# Patient Record
Sex: MALE | Race: WHITE | NOT HISPANIC OR LATINO | Employment: OTHER | URBAN - METROPOLITAN AREA
[De-identification: names, ages, dates, MRNs, and addresses within clinical notes are randomized per-mention and may not be internally consistent; named-entity substitution may affect disease eponyms.]

---

## 2017-03-02 ENCOUNTER — HOSPITAL ENCOUNTER (OUTPATIENT)
Dept: ULTRASOUND IMAGING | Facility: HOSPITAL | Age: 67
Discharge: HOME | End: 2017-03-02
Attending: UROLOGY
Payer: COMMERCIAL

## 2017-03-02 DIAGNOSIS — Q61.00 CONGENITAL RENAL CYST: ICD-10-CM

## 2017-03-02 PROCEDURE — 76770 US EXAM ABDO BACK WALL COMP: CPT

## 2017-03-22 ENCOUNTER — TRANSCRIBE ORDERS (OUTPATIENT)
Dept: ADMINISTRATIVE | Facility: HOSPITAL | Age: 67
End: 2017-03-22

## 2017-03-22 DIAGNOSIS — N28.1 ACQUIRED CYST OF KIDNEY: Primary | ICD-10-CM

## 2017-05-22 ENCOUNTER — HOSPITAL ENCOUNTER (OUTPATIENT)
Dept: RADIOLOGY | Facility: HOSPITAL | Age: 67
Discharge: HOME | End: 2017-05-22
Payer: COMMERCIAL

## 2017-05-22 ENCOUNTER — TRANSCRIBE ORDERS (OUTPATIENT)
Dept: RADIOLOGY | Facility: HOSPITAL | Age: 67
End: 2017-05-22

## 2017-05-22 ENCOUNTER — ALLSCRIPTS OFFICE VISIT (OUTPATIENT)
Dept: OTHER | Facility: OTHER | Age: 67
End: 2017-05-22

## 2017-05-22 ENCOUNTER — TRANSCRIBE ORDERS (OUTPATIENT)
Dept: ADMINISTRATIVE | Facility: HOSPITAL | Age: 67
End: 2017-05-22

## 2017-05-22 DIAGNOSIS — Z87.891 FORMER SMOKER: ICD-10-CM

## 2017-05-22 DIAGNOSIS — M79.672 PAIN OF LEFT FOOT: ICD-10-CM

## 2017-05-22 DIAGNOSIS — Z13.6 ENCOUNTER FOR SCREENING FOR VASCULAR DISEASE: Primary | ICD-10-CM

## 2017-05-22 PROCEDURE — 73630 X-RAY EXAM OF FOOT: CPT

## 2017-05-23 ENCOUNTER — TRANSCRIBE ORDERS (OUTPATIENT)
Dept: LAB | Facility: HOSPITAL | Age: 67
End: 2017-05-23

## 2017-05-23 ENCOUNTER — APPOINTMENT (OUTPATIENT)
Dept: LAB | Facility: HOSPITAL | Age: 67
End: 2017-05-23
Payer: COMMERCIAL

## 2017-05-23 DIAGNOSIS — I10 ESSENTIAL (PRIMARY) HYPERTENSION: ICD-10-CM

## 2017-05-23 DIAGNOSIS — E66.01 MORBID (SEVERE) OBESITY DUE TO EXCESS CALORIES (HCC): ICD-10-CM

## 2017-05-23 LAB
ALBUMIN SERPL BCP-MCNC: 4 G/DL (ref 3.5–5)
ALP SERPL-CCNC: 78 U/L (ref 46–116)
ALT SERPL W P-5'-P-CCNC: 26 U/L (ref 12–78)
ANION GAP SERPL CALCULATED.3IONS-SCNC: 6 MMOL/L (ref 4–13)
AST SERPL W P-5'-P-CCNC: 15 U/L (ref 5–45)
BASOPHILS # BLD AUTO: 0.01 THOUSANDS/ΜL (ref 0–0.1)
BASOPHILS NFR BLD AUTO: 0 % (ref 0–1)
BILIRUB SERPL-MCNC: 0.73 MG/DL (ref 0.2–1)
BILIRUB UR QL STRIP: NEGATIVE
BUN SERPL-MCNC: 15 MG/DL (ref 5–25)
CALCIUM SERPL-MCNC: 8.5 MG/DL (ref 8.3–10.1)
CHLORIDE SERPL-SCNC: 107 MMOL/L (ref 100–108)
CHOLEST SERPL-MCNC: 135 MG/DL (ref 50–200)
CLARITY UR: CLEAR
CO2 SERPL-SCNC: 27 MMOL/L (ref 21–32)
COLOR UR: YELLOW
CREAT SERPL-MCNC: 1.07 MG/DL (ref 0.6–1.3)
EOSINOPHIL # BLD AUTO: 0.22 THOUSAND/ΜL (ref 0–0.61)
EOSINOPHIL NFR BLD AUTO: 3 % (ref 0–6)
ERYTHROCYTE [DISTWIDTH] IN BLOOD BY AUTOMATED COUNT: 13.4 % (ref 11.6–15.1)
GFR SERPL CREATININE-BSD FRML MDRD: >60 ML/MIN/1.73SQ M
GLUCOSE P FAST SERPL-MCNC: 95 MG/DL (ref 65–99)
GLUCOSE UR STRIP-MCNC: NEGATIVE MG/DL
HCT VFR BLD AUTO: 46 % (ref 36.5–49.3)
HDLC SERPL-MCNC: 53 MG/DL (ref 40–60)
HGB BLD-MCNC: 15.6 G/DL (ref 12–17)
HGB UR QL STRIP.AUTO: NEGATIVE
KETONES UR STRIP-MCNC: NEGATIVE MG/DL
LDLC SERPL CALC-MCNC: 66 MG/DL (ref 0–100)
LEUKOCYTE ESTERASE UR QL STRIP: NEGATIVE
LYMPHOCYTES # BLD AUTO: 0.96 THOUSANDS/ΜL (ref 0.6–4.47)
LYMPHOCYTES NFR BLD AUTO: 15 % (ref 14–44)
MCH RBC QN AUTO: 29.9 PG (ref 26.8–34.3)
MCHC RBC AUTO-ENTMCNC: 33.9 G/DL (ref 31.4–37.4)
MCV RBC AUTO: 88 FL (ref 82–98)
MONOCYTES # BLD AUTO: 0.83 THOUSAND/ΜL (ref 0.17–1.22)
MONOCYTES NFR BLD AUTO: 13 % (ref 4–12)
NEUTROPHILS # BLD AUTO: 4.55 THOUSANDS/ΜL (ref 1.85–7.62)
NEUTS SEG NFR BLD AUTO: 69 % (ref 43–75)
NITRITE UR QL STRIP: NEGATIVE
NRBC BLD AUTO-RTO: 0 /100 WBCS
PH UR STRIP.AUTO: 6 [PH] (ref 4.5–8)
PLATELET # BLD AUTO: 233 THOUSANDS/UL (ref 149–390)
PMV BLD AUTO: 10.1 FL (ref 8.9–12.7)
POTASSIUM SERPL-SCNC: 4.3 MMOL/L (ref 3.5–5.3)
PROT SERPL-MCNC: 7.4 G/DL (ref 6.4–8.2)
PROT UR STRIP-MCNC: NEGATIVE MG/DL
RBC # BLD AUTO: 5.22 MILLION/UL (ref 3.88–5.62)
SODIUM SERPL-SCNC: 140 MMOL/L (ref 136–145)
SP GR UR STRIP.AUTO: 1.02 (ref 1–1.03)
TRIGL SERPL-MCNC: 80 MG/DL
TSH SERPL DL<=0.05 MIU/L-ACNC: 1.78 UIU/ML (ref 0.36–3.74)
UROBILINOGEN UR QL STRIP.AUTO: 0.2 E.U./DL
WBC # BLD AUTO: 6.59 THOUSAND/UL (ref 4.31–10.16)

## 2017-05-23 PROCEDURE — 85025 COMPLETE CBC W/AUTO DIFF WBC: CPT

## 2017-05-23 PROCEDURE — 80053 COMPREHEN METABOLIC PANEL: CPT

## 2017-05-23 PROCEDURE — 80061 LIPID PANEL: CPT

## 2017-05-23 PROCEDURE — 84443 ASSAY THYROID STIM HORMONE: CPT

## 2017-05-23 PROCEDURE — 36415 COLL VENOUS BLD VENIPUNCTURE: CPT

## 2017-05-23 PROCEDURE — 81003 URINALYSIS AUTO W/O SCOPE: CPT

## 2017-05-24 ENCOUNTER — GENERIC CONVERSION - ENCOUNTER (OUTPATIENT)
Dept: OTHER | Facility: OTHER | Age: 67
End: 2017-05-24

## 2017-05-25 ENCOUNTER — HOSPITAL ENCOUNTER (OUTPATIENT)
Dept: RADIOLOGY | Facility: HOSPITAL | Age: 67
Discharge: HOME | End: 2017-05-25
Payer: COMMERCIAL

## 2017-05-25 DIAGNOSIS — Z13.6 ENCOUNTER FOR SCREENING FOR VASCULAR DISEASE: ICD-10-CM

## 2017-05-25 PROCEDURE — 76706 US ABDL AORTA SCREEN AAA: CPT

## 2017-05-29 ENCOUNTER — GENERIC CONVERSION - ENCOUNTER (OUTPATIENT)
Dept: OTHER | Facility: OTHER | Age: 67
End: 2017-05-29

## 2017-06-21 ENCOUNTER — ALLSCRIPTS OFFICE VISIT (OUTPATIENT)
Dept: OTHER | Facility: OTHER | Age: 67
End: 2017-06-21

## 2017-06-22 ENCOUNTER — ALLSCRIPTS OFFICE VISIT (OUTPATIENT)
Dept: OTHER | Facility: OTHER | Age: 67
End: 2017-06-22

## 2017-06-29 ENCOUNTER — GENERIC CONVERSION - ENCOUNTER (OUTPATIENT)
Dept: OTHER | Facility: OTHER | Age: 67
End: 2017-06-29

## 2017-07-13 ENCOUNTER — GENERIC CONVERSION - ENCOUNTER (OUTPATIENT)
Dept: OTHER | Facility: OTHER | Age: 67
End: 2017-07-13

## 2017-07-19 ENCOUNTER — ALLSCRIPTS OFFICE VISIT (OUTPATIENT)
Dept: OTHER | Facility: OTHER | Age: 67
End: 2017-07-19

## 2017-07-20 ENCOUNTER — GENERIC CONVERSION - ENCOUNTER (OUTPATIENT)
Dept: OTHER | Facility: OTHER | Age: 67
End: 2017-07-20

## 2017-07-27 ENCOUNTER — GENERIC CONVERSION - ENCOUNTER (OUTPATIENT)
Dept: OTHER | Facility: OTHER | Age: 67
End: 2017-07-27

## 2017-08-03 ENCOUNTER — GENERIC CONVERSION - ENCOUNTER (OUTPATIENT)
Dept: OTHER | Facility: OTHER | Age: 67
End: 2017-08-03

## 2017-08-10 ENCOUNTER — GENERIC CONVERSION - ENCOUNTER (OUTPATIENT)
Dept: OTHER | Facility: OTHER | Age: 67
End: 2017-08-10

## 2017-08-17 ENCOUNTER — ALLSCRIPTS OFFICE VISIT (OUTPATIENT)
Dept: OTHER | Facility: OTHER | Age: 67
End: 2017-08-17

## 2017-08-31 ENCOUNTER — GENERIC CONVERSION - ENCOUNTER (OUTPATIENT)
Dept: OTHER | Facility: OTHER | Age: 67
End: 2017-08-31

## 2017-09-07 ENCOUNTER — GENERIC CONVERSION - ENCOUNTER (OUTPATIENT)
Dept: OTHER | Facility: OTHER | Age: 67
End: 2017-09-07

## 2017-09-14 ENCOUNTER — GENERIC CONVERSION - ENCOUNTER (OUTPATIENT)
Dept: OTHER | Facility: OTHER | Age: 67
End: 2017-09-14

## 2017-09-21 ENCOUNTER — GENERIC CONVERSION - ENCOUNTER (OUTPATIENT)
Dept: OTHER | Facility: OTHER | Age: 67
End: 2017-09-21

## 2017-09-28 ENCOUNTER — ALLSCRIPTS OFFICE VISIT (OUTPATIENT)
Dept: OTHER | Facility: OTHER | Age: 67
End: 2017-09-28

## 2018-01-09 NOTE — PROGRESS NOTES
History of Present Illness  HPI: Precious Martinez is here for 2 month f/u  Current wt 262 lbs, loss of 19 2 lbs x 7 wks  Not tracking but no changes to intake  Likely consuming ~1800 calories/day  He is going on vacation next week and reports he will be overindulging and "gorging" at the buffet x 2  Encouraged him to be mindful of his portions and choices but he said this will not be happening  Discussed other meals surrounding these higher calorie meals and he is prepared to take meal replacements for breakfast and lunch as well as do a lot of walking  Bariatric MNT [facility]:   Weight Assessment: IBW: 166 lbs, ABW: 190 lbs, Weight Change: 19 2 lb loss x 7 wks, Highest Weight: 284 lbs, Lowest Weight: 185 lbs, Goal Weight: 253 lbs  Weight loss attempts: Other: flat belly 50 lbs  Excess calories come from large portions at mealtimes and high calorie high fat food choices  Dietary Recall:   Breakfast: replacement  Snack: orange, [place]  Lunch: 2 oranges, replacement  Snack: bar  Dinner: 2 turkey burgers (homemade), s/w thin, veggies, 1 slice cheese  Snack: 2 fudge pop or nectarine or vitamin C   Beverages: water, powderade  Estimated fluid intake: >80 oz  Alcohol consumption: n/a  Food allergies/intolerances: n/a  Cooking: wife  Shopping: both  Exercise Frequency:  He exercises treadmill/gym when not working  His obesity/being overweight is related to excess energy intake and as evidenced by BMI=37 6  Nutrition Prescription: Estimated calories for weight loss: eCalc BMR 1975, wt loss w/o exercise 1764-8153, w/exercise 4739-7878, Estimated daily protein needs:  gm (1 2-1 5 gm/kg IBW) and Estimated daily fluid needs: 88 oz (35 cc/kg IBW)  Nutrition Intervention: Counseling provided with emphasis on meal planning, portion sizes, hydration, protein intake and exercise  Education materials provided: [employer] manual    Comprehension: good  Expected Compliance: good  Goals: follow meal plan prescribed, plan meals and snacks daily, Choose lower-calorie, lower-fat meal options at home and when dining out, do not skip meals or snacks and 6664-8659 calories using 1-2 replacements  Monitor/Evaluation: weekly weight, food journal, fluid intake and exercise level  Active Problems    1  Chronic obstructive pulmonary disease (496) (J44 9)   2  Decreased vision in both eyes (369 20) (H54 2)   3  Eczema (692 9) (L30 9)   4  Encounter for screening for vascular disease (V81 2) (Z13 6)   5  Foot pain, left (729 5) (M79 672)   6  Heel pain, chronic, left (729 5,338 29) (M79 672,G89 29)   7  Hypertension (401 9) (I10)   8  Mild depression (311) (F32 0)   9  Morbid or severe obesity due to excess calories (278 01) (E66 01)   10  Need for pneumococcal vaccination (V03 82) (Z23)   11  Obstructive sleep apnea (327 23) (G47 33)   12  Renal cyst, left (753 10) (N28 1)   13  Screening for colon cancer (V76 51) (Z12 11)   14  Screening for prostate cancer (V76 44) (Z12 5)   15  Welcome to Medicare preventive visit (V70 0) (Z00 00)   16  Well adult exam (V70 0) (Z00 00)    Past Medical History    1  History of Arthralgia of right elbow (719 42) (M25 521)   2  History of Borderline hypertension (796 2) (R03 0)   3  History of Epilepsy And Recurrent Seizures (345 90)   4  History of Fracture Of Facial Bones (802 8)   5  History of asthma (V12 69) (Z87 09)   6  History of obesity (V13 89) (Z86 39)   7  History of shortness of breath (V13 89) (Z87 898)   8  History of sleep apnea (V13 89) (Z86 69)    Surgical History    1  History of Appendectomy   2  History of Dental Surgery   3  History of Elevation Of Depressed Skull Fracture   4  History of Tonsillectomy    Family History  Mother    1  Family history of dementia (V17 2) (Z81 8)  Father    2  Family history of   Family History    3   Family history of Cancer    Social History    · Former smoker (D43 62) (X69 420)   · Never Drank Alcohol   · No illicit drug use   · Non drinker / no alcohol use   · Occasional caffeine consumption    Current Meds   1  Advair Diskus 250-50 MCG/DOSE Inhalation Aerosol Powder Breath Activated; USE 1   PUFF TWICE DAILY; Therapy: 91ODO4727 to (Last Rx:23May2016)  Requested for: 62QIC5652 Ordered   2  Ibuprofen 800 MG Oral Tablet; TAKE 1 TABLET TWICE DAILY AS NEEDED; Therapy: 98GEQ6247 to (Evaluate:14Jan2016)  Requested for: 20CTI5010; Last   Rx:04Jan2016 Ordered   3  Lisinopril-Hydrochlorothiazide 10-12 5 MG Oral Tablet; TAKE 1 TABLET EVERY DAY; Therapy: 46QUT4876 to (Evaluate:18Nov2017)  Requested for: 97VJR8450; Last   Rx:22May2017 Ordered   4  Ventolin  (90 Base) MCG/ACT Inhalation Aerosol Solution; USE 2 PUFFS EVERY   4-6 HOURS AS NEEDED; Therapy: 85NOD2545 to (Evaluate:21Mar2017); Last Rx:21Nov2016 Ordered    Allergies    1  Penicillin V Potassium SOLR    Vitals  Signs   Recorded: 17Aug2017 07:39AM   Height: 5 ft 10 in  Weight: 262 lb   BMI Calculated: 37 59  BSA Calculated: 2 34    Future Appointments    Date/Time Provider Specialty Site   03/27/2018 08:30 AM Joaquin Dudley MD Urology [address]   10/05/2017 03:30 PM IVIS Hill  Bariatric Medicine [facility]     Signatures   Electronically signed by :  Daren Shelton, ; Aug 17 2017 12:00PM EST                       (Author)    Electronically signed by : IVIS Wu ; Aug 17 2017  4:41PM EST                       (Co-author)

## 2018-01-10 NOTE — RESULT NOTES
Verified Results  US ABDOMINAL AORTA SCREENING AAA [id number] 08:01AM Mortimer [place]     Test Name Result Flag Reference   US ABDOMINAL AORTA SCREENING AAA (Report)     ABDOMINAL AORTIC ULTRASOUND     INDICATION: Evaluate for aortic aneurysm  COMPARISON: CT abdomen and pelvis 1/15/2016     FINDINGS:      Ultrasound of the abdominal aorta was performed in longitudinal and transverse planes with a curvilinear transducer  Evaluation is mildly limited secondary to shadowing  Proximal aorta: 3 4 x 3 6 cm   Mid aorta:  2 x 2 1 cm   Distal aorta:  1 8 x 1 9 cm   Right common iliac origin: 1 x 1 2 cm   Left common iliac origin: 1 x 1 2 cm     No periaortic collections or adenopathy detected  IMPRESSION:   Limited evaluation demonstrating mild enlargement of the proximal abdominal aorta measuring 3 4 x 3 6 cm         Workstation performed: [id number]     Signed by:   Carlota Pavon MD   5/29/17

## 2018-01-10 NOTE — CONSULTS
Chief Complaint  Chief Complaint Free Text Note Form: Patient is here today for MWM consult  Patient has been diagnosed with sleep apnea but does not use C-PAP machine  History of Present Illness  Free Text HPI: Obesity-  Severity: Severe  Onset: 1990s  Modifiers:Flat belly diet lost 50lbs in 6lbs  Associated Symptoms: increased SOB, increased sweating, increased BP  Past Medical History    1  History of Arthralgia of right elbow (719 42) (M25 521)   2  History of Borderline hypertension (796 2) (R03 0)   3  History of Epilepsy And Recurrent Seizures (345 90)   4  History of Fracture Of Facial Bones (802 8)   5  History of obesity (V13 89) (Z86 39)   6  History of shortness of breath (V13 89) (N10 932)  Active Problems And Past Medical History Reviewed: The active problems and past medical history were reviewed and updated today  Assessment    1  Morbid or severe obesity due to excess calories (278 01) (E66 01)   2  Obstructive sleep apnea (327 23) (G47 33)   3  Hypertension (401 9) (I10)   4  Chronic obstructive pulmonary disease (496) (J44 9)    Discussion/Summary  Discussion Summary:   71-year-old male with hypertension, COPD, KIZZY and obesity here to pursue medical weight management to improve weight and health  Obesity class 3:  -discussed options of conservative vs CAM program +/- meal replacement vs VLCD and bariatric surgery(not interested in this)  -initial focus of 5-10% weight loss over 3-6 mos for improved health  -screening labs-reviewed from 5/2017 and within acceptable limits    KIZZY:  -Encouraged continued use of C Pap-does not use, discussed the risks of untreated sleep apnea    Hypertension: Elevated today, patient did not take medication  -Continue with lisinopril/HCTZ    Asthma vs COPD: Stable  -Continue with current regimen    Patient is interested in intensive lifestyle intervention program and will start this with his wife   We'll set up visit with dietitian, 2 months BP check, 3 month follow-up with me  Patient's Capacity to Self-Care: Patient is able to Self-Care  Understands and agrees with treatment plan: The treatment plan was reviewed with the patient/guardian  The patient/guardian understands and agrees with the treatment plan   Self Referrals:   Self Referrals: No      Review of Systems  Focused-Male:   Constitutional: no fever  ENT: no sore throat  Cardiovascular: no chest pain and no palpitations  Respiratory: shortness of breath during exertion  Gastrointestinal: no abdominal pain, no nausea, no constipation and no diarrhea  Genitourinary: urinary hesitancy  Musculoskeletal: no arthralgias  Integumentary: no rashes  Neurological: no headache  Other Symptoms: Psych:denies depression/anxiety  Active Problems    1  Chronic obstructive pulmonary disease (496) (J44 9)   2  Decreased vision in both eyes (369 20) (H54 2)   3  Eczema (692 9) (L30 9)   4  Encounter for screening for vascular disease (V81 2) (Z13 6)   5  Foot pain, left (729 5) (M79 672)   6  Heel pain, chronic, left (729 5,338 29) (M79 672,G89 29)   7  Hypertension (401 9) (I10)   8  Mild depression (311) (F32 0)   9  Morbid or severe obesity due to excess calories (278 01) (E66 01)   10  Need for pneumococcal vaccination (V03 82) (Z23)   11  Obstructive sleep apnea (327 23) (G47 33)   12  Renal cyst, left (753 10) (N28 1)   13  Screening for colon cancer (V76 51) (Z12 11)   14  Screening for prostate cancer (V76 44) (Z12 5)   15  Welcome to Medicare preventive visit (V70 0) (Z00 00)   16  Well adult exam (V70 0) (Z00 00)    Surgical History    1  History of Appendectomy   2  History of Dental Surgery  Surgical History Reviewed: The surgical history was reviewed and updated today  Family History  Mother    1  Family history of dementia (V17 2) (Z81 8)  Family History    2  Family history of Cancer  Family History Reviewed: The family history was reviewed and updated today  Social History    · Former smoker (D66 66) (W23 639)   · Never Drank Alcohol   · Occasional caffeine consumption  Social History Reviewed: The social history was reviewed and updated today  Current Meds   1  Advair Diskus 250-50 MCG/DOSE Inhalation Aerosol Powder Breath Activated; USE 1   PUFF TWICE DAILY; Therapy: 22ZLP5566 to (Last Rx:23May2016)  Requested for: 52TTS5974 Ordered   2  Ibuprofen 800 MG Oral Tablet; TAKE 1 TABLET TWICE DAILY AS NEEDED; Therapy: 77CTY3307 to (Evaluate:14Jan2016)  Requested for: 06IQL5599; Last   Rx:04Jan2016 Ordered   3  Lisinopril-Hydrochlorothiazide 10-12 5 MG Oral Tablet; TAKE 1 TABLET EVERY DAY; Therapy: 01JVC7006 to (Evaluate:18Nov2017)  Requested for: 36MFO3095; Last   Rx:22May2017 Ordered   4  Ventolin  (90 Base) MCG/ACT Inhalation Aerosol Solution; USE 2 PUFFS EVERY   4-6 HOURS AS NEEDED; Therapy: 46BGZ3226 to (Evaluate:21Mar2017); Last Rx:21Nov2016 Ordered  Medication List Reviewed: The medication list was reviewed and updated today  Allergies    1  Penicillin V Potassium SOLR    Vitals  Vital Signs    Recorded: 21Jun2017 04:03PM   Temperature 97 5 F   Heart Rate 78   Systolic 471, LUE, Sitting   Diastolic 88, LUE, Sitting   Height 5 ft 10 in   Weight 284 lb 5 oz   BMI Calculated 40 79   BSA Calculated 2 42   Waist Circumference 54 in  Neck Circumference 18 in  Physical Exam    Constitutional   General appearance: Abnormal   well developed and morbidly obese  Eyes No conjunctival pallor  Ears, Nose, Mouth, and Throat Moist oral mucosa  Pulmonary   Respiratory effort: No increased work of breathing or signs of respiratory distress  Auscultation of lungs: Clear to auscultation, equal breath sounds bilaterally, no wheezes, no rales, no rhonci  Cardiovascular   Auscultation of heart: Normal rate and rhythm, normal S1 and S2, without murmurs  Abdomen   Abdomen: Abnormal   The abdomen was obese  The abdomen was soft and nontender  Musculoskeletal   Gait and station: Normal     Psychiatric   Orientation to person, place and time: Normal     Mood and affect: Normal          Future Appointments    Date/Time Provider Specialty Site   07/05/2017 09:00 AM IVIS Mariscal   03/27/2018 08:30 AM Danae Hines MD Urology [address]     Signatures   Electronically signed by : IVIS Barbour ; Jun 21 2017  5:03PM EST                       (Author)

## 2018-01-10 NOTE — RESULT NOTES
Message   Colon polyp biopsy showed hyperplastic polyp  Colonoscopy recommended in ten years  Please enter reminder  Verified Results  (1) TISSUE EXAM 04Apr2016 02:23PM Verona Pulido   polyp     Test Name Result Flag Reference   LAB AP CASE REPORT (Report)     Surgical Pathology Report             Case: [id number]                   Authorizing Provider: Skyler Hankins MD      Collected:      04/04/2016 1423        Ordering Location:   [place] [address]   Received:      04/05/2016 [facility]                               Pathologist:      Jaime Rivera MD                              Specimen:  Large Intestine, Sigmoid Colon, Cold bx, polyp   LAB AP FINAL DIAGNOSIS      A  Sigmoid colon, polyp, cold biopsy:    - Hyperplastic polyp     - No dysplasia or carcinoma identified  LAB AP SURGICAL ADDITIONAL INFORMATION (Report)     These tests were developed and their performance characteristics   determined by [address] [place] [address] or [employer]  They may not be cleared or approved by the [place]  Food and   Drug Administration  The FDA has determined that such clearance or   approval is not necessary  These tests are used for clinical purposes  They should not be regarded as investigational or for research  This   laboratory has been approved by [facility], designated as a high-complexity   laboratory and is qualified to perform these tests  - Interpretation performed at [facility]   LAB AP GROSS DESCRIPTION (Report)     A  The specimen is received in formalin, labeled with the patient's name   and hospital number, and is designated colon polyp biopsy  The specimen   consists of one tan-pink soft tissue fragment measuring 0 3 centimeters in   greatest dimension  Entirely submitted  One cassette      Note: The estimated total formalin fixation time based upon information   provided by the submitting clinician and the standard processing schedule   is 23 75 hours      [facility]   LAB AP CLINICAL INFORMATION polyp

## 2018-01-11 NOTE — PROGRESS NOTES
History of Present Illness  HPI: Angela Jones is here for initial RD session for [employer]  Current wt: 281 2 lbs  No goal weight in mind but wants to breathe better so that he can be intimate with wife  Has been diets before and is open to suggestions  Currently working at his [employer]  Son also on the program and wife joining as well which will be good for support  He currently consumes excess calories from carbs  Enjoys exercising on the treadmill  Does not drive and needs to plan everything around his wife's schedule  Bariatric MNT [facility]:   Weight Assessment: IBW: 166 lbs, ABW: 194 8 lbs, Highest Weight: 284 lbs, Lowest Weight: 185 lbs, Goal Weight: 253-267  Weight loss attempts: Other: FlatBelly 50 lbs  Excess calories come from large portions at mealtimes and high calorie high fat food choices  Dietary Recall:   Breakfast: 8 a m  usually skip or granola bar or cereal honey nut cheerios, skim  Snack: 10:00 iced tea crystal    Lunch: 11-1: (subway) steak s/w, crystal light iced tea  Snack: skip  Dinner: 2-6: hoagie OR tuna salad, chicken w/pierogies  Snack: healthy choice fudge pop 100, 5  cereal w/skim milk   Beverages: crystal light,   Estimated fluid intake: 80 oz  Alcohol consumption: n/a  Food allergies/intolerances: n/a  Cooking: wife  Shopping: both  He dines out 1-2 times per week  Exercise Frequency:  He exercises daily when not working, gym/treadmill at home  His obesity/being overweight is related to excess energy intake and as evidenced by BMI=40 3  Nutrition Prescription: Estimated calories for weight loss: [place] BMR 2042, eCalc BMR 2076, wt loss w/o exercise 4229-3168, w/exercise 9600-8441, Estimated daily protein needs:  gm (1 2-1 5 gm/kg IBW) and Estimated daily fluid needs: 88 oz (35 cc/kg IBW)  Breakfast: replacement + skim milk 12 oz  Snack: bar  Lunch: replacement + fruit  Snack: skip or food snack     Dinner: 500-620, 42: 6 oz lean pro, 200 marti carb, 2 fat  Snack: healthy choice fudge pop  3/4 cup cereal + 1/2 cup skim milk  255, 11   Nutrition Intervention: Counseling provided with emphasis on meal planning, portion sizes, healthy snack choices, hydration, fiber intake, protein intake, exercise and food journaling  Education materials provided: [employer] manual    Comprehension: good  Expected Compliance: good  Goals: follow meal plan prescribed, reduce portion sizes at mealtimes, plan meals and snacks daily, Choose lower-calorie, lower-fat meal options at home and when dining out, food journal, do not skip meals or snacks and 2656-8046 calories  Monitor/Evaluation: weekly weight, food journal, fluid intake and exercise level  Active Problems    1  Chronic obstructive pulmonary disease (496) (J44 9)   2  Decreased vision in both eyes (369 20) (H54 2)   3  Eczema (692 9) (L30 9)   4  Encounter for screening for vascular disease (V81 2) (Z13 6)   5  Foot pain, left (729 5) (M79 672)   6  Heel pain, chronic, left (729 5,338 29) (M79 672,G89 29)   7  Hypertension (401 9) (I10)   8  Mild depression (311) (F32 0)   9  Morbid or severe obesity due to excess calories (278 01) (E66 01)   10  Need for pneumococcal vaccination (V03 82) (Z23)   11  Obstructive sleep apnea (327 23) (G47 33)   12  Renal cyst, left (753 10) (N28 1)   13  Screening for colon cancer (V76 51) (Z12 11)   14  Screening for prostate cancer (V76 44) (Z12 5)   15  Welcome to Medicare preventive visit (V70 0) (Z00 00)   16  Well adult exam (V70 0) (Z00 00)    Past Medical History    1  History of Arthralgia of right elbow (719 42) (M25 521)   2  History of Borderline hypertension (796 2) (R03 0)   3  History of Epilepsy And Recurrent Seizures (345 90)   4  History of Fracture Of Facial Bones (802 8)   5  History of asthma (V12 69) (Z87 09)   6  History of obesity (V13 89) (Z86 39)   7  History of shortness of breath (V13 89) (Z87 898)   8   History of sleep apnea (V13 89) (Z86 69)    Surgical History    1  History of Appendectomy   2  History of Dental Surgery   3  History of Elevation Of Depressed Skull Fracture   4  History of Tonsillectomy    Family History  Mother    1  Family history of dementia (V17 2) (Z81 8)  Father    2  Family history of   Family History    3  Family history of Cancer    Social History    · Former smoker (Q39 62) (T94 312)   · Never Drank Alcohol   · No illicit drug use   · Non drinker / no alcohol use   · Occasional caffeine consumption    Current Meds   1  Advair Diskus 250-50 MCG/DOSE Inhalation Aerosol Powder Breath Activated; USE 1   PUFF TWICE DAILY; Therapy: 57AXX9113 to (Last Rx:2016)  Requested for: 67XAI7533 Ordered   2  Ibuprofen 800 MG Oral Tablet; TAKE 1 TABLET TWICE DAILY AS NEEDED; Therapy: 96DST4010 to (Evaluate:2016)  Requested for: 76KNV5413; Last   Rx:2016 Ordered   3  Lisinopril-Hydrochlorothiazide 10-12 5 MG Oral Tablet; TAKE 1 TABLET EVERY DAY; Therapy: 99PPY7702 to (Evaluate:2017)  Requested for: 88PXF3165; Last   Rx:2017 Ordered   4  Ventolin  (90 Base) MCG/ACT Inhalation Aerosol Solution; USE 2 PUFFS EVERY   4-6 HOURS AS NEEDED; Therapy: 96XJU7393 to (Evaluate:2017); Last Rx:2016 Ordered    Allergies    1   Penicillin V Potassium SOLR    Vitals  Signs   Recorded: 34DTS2538 04:56PM   Height: 5 ft 10 in  Weight: 281 lb 3 2 oz  BMI Calculated: 40 35  BSA Calculated: 2 41    Results/Data  Tanita Flowsheet 71LLX2157 05:57PM Kvng Jazmin     Test Name Result Flag Reference   Height 70     Weight 281 2     Body Fat % 40 0     Fat Mass 112 4     FFM ( Fat Free Mass) 168 8     Muscle Mass 160 6     TBW ( Total Body Water) 121 4     TBW % 43 2     Bone Mass 8 2     BMR ( Basal Metabolic Rate) 7415     Metabolic Age 81     Visceral Fat Rating 26     BMI 40 3         Future Appointments    Date/Time Provider Specialty Site   2017 09:00 AM IVIS Terry  Leandra   03/27/2018 08:30 AM Bridget Delacruz MD Urology [facility] [address]   07/19/2017 08:00 AM Marston Schlatter  [facility]   10/05/2017 03:30 PM IVIS Castle  Bariatric Medicine [facility]     Signatures   Electronically signed by :  Blayne Matthew, ; Jun 22 2017  5:57PM EST                       (Author)    Electronically signed by : IVIS Salazar ; Jun 23 2017  7:38AM EST                       (Co-author)

## 2018-01-12 VITALS — BODY MASS INDEX: 37.82 KG/M2 | HEIGHT: 70 IN | WEIGHT: 264.2 LBS

## 2018-01-12 VITALS — HEIGHT: 70 IN | WEIGHT: 270.2 LBS | BODY MASS INDEX: 38.68 KG/M2

## 2018-01-12 VITALS — HEIGHT: 70 IN | BODY MASS INDEX: 37.51 KG/M2 | WEIGHT: 262 LBS

## 2018-01-12 NOTE — RESULT NOTES
Message   Call patient  X-ray L foot showed no heel spur or bone lesions  Recommend to schedule visit with podiatrist Dr Jung Cruz as discussed  Verified Results  * XR FOOT 3+ VIEW LEFT 22Nov2016 06:57AM Bozena Carter    Order Number: [id number]     Test Name Result Flag Reference   XR FOOT 3+ VW LEFT (Report)     LEFT FOOT     INDICATION: Left heel pain     COMPARISON: Left ankle 8/2/2006     VIEWS: 3; 3 images     FINDINGS:     There is no acute fracture or dislocation  No calcaneal spur is seen  No significant joint space narrowing  No lytic or blastic lesions are seen  Soft tissues are unremarkable  IMPRESSION:     No acute osseous abnormality         Workstation performed: [id number]     Signed by:   Olive Lord MD   11/23/16

## 2018-01-12 NOTE — RESULT NOTES
Verified Results  * XR FOOT 3+ VIEW LEFT [id number] 09:34AM Trixie Munoz Order Number: [id number]     Test Name Result Flag Reference   XR FOOT 3+ VW LEFT (Report)     LEFT FOOT     INDICATION: Left foot pain  COMPARISON: Left foot x-ray dated November 22, 2016  VIEWS: 3     IMAGES: 3     FINDINGS:     There is no acute fracture or dislocation  No degenerative changes  No lytic or blastic lesions are seen  Soft tissues are unremarkable  IMPRESSION:     No acute osseous abnormality         Workstation performed: [id number]     Signed by:   Barrett Betancourt MD   5/24/17

## 2018-01-13 VITALS — HEIGHT: 70 IN | WEIGHT: 277.5 LBS | BODY MASS INDEX: 39.73 KG/M2

## 2018-01-13 VITALS — HEIGHT: 70 IN | WEIGHT: 281.2 LBS | BODY MASS INDEX: 40.26 KG/M2

## 2018-01-13 VITALS
DIASTOLIC BLOOD PRESSURE: 100 MMHG | BODY MASS INDEX: 40.03 KG/M2 | HEART RATE: 72 BPM | TEMPERATURE: 96.5 F | RESPIRATION RATE: 20 BRPM | WEIGHT: 279.6 LBS | SYSTOLIC BLOOD PRESSURE: 172 MMHG | HEIGHT: 70 IN

## 2018-01-13 VITALS — WEIGHT: 267.8 LBS | HEIGHT: 70 IN | BODY MASS INDEX: 38.34 KG/M2

## 2018-01-13 VITALS — HEIGHT: 70 IN | BODY MASS INDEX: 36.79 KG/M2 | WEIGHT: 257 LBS

## 2018-01-13 NOTE — RESULT NOTES
Message   Call patient  Blood work showed normal cholesterol profile, kidney function test,  liver enzymes,  fasting blood sugar level  Verified Results  (1) LIPID PANEL, FASTING 41VBS9232 09:16AM Melba Hamilton    Order Number: [id number]  TW Order Number: [id number] Order Number: [id number]     Test Name Result Flag Reference   CHOLESTEROL 145 mg/dL     HDL,DIRECT 46 mg/dL  40-60   Specimen collection should occur prior to Metamizole administration due to the potential for falsely depressed results  LDL CHOLESTEROL CALCULATED 84 mg/dL  0-100   Triglyceride:         Normal              <150 mg/dl       Borderline High    150-199 mg/dl       High               200-499 mg/dl       Very High          >499 mg/dl  Cholesterol:         Desirable        <200 mg/dl      Borderline High  200-239 mg/dl      High             >239 mg/dl  HDL Cholesterol:        High    >59 mg/dL      Low     <41 mg/dL  LDL CALCULATED:    This screening LDL is a calculated result  It does not have the accuracy of the Direct Measured LDL in the monitoring of patients with hyperlipidemia and/or statin therapy  Direct Measure LDL (IAT167) must be ordered separately in these patients  TRIGLYCERIDES 76 mg/dL  <=150   Specimen collection should occur prior to N-Acetylcysteine or Metamizole administration due to the potential for falsely depressed results

## 2018-01-14 VITALS
WEIGHT: 284.31 LBS | HEIGHT: 70 IN | BODY MASS INDEX: 40.7 KG/M2 | SYSTOLIC BLOOD PRESSURE: 158 MMHG | DIASTOLIC BLOOD PRESSURE: 88 MMHG | TEMPERATURE: 97.5 F | HEART RATE: 78 BPM

## 2018-01-14 VITALS — HEIGHT: 70 IN | WEIGHT: 273.4 LBS | BODY MASS INDEX: 39.14 KG/M2

## 2018-01-14 VITALS — HEIGHT: 70 IN | BODY MASS INDEX: 37.02 KG/M2 | WEIGHT: 258.6 LBS

## 2018-01-14 VITALS — HEIGHT: 70 IN | BODY MASS INDEX: 37.16 KG/M2 | WEIGHT: 259.6 LBS

## 2018-01-15 VITALS — WEIGHT: 260.2 LBS | BODY MASS INDEX: 37.25 KG/M2 | HEIGHT: 70 IN

## 2018-01-15 VITALS — WEIGHT: 266 LBS | BODY MASS INDEX: 38.08 KG/M2 | HEIGHT: 70 IN

## 2018-01-17 NOTE — PROGRESS NOTES
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  Bariatric MNT [facility]:   Weight Assessment: IBW: 166 lbs, ABW: 191 8 lbs, Weight Change: 12 lb loss x 3 1/2 wk, Highest Weight: 284 lbs, Lowest Weight: 185 lbs, Goal Weight: 253-267 lbs  Weight loss attempts: Other: flat belly 50 lbs  Excess calories come from large portions at mealtimes and high calorie high fat food choices  Dietary Recall:   Breakfast: replacement (200)  Snack: [employer] protein 190, 10  Lunch: shake + 2 oranges  Snack: [employer] protein  Dinner: 2 turkey burgers, veggies, cheese, s/w thin's  Snack: fudge pop, bar   Beverages: water  Estimated fluid intake: >80 oz  Alcohol consumption: n/a  Food allergies/intolerances: n/a  Cooking: wife  Shopping: both  He dines out 1-2 times per week  Exercise Frequency:  He exercises treadmill/gym when not working, some daily  His obesity/being overweight is related to excess energy intake and as evidenced by BMI=38 6  Nutrition Prescription: Estimated calories for weight loss: eCalc BMR 2054, wt loss w/o exercise 1670, w/exercise 2170, Estimated daily protein needs:  gm (1 2-1 5 gm/kg IBW) and Estimated daily fluid needs: 88 oz (35 cc/kg IBW)  Nutrition Intervention: Counseling provided with emphasis on portion sizes, healthy snack choices, hydration, protein intake and exercise  Education materials provided: [employer] manual    Comprehension: good  Expected Compliance: good     Goals: follow meal plan prescribed, plan meals and snacks daily, do not skip meals or snacks and 8494-8740 calories using 2 replacements   Monitor/Evaluation: weekly weight, food journal, fluid intake and exercise level  Active Problems    1  Chronic obstructive pulmonary disease (496) (J44 9)   2  Decreased vision in both eyes (369 20) (H54 2)   3  Eczema (692 9) (L30 9)   4  Encounter for screening for vascular disease (V81 2) (Z13 6)   5  Foot pain, left (729 5) (M79 672)   6  Heel pain, chronic, left (729 5,338 29) (M79 672,G89 29)   7  Hypertension (401 9) (I10)   8  Mild depression (311) (F32 0)   9  Morbid or severe obesity due to excess calories (278 01) (E66 01)   10  Need for pneumococcal vaccination (V03 82) (Z23)   11  Obstructive sleep apnea (327 23) (G47 33)   12  Renal cyst, left (753 10) (N28 1)   13  Screening for colon cancer (V76 51) (Z12 11)   14  Screening for prostate cancer (V76 44) (Z12 5)   15  Welcome to Medicare preventive visit (V70 0) (Z00 00)   16  Well adult exam (V70 0) (Z00 00)    Past Medical History    1  History of Arthralgia of right elbow (719 42) (M25 521)   2  History of Borderline hypertension (796 2) (R03 0)   3  History of Epilepsy And Recurrent Seizures (345 90)   4  History of Fracture Of Facial Bones (802 8)   5  History of asthma (V12 69) (Z87 09)   6  History of obesity (V13 89) (Z86 39)   7  History of shortness of breath (V13 89) (Z87 898)   8  History of sleep apnea (V13 89) (Z86 69)    Surgical History    1  History of Appendectomy   2  History of Dental Surgery   3  History of Elevation Of Depressed Skull Fracture   4  History of Tonsillectomy    Family History  Mother    1  Family history of dementia (V17 2) (Z81 8)  Father    2  Family history of   Family History    3  Family history of Cancer    Social History    · Former smoker (L62 17) (E23 543)   · Never Drank Alcohol   · No illicit drug use   · Non drinker / no alcohol use   · Occasional caffeine consumption    Current Meds   1   Advair Diskus 250-50 MCG/DOSE Inhalation Aerosol Powder Breath Activated; USE 1   PUFF TWICE DAILY; Therapy: 66FPO1264 to (Last Rx:23May2016)  Requested for: 06SKJ3898 Ordered   2  Ibuprofen 800 MG Oral Tablet; TAKE 1 TABLET TWICE DAILY AS NEEDED; Therapy: 11MQL7061 to (Evaluate:14Jan2016)  Requested for: 46NCB6524; Last   Rx:04Jan2016 Ordered   3  Lisinopril-Hydrochlorothiazide 10-12 5 MG Oral Tablet; TAKE 1 TABLET EVERY DAY; Therapy: 95HCP8763 to (Evaluate:18Nov2017)  Requested for: 58DHA7796; Last   Rx:22May2017 Ordered   4  Ventolin  (90 Base) MCG/ACT Inhalation Aerosol Solution; USE 2 PUFFS EVERY   4-6 HOURS AS NEEDED; Therapy: 42JLH7810 to (Evaluate:21Mar2017); Last Rx:21Nov2016 Ordered    Allergies    1  Penicillin V Potassium SOLR    Vitals  Signs   Recorded: 87XXP0718 11:34AM   Height: 5 ft 10 in  Weight: 269 lb 3 2 oz  BMI Calculated: 38 63  BSA Calculated: 2 37    Future Appointments    Date/Time Provider Specialty Site   03/27/2018 08:30 AM Eduardo Love MD Urology [address]   10/05/2017 03:30 PM IVIS Machuca  Bariatric Medicine [place] [address] [facility]     Signatures   Electronically signed by :  Nithya Bravo, ; Jul 19 2017  1:12PM EST                       (Author)    Electronically signed by : IVIS Stallings ; Jul 19 2017  4:31PM EST                       (Co-author)

## 2018-01-18 NOTE — PROGRESS NOTES
History of Present Illness  HPI: Gabriella Garvey is here for 3 month f/u with Tanita and REE  Current wt: 256 2 lbs  Loss of 25 lbs x 3 months (9%)  Tanita shows fat loss of 14 2 lbs (57%) but this is likely skewed r/t 10 2 lb fluid difference  BMR does show improvement vs  initial Tanita  REE 6% < predicted (1987 kcal measured vs  2106 predicted)  No major changes to intake although he is eating mainly fruit at lunch though daily total of protein does exceed needs  He will talk to his wife about how he would like to proceed  He feels he needs to do check ins to stay on track but knows that new class times in the [facility] program will not work for him  Did have his fitness assessment done and is attending the gym  Bariatric [facility]NT [facility]:   Weight Assessment: IBW: 166 lbs, ABW: 188 5 lbs, Weight Change: 25 lb loss x 3 m, Highest Weight: 284 lbs, Lowest Weight: 185 lbs, Goal Weight: 253 lbs  Weight loss attempts: Other: flat belly 50 lbs  Excess calories come from large portions at mealtimes and high calorie high fat food choices  Dietary Recall:   Breakfast: replacement  Snack: [employer] bar  Lunch: orange, apple  Snack: [employer] bar  Dinner: 2 homemade turkey burgers, s/w thin, 1 slice cheese, veggies  Snack: 2 weight watchers fudge pop   Beverages: water, powerade  Estimated fluid intake: >80 oz  Alcohol consumption: n/a  Food allergies/intolerances: n/a  Cooking: wife  Shopping: both  He dines out occasionally  Exercise Frequency:  He exercises treadmill/gym when not working  His obesity/being overweight is related to excess energy intake and as evidenced by BMI=36 8  Nutrition Prescription: Estimated calories for weight loss: Tanita REE 2166x1 3-8052=2947, REE 1987x1  3-7735=4476, Estimated daily protein needs:  gm (1 2-1 5 gm/kg IBW) and Estimated daily fluid needs: 88 oz (35 cc/kg IBW)         Nutrition Intervention: Counseling provided with emphasis on healthy snack choices, protein intake and exercise  Education materials provided: [employer] manual    Comprehension: good  Expected Compliance: good  Goals: follow meal plan prescribed, plan meals and snacks daily, Choose lower-calorie, lower-fat meal options at home and when dining out, do not skip meals or snacks and 0694-6486 calories using 1 replacement  Monitor/Evaluation: weekly weight, food journal, fluid intake and exercise level  Active Problems    1  Chronic obstructive pulmonary disease (496) (J44 9)   2  Decreased vision in both eyes (369 20) (H54 2)   3  Eczema (692 9) (L30 9)   4  Encounter for screening for vascular disease (V81 2) (Z13 6)   5  Foot pain, left (729 5) (M79 672)   6  Heel pain, chronic, left (729 5,338 29) (M79 672,G89 29)   7  Hypertension (401 9) (I10)   8  Mild depression (311) (F32 0)   9  Morbid or severe obesity due to excess calories (278 01) (E66 01)   10  Need for pneumococcal vaccination (V03 82) (Z23)   11  Obstructive sleep apnea (327 23) (G47 33)   12  Renal cyst, left (753 10) (N28 1)   13  Screening for colon cancer (V76 51) (Z12 11)   14  Screening for prostate cancer (V76 44) (Z12 5)   15  Welcome to Medicare preventive visit (V70 0) (Z00 00)   16  Well adult exam (V70 0) (Z00 00)    Past Medical History    1  History of Arthralgia of right elbow (719 42) (M25 521)   2  History of Borderline hypertension (796 2) (R03 0)   3  History of Epilepsy And Recurrent Seizures (345 90)   4  History of Fracture Of Facial Bones (802 8)   5  History of asthma (V12 69) (Z87 09)   6  History of obesity (V13 89) (Z86 39)   7  History of shortness of breath (V13 89) (Z87 898)   8  History of sleep apnea (V13 89) (Z86 69)    Surgical History    1  History of Appendectomy   2  History of Dental Surgery   3  History of Elevation Of Depressed Skull Fracture   4  History of Tonsillectomy    Family History  Mother    1   Family history of dementia (V17 2) (Z81 8)  Father 2  Family history of   Family History    3  Family history of Cancer    Social History    · Former smoker (D43 88) (T34 876)   · Never Drank Alcohol   · No illicit drug use   · Non drinker / no alcohol use   · Occasional caffeine consumption    Current Meds   1  Advair Diskus 250-50 MCG/DOSE Inhalation Aerosol Powder Breath Activated; USE 1   PUFF TWICE DAILY; Therapy: 10HMQ4478 to (Last Rx:2016)  Requested for: 69JTI6230 Ordered   2  Ibuprofen 800 MG Oral Tablet; TAKE 1 TABLET TWICE DAILY AS NEEDED; Therapy: 93GLV2483 to (Evaluate:2016)  Requested for: 50MRF2083; Last   Rx:2016 Ordered   3  Lisinopril-Hydrochlorothiazide 10-12 5 MG Oral Tablet; TAKE 1 TABLET EVERY DAY; Therapy: 92OOR1334 to (Evaluate:2017)  Requested for: 12BJR8849; Last   Rx:2017 Ordered   4  Ventolin  (90 Base) MCG/ACT Inhalation Aerosol Solution; USE 2 PUFFS EVERY   4-6 HOURS AS NEEDED; Therapy: 29UZE7640 to (Evaluate:2017); Last Rx:2016 Ordered    Allergies    1  Penicillin V Potassium SOLR    Vitals  Signs   Recorded: 66BOL2754 08:14AM   Height: 5 ft 10 in  Weight: 256 lb 3 2 oz  BMI Calculated: 36 76  BSA Calculated: 2 32  Waist Circumference: 48    Results/Data  Metabolic Analyzer - POC [id number] 08:44AM [place]     Test Name Result Flag Reference   Metabolic Analyzer [address]       [address] [place] [address] Road 36SSW6649 08:43AM [place]     Test Name Result Flag Reference   Height 70     Weight 256 2     Body Fat % 38 3     Fat Mass 98 2     FFM ( Fat Free Mass) 158 0     Muscle Mass 150 2     TBW ( Total Body Water) 111 2     TBW % 43 4     Bone Mass 7 8     BMR ( Basal Metabolic Rate) 2393     Metabolic Age 81     Visceral Fat Rating 24     BMI 36 8         Future Appointments    Date/Time Provider Specialty Site   2018 08:30 AM Waldemar Rogers MD Urology [address] [place] [address]   10/05/2017 03:30 PM IVIS Vásquez   Bariatric Medicine [facility]     Signatures   Electronically signed by :  Austin Boss, ; Sep 28 2017  8:53AM EST                       (Author)    Electronically signed by : IVIS Flannery ; Sep 28 2017  9:06AM EST                       (Co-author)

## 2018-01-22 VITALS — HEIGHT: 70 IN | BODY MASS INDEX: 36.68 KG/M2 | WEIGHT: 256.2 LBS

## 2018-01-22 VITALS — BODY MASS INDEX: 38.54 KG/M2 | WEIGHT: 269.2 LBS | HEIGHT: 70 IN

## 2018-03-08 DIAGNOSIS — N28.1 ACQUIRED CYST OF KIDNEY: ICD-10-CM

## 2019-02-08 ENCOUNTER — TRANSCRIBE ORDERS (OUTPATIENT)
Dept: RADIOLOGY | Facility: HOSPITAL | Age: 69
End: 2019-02-08

## 2019-02-08 ENCOUNTER — OFFICE VISIT (OUTPATIENT)
Dept: FAMILY MEDICINE CLINIC | Facility: CLINIC | Age: 69
End: 2019-02-08
Payer: COMMERCIAL

## 2019-02-08 ENCOUNTER — HOSPITAL ENCOUNTER (OUTPATIENT)
Dept: RADIOLOGY | Facility: HOSPITAL | Age: 69
Discharge: HOME | End: 2019-02-08
Payer: COMMERCIAL

## 2019-02-08 VITALS
RESPIRATION RATE: 20 BRPM | BODY MASS INDEX: 39.03 KG/M2 | OXYGEN SATURATION: 96 % | TEMPERATURE: 98.8 F | DIASTOLIC BLOOD PRESSURE: 84 MMHG | WEIGHT: 272 LBS | HEART RATE: 76 BPM | SYSTOLIC BLOOD PRESSURE: 130 MMHG

## 2019-02-08 DIAGNOSIS — J20.9 ACUTE BRONCHITIS WITH BRONCHOSPASM: ICD-10-CM

## 2019-02-08 DIAGNOSIS — J44.9 CHRONIC OBSTRUCTIVE PULMONARY DISEASE, UNSPECIFIED COPD TYPE (HCC): Primary | ICD-10-CM

## 2019-02-08 DIAGNOSIS — I10 ESSENTIAL HYPERTENSION: ICD-10-CM

## 2019-02-08 DIAGNOSIS — J20.9 ACUTE BRONCHITIS WITH BRONCHOSPASM: Primary | ICD-10-CM

## 2019-02-08 PROBLEM — E66.01 MORBID OBESITY (HCC): Status: ACTIVE | Noted: 2017-05-22

## 2019-02-08 PROBLEM — F32.A MILD DEPRESSION: Status: ACTIVE | Noted: 2017-05-23

## 2019-02-08 PROCEDURE — 94640 AIRWAY INHALATION TREATMENT: CPT | Performed by: FAMILY MEDICINE

## 2019-02-08 PROCEDURE — 99214 OFFICE O/P EST MOD 30 MIN: CPT | Performed by: FAMILY MEDICINE

## 2019-02-08 PROCEDURE — 71046 X-RAY EXAM CHEST 2 VIEWS: CPT

## 2019-02-08 RX ORDER — PROMETHAZINE HYDROCHLORIDE AND CODEINE PHOSPHATE 6.25; 1 MG/5ML; MG/5ML
5 SYRUP ORAL EVERY 6 HOURS PRN
Qty: 240 ML | Refills: 0 | Status: SHIPPED | OUTPATIENT
Start: 2019-02-08 | End: 2019-02-15 | Stop reason: SDUPTHER

## 2019-02-08 RX ORDER — LISINOPRIL AND HYDROCHLOROTHIAZIDE 12.5; 1 MG/1; MG/1
1 TABLET ORAL DAILY
COMMUNITY
Start: 2017-05-22 | End: 2019-02-15 | Stop reason: ALTCHOICE

## 2019-02-08 RX ORDER — ALBUTEROL SULFATE 2.5 MG/3ML
2.5 SOLUTION RESPIRATORY (INHALATION) EVERY 6 HOURS PRN
Qty: 60 VIAL | Refills: 1 | Status: SHIPPED | OUTPATIENT
Start: 2019-02-08 | End: 2021-11-01 | Stop reason: ALTCHOICE

## 2019-02-08 RX ORDER — PREDNISONE 10 MG/1
TABLET ORAL
Qty: 21 TABLET | Refills: 0 | Status: SHIPPED | OUTPATIENT
Start: 2019-02-08 | End: 2019-10-17 | Stop reason: ALTCHOICE

## 2019-02-08 RX ORDER — ALBUTEROL SULFATE 90 UG/1
2 AEROSOL, METERED RESPIRATORY (INHALATION) EVERY 6 HOURS PRN
Qty: 1 INHALER | Refills: 3 | Status: SHIPPED | OUTPATIENT
Start: 2019-02-08 | End: 2019-03-10

## 2019-02-08 RX ORDER — IPRATROPIUM BROMIDE AND ALBUTEROL SULFATE 2.5; .5 MG/3ML; MG/3ML
3 SOLUTION RESPIRATORY (INHALATION) ONCE
Status: COMPLETED | OUTPATIENT
Start: 2019-02-08 | End: 2019-02-08

## 2019-02-08 RX ORDER — LEVOFLOXACIN 500 MG/1
500 TABLET, FILM COATED ORAL EVERY 24 HOURS
Qty: 7 TABLET | Refills: 0 | Status: SHIPPED | OUTPATIENT
Start: 2019-02-08 | End: 2019-02-15

## 2019-02-08 RX ADMIN — IPRATROPIUM BROMIDE AND ALBUTEROL SULFATE 3 ML: 2.5; .5 SOLUTION RESPIRATORY (INHALATION) at 15:56

## 2019-02-08 NOTE — TELEPHONE ENCOUNTER
Patient is requesting a refill of albuterol (PROVENTIL HFA, VENTOLIN HFA) 90 mcg inhaler, 30 day supply to [facility]  Patient can be contacted at [phone] with any questions-also, patient is coming to see Dr Phillip Reed this afternoon

## 2019-02-08 NOTE — PROGRESS NOTES
Chief Complaint   Patient presents with    Cough     x5 days      Health Maintenance   Topic Date Due    Hepatitis C Screening  1950    Depression Screening PHQ  1950    Medicare Annual Wellness Visit (AWV)  1950    Fall Risk  04/17/2016    Pneumococcal PPSV23/PCV13 65+ Years / Low and Medium Risk (1 of 2 - PCV13) 12/01/2016    DTaP,Tdap,and Td Vaccines (2 - Td) 08/14/2024    CRC Screening: Colonoscopy  04/04/2026    INFLUENZA VACCINE  Completed       Assessment/Plan:    Acute bronchitis with bronchospasm  DuoNeb given via nebulizer with some symptomatic improvement  Will start Levofloxacin 500 mg 1 tablet daily with food for 7 days, Prednisone taper  Prescription sent to the pharmacy for Promethazine with Codeine to take 1 tsp  every 6 hours as needed for cough  Will check chest X-ray to r/o pneumonia  Use Abuterol 2 5 mg via nebulizer every 6 hours  Patient has  nebulizer machine at home  Call office or go to the emergency room if symptoms persist or worsen  Essential hypertension  Recommended to follow a low-sodium diet, lose weight  Schedule follow-up office visit in 1 week  Diagnoses and all orders for this visit:    Acute bronchitis with bronchospasm  -     ipratropium-albuterol (DUO-NEB) 0 5-2 5 mg/3 mL inhalation solution 3 mL; Take 3 mL by nebulization once   -     levofloxacin (LEVAQUIN) 500 mg tablet; Take 1 tablet (500 mg total) by mouth every 24 hours for 7 days  -     promethazine-codeine (PHENERGAN WITH CODEINE) 6 25-10 mg/5 mL syrup; Take 5 mL by mouth every 6 (six) hours as needed for cough  -     predniSONE 10 mg tablet; Take 4 tab  for 3 days, then 2 tab  for 3 days, then 1 tab  for 3 days, then stop  -     albuterol (2 5 mg/3 mL) 0 083 % nebulizer solution;  Take 1 vial (2 5 mg total) by nebulization every 6 (six) hours as needed for wheezing or shortness of breath  -     XR chest pa & lateral; Future    Essential hypertension          Subjective: Patient ID: Yifan Galo is a 80 y o  male  HPI     Patient presents to the office c/o chest congestion, non-productive cough, chest tightness, occasional wheezing, some shortness of breath for the last 5 days  Patient has remote history of tobacco use  He stopped smoking 13 years ago  Patient tried Mucinex ,OTC cough drops with no relief in symptoms  Denies fever, chills  Patient has H/o Hypertension  He stopped  taking Lisinopril/HCTZ 10/12 5 mg on his own due to feeling lightheaded  Blood pressure improved  Denies chest pain, dizziness  The following portions of the patient's history were reviewed and updated as appropriate: allergies, past family history, past medical history, past social history, past surgical history and problem list     Review of Systems   Constitutional: Positive for fatigue  Negative for activity change, appetite change, chills and fever  HENT: Negative for congestion, ear pain, nosebleeds, sore throat and trouble swallowing  Eyes: Negative for pain, discharge, redness, itching and visual disturbance  Respiratory: Positive for cough, chest tightness, shortness of breath and wheezing  Cardiovascular: Negative for chest pain, palpitations and leg swelling  Gastrointestinal: Negative for abdominal pain, diarrhea, nausea and vomiting  Genitourinary: Negative for difficulty urinating, dysuria, flank pain, frequency and hematuria  Musculoskeletal: Negative for arthralgias, joint swelling and myalgias  Skin: Negative for rash and wound  Neurological: Negative for dizziness and headaches  Hematological: Negative  Objective:      /84 (BP Location: Left arm, Patient Position: Sitting, Cuff Size: Large)   Pulse 76   Temp 98 8 °F (37 1 °C) (Tympanic)   Resp 20   Wt 123 kg (272 lb)   SpO2 96%   BMI 39 03 kg/m²          Physical Exam   Constitutional: He appears well-developed and well-nourished  No distress     Obese HENT:   Head: Normocephalic and atraumatic  Right Ear: External ear normal    Left Ear: External ear normal    Mouth/Throat: Oropharynx is clear and moist    Eyes: Pupils are equal, round, and reactive to light  Conjunctivae are normal    Cardiovascular: Normal rate, regular rhythm and normal heart sounds  No murmur heard  No BL LE  edema   Pulmonary/Chest: Effort normal  He has wheezes  He has no rales  Decreased air entry   Abdominal: Soft  Bowel sounds are normal  There is no tenderness  Musculoskeletal: Normal range of motion  He exhibits no edema, tenderness or deformity  Skin: Skin is warm and dry  No rash noted  Nursing note and vitals reviewed

## 2019-02-08 NOTE — ASSESSMENT & PLAN NOTE
DuoNeb given via nebulizer with some symptomatic improvement  Will start Levofloxacin 500 mg 1 tablet daily with food for 7 days, Prednisone taper  Prescription sent to the pharmacy for Promethazine with Codeine to take 1 tsp  every 6 hours as needed for cough  Will check chest X-ray to r/o pneumonia  Use Abuterol 2 5 mg via nebulizer every 6 hours  Patient has  nebulizer machine at home  Call office or go to the emergency room if symptoms persist or worsen

## 2019-02-09 NOTE — PROGRESS NOTES
Mini neb  Performed by: Sharan Ceja  Authorized by: Sharan Ceja     Number of treatments:  1  Treatment 1:   Pre-Procedure     Symptoms:  Wheezing, difficulty breathing, shortness of breath and cough    Lung Sounds:  Decreased air entry, wheezes    HR:  76    RR:  20    SP02:  96    Medication Administered:  Duoneb - Albuterol 2 5 mg/Atrovent 0 5 mg  Post-Procedure     Post-treatment symptoms: chest tightness improved      Lung sounds:  Decreased breath sounds BL     HR:  78    RR:  20    SP02:  94

## 2019-02-15 ENCOUNTER — OFFICE VISIT (OUTPATIENT)
Dept: FAMILY MEDICINE CLINIC | Facility: CLINIC | Age: 69
End: 2019-02-15
Payer: COMMERCIAL

## 2019-02-15 VITALS
SYSTOLIC BLOOD PRESSURE: 130 MMHG | OXYGEN SATURATION: 95 % | WEIGHT: 273.4 LBS | BODY MASS INDEX: 39.23 KG/M2 | HEART RATE: 66 BPM | RESPIRATION RATE: 20 BRPM | TEMPERATURE: 97 F | DIASTOLIC BLOOD PRESSURE: 86 MMHG

## 2019-02-15 DIAGNOSIS — J20.9 ACUTE BRONCHITIS WITH BRONCHOSPASM: Primary | ICD-10-CM

## 2019-02-15 DIAGNOSIS — J44.9 CHRONIC OBSTRUCTIVE PULMONARY DISEASE, UNSPECIFIED COPD TYPE (HCC): ICD-10-CM

## 2019-02-15 DIAGNOSIS — I10 ESSENTIAL HYPERTENSION: ICD-10-CM

## 2019-02-15 DIAGNOSIS — E66.01 MORBID OBESITY (HCC): ICD-10-CM

## 2019-02-15 DIAGNOSIS — Z87.891 HISTORY OF TOBACCO USE: ICD-10-CM

## 2019-02-15 PROCEDURE — 3079F DIAST BP 80-89 MM HG: CPT | Performed by: FAMILY MEDICINE

## 2019-02-15 PROCEDURE — 99214 OFFICE O/P EST MOD 30 MIN: CPT | Performed by: FAMILY MEDICINE

## 2019-02-15 PROCEDURE — 1036F TOBACCO NON-USER: CPT | Performed by: FAMILY MEDICINE

## 2019-02-15 PROCEDURE — 3075F SYST BP GE 130 - 139MM HG: CPT | Performed by: FAMILY MEDICINE

## 2019-02-15 PROCEDURE — 1160F RVW MEDS BY RX/DR IN RCRD: CPT | Performed by: FAMILY MEDICINE

## 2019-02-15 RX ORDER — PROMETHAZINE HYDROCHLORIDE AND CODEINE PHOSPHATE 6.25; 1 MG/5ML; MG/5ML
5 SYRUP ORAL EVERY 6 HOURS PRN
Qty: 240 ML | Refills: 0 | Status: SHIPPED | OUTPATIENT
Start: 2019-02-15 | End: 2019-10-17 | Stop reason: ALTCHOICE

## 2019-02-15 NOTE — ASSESSMENT & PLAN NOTE
Patient has H/o heavy tobacco use for over 40 years  Recommended to schedule CT chest lung screening protocol

## 2019-02-15 NOTE — ASSESSMENT & PLAN NOTE
Patient reports improvement in symptoms  He completed antibiotic therapy with Levofloxacin this morning  Recommended to Finish Prednisone taper  Patient requesting refill for Promethazine with Codeine syrup to take at bedtime PRN for cough

## 2019-02-15 NOTE — PROGRESS NOTES
Chief Complaint   Patient presents with    Follow-up     1 week follow-up for Acute Bronchitis     Health Maintenance   Topic Date Due    Hepatitis C Screening  1950    Medicare Annual Wellness Visit (AWV)  1950    BMI: Adult  04/17/1969    Fall Risk  04/17/2016    Pneumococcal PPSV23/PCV13 65+ Years / Low and Medium Risk (1 of 2 - PCV13) 12/01/2016    DTaP,Tdap,and Td Vaccines (2 - Td) 08/14/2024    CRC Screening: Colonoscopy  04/04/2026    INFLUENZA VACCINE  Completed    HEPATITIS B VACCINES  Aged Out       Assessment/Plan:    Acute bronchitis with bronchospasm  Patient reports improvement in symptoms  He completed antibiotic therapy with Levofloxacin this morning  Recommended to Finish Prednisone taper  Patient requesting refill for Promethazine with Codeine syrup to take at bedtime PRN for cough  COPD (chronic obstructive pulmonary disease) (HCC)  Refilled prescription for Advair 250/ 50 one puff twice daily  History of tobacco use  Patient has H/o heavy tobacco use for over 40 years  Recommended to schedule CT chest lung screening protocol  Morbid obesity ([address] )  Encouraged regular exercise, weight reduction  Essential hypertension  Discussed a low-cholesterol, low-sodium diet with patient  Recommended to lose weight  Check labs  I have spent 25 minutes with Patient and family today in which greater than 50% of this time was spent in counseling/coordination of care regarding Diagnostic results, Risks and benefits of tx options, Intructions for management, Patient and family education, Importance of tx compliance, Risk factor reductions and Impressions  Schedule follow-up office visit in 6-8 months  Diagnoses and all orders for this visit:    Acute bronchitis with bronchospasm  -     promethazine-codeine (PHENERGAN WITH CODEINE) 6 25-10 mg/5 mL syrup;  Take 5 mL by mouth every 6 (six) hours as needed for cough    Chronic obstructive pulmonary disease, unspecified COPD type ([facility] )  -     CT lung screening program; Future  -     CBC and differential; Future  -     fluticasone-salmeterol (ADVAIR DISKUS) 250-50 mcg/dose inhaler; Inhale 1 puff 2 (two) times a day Rinse mouth after use  Morbid obesity ([facility] )    History of tobacco use  -     CT lung screening program; Future    Essential hypertension  -     Comprehensive metabolic panel; Future  -     TSH, 3rd generation; Future  -     Lipid panel; Future          Subjective:      Patient ID: Fernando Lau is a 80 y o  male  HPI     Patient presents for 1 week follow-up for a acute bronchitis with bronchospasm  Patient reports improvement in symptoms  Denies shortness of breath, wheezing, chest tightness  C/o mild dry cough  He completed antibiotic therapy with levofloxacin this morning  Finishing  Prednisone taper  Chest x-ray done on 2/8/19 showed no evidence of pneumonia  Patient has H/o COPD  He was a heavy smoker for over 40 years, was smoking   2 ppd  Patient quit smoking 13 years ago  He had CT chest low-dose protocol in December 2015 which showed no pulmonary nodules  Patient requesting Rx refill for Advair 250/50  Last blood work done in 5/17  Patient is morbidly obese  Family history is positive for DM  HTN - patient does not take any blood pressure medications at the present time  Denies chest pain, dizziness, heart palpitations  Pneumovax done in 12/15  Patient had flu shot in 9/18  Colonoscopy done in 2016  The following portions of the patient's history were reviewed and updated as appropriate: current medications, past family history, past medical history, past social history, past surgical history and problem list     Review of Systems   Constitutional: Negative for activity change, appetite change, chills, fatigue and fever  HENT: Negative for congestion, ear pain, hearing loss, nosebleeds, sore throat, tinnitus and trouble swallowing  Eyes: Negative for pain, discharge, redness, itching and visual disturbance  Respiratory: Positive for cough (mild dry cough)  Negative for chest tightness, shortness of breath and wheezing  Cardiovascular: Negative for chest pain, palpitations and leg swelling  Gastrointestinal: Negative for abdominal pain, blood in stool, constipation, diarrhea, nausea and vomiting  Genitourinary: Negative for difficulty urinating, dysuria, flank pain, frequency and hematuria  Nocturia   Musculoskeletal: Negative for arthralgias, back pain, gait problem, joint swelling, myalgias and neck pain  Skin: Negative for rash and wound  Neurological: Negative for dizziness and headaches  Hematological: Negative  Psychiatric/Behavioral: Negative  Objective:      /86 (BP Location: Left arm, Patient Position: Sitting, Cuff Size: Large)   Pulse 66   Temp (!) 97 °F (36 1 °C) (Tympanic)   Resp 20   Wt 124 kg (273 lb 6 4 oz)   SpO2 95%   BMI 39 23 kg/m²     /86  Physical Exam   Constitutional: He appears well-developed and well-nourished  Morbidly obese   HENT:   Head: Normocephalic  Right Ear: External ear normal    Left Ear: External ear normal    Mouth/Throat: Oropharynx is clear and moist    Eyes: Pupils are equal, round, and reactive to light  Conjunctivae are normal    Cardiovascular: Normal rate, regular rhythm and normal heart sounds  No murmur heard  No BL LE edema   Pulmonary/Chest: Effort normal  He has no wheezes  He has no rales  Decreased breath sounds BL   Abdominal: Soft  Bowel sounds are normal  There is no tenderness  Musculoskeletal: Normal range of motion  He exhibits no edema, tenderness or deformity  Skin: Skin is warm and dry  No rash noted  Psychiatric: He has a normal mood and affect  Nursing note and vitals reviewed

## 2019-10-14 ENCOUNTER — APPOINTMENT (OUTPATIENT)
Dept: LAB | Facility: HOSPITAL | Age: 69
End: 2019-10-14
Payer: COMMERCIAL

## 2019-10-14 ENCOUNTER — HOSPITAL ENCOUNTER (OUTPATIENT)
Dept: RADIOLOGY | Facility: HOSPITAL | Age: 69
Discharge: HOME | End: 2019-10-14
Payer: COMMERCIAL

## 2019-10-14 DIAGNOSIS — J44.9 CHRONIC OBSTRUCTIVE PULMONARY DISEASE, UNSPECIFIED COPD TYPE (HCC): ICD-10-CM

## 2019-10-14 DIAGNOSIS — I10 ESSENTIAL HYPERTENSION: ICD-10-CM

## 2019-10-14 DIAGNOSIS — Z87.891 HISTORY OF TOBACCO USE: ICD-10-CM

## 2019-10-14 LAB
ALBUMIN SERPL BCP-MCNC: 3.9 G/DL (ref 3.5–5)
ALP SERPL-CCNC: 66 U/L (ref 46–116)
ALT SERPL W P-5'-P-CCNC: 22 U/L (ref 12–78)
ANION GAP SERPL CALCULATED.3IONS-SCNC: 6 MMOL/L (ref 4–13)
AST SERPL W P-5'-P-CCNC: 17 U/L (ref 5–45)
BASOPHILS # BLD AUTO: 0.02 THOUSANDS/ΜL (ref 0–0.1)
BASOPHILS NFR BLD AUTO: 0 % (ref 0–1)
BILIRUB SERPL-MCNC: 0.84 MG/DL (ref 0.2–1)
BUN SERPL-MCNC: 19 MG/DL (ref 5–25)
CALCIUM SERPL-MCNC: 8.9 MG/DL (ref 8.3–10.1)
CHLORIDE SERPL-SCNC: 112 MMOL/L (ref 100–108)
CHOLEST SERPL-MCNC: 146 MG/DL (ref 50–200)
CO2 SERPL-SCNC: 22 MMOL/L (ref 21–32)
CREAT SERPL-MCNC: 0.98 MG/DL (ref 0.6–1.3)
EOSINOPHIL # BLD AUTO: 0.28 THOUSAND/ΜL (ref 0–0.61)
EOSINOPHIL NFR BLD AUTO: 5 % (ref 0–6)
ERYTHROCYTE [DISTWIDTH] IN BLOOD BY AUTOMATED COUNT: 13.1 % (ref 11.6–15.1)
GFR SERPL CREATININE-BSD FRML MDRD: 79 ML/MIN/1.73SQ M
GLUCOSE P FAST SERPL-MCNC: 92 MG/DL (ref 65–99)
HCT VFR BLD AUTO: 45.9 % (ref 36.5–49.3)
HDLC SERPL-MCNC: 47 MG/DL (ref 40–60)
HGB BLD-MCNC: 15.3 G/DL (ref 12–17)
IMM GRANULOCYTES # BLD AUTO: 0.01 THOUSAND/UL (ref 0–0.2)
IMM GRANULOCYTES NFR BLD AUTO: 0 % (ref 0–2)
LDLC SERPL CALC-MCNC: 82 MG/DL (ref 0–100)
LYMPHOCYTES # BLD AUTO: 1.33 THOUSANDS/ΜL (ref 0.6–4.47)
LYMPHOCYTES NFR BLD AUTO: 23 % (ref 14–44)
MCH RBC QN AUTO: 29.2 PG (ref 26.8–34.3)
MCHC RBC AUTO-ENTMCNC: 33.3 G/DL (ref 31.4–37.4)
MCV RBC AUTO: 88 FL (ref 82–98)
MONOCYTES # BLD AUTO: 0.39 THOUSAND/ΜL (ref 0.17–1.22)
MONOCYTES NFR BLD AUTO: 7 % (ref 4–12)
NEUTROPHILS # BLD AUTO: 3.69 THOUSANDS/ΜL (ref 1.85–7.62)
NEUTS SEG NFR BLD AUTO: 65 % (ref 43–75)
NONHDLC SERPL-MCNC: 99 MG/DL
NRBC BLD AUTO-RTO: 0 /100 WBCS
PLATELET # BLD AUTO: 267 THOUSANDS/UL (ref 149–390)
PMV BLD AUTO: 10.1 FL (ref 8.9–12.7)
POTASSIUM SERPL-SCNC: 4.2 MMOL/L (ref 3.5–5.3)
PROT SERPL-MCNC: 7 G/DL (ref 6.4–8.2)
RBC # BLD AUTO: 5.24 MILLION/UL (ref 3.88–5.62)
SODIUM SERPL-SCNC: 140 MMOL/L (ref 136–145)
TRIGL SERPL-MCNC: 87 MG/DL
TSH SERPL DL<=0.05 MIU/L-ACNC: 1.47 UIU/ML (ref 0.36–3.74)
WBC # BLD AUTO: 5.72 THOUSAND/UL (ref 4.31–10.16)

## 2019-10-14 PROCEDURE — 80061 LIPID PANEL: CPT

## 2019-10-14 PROCEDURE — 85025 COMPLETE CBC W/AUTO DIFF WBC: CPT

## 2019-10-14 PROCEDURE — 80053 COMPREHEN METABOLIC PANEL: CPT

## 2019-10-14 PROCEDURE — 84443 ASSAY THYROID STIM HORMONE: CPT

## 2019-10-14 PROCEDURE — 36415 COLL VENOUS BLD VENIPUNCTURE: CPT

## 2019-10-14 PROCEDURE — G0297 LDCT FOR LUNG CA SCREEN: HCPCS

## 2019-10-17 ENCOUNTER — OFFICE VISIT (OUTPATIENT)
Dept: FAMILY MEDICINE CLINIC | Facility: CLINIC | Age: 69
End: 2019-10-17
Payer: COMMERCIAL

## 2019-10-17 VITALS
RESPIRATION RATE: 16 BRPM | HEART RATE: 64 BPM | WEIGHT: 284 LBS | TEMPERATURE: 98.5 F | OXYGEN SATURATION: 95 % | DIASTOLIC BLOOD PRESSURE: 84 MMHG | BODY MASS INDEX: 40.75 KG/M2 | SYSTOLIC BLOOD PRESSURE: 144 MMHG

## 2019-10-17 DIAGNOSIS — Z87.891 HISTORY OF TOBACCO USE: ICD-10-CM

## 2019-10-17 DIAGNOSIS — Z11.59 NEED FOR HEPATITIS C SCREENING TEST: ICD-10-CM

## 2019-10-17 DIAGNOSIS — Z12.5 SCREENING FOR PROSTATE CANCER: ICD-10-CM

## 2019-10-17 DIAGNOSIS — E66.01 MORBID OBESITY (HCC): ICD-10-CM

## 2019-10-17 DIAGNOSIS — L30.9 DERMATITIS: ICD-10-CM

## 2019-10-17 DIAGNOSIS — I10 ESSENTIAL HYPERTENSION: Primary | ICD-10-CM

## 2019-10-17 DIAGNOSIS — Z23 NEED FOR PNEUMOCOCCAL VACCINATION: ICD-10-CM

## 2019-10-17 DIAGNOSIS — Z00.00 MEDICARE ANNUAL WELLNESS VISIT, SUBSEQUENT: ICD-10-CM

## 2019-10-17 DIAGNOSIS — J44.9 CHRONIC OBSTRUCTIVE PULMONARY DISEASE, UNSPECIFIED COPD TYPE (HCC): ICD-10-CM

## 2019-10-17 PROBLEM — J20.9 ACUTE BRONCHITIS WITH BRONCHOSPASM: Status: RESOLVED | Noted: 2019-02-08 | Resolved: 2019-10-17

## 2019-10-17 PROCEDURE — 1125F AMNT PAIN NOTED PAIN PRSNT: CPT | Performed by: FAMILY MEDICINE

## 2019-10-17 PROCEDURE — 90670 PCV13 VACCINE IM: CPT

## 2019-10-17 PROCEDURE — 99214 OFFICE O/P EST MOD 30 MIN: CPT | Performed by: FAMILY MEDICINE

## 2019-10-17 PROCEDURE — 1101F PT FALLS ASSESS-DOCD LE1/YR: CPT | Performed by: FAMILY MEDICINE

## 2019-10-17 PROCEDURE — 1170F FXNL STATUS ASSESSED: CPT | Performed by: FAMILY MEDICINE

## 2019-10-17 PROCEDURE — G0009 ADMIN PNEUMOCOCCAL VACCINE: HCPCS

## 2019-10-17 PROCEDURE — G0439 PPPS, SUBSEQ VISIT: HCPCS | Performed by: FAMILY MEDICINE

## 2019-10-17 RX ORDER — TRIAMCINOLONE ACETONIDE 1 MG/G
CREAM TOPICAL 2 TIMES DAILY
Qty: 30 G | Refills: 0 | Status: SHIPPED | OUTPATIENT
Start: 2019-10-17 | End: 2020-10-21 | Stop reason: ALTCHOICE

## 2019-10-17 NOTE — PATIENT INSTRUCTIONS
Medicare Preventive Visit Patient Instructions  Thank you for completing your Welcome to Medicare Visit or Medicare Annual Wellness Visit today  Your next wellness visit will be due in one year (10/17/2020)  The screening/preventive services that you may require over the next 5-10 years are detailed below  Some tests may not apply to you based off risk factors and/or age  Screening tests ordered at today's visit but not completed yet may show as past due  Also, please note that scanned in results may not display below  Preventive Screenings:  Service Recommendations Previous Testing/Comments   Colorectal Cancer Screening  · Colonoscopy    · Fecal Occult Blood Test (FOBT)/Fecal Immunochemical Test (FIT)  · Fecal DNA/Cologuard Test  · Flexible Sigmoidoscopy Age: 54-65 years old   Colonoscopy: every 10 years (May be performed more frequently if at higher risk)  OR  FOBT/FIT: every 1 year  OR  Cologuard: every 3 years  OR  Sigmoidoscopy: every 5 years  Screening may be recommended earlier than age 48 if at higher risk for colorectal cancer  Also, an individualized decision between you and your healthcare provider will decide whether screening between the ages of 74-80 would be appropriate  Colonoscopy: 04/04/2016  FOBT/FIT: Not on file  Cologuard: Not on file  Sigmoidoscopy: Not on file    Screening Current     Prostate Cancer Screening Individualized decision between patient and health care provider in men between ages of 53-78   Medicare will cover every 12 months beginning on the day after your 50th birthday PSA: 0 8 ng/mL          Hepatitis C Screening Once for adults born between 1945 and 1965  More frequently in patients at high risk for Hepatitis C Hep C Antibody: Not on file       Diabetes Screening 1-2 times per year if you're at risk for diabetes or have pre-diabetes Fasting glucose: 92 mg/dL   A1C: 5 6 %    Screening Current   Cholesterol Screening Once every 5 years if you don't have a lipid disorder  May order more often based on risk factors  Lipid panel: 10/14/2019    Screening Current      Other Preventive Screenings Covered by Medicare:  1  Abdominal Aortic Aneurysm (AAA) Screening: covered once if your at risk  You're considered to be at risk if you have a family history of AAA or a male between the age of 73-68 who smoking at least 100 cigarettes in your lifetime  2  Lung Cancer Screening: covers low dose CT scan once per year if you meet all of the following conditions: (1) Age 50-69; (2) No signs or symptoms of lung cancer; (3) Current smoker or have quit smoking within the last 15 years; (4) You have a tobacco smoking history of at least 30 pack years (packs per day x number of years you smoked); (5) You get a written order from a healthcare provider  3  Glaucoma Screening: covered annually if you're considered high risk: (1) You have diabetes OR (2) Family history of glaucoma OR (3)  aged 48 and older OR (3)  American aged 72 and older  3  Osteoporosis Screening: covered every 2 years if you meet one of the following conditions: (1) Have a vertebral abnormality; (2) On glucocorticoid therapy for more than 3 months; (3) Have primary hyperparathyroidism; (4) On osteoporosis medications and need to assess response to drug therapy  5  HIV Screening: covered annually if you're between the age of 12-76  Also covered annually if you are younger than 13 and older than 72 with risk factors for HIV infection  For pregnant patients, it is covered up to 3 times per pregnancy      Immunizations:  Immunization Recommendations   Influenza Vaccine Annual influenza vaccination during flu season is recommended for all persons aged >= 6 months who do not have contraindications   Pneumococcal Vaccine (Prevnar and Pneumovax)  * Prevnar = PCV13  * Pneumovax = PPSV23 Adults 25-60 years old: 1-3 doses may be recommended based on certain risk factors  Adults 72 years old: Prevnar (PCV13) vaccine recommended followed by Pneumovax (PPSV23) vaccine  If already received PPSV23 since turning 65, then PCV13 recommended at least one year after PPSV23 dose  Hepatitis B Vaccine 3 dose series if at intermediate or high risk (ex: diabetes, end stage renal disease, liver disease)   Tetanus (Td) Vaccine - COST NOT COVERED BY MEDICARE PART B Following completion of primary series, a booster dose should be given every 10 years to maintain immunity against tetanus  Td may also be given as tetanus wound prophylaxis  Tdap Vaccine - COST NOT COVERED BY MEDICARE PART B Recommended at least once for all adults  For pregnant patients, recommended with each pregnancy  Shingles Vaccine (Shingrix) - COST NOT COVERED BY MEDICARE PART B  2 shot series recommended in those aged 48 and above     Health Maintenance Due:      Topic Date Due    Hepatitis C Screening  1950    CRC Screening: Colonoscopy  04/04/2026     Immunizations Due:      Topic Date Due    Pneumococcal Vaccine: 65+ Years (1 of 2 - PCV13) 12/01/2016    INFLUENZA VACCINE  07/01/2019     Advance Directives   What are advance directives? Advance directives are legal documents that state your wishes and plans for medical care  These plans are made ahead of time in case you lose your ability to make decisions for yourself  Advance directives can apply to any medical decision, such as the treatments you want, and if you want to donate organs  What are the types of advance directives? There are many types of advance directives, and each state has rules about how to use them  You may choose a combination of any of the following:  · Living will: This is a written record of the treatment you want  You can also choose which treatments you do not want, which to limit, and which to stop at a certain time  This includes surgery, medicine, IV fluid, and tube feedings  · Durable power of  for healthcare [facility]):   This is a written record that states who you want to make healthcare choices for you when you are unable to make them for yourself  This person, called a proxy, is usually a family member or a friend  You may choose more than 1 proxy  · Do not resuscitate (DNR) order:  A DNR order is used in case your heart stops beating or you stop breathing  It is a request not to have certain forms of treatment, such as CPR  A DNR order may be included in other types of advance directives  · Medical directive: This covers the care that you want if you are in a coma, near death, or unable to make decisions for yourself  You can list the treatments you want for each condition  Treatment may include pain medicine, surgery, blood transfusions, dialysis, IV or tube feedings, and a ventilator (breathing machine)  · Values history: This document has questions about your views, beliefs, and how you feel and think about life  This information can help others choose the care that you would choose  Why are advance directives important? An advance directive helps you control your care  Although spoken wishes may be used, it is better to have your wishes written down  Spoken wishes can be misunderstood, or not followed  Treatments may be given even if you do not want them  An advance directive may make it easier for your family to make difficult choices about your care  © Copyright [employer] 2018 Information is for End User's use only and may not be sold, redistributed or otherwise used for commercial purposes   All illustrations and images included in CareNotes® are the copyrighted property of [employer]  or [address] [place] [address] [employer]

## 2019-10-17 NOTE — PROGRESS NOTES
Assessment and Plan:     Problem List Items Addressed This Visit        Respiratory    COPD (chronic obstructive pulmonary disease) ([facility] )       Cardiovascular and Mediastinum    Essential hypertension - Primary       Other    Morbid obesity ([facility] )    History of tobacco use      Other Visit Diagnoses     Screening for prostate cancer        Need for hepatitis C screening test               Preventive health issues were discussed with patient, and age appropriate screening tests were ordered as noted in patient's After Visit Summary  Personalized health advice and appropriate referrals for health education or preventive services given if needed, as noted in patient's After Visit Summary  History of Present Illness:     Patient presents for Medicare Annual Wellness visit    Patient Care Team:  Chet Krueger MD as PCP - Clarke Berry, MD Ann Mauro MD Kalvin Arrant, MD Terri Schimke, MD as Endoscopist     Problem List:     Patient Active Problem List   Diagnosis    Essential hypertension    Morbid obesity ([facility] )    Obstructive sleep apnea    Renal cyst, left    Mild depression ([facility] )    COPD (chronic obstructive pulmonary disease) ([place] [address] )    History of tobacco use      Past Medical and Surgical History:     Past Medical History:   Diagnosis Date    Asthma     Epilepsy ([place] [address] )     Hypertension     Obesity     Stroke [facility])      Past Surgical History:   Procedure Laterality Date    APPENDECTOMY      MANDIBLE SURGERY      WA COLONOSCOPY FLX DX W/COLLJ SPEC WHEN PFRMD N/A 4/4/2016    Procedure: COLONOSCOPY;  Surgeon: Ann Sneed MD;  [facility]: [facility]; Service: Gastroenterology    SKULL FRACTURE ELEVATION      TONSILLECTOMY        Family History:     No family history on file     Social History:     Social History     Socioeconomic History    Marital status: /Civil Union     Spouse name: Not on file    Number of children: Not on file    Years of Provider at bedside, Dr Clifford Marroquin, ICU team      Stacie Ace, RN  03/01/19 0076 education: Not on file    Highest education level: Not on file   Occupational History    Not on file   Social Needs    Financial resource strain: Not on file    Food insecurity:     Worry: Not on file     Inability: Not on file    Transportation needs:     Medical: Not on file     Non-medical: Not on file   Tobacco Use    Smoking status: Former Smoker     Types: Cigarettes     Last attempt to quit: 2006     Years since quittin 6    Smokeless tobacco: Never Used   Substance and Sexual Activity    Alcohol use: No    Drug use: No    Sexual activity: Not on file   Lifestyle    Physical activity:     Days per week: Not on file     Minutes per session: Not on file    Stress: Not on file   Relationships    Social connections:     Talks on phone: Not on file     Gets together: Not on file     Attends Christianity service: Not on file     Active member of club or organization: Not on file     Attends meetings of clubs or organizations: Not on file     Relationship status: Not on file    Intimate partner violence:     Fear of current or ex partner: Not on file     Emotionally abused: Not on file     Physically abused: Not on file     Forced sexual activity: Not on file   Other Topics Concern    Not on file   Social History Narrative    Not on file       Medications and Allergies:     Current Outpatient Medications   Medication Sig Dispense Refill    albuterol (2 5 mg/3 mL) 0 083 % nebulizer solution Take 1 vial (2 5 mg total) by nebulization every 6 (six) hours as needed for wheezing or shortness of breath 60 vial 1    fluticasone-salmeterol (ADVAIR DISKUS) 250-50 mcg/dose inhaler Inhale 1 puff 2 (two) times a day Rinse mouth after use  3 Inhaler 3    fluticasone-salmeterol (ADVAIR) 250-50 mcg/dose Inhale 1 puff every 12 (twelve) hours        predniSONE 10 mg tablet Take 4 tab  for 3 days, then 2 tab  for 3 days, then 1 tab  for 3 days, then stop (Patient not taking: Reported on 2/15/2019) 21 tablet 0  promethazine-codeine (PHENERGAN WITH CODEINE) 6 25-10 mg/5 mL syrup Take 5 mL by mouth every 6 (six) hours as needed for cough 240 mL 0     No current facility-administered medications for this visit  Allergies   Allergen Reactions    Penicillins Other (See Comments)     As a child      Immunizations:     Immunization History   Administered Date(s) Administered    INFLUENZA 10/18/2016, 09/30/2018    Influenza TIV (IM) 10/22/2015, 09/01/2016    Pneumococcal Polysaccharide PPV23 12/01/2015    Tdap 08/14/2014    Zoster 12/15/2015      Health Maintenance:         Topic Date Due    Hepatitis C Screening  1950    CRC Screening: Colonoscopy  04/04/2026         Topic Date Due    Pneumococcal Vaccine: 65+ Years (1 of 2 - PCV13) 12/01/2016    INFLUENZA VACCINE  07/01/2019      Medicare Health Risk Assessment:     There were no vitals taken for this visit  Daina Nye is here for his Subsequent Wellness visit  Health Risk Assessment:   Patient rates overall health as good  Patient feels that their physical health rating is same  Eyesight was rated as same  Hearing was rated as same  Patient feels that their emotional and mental health rating is same  Pain experienced in the last 7 days has been some  Depression Screening:   PHQ-2 Score: 0  PHQ-9 Score: 0      Fall Risk Screening: In the past year, patient has experienced: no history of falling in past year      Home Safety:  Patient does not have trouble with stairs inside or outside of their home  Patient has working smoke alarms and has working carbon monoxide detector  Home safety hazards include: none  Nutrition:   Current diet is Regular  Medications:   Patient is currently taking over-the-counter supplements  OTC medications include: see medication list  Patient is able to manage medications       Activities of Daily Living (ADLs)/Instrumental Activities of Daily Living (IADLs):   Walk and transfer into and out of bed and chair?: Yes  Dress and groom yourself?: Yes    Bathe or shower yourself?: Yes    Feed yourself? Yes  Do your laundry/housekeeping?: Yes  Manage your money, pay your bills and track your expenses?: Yes  Make your own meals?: Yes    Do your own shopping?: Yes    Previous Hospitalizations:   Any hospitalizations or ED visits within the last 12 months?: No      Advance Care Planning:   Living will: No    Durable POA for healthcare: No    Advanced directive: No    Advanced directive counseling given: Yes    Five wishes given: Yes    End of Life Decisions reviewed with patient: Yes      Cognitive Screening:   Provider or family/friend/caregiver concerned regarding cognition?: No    PREVENTIVE SCREENINGS      Cardiovascular Screening:    General: Screening Current      Diabetes Screening:     General: Screening Current      Colorectal Cancer Screening:     General: Screening Current      Prostate Cancer Screening:    General: Risks and Benefits Discussed    Due for: PSA      Osteoporosis Screening:    General: Risks and Benefits Discussed      Abdominal Aortic Aneurysm (AAA) Screening:    Risk factors include: age between 73-67 yo and tobacco use        General: Risks and Benefits Discussed and Screening Current      Lung Cancer Screening:     General: Screening Current      Hepatitis C Screening:    General: Risks and Benefits Discussed    Hep C Screening Accepted: Yes      Other Counseling Topics:   Car/seat belt/driving safety, skin self-exam and calcium and vitamin D intake and regular weightbearing exercise  Mini-cog score 4        Jacob Rodriguez MD

## 2019-10-17 NOTE — PROGRESS NOTES
Assessment/Plan:    Essential hypertension  Goal for BP < 140/80  Recommended to follow a low-sodium diet, regular exercise, lose weight  Check blood pressure at the pharmacy and call with BP log in 4- 6 weeks  COPD (chronic obstructive pulmonary disease) (HCC)  Symptoms are stable  Continue to use Advair 250/50  Morbid obesity ([facility] )  Discussed dietary and lifestyle modifications with patient  Encouraged weight reduction  History of tobacco use  LDCT done on 10/14/19 showed no new pulmonary nodules  Recommended to continue annual screening with LDCT  Dermatitis  Patient c/o skin itching over the posterior neck area and upper back  No rash on exam     Recommended to use moisturizing creams or lotions for dry skin  Prescription given for Triamcinolone 0 1 % cream twice daily if develops rash  HM:  Prevnar 13 administered today  Will check PSA  Discussed screening for Hep C , Shingrix vaccination  Patient check with insurance regarding coverage  I have spent 25 minutes with Patient and family today in which greater than 50% of this time was spent in counseling/coordination of care regarding Diagnostic results, Risks and benefits of tx options, Intructions for management, Patient and family education, Importance of tx compliance, Risk factor reductions and Impressions  Schedule follow-up office visit in 8 months  Diagnoses and all orders for this visit:    Essential hypertension    Chronic obstructive pulmonary disease, unspecified COPD type ([facility] )    Morbid obesity ([facility] )    History of tobacco use    Screening for prostate cancer  -     PSA, Total Screen; Future    Need for hepatitis C screening test  -     Hepatitis C antibody;  Future    Need for pneumococcal vaccination  -     PNEUMOCOCCAL CONJUGATE VACCINE 13-VALENT GREATER THAN 6 MONTHS    Dermatitis  -     triamcinolone (KENALOG) 0 1 % cream; Apply topically 2 (two) times a day    Medicare annual wellness visit, subsequent          Subjective:      Patient ID: Zach Recio is a 77 y o  male  HPI     Patient presents for 8 months follow-up office visit accompanied by his wife  Yg Roberson PMHx: HTN, COPD, H/o heavy tobacco use, quit smoking 13 years ago, KIZZY, Morbid obesity  Reviewed current medications, blood test results October 14, 2018  Cholesterol 146 triglycerides 87, HDL 47, LDL 82, fasting blood sugar 92, potassium 4 2, creatinine 0 98, TSH 1 470  HTN - patient does not take any blood pressure medication currently  Denies chest pain, shortness of breath, dizziness  He walks daily  Gained 11 lb since last visit in 2/19  COPD - quit smoking 13 years ago  LDCT done on October 14, 2019 showed no new lung nodules  Radiologist recommended to continue annual screening with LDCT  Patient uses Advair 250/ 50 one puff daily  Denies wheezing, cough  KIZZY - patient cannot tolerate CPAP, uses nasal device  C/o itching over posterior neck, upper back for 1 month  Pneumovax done December 2015  Patient had Flu shot this month  Patient had colonoscopy performed by gastroenterologist Dr Amy Koch in 2016, 1 polyp was removed  Patient was recommended to repeat colonoscopy in 10 years  The following portions of the patient's history were reviewed and updated as appropriate: allergies, past family history, past medical history, past social history, past surgical history and problem list     Review of Systems   Constitutional: Negative for activity change, appetite change, chills, fatigue and fever  HENT: Negative for congestion, ear pain, hearing loss, mouth sores, nosebleeds, postnasal drip, sore throat, tinnitus and trouble swallowing  Eyes: Negative for pain, discharge, redness, itching and visual disturbance  Respiratory: Negative for cough, chest tightness, shortness of breath and wheezing  Cardiovascular: Negative for chest pain, palpitations and leg swelling     Gastrointestinal: Negative for abdominal pain, blood in stool, constipation, diarrhea, nausea and vomiting  Genitourinary: Negative for difficulty urinating, dysuria, flank pain and hematuria  Nocturia x 1-2   Musculoskeletal: Negative for arthralgias, back pain, gait problem, joint swelling, myalgias and neck pain  Skin: Negative for rash and wound  Neurological: Negative for dizziness, syncope and headaches  Hematological: Negative  Psychiatric/Behavioral: Negative  Objective:      /84 (BP Location: Left arm, Patient Position: Sitting, Cuff Size: Large)   Pulse 64   Temp 98 5 °F (36 9 °C) (Tympanic)   Resp 16   Wt 129 kg (284 lb)   SpO2 95%   BMI 40 75 kg/m²          Physical Exam   Constitutional: He is oriented to person, place, and time  He appears well-developed and well-nourished  Morbidly obese   HENT:   Head: Normocephalic  Right Ear: External ear normal    Left Ear: External ear normal    Mouth/Throat: Oropharynx is clear and moist    Eyes: Pupils are equal, round, and reactive to light  Conjunctivae are normal    Neck: Normal range of motion  Neck supple  No JVD present  Cardiovascular: Normal rate, regular rhythm and normal heart sounds  No murmur heard  No BL LE edema  No carotid bruits BL   Pulmonary/Chest: Effort normal and breath sounds normal  He has no wheezes  He has no rales  Abdominal: Soft  Bowel sounds are normal  There is no tenderness  Musculoskeletal: Normal range of motion  He exhibits no edema, tenderness or deformity  Neurological: He is alert and oriented to person, place, and time  Coordination normal    Skin: Skin is warm and dry  No rash noted  Psychiatric: He has a normal mood and affect  Nursing note and vitals reviewed

## 2019-10-17 NOTE — ASSESSMENT & PLAN NOTE
Goal for BP < 140/80  Recommended to follow a low-sodium diet, regular exercise, lose weight  Check blood pressure at the pharmacy and call with BP log in 4- 6 weeks

## 2019-10-17 NOTE — ASSESSMENT & PLAN NOTE
Patient c/o skin itching over the posterior neck area and upper back  No rash on exam     Recommended to use moisturizing creams or lotions for dry skin  Prescription given for Triamcinolone 0 1 % cream twice daily if develops rash

## 2019-10-17 NOTE — ASSESSMENT & PLAN NOTE
LDCT done on 10/14/19 showed no new pulmonary nodules  Recommended to continue annual screening with LDCT

## 2020-06-12 ENCOUNTER — APPOINTMENT (OUTPATIENT)
Dept: LAB | Facility: HOSPITAL | Age: 70
End: 2020-06-12
Payer: COMMERCIAL

## 2020-06-12 DIAGNOSIS — Z12.5 SCREENING FOR PROSTATE CANCER: ICD-10-CM

## 2020-06-12 DIAGNOSIS — Z11.59 NEED FOR HEPATITIS C SCREENING TEST: ICD-10-CM

## 2020-06-12 LAB
HCV AB SER QL: NORMAL
PSA SERPL-MCNC: 1.3 NG/ML (ref 0–4)

## 2020-06-12 PROCEDURE — G0103 PSA SCREENING: HCPCS

## 2020-06-12 PROCEDURE — 36415 COLL VENOUS BLD VENIPUNCTURE: CPT

## 2020-06-12 PROCEDURE — 86803 HEPATITIS C AB TEST: CPT

## 2020-06-17 ENCOUNTER — OFFICE VISIT (OUTPATIENT)
Dept: FAMILY MEDICINE CLINIC | Facility: CLINIC | Age: 70
End: 2020-06-17
Payer: COMMERCIAL

## 2020-06-17 VITALS
OXYGEN SATURATION: 97 % | SYSTOLIC BLOOD PRESSURE: 118 MMHG | BODY MASS INDEX: 40.32 KG/M2 | DIASTOLIC BLOOD PRESSURE: 78 MMHG | WEIGHT: 281 LBS | RESPIRATION RATE: 16 BRPM | TEMPERATURE: 97.6 F | HEART RATE: 68 BPM

## 2020-06-17 DIAGNOSIS — Z87.891 HISTORY OF TOBACCO USE: ICD-10-CM

## 2020-06-17 DIAGNOSIS — E66.01 MORBID OBESITY (HCC): ICD-10-CM

## 2020-06-17 DIAGNOSIS — J44.9 CHRONIC OBSTRUCTIVE PULMONARY DISEASE, UNSPECIFIED COPD TYPE (HCC): ICD-10-CM

## 2020-06-17 DIAGNOSIS — I10 ESSENTIAL HYPERTENSION: Primary | ICD-10-CM

## 2020-06-17 DIAGNOSIS — R10.9 RIGHT FLANK PAIN: ICD-10-CM

## 2020-06-17 PROCEDURE — 99214 OFFICE O/P EST MOD 30 MIN: CPT | Performed by: FAMILY MEDICINE

## 2020-06-17 PROCEDURE — 3074F SYST BP LT 130 MM HG: CPT | Performed by: FAMILY MEDICINE

## 2020-06-17 PROCEDURE — 3078F DIAST BP <80 MM HG: CPT | Performed by: FAMILY MEDICINE

## 2020-06-17 PROCEDURE — 1036F TOBACCO NON-USER: CPT | Performed by: FAMILY MEDICINE

## 2020-06-17 PROCEDURE — 4040F PNEUMOC VAC/ADMIN/RCVD: CPT | Performed by: FAMILY MEDICINE

## 2020-06-17 PROCEDURE — 1160F RVW MEDS BY RX/DR IN RCRD: CPT | Performed by: FAMILY MEDICINE

## 2020-06-24 ENCOUNTER — TELEPHONE (OUTPATIENT)
Dept: FAMILY MEDICINE CLINIC | Facility: CLINIC | Age: 70
End: 2020-06-24

## 2020-06-24 NOTE — TELEPHONE ENCOUNTER
----- Message from Lang Yun MD sent at 6/23/2020  8:04 PM EDT -----  I checked medical records  Patient did not schedule abdominal U/S, X-ray of the lumbar spine, urinalysis and urine culture  Please leave a message for patient to schedule testing as ordered

## 2020-06-24 NOTE — TELEPHONE ENCOUNTER
I left a message asking patient to schedule the ordered tests with instructions, and to call us back with any questions or concerns

## 2020-07-02 NOTE — TELEPHONE ENCOUNTER
I left another message for patient explaining that we are waiting on the test results for the xray and US, and lab work  I asked him to call back with any other concerns

## 2020-09-28 DIAGNOSIS — J44.9 CHRONIC OBSTRUCTIVE PULMONARY DISEASE, UNSPECIFIED COPD TYPE (HCC): ICD-10-CM

## 2020-10-21 ENCOUNTER — HOSPITAL ENCOUNTER (OUTPATIENT)
Dept: RADIOLOGY | Facility: HOSPITAL | Age: 70
Discharge: HOME | End: 2020-10-21
Payer: COMMERCIAL

## 2020-10-21 ENCOUNTER — OFFICE VISIT (OUTPATIENT)
Dept: FAMILY MEDICINE CLINIC | Facility: CLINIC | Age: 70
End: 2020-10-21
Payer: COMMERCIAL

## 2020-10-21 ENCOUNTER — TRANSCRIBE ORDERS (OUTPATIENT)
Dept: RADIOLOGY | Facility: HOSPITAL | Age: 70
End: 2020-10-21

## 2020-10-21 VITALS
HEART RATE: 82 BPM | BODY MASS INDEX: 39.42 KG/M2 | HEIGHT: 71 IN | RESPIRATION RATE: 16 BRPM | DIASTOLIC BLOOD PRESSURE: 80 MMHG | TEMPERATURE: 97.6 F | WEIGHT: 281.6 LBS | SYSTOLIC BLOOD PRESSURE: 138 MMHG

## 2020-10-21 DIAGNOSIS — M54.50 CHRONIC BILATERAL LOW BACK PAIN WITHOUT SCIATICA: ICD-10-CM

## 2020-10-21 DIAGNOSIS — Z00.00 MEDICARE ANNUAL WELLNESS VISIT, SUBSEQUENT: ICD-10-CM

## 2020-10-21 DIAGNOSIS — G89.29 CHRONIC BILATERAL LOW BACK PAIN WITHOUT SCIATICA: ICD-10-CM

## 2020-10-21 DIAGNOSIS — M54.50 CHRONIC BILATERAL LOW BACK PAIN WITHOUT SCIATICA: Primary | ICD-10-CM

## 2020-10-21 DIAGNOSIS — G89.29 CHRONIC BILATERAL LOW BACK PAIN WITHOUT SCIATICA: Primary | ICD-10-CM

## 2020-10-21 PROCEDURE — 3725F SCREEN DEPRESSION PERFORMED: CPT | Performed by: PHYSICIAN ASSISTANT

## 2020-10-21 PROCEDURE — G0439 PPPS, SUBSEQ VISIT: HCPCS | Performed by: PHYSICIAN ASSISTANT

## 2020-10-21 PROCEDURE — 1101F PT FALLS ASSESS-DOCD LE1/YR: CPT | Performed by: PHYSICIAN ASSISTANT

## 2020-10-21 PROCEDURE — 3079F DIAST BP 80-89 MM HG: CPT | Performed by: PHYSICIAN ASSISTANT

## 2020-10-21 PROCEDURE — 3288F FALL RISK ASSESSMENT DOCD: CPT | Performed by: PHYSICIAN ASSISTANT

## 2020-10-21 PROCEDURE — 72110 X-RAY EXAM L-2 SPINE 4/>VWS: CPT

## 2020-10-21 PROCEDURE — 1160F RVW MEDS BY RX/DR IN RCRD: CPT | Performed by: PHYSICIAN ASSISTANT

## 2020-10-21 PROCEDURE — 1036F TOBACCO NON-USER: CPT | Performed by: PHYSICIAN ASSISTANT

## 2020-10-21 PROCEDURE — 99213 OFFICE O/P EST LOW 20 MIN: CPT | Performed by: PHYSICIAN ASSISTANT

## 2020-10-21 PROCEDURE — 1125F AMNT PAIN NOTED PAIN PRSNT: CPT | Performed by: PHYSICIAN ASSISTANT

## 2020-10-21 PROCEDURE — 1170F FXNL STATUS ASSESSED: CPT | Performed by: PHYSICIAN ASSISTANT

## 2020-10-22 PROBLEM — F32.A MILD DEPRESSION: Status: RESOLVED | Noted: 2017-05-23 | Resolved: 2020-10-22

## 2020-10-28 ENCOUNTER — TELEPHONE (OUTPATIENT)
Dept: FAMILY MEDICINE CLINIC | Facility: CLINIC | Age: 70
End: 2020-10-28

## 2020-11-19 ENCOUNTER — EVALUATION (OUTPATIENT)
Dept: PHYSICAL THERAPY | Facility: REHABILITATION | Age: 70
End: 2020-11-19
Payer: COMMERCIAL

## 2020-11-19 DIAGNOSIS — M54.50 CHRONIC BILATERAL LOW BACK PAIN WITHOUT SCIATICA: ICD-10-CM

## 2020-11-19 DIAGNOSIS — G89.29 CHRONIC BILATERAL LOW BACK PAIN WITHOUT SCIATICA: ICD-10-CM

## 2020-11-19 PROCEDURE — 97161 PT EVAL LOW COMPLEX 20 MIN: CPT | Performed by: PHYSICAL THERAPIST

## 2020-11-19 PROCEDURE — 97110 THERAPEUTIC EXERCISES: CPT | Performed by: PHYSICAL THERAPIST

## 2020-11-23 ENCOUNTER — OFFICE VISIT (OUTPATIENT)
Dept: PHYSICAL THERAPY | Facility: REHABILITATION | Age: 70
End: 2020-11-23
Payer: COMMERCIAL

## 2020-11-23 DIAGNOSIS — M54.50 CHRONIC BILATERAL LOW BACK PAIN WITHOUT SCIATICA: Primary | ICD-10-CM

## 2020-11-23 DIAGNOSIS — G89.29 CHRONIC BILATERAL LOW BACK PAIN WITHOUT SCIATICA: Primary | ICD-10-CM

## 2020-11-23 PROCEDURE — 97112 NEUROMUSCULAR REEDUCATION: CPT | Performed by: PHYSICAL THERAPIST

## 2020-11-23 PROCEDURE — 97110 THERAPEUTIC EXERCISES: CPT | Performed by: PHYSICAL THERAPIST

## 2020-11-30 ENCOUNTER — OFFICE VISIT (OUTPATIENT)
Dept: PHYSICAL THERAPY | Facility: REHABILITATION | Age: 70
End: 2020-11-30
Payer: COMMERCIAL

## 2020-11-30 DIAGNOSIS — M54.50 CHRONIC BILATERAL LOW BACK PAIN WITHOUT SCIATICA: Primary | ICD-10-CM

## 2020-11-30 DIAGNOSIS — G89.29 CHRONIC BILATERAL LOW BACK PAIN WITHOUT SCIATICA: Primary | ICD-10-CM

## 2020-11-30 PROCEDURE — 97112 NEUROMUSCULAR REEDUCATION: CPT | Performed by: PHYSICAL THERAPY ASSISTANT

## 2020-11-30 PROCEDURE — 97110 THERAPEUTIC EXERCISES: CPT | Performed by: PHYSICAL THERAPY ASSISTANT

## 2020-12-07 ENCOUNTER — OFFICE VISIT (OUTPATIENT)
Dept: PHYSICAL THERAPY | Facility: REHABILITATION | Age: 70
End: 2020-12-07
Payer: COMMERCIAL

## 2020-12-07 DIAGNOSIS — M54.50 CHRONIC BILATERAL LOW BACK PAIN WITHOUT SCIATICA: Primary | ICD-10-CM

## 2020-12-07 DIAGNOSIS — G89.29 CHRONIC BILATERAL LOW BACK PAIN WITHOUT SCIATICA: Primary | ICD-10-CM

## 2020-12-07 PROCEDURE — 97112 NEUROMUSCULAR REEDUCATION: CPT | Performed by: PHYSICAL THERAPIST

## 2020-12-07 PROCEDURE — 97110 THERAPEUTIC EXERCISES: CPT | Performed by: PHYSICAL THERAPIST

## 2020-12-14 ENCOUNTER — OFFICE VISIT (OUTPATIENT)
Dept: PHYSICAL THERAPY | Facility: REHABILITATION | Age: 70
End: 2020-12-14
Payer: COMMERCIAL

## 2020-12-14 DIAGNOSIS — M54.50 CHRONIC BILATERAL LOW BACK PAIN WITHOUT SCIATICA: Primary | ICD-10-CM

## 2020-12-14 DIAGNOSIS — G89.29 CHRONIC BILATERAL LOW BACK PAIN WITHOUT SCIATICA: Primary | ICD-10-CM

## 2020-12-14 PROCEDURE — 97112 NEUROMUSCULAR REEDUCATION: CPT | Performed by: PHYSICAL THERAPIST

## 2020-12-14 PROCEDURE — 97110 THERAPEUTIC EXERCISES: CPT | Performed by: PHYSICAL THERAPIST

## 2020-12-21 ENCOUNTER — OFFICE VISIT (OUTPATIENT)
Dept: PHYSICAL THERAPY | Facility: REHABILITATION | Age: 70
End: 2020-12-21
Payer: COMMERCIAL

## 2020-12-21 DIAGNOSIS — G89.29 CHRONIC BILATERAL LOW BACK PAIN WITHOUT SCIATICA: Primary | ICD-10-CM

## 2020-12-21 DIAGNOSIS — M54.50 CHRONIC BILATERAL LOW BACK PAIN WITHOUT SCIATICA: Primary | ICD-10-CM

## 2020-12-21 PROCEDURE — 97110 THERAPEUTIC EXERCISES: CPT

## 2020-12-21 PROCEDURE — 97112 NEUROMUSCULAR REEDUCATION: CPT

## 2020-12-28 ENCOUNTER — APPOINTMENT (OUTPATIENT)
Dept: PHYSICAL THERAPY | Facility: REHABILITATION | Age: 70
End: 2020-12-28
Payer: COMMERCIAL

## 2021-01-16 ENCOUNTER — IMMUNIZATIONS (OUTPATIENT)
Dept: FAMILY MEDICINE CLINIC | Facility: HOSPITAL | Age: 71
End: 2021-01-16

## 2021-01-16 DIAGNOSIS — Z23 ENCOUNTER FOR IMMUNIZATION: Primary | ICD-10-CM

## 2021-01-16 PROCEDURE — 91300 SARS-COV-2 / COVID-19 MRNA VACCINE (PFIZER-BIONTECH) 30 MCG: CPT

## 2021-01-16 PROCEDURE — 0001A SARS-COV-2 / COVID-19 MRNA VACCINE (PFIZER-BIONTECH) 30 MCG: CPT

## 2021-02-04 ENCOUNTER — IMMUNIZATIONS (OUTPATIENT)
Dept: FAMILY MEDICINE CLINIC | Facility: HOSPITAL | Age: 71
End: 2021-02-04

## 2021-02-04 DIAGNOSIS — Z23 ENCOUNTER FOR IMMUNIZATION: Primary | ICD-10-CM

## 2021-02-04 PROCEDURE — 91300 SARS-COV-2 / COVID-19 MRNA VACCINE (PFIZER-BIONTECH) 30 MCG: CPT

## 2021-02-04 PROCEDURE — 0002A SARS-COV-2 / COVID-19 MRNA VACCINE (PFIZER-BIONTECH) 30 MCG: CPT

## 2021-04-27 ENCOUNTER — HOSPITAL ENCOUNTER (OUTPATIENT)
Dept: RADIOLOGY | Facility: HOSPITAL | Age: 71
Discharge: HOME | End: 2021-04-27
Payer: COMMERCIAL

## 2021-04-27 ENCOUNTER — OFFICE VISIT (OUTPATIENT)
Dept: FAMILY MEDICINE CLINIC | Facility: CLINIC | Age: 71
End: 2021-04-27
Payer: COMMERCIAL

## 2021-04-27 ENCOUNTER — TRANSCRIBE ORDERS (OUTPATIENT)
Dept: ADMINISTRATIVE | Facility: HOSPITAL | Age: 71
End: 2021-04-27

## 2021-04-27 VITALS
RESPIRATION RATE: 16 BRPM | HEART RATE: 70 BPM | DIASTOLIC BLOOD PRESSURE: 80 MMHG | WEIGHT: 287.9 LBS | HEIGHT: 71 IN | BODY MASS INDEX: 40.31 KG/M2 | SYSTOLIC BLOOD PRESSURE: 122 MMHG

## 2021-04-27 DIAGNOSIS — R07.81 RIB PAIN ON LEFT SIDE: ICD-10-CM

## 2021-04-27 DIAGNOSIS — E66.01 MORBID OBESITY WITH BMI OF 40.0-44.9, ADULT (HCC): Primary | ICD-10-CM

## 2021-04-27 DIAGNOSIS — J44.9 CHRONIC OBSTRUCTIVE PULMONARY DISEASE, UNSPECIFIED COPD TYPE (HCC): ICD-10-CM

## 2021-04-27 PROCEDURE — 71101 X-RAY EXAM UNILAT RIBS/CHEST: CPT

## 2021-04-27 PROCEDURE — 3008F BODY MASS INDEX DOCD: CPT | Performed by: PHYSICIAN ASSISTANT

## 2021-04-27 PROCEDURE — 1160F RVW MEDS BY RX/DR IN RCRD: CPT | Performed by: PHYSICIAN ASSISTANT

## 2021-04-27 PROCEDURE — 99213 OFFICE O/P EST LOW 20 MIN: CPT | Performed by: PHYSICIAN ASSISTANT

## 2021-04-27 PROCEDURE — 3725F SCREEN DEPRESSION PERFORMED: CPT | Performed by: PHYSICIAN ASSISTANT

## 2021-04-27 PROCEDURE — 1036F TOBACCO NON-USER: CPT | Performed by: PHYSICIAN ASSISTANT

## 2021-04-27 RX ORDER — LIDOCAINE 50 MG/G
1 PATCH TOPICAL DAILY
Qty: 15 PATCH | Refills: 0 | Status: SHIPPED | OUTPATIENT
Start: 2021-04-27 | End: 2021-05-20 | Stop reason: ALTCHOICE

## 2021-04-27 NOTE — PROGRESS NOTES
Assessment/Plan:    1  Rib pain on left side    - contusion vs fracture, will send for XR and he will apply lidoderm pathces q 12 hrs as needed for pain,will take 4-6 weeks most likely for pain to resolve  - XR ribs 2 vw left; Future  - lidocaine (LIDODERM) 5 %; Apply 1 patch topically daily Remove & Discard patch within 12 hours or as directed by MD  Dispense: 15 patch; Refill: 0    2  Morbid obesity with BMI of 40 0-44 9, adult ([facility] )    - encouraged patient to work on [employer], exercise  and adequate sleep for weight loss  Follow up if more help is needed    3  Chronic obstructive pulmonary disease, unspecified COPD type ([facility] )    - compliant with Advair no complaints    F/u after 10/21/2021 for AWV or sooner if needed      Subjective:   Chief Complaint   Patient presents with    Fall     times 2 weeks ago    Rib pain     L side       Patient ID: Hoda Graham is a 80 y o  male  Two weeks ago fell while walking across [place], landed left side, denies head trauma or LOC  Hurts with breathing, hurts with moving, cannot sleep on that side  Trouble sleeping last night  Taking advil with no relief  Not getting better two weeks later  Hurts when he walks for exercise  The following portions of the patient's history were reviewed and updated as appropriate: allergies, current medications, past family history, past medical history, past social history, past surgical history and problem list     Past Medical History:   Diagnosis Date    Asthma     Epilepsy ([facility] )     stopped meds 2006 has been seizure free     Past Surgical History:   Procedure Laterality Date    APPENDECTOMY      MANDIBLE SURGERY      [place] COLONOSCOPY FLX DX W/COLLJ SPEC WHEN PFRMD N/A 4/4/2016    Procedure: COLONOSCOPY;  Surgeon: Laura Qureshi MD;  Location: [facility];   Service: Gastroenterology    SKULL FRACTURE ELEVATION      TONSILLECTOMY       Family History   Problem Relation Age of Onset    Alcohol abuse Father     Substance Abuse Neg Hx     Mental illness Neg Hx      Social History     Socioeconomic History    Marital status: /Civil Union     Spouse name: Not on file    Number of children: Not on file    Years of education: Not on file    Highest education level: Not on file   Occupational History    Not on file   Social Needs    Financial resource strain: Not on file    Food insecurity     Worry: Not on file     Inability: Not on file   [employer] needs     Medical: Not on file     Non-medical: Not on file   Tobacco Use    Smoking status: Former Smoker     Types: Cigarettes     Quit date: 2/8/2006     Years since quitting: 15 2    Smokeless tobacco: Never Used   Substance and Sexual Activity    Alcohol use: No    Drug use: No    Sexual activity: Not on file   Lifestyle    Physical activity     Days per week: Not on file     Minutes per session: Not on file    Stress: Not on file   Relationships    Social connections     Talks on phone: Not on file     Gets together: Not on file     Attends Denominational service: Not on file     Active member of club or organization: Not on file     Attends meetings of clubs or organizations: Not on file     Relationship status: Not on file    Intimate partner violence     Fear of current or ex partner: Not on file     Emotionally abused: Not on file     Physically abused: Not on file     Forced sexual activity: Not on file   Other Topics Concern    Not on file   Social History Narrative    Not on file       Current Outpatient Medications:     Advair Diskus 250-50 MCG/DOSE inhaler, INHALE ONE PUFF BY MOUTH TWICE A DAY   RINSE MOUTH AFTER USE, Disp: 180 each, Rfl: 3    albuterol (2 5 mg/3 mL) 0 083 % nebulizer solution, Take 1 vial (2 5 mg total) by nebulization every 6 (six) hours as needed for wheezing or shortness of breath, Disp: 60 vial, Rfl: 1    Multiple Vitamin (ONE-A-DAY MENS PO), Take by mouth, Disp: , Rfl:     Review of Systems          Objective:    Vitals: 04/27/21 1351   BP: 122/80   Pulse: 70   Resp: 16   Weight: 131 kg (287 lb 14 4 oz)   Height: 5' 10 75" (1 797 m)        Physical Exam  Constitutional:       Appearance: Normal appearance  He is well-developed  He is obese  HENT:      Head: Normocephalic and atraumatic  Neck:      Musculoskeletal: Neck supple  Cardiovascular:      Rate and Rhythm: Normal rate and regular rhythm  Pulses: Normal pulses  Heart sounds: Normal heart sounds  Comments: Left ribs posteriorly 9-10-11 tender to palpation, no swelling or abnormality appreciated  Pulmonary:      Effort: Pulmonary effort is normal       Breath sounds: Normal breath sounds  Skin:     General: Skin is warm  Neurological:      General: No focal deficit present  Mental Status: He is alert and oriented to person, place, and time  Psychiatric:         Mood and Affect: Mood normal          Behavior: Behavior normal          Thought Content: Thought content normal          Judgment: Judgment normal                BMI Counseling: Body mass index is 40 44 kg/m²  The BMI is above normal  Nutrition recommendations include reducing portion sizes

## 2021-05-05 ENCOUNTER — TELEPHONE (OUTPATIENT)
Dept: FAMILY MEDICINE CLINIC | Facility: CLINIC | Age: 71
End: 2021-05-05

## 2021-05-05 NOTE — TELEPHONE ENCOUNTER
----- Message from Angi Hargrove PA-C sent at 5/5/2021  1:32 PM EDT -----  Please let patient know there are no rib fractures

## 2021-05-06 ENCOUNTER — HOSPITAL ENCOUNTER (INPATIENT)
Facility: HOSPITAL | Age: 71
LOS: 12 days | Discharge: HOME | DRG: 025 | End: 2021-05-18
Attending: EMERGENCY MEDICINE | Admitting: EMERGENCY MEDICINE
Payer: COMMERCIAL

## 2021-05-06 ENCOUNTER — APPOINTMENT (EMERGENCY)
Dept: RADIOLOGY | Facility: HOSPITAL | Age: 71
DRG: 025 | End: 2021-05-06
Payer: COMMERCIAL

## 2021-05-06 DIAGNOSIS — E66.01 MORBID OBESITY (HCC): ICD-10-CM

## 2021-05-06 DIAGNOSIS — G93.5 BRAIN COMPRESSION (HCC): ICD-10-CM

## 2021-05-06 DIAGNOSIS — R47.01 EXPRESSIVE APHASIA: ICD-10-CM

## 2021-05-06 DIAGNOSIS — R90.89 MIDLINE SHIFT OF BRAIN: ICD-10-CM

## 2021-05-06 DIAGNOSIS — G93.89 BRAIN MASS: Primary | ICD-10-CM

## 2021-05-06 DIAGNOSIS — G93.6 CEREBRAL EDEMA (HCC): ICD-10-CM

## 2021-05-06 LAB
ANION GAP SERPL CALCULATED.3IONS-SCNC: 6 MMOL/L (ref 4–13)
APTT PPP: 32 SECONDS (ref 23–37)
ATRIAL RATE: 74 BPM
BUN SERPL-MCNC: 21 MG/DL (ref 5–25)
CALCIUM SERPL-MCNC: 9.3 MG/DL (ref 8.3–10.1)
CHLORIDE SERPL-SCNC: 109 MMOL/L (ref 100–108)
CO2 SERPL-SCNC: 24 MMOL/L (ref 21–32)
CREAT SERPL-MCNC: 1.06 MG/DL (ref 0.6–1.3)
ERYTHROCYTE [DISTWIDTH] IN BLOOD BY AUTOMATED COUNT: 13.1 % (ref 11.6–15.1)
GFR SERPL CREATININE-BSD FRML MDRD: 71 ML/MIN/1.73SQ M
GLUCOSE SERPL-MCNC: 91 MG/DL (ref 65–140)
GLUCOSE SERPL-MCNC: 95 MG/DL (ref 65–140)
HCT VFR BLD AUTO: 45.3 % (ref 36.5–49.3)
HGB BLD-MCNC: 15.5 G/DL (ref 12–17)
INR PPP: 0.97 (ref 0.84–1.19)
MCH RBC QN AUTO: 29.1 PG (ref 26.8–34.3)
MCHC RBC AUTO-ENTMCNC: 34.2 G/DL (ref 31.4–37.4)
MCV RBC AUTO: 85 FL (ref 82–98)
P AXIS: 50 DEGREES
PLATELET # BLD AUTO: 272 THOUSANDS/UL (ref 149–390)
PMV BLD AUTO: 9.7 FL (ref 8.9–12.7)
POTASSIUM SERPL-SCNC: 3.9 MMOL/L (ref 3.5–5.3)
PR INTERVAL: 206 MS
PROTHROMBIN TIME: 12.9 SECONDS (ref 11.6–14.5)
QRS AXIS: 32 DEGREES
QRSD INTERVAL: 104 MS
QT INTERVAL: 384 MS
QTC INTERVAL: 426 MS
RBC # BLD AUTO: 5.33 MILLION/UL (ref 3.88–5.62)
SODIUM SERPL-SCNC: 139 MMOL/L (ref 136–145)
T WAVE AXIS: 18 DEGREES
TROPONIN I SERPL-MCNC: <0.02 NG/ML
VENTRICULAR RATE: 74 BPM
WBC # BLD AUTO: 7.56 THOUSAND/UL (ref 4.31–10.16)

## 2021-05-06 PROCEDURE — 70498 CT ANGIOGRAPHY NECK: CPT

## 2021-05-06 PROCEDURE — 93005 ELECTROCARDIOGRAM TRACING: CPT

## 2021-05-06 PROCEDURE — 80048 BASIC METABOLIC PNL TOTAL CA: CPT | Performed by: EMERGENCY MEDICINE

## 2021-05-06 PROCEDURE — 84484 ASSAY OF TROPONIN QUANT: CPT | Performed by: EMERGENCY MEDICINE

## 2021-05-06 PROCEDURE — 85027 COMPLETE CBC AUTOMATED: CPT | Performed by: EMERGENCY MEDICINE

## 2021-05-06 PROCEDURE — 70496 CT ANGIOGRAPHY HEAD: CPT

## 2021-05-06 PROCEDURE — G1004 CDSM NDSC: HCPCS

## 2021-05-06 PROCEDURE — 99285 EMERGENCY DEPT VISIT HI MDM: CPT

## 2021-05-06 PROCEDURE — 85610 PROTHROMBIN TIME: CPT | Performed by: EMERGENCY MEDICINE

## 2021-05-06 PROCEDURE — 93010 ELECTROCARDIOGRAM REPORT: CPT | Performed by: INTERNAL MEDICINE

## 2021-05-06 PROCEDURE — 85730 THROMBOPLASTIN TIME PARTIAL: CPT | Performed by: EMERGENCY MEDICINE

## 2021-05-06 PROCEDURE — A9585 GADOBUTROL INJECTION: HCPCS | Performed by: EMERGENCY MEDICINE

## 2021-05-06 PROCEDURE — 99223 1ST HOSP IP/OBS HIGH 75: CPT | Performed by: INTERNAL MEDICINE

## 2021-05-06 PROCEDURE — 70553 MRI BRAIN STEM W/O & W/DYE: CPT

## 2021-05-06 PROCEDURE — 99291 CRITICAL CARE FIRST HOUR: CPT | Performed by: EMERGENCY MEDICINE

## 2021-05-06 PROCEDURE — 82948 REAGENT STRIP/BLOOD GLUCOSE: CPT

## 2021-05-06 PROCEDURE — 96374 THER/PROPH/DIAG INJ IV PUSH: CPT

## 2021-05-06 PROCEDURE — 36415 COLL VENOUS BLD VENIPUNCTURE: CPT | Performed by: EMERGENCY MEDICINE

## 2021-05-06 RX ORDER — DEXAMETHASONE SODIUM PHOSPHATE 4 MG/ML
10 INJECTION, SOLUTION INTRA-ARTICULAR; INTRALESIONAL; INTRAMUSCULAR; INTRAVENOUS; SOFT TISSUE ONCE
Status: COMPLETED | OUTPATIENT
Start: 2021-05-06 | End: 2021-05-06

## 2021-05-06 RX ORDER — ACETAMINOPHEN 325 MG/1
975 TABLET ORAL ONCE
Status: COMPLETED | OUTPATIENT
Start: 2021-05-06 | End: 2021-05-06

## 2021-05-06 RX ORDER — SODIUM CHLORIDE, SODIUM GLUCONATE, SODIUM ACETATE, POTASSIUM CHLORIDE, MAGNESIUM CHLORIDE, SODIUM PHOSPHATE, DIBASIC, AND POTASSIUM PHOSPHATE .53; .5; .37; .037; .03; .012; .00082 G/100ML; G/100ML; G/100ML; G/100ML; G/100ML; G/100ML; G/100ML
1000 INJECTION, SOLUTION INTRAVENOUS ONCE
Status: DISCONTINUED | OUTPATIENT
Start: 2021-05-06 | End: 2021-05-06

## 2021-05-06 RX ADMIN — DEXAMETHASONE SODIUM PHOSPHATE 10 MG: 4 INJECTION INTRA-ARTICULAR; INTRALESIONAL; INTRAMUSCULAR; INTRAVENOUS; SOFT TISSUE at 21:54

## 2021-05-06 RX ADMIN — ACETAMINOPHEN 975 MG: 325 TABLET, FILM COATED ORAL at 20:46

## 2021-05-06 RX ADMIN — GADOBUTROL 13 ML: 604.72 INJECTION INTRAVENOUS at 23:07

## 2021-05-06 RX ADMIN — IOHEXOL 100 ML: 350 INJECTION, SOLUTION INTRAVENOUS at 21:02

## 2021-05-06 NOTE — ED PROVIDER NOTES
History  Chief Complaint   Patient presents with    Speech Problem     Pt reports he fell approximately two weeks ago and hit his head, +LOC, - thinners  Patient did not seek medical treatment at the time  Patient reports starting yesterday he started with aphasia, patient has trouble finding the right words  Patient denies all other focal neuro deficits  18-year-old male with history of seizure (last episode in 2006), asthma who presents for evaluation aphasia  Patient reports that he fell 3 weeks ago while walking across the street  He struck his head and thinks that he lost consciousness  No one was with him to witness the fall  He did not get evaluated at that time  He was evaluated several days later by his primary care physician for pain in his ribs  No acute rib fractures were found at that time  He did not mention anything about his head strike at that visit per his wife  Since yesterday morning, he has been experiencing aphasia and word-finding difficulty  He states that he knows what he wants to say but has difficulty getting words out  His wife has also noticed differences in his speech  He has had a mild diffuse headache throughout the day today  He denies vision changes, weakness, numbness, difficulty walking  He has had occasional episodes of feeling off balance  Prior to Admission Medications   Prescriptions Last Dose Informant Patient Reported? Taking? Advair Diskus 250-50 MCG/DOSE inhaler   No No   Sig: INHALE ONE PUFF BY MOUTH TWICE A DAY   RINSE MOUTH AFTER USE   Multiple Vitamin (ONE-A-DAY MENS PO)   Yes No   Sig: Take by mouth   albuterol (2 5 mg/3 mL) 0 083 % nebulizer solution  Self No No   Sig: Take 1 vial (2 5 mg total) by nebulization every 6 (six) hours as needed for wheezing or shortness of breath   lidocaine (LIDODERM) 5 %   No No   Sig: Apply 1 patch topically daily Remove & Discard patch within 12 hours or as directed by MD      Facility-Administered Medications: None       Past Medical History:   Diagnosis Date    Asthma     Epilepsy ([facility] )     stopped meds 2006 has been seizure free       Past Surgical History:   Procedure Laterality Date    APPENDECTOMY      MANDIBLE SURGERY      [place] COLONOSCOPY FLX DX W/COLLJ SPEC WHEN PFRMD N/A 4/4/2016    Procedure: COLONOSCOPY;  Surgeon: Art Solis MD;  Location:  GI LAB; Service: Gastroenterology    SKULL FRACTURE ELEVATION      TONSILLECTOMY         Family History   Problem Relation Age of Onset    Alcohol abuse Father     Substance Abuse Neg Hx     Mental illness Neg Hx      I have reviewed and agree with the history as documented  E-Cigarette/Vaping    E-Cigarette Use Never User      E-Cigarette/Vaping Substances     Social History     Tobacco Use    Smoking status: Former Smoker     Types: Cigarettes     Quit date: 2/8/2006     Years since quitting: 15 2    Smokeless tobacco: Never Used   Substance Use Topics    Alcohol use: No    Drug use: No        Review of Systems   Constitutional: Negative for chills and fever  Eyes: Negative for visual disturbance  Respiratory: Negative for cough and shortness of breath  Cardiovascular: Negative for chest pain and leg swelling  Gastrointestinal: Negative for abdominal distention, abdominal pain, diarrhea, nausea and vomiting  Genitourinary: Negative for flank pain  Musculoskeletal: Negative for back pain and gait problem  Skin: Negative for pallor and rash  Neurological: Positive for speech difficulty and headaches  Negative for syncope, weakness and light-headedness  All other systems reviewed and are negative        Physical Exam  ED Triage Vitals   Temperature Pulse Respirations Blood Pressure SpO2   05/06/21 1921 05/06/21 1921 05/06/21 1921 05/06/21 1921 05/06/21 1921   98 °F (36 7 °C) 78 18 (!) 161/105 95 %      Temp Source Heart Rate Source Patient Position - Orthostatic VS BP Location FiO2 (%)   05/06/21 1921 05/06/21 1921 05/06/21 1921 05/06/21 1921 --   Oral Monitor Lying Right arm       Pain Score       05/06/21 2046       5             Orthostatic Vital Signs  Vitals:    05/06/21 1921 05/06/21 2048 05/06/21 2130   BP: (!) 161/105 (!) 161/105 154/78   Pulse: 78 71 72   Patient Position - Orthostatic VS: Lying Sitting        Physical Exam  Vitals signs and nursing note reviewed  Constitutional:       General: He is not in acute distress  HENT:      Head: Normocephalic and atraumatic  Nose: Nose normal    Eyes:      Extraocular Movements: Extraocular movements intact  Pupils: Pupils are equal, round, and reactive to light  Neck:      Musculoskeletal: Normal range of motion and neck supple  No neck rigidity  Cardiovascular:      Rate and Rhythm: Normal rate and regular rhythm  Heart sounds: No murmur  No friction rub  No gallop  Pulmonary:      Effort: Pulmonary effort is normal       Breath sounds: Normal breath sounds  No wheezing, rhonchi or rales  Abdominal:      General: Bowel sounds are normal  There is no distension  Palpations: Abdomen is soft  Tenderness: There is no abdominal tenderness  Musculoskeletal: Normal range of motion  General: No swelling or tenderness  Skin:     General: Skin is warm and dry  Coloration: Skin is not pale  Findings: No rash  Neurological:      Mental Status: He is alert and oriented to person, place, and time  Comments: Cranial nerves 2-12 intact  5/5 strength in bilateral upper and lower extremities  There is no drift in the upper or lower extremities  Sensation is intact in bilateral upper and lower extremities  Finger-to-nose is normal   Visual fields are grossly intact  The patient's speech is not dysarthric, however, he frequently as words into sentences that do not make sense  He has obvious word-finding difficulty at times     Psychiatric:         Behavior: Behavior normal          ED Medications  Medications   acetaminophen (TYLENOL) tablet 975 mg (975 mg Oral Given 5/6/21 2046)   iohexol (OMNIPAQUE) 350 MG/ML injection (SINGLE-DOSE) 100 mL (100 mL Intravenous Given 5/6/21 2102)   dexamethasone (DECADRON) injection 10 mg (10 mg Intravenous Given by Other 5/6/21 2154)   Gadobutrol injection (SINGLE-DOSE) SOLN 13 mL (13 mL Intravenous Given 5/6/21 2307)       Diagnostic Studies  Results Reviewed     Procedure Component Value Units Date/Time    Troponin I [[id number]]  (Normal) Collected: 05/06/21 1954    Lab Status: Final result Specimen: Blood from Arm, Left Updated: 05/06/21 2030     Troponin I <0 02 ng/mL     Protime-INR [[id number]]  (Normal) Collected: 05/06/21 1954    Lab Status: Final result Specimen: Blood from Arm, Left Updated: 05/06/21 2029     Protime 12 9 seconds      INR 0 97    APTT [[id number]]  (Normal) Collected: 05/06/21 1954    Lab Status: Final result Specimen: Blood from Arm, Left Updated: 05/06/21 2029     PTT 32 seconds     Basic metabolic panel [[id number]]  (Abnormal) Collected: 05/06/21 1954    Lab Status: Final result Specimen: Blood from Arm, Left Updated: 05/06/21 2026     Sodium 139 mmol/L      Potassium 3 9 mmol/L      Chloride 109 mmol/L      CO2 24 mmol/L      ANION GAP 6 mmol/L      BUN 21 mg/dL      Creatinine 1 06 mg/dL      Glucose 95 mg/dL      Calcium 9 3 mg/dL      eGFR 71 ml/min/1 73sq m     Narrative:      Laure guidelines for Chronic Kidney Disease (CKD):     Stage 1 with normal or high GFR (GFR > 90 mL/min/1 73 square meters)    Stage 2 Mild CKD (GFR = 60-89 mL/min/1 73 square meters)    Stage 3A Moderate CKD (GFR = 45-59 mL/min/1 73 square meters)    Stage 3B Moderate CKD (GFR = 30-44 mL/min/1 73 square meters)    Stage 4 Severe CKD (GFR = 15-29 mL/min/1 73 square meters)    Stage 5 End Stage CKD (GFR <15 mL/min/1 73 square meters)  Note: GFR calculation is accurate only with a steady state creatinine    Fingerstick Glucose (POCT) [[id number]]  (Normal) Collected: 05/06/21 1957    Lab Status: Final result Updated: 05/06/21 2015     POC Glucose 91 mg/dl     CBC and Platelet [[id number]]  (Normal) Collected: 05/06/21 1954    Lab Status: Final result Specimen: Blood from Arm, Left Updated: 05/06/21 2002     WBC 7 56 Thousand/uL      RBC 5 33 Million/uL      Hemoglobin 15 5 g/dL      Hematocrit 45 3 %      MCV 85 fL      MCH 29 1 pg      MCHC 34 2 g/dL      RDW 13 1 %      Platelets 221 Thousands/uL      MPV 9 7 fL                  CTA head and neck with and without contrast   ED Interpretation by Ninfa Don MD (05/06 2110)   Abnormal   LEFT mass with vasogenic edema      Final Result by Cyndee Guzman MD (05/06 2224)      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 and left hemispheric mass effect causing approximately 3 mm left to right midline shift  Contrast-enhanced brain MRI is recommended for further evaluation  No evidence of flow restrictive large vessel occlusive disease  I personally discussed this study with Angus Deluna on 5/6/2021 at 9:40 PM                            Workstation performed: [id number]         MRI brain w wo contrast    (Results Pending)   CT chest abdomen pelvis w contrast    (Results Pending)         Procedures  Procedures      ED Course  ED Course as of May 06 2337   Thu May 06, 2021   2002 Procedure Note: EKG  Date/Time: 05/06/21 20:02  Interpreted by: Liliane Angel   Indications / Diagnosis: stroke workup  ECG reviewed by me, the ED Provider: yes   The EKG demonstrates:  Rhythm: rate 74, normal sinus, infrequent PACs  Intervals: normal intervals  Axis: normal axis  QRS/Blocks: normal QRS  ST Changes: No acute ST Changes, no STD/HUNTER          2003 CBC and Platelet   2760 Troponin I: <0 02 2137 D/w Dr Clarke Enciso from [facility]  Recommending decadron 10 mg x1 followed by 4mg Q6H, admission for MRI and CTAP       2205 Discussed results of CT head with patient and his wife   Discussed that he has a mass in the frontotemporal region of his brain that will need further evaluation with MRI, CTCAP, and evaluation by neurosurgery  They understand and are in agreement for admission  All questions were answered  SBIRT 22yo+      Most Recent Value   SBIRT (24 yo +)   In order to provide better care to our patients, we are screening all of our patients for alcohol and drug use  Would it be okay to ask you these screening questions? No Filed at: 05/06/2021 1922                MDM  Number of Diagnoses or Management Options  Brain mass: new and requires workup  Expressive aphasia: new and requires workup  Midline shift of brain: new and requires workup  Diagnosis management comments: 81-year-old male who presents for evaluation of expressive aphasia  Patient is out of the window for tPA given his symptoms began greater than 24 hours ago, therefore patient was not made a stroke alert  Patient with expressive aphasia but otherwise a normal neurologic exam   Will obtain CTA head and neck to evaluate for intracranial hemorrhage given his recent history of trauma, stroke, or other acute intracranial pathology  Will complete the stroke workup as well  Labs within normal limits  CT showing a for 2 temporal mass with vasogenic edema and 3 mm of shift  Discussed with neurosurgery who recommends administration of Decadron and admission with MRI and CT chest abdomen pelvis  These results were discussed with patient and his wife  Decadron administered  MRI and CT chest abdomen and pelvis ordered  Discussed with slim and admitted to their service         Amount and/or Complexity of Data Reviewed  Clinical lab tests: ordered and reviewed  Tests in the radiology section of CPT®: ordered and reviewed  Decide to obtain previous medical records or to obtain history from someone other than the patient: yes  Review and summarize past medical records: yes  Discuss the patient with other providers: yes    Risk of Complications, Morbidity, and/or Mortality  Presenting problems: high  Diagnostic procedures: low  Management options: moderate    Patient Progress  Patient progress: stable      Disposition  Final diagnoses:   Brain mass   Midline shift of brain   Expressive aphasia     Time reflects when diagnosis was documented in both MDM as applicable and the Disposition within this note     Time User Action Codes Description Comment    5/6/2021 10:13 PM Gwenevere Bird [G93 [id number]] Brain mass     5/6/2021 10:13 PM Martin Primrose Add [R90 89] Midline shift of brain     5/6/2021 10:13 PM Martin Primrose Add [R47 01] Expressive aphasia       ED Disposition     ED Disposition Condition Date/Time Comment    Admit Stable Thu May 6, 2021 10:13 PM Case was discussed with [facility] and the patient's admission status was agreed to be Admission Status: inpatient status to the service of Dr Joaquim Kehr   Follow-up Information    None         Patient's Medications   Discharge Prescriptions    No medications on file     No discharge procedures on file  PDMP Review     None           ED Provider  Attending physically available and evaluated Lupe Chaidez I managed the patient along with the ED Attending      Electronically Signed by         Adrian Perkins MD  05/06/21 1858

## 2021-05-06 NOTE — ED ATTENDING ATTESTATION
Final Diagnosis:  1  Brain mass    2  Midline shift of brain    3  Expressive aphasia           IShelley MD, saw and evaluated the patient  All available labs and X-rays were ordered by me or the resident and have been reviewed by myself  I discussed the patient with the resident / non-physician and agree with the resident's / non-physician practitioner's findings and plan as documented in the resident's / non-physician practicitioner's note, except where noted  At this point, I agree with the current assessment done in the ED  I was present during key portions of all procedures performed unless otherwise stated  Chief Complaint   Patient presents with    Speech Problem     Pt reports he fell approximately two weeks ago and hit his head, +LOC, - thinners  Patient did not seek medical treatment at the time  Patient reports starting yesterday he started with aphasia, patient has trouble finding the right words  Patient denies all other focal neuro deficits  This is a 80 y o  male presenting for evaluation of speech abnormality  The patient about 2 weeks ago had tripped, falling, hit his head  He lost conscious  No blood thinners  He denies she get an evaluation at that time  Been doing pretty okay until yesterday when he at some point during the day started to have some difficulty expressing himself  He was think of the words but we unable to express some, HGB stuck  It continued till today until his wife brought in for evaluation  No seizure activity  No fevers chills nausea vomiting chest pain shortness of breath  No numbness tingling down the arms or legs  No dizziness or lightheadedness  PMH:   has a past medical history of Asthma and Epilepsy ([facility] [address] )  PSH:   has a past surgical history that includes Mandible surgery; Tonsillectomy; Appendectomy; Skull fracture elevation; and pr colonoscopy flx dx w/collj spec when pfrmd (N/A, 4/4/2016)      Social:  Social History Substance and Sexual Activity   Alcohol Use No     Social History     Tobacco Use   Smoking Status Former Smoker    Types: Cigarettes    Quit date: 2/8/2006    Years since quitting: 15 2   Smokeless Tobacco Never Used     Social History     Substance and Sexual Activity   Drug Use No     PE:  Vitals:    05/07/21 0200 05/07/21 0400 05/07/21 0414 05/07/21 0600   BP: (!) 173/86 170/93  170/92   BP Location:       Pulse: 88 86 88 84   Resp: 20 18 20 18   Temp:       TempSrc:       SpO2: 96% 96% 96% 94%   Weight:       Height:       General: VSS, NAD, awake, alert  Well-nourished, well-developed  Appears stated age  Head: Normocephalic, atraumatic, nontender  Eyes: PERRL, EOM-I  No diplopia  No hyphema  No subconjunctival hemorrhages  Symmetrical lids  ENTAtraumatic external nose and ears  MMM  No stridor  Normal phonation  No drooling  Base of mouth is soft  No mastoid tenderness  Neck: Symmetric, trachea midline  No JVD  CV: Peripheral pulses +2 throughout  No chest wall tenderness  Lungs:   Unlabored   No retractions  No crepitus  No tachypnea  No paradoxical motion  Abd: +BS, soft, NT/ND    MSK:   Extremities without tenderness or gross deformity  FROM   No lower extremity edema  Back:   No CVAT  Skin: Dry, intact  Neuro: AAOx3, GCS 15, CN II-XII grossly intact  Motor grossly intact  Sensory grossly intact  C-spine: NT, supple  No midline tenderness  Kernig's Brudzinski's negative  EHL/FHL/PF/DF/KF/KE/HF/HE 5/5  No saddle anesthesia  2+ patellar reflexes  SLR negative  M/U/R/A nerve sensation bilateral intact and equal    strength 5/5  Finger abduction/adduction/opposition intact  Suppination/Pronation intact without reproduction of pain  Good capillary refill  2+ radial pulses, bilaterally equal    WE/WF/WRD/WUD 5/5, intact, without pain  BICEPS/TRICEPS 5/5  Shoulder abduction/adduction 5/5  No pronator drift  No dysarthria  CN 2-12 intact  Mild aphasia  Normal finger to nose  Normal rapid alternating movements of hands  No nystagmus  No facial droop  NIH Stroke Scale Score = 1  Psychiatric/Behavioral: Appropriate mood and affect   Exam: deferred  A:  - Aphasia since fall? P:  - r/o stroke  About 24 hours of symptoms, no stroke alert  - r/o sdh  - admit for mri    - 13 point ROS was performed and all are normal unless stated in the history above  - Nursing note reviewed  Vitals reviewed  - Orders placed by myself and/or advanced practitioner / resident     - Previous chart was reviewed  - No language barrier    - History obtained from patient wife   - There are no limitations to the history obtained  - Critical care time: 38 minutes  - Critical care time was exclusive of seperately bilable procedures and treating other patients as well as teaching time  - Critical care was necessary to treat or prevent imminent or life-threatening deterioration of the following condition: brain mass w/ shift  - Critical care time was spent personally by me on the following activities as well as the above as per the ED course and rest of chart: blood draw for specimens, obtaining history from patient / surrogate, developement of a treatment plan, discussions with consultants, evaluation of patient's response to the treatment, examination of the patient, ordering/performing treatements and interventions, re-evaluation of the patient's condition, review of old charts, ordering/reviewing laboratory studies, ordering/reviewing of radiographic studies  - Patient does not need initiation of IV thrombolytics: Last Known well was 24 hours ago       Code Status: Level 1 - Full Code  Advance Directive and Living Will:      Power of :    POLST:      Medications   fluticasone-vilanterol (BREO ELLIPTA) 200-25 MCG/INH inhaler 1 puff (has no administration in time range)   albuterol inhalation solution 2 5 mg (has no administration in time range) multivitamin-minerals (CENTRUM) tablet 1 tablet (has no administration in time range)   lidocaine (LIDODERM) 5 % patch 1 patch (has no administration in time range)   ondansetron (ZOFRAN) injection 4 mg (has no administration in time range)   levETIRAcetam (KEPPRA) 500 mg in sodium chloride 0 9 % 100 mL IVPB (0 mg Intravenous Stopped 5/7/21 0454)   acetaminophen (TYLENOL) tablet 975 mg (975 mg Oral Given 5/6/21 2046)   iohexol (OMNIPAQUE) 350 MG/ML injection (SINGLE-DOSE) 100 mL (100 mL Intravenous Given 5/6/21 2102)   dexamethasone (DECADRON) injection 10 mg (10 mg Intravenous Given by Other 5/6/21 2154)   Gadobutrol injection (SINGLE-DOSE) SOLN 13 mL (13 mL Intravenous Given 5/6/21 2307)     MRI brain w wo contrast   Final Result         1  Rim-enhancing left temporal parietal mass measuring 5 cm with significant surrounding vasogenic edema, 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  2   Left-to-right midline shift of 2 to 3 mm  Workstation performed: [id number]         CTA head and neck with and without contrast   ED Interpretation   Abnormal   LEFT mass with vasogenic edema      Final Result      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 and left hemispheric mass effect causing approximately 3 mm left to right midline shift  Contrast-enhanced brain MRI is recommended for further evaluation  No evidence of flow restrictive large vessel occlusive disease               I personally discussed this study with Binu Nascimento on 5/6/2021 at 9:40 PM                            Workstation performed: [id number]         CT chest abdomen pelvis w contrast    (Results Pending)     Orders Placed This Encounter   Procedures    CTA head and neck with and without contrast    MRI brain w wo contrast    CT chest abdomen pelvis w contrast    Basic metabolic panel    CBC and Platelet    Protime-INR    APTT    Troponin I    Basic metabolic panel    CBC (With Platelets)    Diet NPO; Sips with meds    Continuous cardiac monitoring    Continuous pulse oximetry    Fingerstick Glucose (POCT)    Insert peripheral IV    Vital Signs per unit routine    Neuro checks    Up with assistance    Ambulate patient    Incentive spirometry    Apply SCD or Foot pumps    Level 1-Full Code: all life saving measures are indicated    Inpatient consult to Neurosurgery    OT eval and treat    PT eval and treat    Respiratory Protocol    EKG RESULTS    ECG 12 lead    ECG 12 lead    Type and screen    ABORh Recheck - Contact Blood Bank Prior to Collection    Inpatient Admission     Labs Reviewed   BASIC METABOLIC PANEL - Abnormal       Result Value Ref Range Status    Sodium 139  136 - 145 mmol/L Final    Potassium 3 9  3 5 - 5 3 mmol/L Final    Chloride 109 (*) 100 - 108 mmol/L Final    CO2 24  21 - 32 mmol/L Final    ANION GAP 6  4 - 13 mmol/L Final    BUN 21  5 - 25 mg/dL Final    Creatinine 1 06  0 60 - 1 30 mg/dL Final    Comment: Standardized to IDMS reference method    Glucose 95  65 - 140 mg/dL Final    Comment: If the patient is fasting, the ADA then defines impaired fasting glucose as > 100 mg/dL and diabetes as > or equal to 123 mg/dL  Specimen collection should occur prior to Sulfasalazine administration due to the potential for falsely depressed results  Specimen collection should occur prior to Sulfapyridine administration due to the potential for falsely elevated results      Calcium 9 3  8 3 - 10 1 mg/dL Final    eGFR 71  ml/min/1 73sq m Final    Narrative:     [place] guidelines for Chronic Kidney Disease (CKD):     Stage 1 with normal or high GFR (GFR > 90 mL/min/1 73 square meters)    Stage 2 Mild CKD (GFR = 60-89 mL/min/1 73 square meters)    Stage 3A Moderate CKD (GFR = 45-59 mL/min/1 73 square meters)    Stage 3B Moderate CKD (GFR = 30-44 mL/min/1 73 square meters)    Stage 4 Severe CKD (GFR = 15-29 mL/min/1 73 square meters)    Stage 5 End Stage CKD (GFR <15 mL/min/1 73 square meters)  Note: GFR calculation is accurate only with a steady state creatinine   BASIC METABOLIC PANEL - Abnormal    Sodium 139  136 - 145 mmol/L Final    Potassium 4 0  3 5 - 5 3 mmol/L Final    Chloride 110 (*) 100 - 108 mmol/L Final    CO2 21  21 - 32 mmol/L Final    ANION GAP 8  4 - 13 mmol/L Final    BUN 16  5 - 25 mg/dL Final    Creatinine 0 97  0 60 - 1 30 mg/dL Final    Comment: Standardized to IDMS reference method    Glucose 148 (*) 65 - 140 mg/dL Final    Comment: If the patient is fasting, the ADA then defines impaired fasting glucose as > 100 mg/dL and diabetes as > or equal to 123 mg/dL  Specimen collection should occur prior to Sulfasalazine administration due to the potential for falsely depressed results  Specimen collection should occur prior to Sulfapyridine administration due to the potential for falsely elevated results      Calcium 9 0  8 3 - 10 1 mg/dL Final    eGFR 79  ml/min/1 73sq m Final    Narrative:     [place] guidelines for Chronic Kidney Disease (CKD):     Stage 1 with normal or high GFR (GFR > 90 mL/min/1 73 square meters)    Stage 2 Mild CKD (GFR = 60-89 mL/min/1 73 square meters)    Stage 3A Moderate CKD (GFR = 45-59 mL/min/1 73 square meters)    Stage 3B Moderate CKD (GFR = 30-44 mL/min/1 73 square meters)    Stage 4 Severe CKD (GFR = 15-29 mL/min/1 73 square meters)    Stage 5 End Stage CKD (GFR <15 mL/min/1 73 square meters)  Note: GFR calculation is accurate only with a steady state creatinine   CBC AND PLATELET - Normal    WBC 7 56  4 31 - 10 16 Thousand/uL Final    RBC 5 33  3 88 - 5 62 Million/uL Final    Hemoglobin 15 5  12 0 - 17 0 g/dL Final    Hematocrit 45 3  36 5 - 49 3 % Final    MCV 85  82 - 98 fL Final    MCH 29 1  26 8 - 34 3 pg Final    MCHC 34 2  31 4 - 37 4 g/dL Final    RDW 13 1  11 6 - 15 1 % Final    Platelets 974  291 - 390 Thousands/uL Final    MPV 9 7  8 9 - 12 7 fL Final   PROTIME-INR - Normal    Protime 12 9  11 6 - 14 5 seconds Final    INR 0 97  0 84 - 1 19 Final   APTT - Normal    PTT 32  23 - 37 seconds Final    Comment: Therapeutic Heparin Range =  60-90 seconds   TROPONIN I - Normal    Troponin I <0 02  <=0 04 ng/mL Final    Comment: Siemens Chemistry analyzer 99% cutoff is > 0 04 ng/mL in network labs     o cTnI 99% cutoff is useful only when applied to patients in the clinical setting of myocardial ischemia   o cTnI 99% cutoff should be interpreted in the context of clinical history, ECG findings and possibly cardiac imaging to establish correct diagnosis  o cTnI 99% cutoff may be suggestive but clearly not indicative of a coronary event without the clinical setting of myocardial ischemia  CBC AND PLATELET - Normal    WBC 7 02  4 31 - 10 16 Thousand/uL Final    RBC 5 50  3 88 - 5 62 Million/uL Final    Hemoglobin 16 1  12 0 - 17 0 g/dL Final    Hematocrit 47 0  36 5 - 49 3 % Final    MCV 86  82 - 98 fL Final    MCH 29 3  26 8 - 34 3 pg Final    MCHC 34 3  31 4 - 37 4 g/dL Final    RDW 13 1  11 6 - 15 1 % Final    Platelets 220  497 - 390 Thousands/uL Final    MPV 9 4  8 9 - 12 7 fL Final   POCT GLUCOSE - Normal    POC Glucose 91  65 - 140 mg/dl Final   TYPE AND SCREEN   ABORH RECHECK     Time reflects when diagnosis was documented in both MDM as applicable and the Disposition within this note     Time User Action Codes Description Comment    5/6/2021 10:13 PM Ludin Rivera [G93 [id number]] Brain mass     5/6/2021 10:13 PM Philip Bran Add [R90 [id number]] Midline shift of brain     5/6/2021 10:13 PM Philip Bran Add [R47 [id number]] Expressive aphasia       ED Disposition     ED Disposition Condition Date/Time Comment    Admit Stable Thu May 6, 2021 10:13 PM Case was discussed with ISAC and the patient's admission status was agreed to be Admission Status: inpatient status to the service of Dr Cornelia Dia           Follow-up Information    None       Patient's Medications Discharge Prescriptions    No medications on file     No discharge procedures on file  Prior to Admission Medications   Prescriptions Last Dose Informant Patient Reported? Taking? Advair Diskus 250-50 MCG/DOSE inhaler   No No   Sig: INHALE ONE PUFF BY MOUTH TWICE A DAY  RINSE MOUTH AFTER USE   Multiple Vitamin (ONE-A-DAY MENS PO)   Yes No   Sig: Take by mouth   albuterol (2 5 mg/3 mL) 0 083 % nebulizer solution  Self No No   Sig: Take 1 vial (2 5 mg total) by nebulization every 6 (six) hours as needed for wheezing or shortness of breath   lidocaine (LIDODERM) 5 %   No No   Sig: Apply 1 patch topically daily Remove & Discard patch within 12 hours or as directed by MD      Facility-Administered Medications: None       Portions of the record may have been created with voice recognition software  Occasional wrong word or "sound a like" substitutions may have occurred due to the inherent limitations of voice recognition software  Read the chart carefully and recognize, using context, where substitutions have occurred      Electronically signed by:  Apollo Geller

## 2021-05-07 ENCOUNTER — TELEPHONE (OUTPATIENT)
Dept: RADIOLOGY | Facility: HOSPITAL | Age: 71
End: 2021-05-07

## 2021-05-07 ENCOUNTER — APPOINTMENT (INPATIENT)
Dept: RADIOLOGY | Facility: HOSPITAL | Age: 71
DRG: 025 | End: 2021-05-07
Payer: COMMERCIAL

## 2021-05-07 PROBLEM — G93.89 BRAIN MASS: Status: ACTIVE | Noted: 2021-05-07

## 2021-05-07 PROBLEM — G93.6 CEREBRAL EDEMA (HCC): Status: ACTIVE | Noted: 2021-05-07

## 2021-05-07 PROBLEM — G93.5 BRAIN COMPRESSION (HCC): Status: ACTIVE | Noted: 2021-05-07

## 2021-05-07 LAB
ABO GROUP BLD: NORMAL
ANION GAP SERPL CALCULATED.3IONS-SCNC: 8 MMOL/L (ref 4–13)
BLD GP AB SCN SERPL QL: NEGATIVE
BUN SERPL-MCNC: 16 MG/DL (ref 5–25)
CALCIUM SERPL-MCNC: 9 MG/DL (ref 8.3–10.1)
CHLORIDE SERPL-SCNC: 110 MMOL/L (ref 100–108)
CO2 SERPL-SCNC: 21 MMOL/L (ref 21–32)
CREAT SERPL-MCNC: 0.97 MG/DL (ref 0.6–1.3)
ERYTHROCYTE [DISTWIDTH] IN BLOOD BY AUTOMATED COUNT: 13.1 % (ref 11.6–15.1)
GFR SERPL CREATININE-BSD FRML MDRD: 79 ML/MIN/1.73SQ M
GLUCOSE SERPL-MCNC: 148 MG/DL (ref 65–140)
HCT VFR BLD AUTO: 47 % (ref 36.5–49.3)
HGB BLD-MCNC: 16.1 G/DL (ref 12–17)
MCH RBC QN AUTO: 29.3 PG (ref 26.8–34.3)
MCHC RBC AUTO-ENTMCNC: 34.3 G/DL (ref 31.4–37.4)
MCV RBC AUTO: 86 FL (ref 82–98)
PLATELET # BLD AUTO: 277 THOUSANDS/UL (ref 149–390)
PMV BLD AUTO: 9.4 FL (ref 8.9–12.7)
POTASSIUM SERPL-SCNC: 4 MMOL/L (ref 3.5–5.3)
RBC # BLD AUTO: 5.5 MILLION/UL (ref 3.88–5.62)
RH BLD: POSITIVE
SODIUM SERPL-SCNC: 139 MMOL/L (ref 136–145)
SPECIMEN EXPIRATION DATE: NORMAL
WBC # BLD AUTO: 7.02 THOUSAND/UL (ref 4.31–10.16)

## 2021-05-07 PROCEDURE — 36415 COLL VENOUS BLD VENIPUNCTURE: CPT | Performed by: INTERNAL MEDICINE

## 2021-05-07 PROCEDURE — 99232 SBSQ HOSP IP/OBS MODERATE 35: CPT | Performed by: FAMILY MEDICINE

## 2021-05-07 PROCEDURE — 85027 COMPLETE CBC AUTOMATED: CPT | Performed by: INTERNAL MEDICINE

## 2021-05-07 PROCEDURE — 71260 CT THORAX DX C+: CPT

## 2021-05-07 PROCEDURE — 86901 BLOOD TYPING SEROLOGIC RH(D): CPT | Performed by: INTERNAL MEDICINE

## 2021-05-07 PROCEDURE — 74177 CT ABD & PELVIS W/CONTRAST: CPT

## 2021-05-07 PROCEDURE — 94760 N-INVAS EAR/PLS OXIMETRY 1: CPT

## 2021-05-07 PROCEDURE — 99223 1ST HOSP IP/OBS HIGH 75: CPT | Performed by: NEUROLOGICAL SURGERY

## 2021-05-07 PROCEDURE — G1004 CDSM NDSC: HCPCS

## 2021-05-07 PROCEDURE — 86850 RBC ANTIBODY SCREEN: CPT | Performed by: INTERNAL MEDICINE

## 2021-05-07 PROCEDURE — 86900 BLOOD TYPING SEROLOGIC ABO: CPT | Performed by: INTERNAL MEDICINE

## 2021-05-07 PROCEDURE — 80048 BASIC METABOLIC PNL TOTAL CA: CPT | Performed by: INTERNAL MEDICINE

## 2021-05-07 RX ORDER — DEXAMETHASONE SODIUM PHOSPHATE 4 MG/ML
4 INJECTION, SOLUTION INTRA-ARTICULAR; INTRALESIONAL; INTRAMUSCULAR; INTRAVENOUS; SOFT TISSUE EVERY 6 HOURS SCHEDULED
Status: COMPLETED | OUTPATIENT
Start: 2021-05-07 | End: 2021-05-12

## 2021-05-07 RX ORDER — LIDOCAINE 50 MG/G
1 PATCH TOPICAL DAILY
Status: DISCONTINUED | OUTPATIENT
Start: 2021-05-07 | End: 2021-05-18 | Stop reason: HOSPADM

## 2021-05-07 RX ORDER — FLUTICASONE FUROATE AND VILANTEROL 200; 25 UG/1; UG/1
1 POWDER RESPIRATORY (INHALATION)
Status: DISCONTINUED | OUTPATIENT
Start: 2021-05-07 | End: 2021-05-14

## 2021-05-07 RX ORDER — ONDANSETRON 2 MG/ML
4 INJECTION INTRAMUSCULAR; INTRAVENOUS EVERY 6 HOURS PRN
Status: DISCONTINUED | OUTPATIENT
Start: 2021-05-07 | End: 2021-05-18 | Stop reason: HOSPADM

## 2021-05-07 RX ORDER — DOCUSATE SODIUM 100 MG/1
100 CAPSULE, LIQUID FILLED ORAL 2 TIMES DAILY
Status: DISCONTINUED | OUTPATIENT
Start: 2021-05-07 | End: 2021-05-18 | Stop reason: HOSPADM

## 2021-05-07 RX ORDER — POLYETHYLENE GLYCOL 3350 17 G/17G
17 POWDER, FOR SOLUTION ORAL DAILY
Status: DISCONTINUED | OUTPATIENT
Start: 2021-05-07 | End: 2021-05-18 | Stop reason: HOSPADM

## 2021-05-07 RX ORDER — ALBUTEROL SULFATE 2.5 MG/3ML
2.5 SOLUTION RESPIRATORY (INHALATION) EVERY 6 HOURS PRN
Status: DISCONTINUED | OUTPATIENT
Start: 2021-05-07 | End: 2021-05-08

## 2021-05-07 RX ORDER — LANOLIN ALCOHOL/MO/W.PET/CERES
6 CREAM (GRAM) TOPICAL
Status: DISCONTINUED | OUTPATIENT
Start: 2021-05-07 | End: 2021-05-12

## 2021-05-07 RX ORDER — SENNOSIDES 8.6 MG
1 TABLET ORAL
Status: DISCONTINUED | OUTPATIENT
Start: 2021-05-07 | End: 2021-05-18 | Stop reason: HOSPADM

## 2021-05-07 RX ADMIN — LEVETIRACETAM 500 MG: 100 INJECTION, SOLUTION INTRAVENOUS at 09:23

## 2021-05-07 RX ADMIN — IOHEXOL 100 ML: 350 INJECTION, SOLUTION INTRAVENOUS at 20:45

## 2021-05-07 RX ADMIN — LEVETIRACETAM 500 MG: 100 INJECTION, SOLUTION INTRAVENOUS at 04:31

## 2021-05-07 RX ADMIN — DEXAMETHASONE SODIUM PHOSPHATE 4 MG: 4 INJECTION INTRA-ARTICULAR; INTRALESIONAL; INTRAMUSCULAR; INTRAVENOUS; SOFT TISSUE at 17:28

## 2021-05-07 RX ADMIN — LEVETIRACETAM 500 MG: 100 INJECTION, SOLUTION INTRAVENOUS at 21:02

## 2021-05-07 RX ADMIN — MELATONIN TAB 3 MG 6 MG: 3 TAB at 22:18

## 2021-05-07 RX ADMIN — DOCUSATE SODIUM 100 MG: 100 CAPSULE, LIQUID FILLED ORAL at 17:28

## 2021-05-07 NOTE — RESPIRATORY THERAPY NOTE
RT Protocol Note  Rae Bowels Crochip 80 y o  male MRN: [id number]  Unit/Bed#: ED 29 Encounter: [id number]    Assessment    Principal Problem:    Brain mass  Active Problems:     Morbid obesity (HCC)    COPD (chronic obstructive pulmonary disease) (HCC)      Home Pulmonary Medications:  Advair  Proventil       Past Medical History:   Diagnosis Date    Asthma     Epilepsy ([facility] [address] )     stopped meds 2006 has been seizure free     Social History     Socioeconomic History    Marital status: /Civil Union     Spouse name: None    Number of children: None    Years of education: None    Highest education level: None   Occupational History    None   Social Needs    Financial resource strain: None    Food insecurity     Worry: None     Inability: None    Transportation needs     Medical: None     Non-medical: None   Tobacco Use    Smoking status: Former Smoker     Types: Cigarettes     Quit date: 2/8/2006     Years since quitting: 15 2    Smokeless tobacco: Never Used   Substance and Sexual Activity    Alcohol use: No    Drug use: No    Sexual activity: None   Lifestyle    Physical activity     Days per week: None     Minutes per session: None    Stress: None   Relationships    Social connections     Talks on phone: None     Gets together: None     Attends Qu[employer] service: None     Active member of club or organization: None     Attends meetings of clubs or organizations: None     Relationship status: None    Intimate partner violence     Fear of current or ex partner: None     Emotionally abused: None     Physically abused: None     Forced sexual activity: None   Other Topics Concern    None   Social History Narrative    None       Subjective         Objective    Physical Exam:   Assessment Type: (P) Assess only  General Appearance: (P) Drowsy  Respiratory Pattern: (P) Normal  Chest Assessment: (P) Chest expansion symmetrical  Bilateral Breath Sounds: (P) Diminished, Clear  Cough: (P) Weak  O2 Device: (P) room air    Vitals:  Blood pressure (!) 173/86, pulse (P) 88, temperature 98 °F (36 7 °C), temperature source Oral, resp  rate (P) 20, height 5' 10" (1 778 m), weight 130 kg (286 lb), SpO2 (P) 96 %  Imaging and other studies: I have personally reviewed pertinent reports  O2 Device: (P) room air     Plan    Respiratory Plan: (P) Home Bronchodilator Patient pathway        Resp Comments: (P) Assessed per protocol  On Advair and Proventil at home  80 y o  male who presents with word-finding difficulties  Has a past medical history significant for reported COPD, reported prior seizure history not currently on antibiotics, and remote history of tobacco use with last use 16 weeks prior  He reports fall approximately 3 weeks prior to his presentation while ambulating across street with associated head strike and questionable loss of consciousness  No other modality to suggest at this time  Will continue to follow

## 2021-05-07 NOTE — TELEPHONE ENCOUNTER
TT  Elmer Meza regarding CT order  OK to be done during the day since patient was already injected for CTA     Will order Oral and IV   0245 rdr

## 2021-05-07 NOTE — PROGRESS NOTES
[facility] [facility]  Progress Note Royanne Figures Alejandro 1950, 80 y o  male MRN: [id number]  Unit/Bed#: [facility] 632-01 Encounter: [id number]  Primary Care Provider: Keny Dempsey PA-C   Date and time admitted to hospital: 5/6/2021  7:32 PM    * Brain mass  Assessment & Plan  · Presented with word-finding difficulties, CTA head/neck revealing 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 and left hemispheric mass effect causing approximately 3 mm left to right midline shift "   · Neurosurgery evaluation appreciated  · Started on Decadron, received 10 mg loading dose will continue 4 mg q 6 hours  · Neuro checks  · MRI is consistent with primary CNS neoplasm-may need resection  · CT chest abdomen and pelvis pending    COPD (chronic obstructive pulmonary disease) ([facility] [address] )  Assessment & Plan  · Not in acute exacerbation  · Continue home inhalers    Morbid obesity ([facility] [address] )  Assessment & Plan  · Dietary and lifestyle modification advised  · Patient with history of obstructive sleep apnea but do not use BiPAP  VTE Pharmacologic Prophylaxis:   Pharmacologic: DVT prophylaxis on hold secondary to the brain mass  Mechanical VTE Prophylaxis in Place: Yes    Patient Centered Rounds: I have performed bedside rounds with nursing staff today  Discussions with Specialists or Other Care Team Provider:     Education and Discussions with Family / Patient:  Wife updated in the room    Time Spent for Care: 30 minutes  More than 50% of total time spent on counseling and coordination of care as described above  Current Length of Stay: 1 day(s)    Current Patient Status: Inpatient   Certification Statement: The patient will continue to require additional inpatient hospital stay due to Abnormal MRI with primary CNS neoplasm    Discharge Plan:     Code Status: Level 1 - Full Code      Subjective:   Patient seen and examined  No patient is very sleepy when I saw him    Wife reported that this is normal for him and he does not get good night's sleep  Patient with known history of obstructive sleep apnea but he refuses to wear BiPAP  I was able to wake him up and he answers questions and follows commands appropriately  Wife reported that his aphasia appears to be slightly better today  Objective:     Vitals:   Temp (24hrs), Av 9 °F (36 6 °C), Min:97 8 °F (36 6 °C), Max:98 °F (36 7 °C)    Temp:  [97 8 °F (36 6 °C)-98 °F (36 7 °C)] 97 8 °F (36 6 °C)  HR:  [71-88] 85  Resp:  [18-22] 18  BP: (147-173)/() 147/90  SpO2:  [92 %-97 %] 92 %  Body mass index is 41 04 kg/m²  Input and Output Summary (last 24 hours): Intake/Output Summary (Last 24 hours) at 2021 1524  Last data filed at 2021 0454  Gross per 24 hour   Intake 100 ml   Output --   Net 100 ml       Physical Exam:     Physical Exam  Constitutional:       General: He is not in acute distress  Appearance: Normal appearance  HENT:      Head: Normocephalic  Nose: Nose normal    Eyes:      General: No scleral icterus  Neck:      Musculoskeletal: Normal range of motion  Cardiovascular:      Rate and Rhythm: Normal rate and regular rhythm  Pulses: Normal pulses  Pulmonary:      Effort: Pulmonary effort is normal    Abdominal:      General: Abdomen is flat  Musculoskeletal: Normal range of motion  Skin:     General: Skin is warm  Neurological:      General: No focal deficit present  Mental Status: He is alert and oriented to person, place, and time           Additional Data:     Labs:    Results from last 7 days   Lab Units 21  0713   WBC Thousand/uL 7 02   HEMOGLOBIN g/dL 16 1   HEMATOCRIT % 47 0   PLATELETS Thousands/uL 277     Results from last 7 days   Lab Units 21  0713   POTASSIUM mmol/L 4 0   CHLORIDE mmol/L 110*   CO2 mmol/L 21   BUN mg/dL 16   CREATININE mg/dL 0 97   CALCIUM mg/dL 9 0     Results from last 7 days   Lab Units 21  1954   INR  0 97       * I Have Reviewed All Lab Data Listed Above  * Additional Pertinent Lab Tests Reviewed: [address] Admission Reviewed    Imaging:    Imaging Reports Reviewed Today Include:   Imaging Personally Reviewed by Myself Includes:      Recent Cultures (last 7 days):           Last 24 Hours Medication List:   Current Facility-Administered Medications   Medication Dose Route Frequency Provider Last Rate    albuterol  2 5 mg Nebulization Q6H PRN Yo Donya, DO      docusate sodium  100 mg Oral BID Curt Garland MD      fluticasone-vilanterol  1 puff Inhalation Daily Yo Donya, DO      levETIRAcetam  500 mg Intravenous Q12H [address] Yo Donya,  mg (05/07/21 0184)    lidocaine  1 patch Topical Daily Yo Donya, DO      melatonin  6 mg Oral HS Curt Garland MD      multivitamin-minerals  1 tablet Oral Daily Yo Donya, DO      ondansetron  4 mg Intravenous Q6H PRN Yo Donya, DO      polyethylene glycol  17 g Oral Daily Curt Garland MD      senna  1 tablet Oral HS Curt Garland MD          Today, Patient Was Seen By: Curt Garland MD    ** Please Note: Dictation voice to text software may have been used in the creation of this document   **
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
## 2021-05-07 NOTE — ASSESSMENT & PLAN NOTE
· Presented with word-finding difficulties, CTA head/neck revealing 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 and left hemispheric mass effect causing approximately 3 mm left to right midline shift "   · MRI brain to further characterize and CT chest/abdomen/pelvis to be obtained to evaluate for other masses  · Neurosurgery consultation requested  · Started on Decadron, received 10 mg loading dose will continue 4 mg q 6 hours  · Neuro checks

## 2021-05-07 NOTE — ASSESSMENT & PLAN NOTE
Patient presents with a few day history of speech difficulties, had prior fall 3 weeks ago with positive head strike  Imaging significant for large left frontotemporal brain mass with mass effect cerebral edema  · No AC/AP therapy  · No personal or family history of cancer    Imaging reviewed personally and with attending, results are as follows:   CTA head and neck with and without contrast 05/06/2021:  Large left frontotemporal brain mass/neoplasm with cerebral edema and left hemispheric brain affect causing approximately 3 mm left-to-right midline shift  Contrast enhanced MRI brain is recommended for further evaluation  No evidence of flow restrictive large vessel occlusive disease   MRI brain with and without contrast 05/06/2021:  Rim enhancing left temporal parietal mass measuring 5 cm with significant surrounding vasogenic edema, 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  Left-to-right midline shift of 2-3 mm  Plan:   Ongoing discussion regarding timing of surgical intervention as patient will likely require resection  o Patient was bolused with Decadron 10 mg, continues on Decadron 4 mg q 6 hours for cerebral edema  o Currently on Keppra 500 mg b i d  For seizure prophylaxis  o CT chest abdomen pelvis ordered and pending to assess for possible metastatic disease   Medical management and pain control per primary team   DVT ppx:  SCDs, okay for pharmacological DVT prophylaxis   Mobilize as tolerated with assistance, PT / OT evaluation    Neurosurgery will continue to follow  Please call with questions or concerns

## 2021-05-07 NOTE — UTILIZATION REVIEW
Initial Clinical Review    Admission: Date/Time/Statement:   Admission Orders (From admission, onward)     Ordered        05/06/21 2214  Inpatient Admission  Once                   Orders Placed This Encounter   Procedures    Inpatient Admission     Standing Status:   Standing     Number of Occurrences:   1     Order Specific Question:   Level of Care     Answer:   Level 2 Stepdown / HOT [14]     Order Specific Question:   Estimated length of stay     Answer:   More than 2 Midnights     Order Specific Question:   Certification     Answer:   I certify that inpatient services are medically necessary for this patient for a duration of greater than two midnights  See H&P and MD Progress Notes for additional information about the patient's course of treatment  ED Arrival Information     Expected Arrival Acuity Means of Arrival Escorted By Service Admission Type    - 5/6/2021 19:06 Emergent [facility] Medicine Emergency    Arrival Complaint    speech impairment        Chief Complaint   Patient presents with    Speech Problem     Pt reports he fell approximately two weeks ago and hit his head, +LOC, - thinners  Patient did not seek medical treatment at the time  Patient reports starting yesterday he started with aphasia, patient has trouble finding the right words  Patient denies all other focal neuro deficits  Initial Presentation:  81 y/o male with PMHx of COPD, seizure d/o, remote tobacco presents to ED from home with word finding difficulties  Pt states he fell ~ 3 wks ago while crossing the street with associated head strike and questionable loc, but fall was unwitnessed and he did not seek medical tx at that time  Seen by his PCP several days later with c/o rib pain, with imaging nabil for fxs, did not mention head strike at that time   For past day he has been experiencing intermittent aphasia and word finding difficulties with CTA head/neck in ED today revealing a large left frontotemporal brain mass with surrounding cerebral edema and 3 mm midline shift, concerning for malignancy  Given IV decadron in ED  Admit inpatient to M/S unit with brain mass  MRI brain ordered  Continue IV decadron  Neuro checks q4h  Continue previous po meds  NSx consulted  SCD's  Neuro consult 5/7 -- Patient was bolused with Decadron 10 mg, continues on Decadron 4 mg q 6 hours for cerebral edema  Currently on Keppra 500 mg b i d  for seizure ppx  Date: 5/7   Day 2: Pt wife feels pt aphasia slightly better today  Continue decadron, neuro checks  CT c/a/p pending  ED Triage Vitals   Temperature Pulse Respirations Blood Pressure SpO2   05/06/21 1921 05/06/21 1921 05/06/21 1921 05/06/21 1921 05/06/21 1921   98 °F (36 7 °C) 78 18 (!) 161/105 95 %      Temp Source Heart Rate Source Patient Position - Orthostatic VS BP Location FiO2 (%)   05/06/21 1921 05/06/21 1921 05/06/21 1921 05/06/21 1921 --   Oral Monitor Lying Right arm       Pain Score       05/06/21 2046       5          Wt Readings from Last 1 Encounters:   05/06/21 130 kg (286 lb)     Additional Vital Signs:   Date/Time  Temp  Pulse  Resp  BP  MAP (mmHg)  SpO2  O2 Device   05/07/21 0600  --  84  18  170/92  --  94 %  --   05/07/21 0414  --  88  20  --  --  96 %  --   05/07/21 0400  --  86  18  170/93  --  96 %  --   05/07/21 0200  --  88  20  173/86Abnormal   --  96 %  --   05/07/21 0152  --  83  --  173/86Abnormal   --  96 %  None (Room air)   05/06/21 2130  --  72  22  154/78  109  97 %  None (Room air)   05/06/21 2048  --  71  18  161/105Abnormal   --  96 %  --   05/06/21 1921  98 °F (36 7 °C)  78  18  161/105Abnormal   --  95 %  None (Room air)       Pertinent Labs/Diagnostic Test Results:   · CTA head and neck with and without contrast 05/06/2021:  Large left frontotemporal brain mass/neoplasm with cerebral edema and left hemispheric brain affect causing approximately 3 mm left-to-right midline shift    Contrast enhanced MRI brain is recommended for further evaluation  No evidence of flow restrictive large vessel occlusive disease  · MRI brain with and without contrast 05/06/2021:  Rim enhancing left temporal parietal mass measuring 5 cm with significant surrounding vasogenic edema, 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    Left-to-right midline shift of 2-3 mm      Results from last 7 days   Lab Units 05/07/21  0713 05/06/21 1954   WBC Thousand/uL 7 02 7 56   HEMOGLOBIN g/dL 16 1 15 5   HEMATOCRIT % 47 0 45 3   PLATELETS Thousands/uL 277 272     Results from last 7 days   Lab Units 05/07/21  0713 05/06/21 1954   SODIUM mmol/L 139 139   POTASSIUM mmol/L 4 0 3 9   CHLORIDE mmol/L 110* 109*   CO2 mmol/L 21 24   ANION GAP mmol/L 8 6   BUN mg/dL 16 21   CREATININE mg/dL 0 97 1 06   EGFR ml/min/1 73sq m 79 71   CALCIUM mg/dL 9 0 9 3     Results from last 7 days   Lab Units 05/06/21 1957   POC GLUCOSE mg/dl 91     Results from last 7 days   Lab Units 05/07/21  0713 05/06/21 1954   GLUCOSE RANDOM mg/dL 148* 95     Results from last 7 days   Lab Units 05/06/21 1954   TROPONIN I ng/mL <0 02         Results from last 7 days   Lab Units 05/06/21 1954   PROTIME seconds 12 9   INR  0 97   PTT seconds 32     ED Treatment:   Medication Administration from 05/06/2021 1905 to 05/07/2021 1329       Date/Time Order Dose Route Action     05/06/2021 2046 acetaminophen (TYLENOL) tablet 975 mg 975 mg Oral Given     05/06/2021 2102 iohexol (OMNIPAQUE) 350 MG/ML injection (SINGLE-DOSE) 100 mL 100 mL Intravenous Given     05/06/2021 2154 dexamethasone (DECADRON) injection 10 mg 10 mg Intravenous Given by Other     05/06/2021 2307 Gadobutrol injection (SINGLE-DOSE) SOLN 13 mL 13 mL Intravenous Given     05/07/2021 0923 levETIRAcetam (KEPPRA) 500 mg in sodium chloride 0 9 % 100 mL IVPB 500 mg Intravenous New Bag     05/07/2021 0431 levETIRAcetam (KEPPRA) 500 mg in sodium chloride 0 9 % 100 mL IVPB 500 mg Intravenous New Bag     Past Medical History:   Diagnosis Date    Asthma     Epilepsy ([facility] [address] )     stopped meds 2006 has been seizure free     Present on Admission:   COPD (chronic obstructive pulmonary disease) ([facility] [address] )   Morbid obesity ([facility] [address] )   Brain mass      Admitting Diagnosis: Brain mass [G93 89]  Expressive aphasia [R47 01]  Midline shift of brain [R90 89]  Age/Sex: 80 y o  male  Admission Orders:  Scheduled Medications:  fluticasone-vilanterol, 1 puff, Inhalation, Daily  levETIRAcetam, 500 mg, Intravenous, Q12H [address]  lidocaine, 1 patch, Topical, Daily  multivitamin-minerals, 1 tablet, Oral, Daily    PRN Meds:  albuterol, 2 5 mg, Nebulization, Q6H PRN  ondansetron, 4 mg, Intravenous, Q6H PRN        Network Utilization Review Department  ATTENTION: Please call with any questions or concerns to [phone] and carefully listen to the prompts so that you are directed to the right person  All voicemails are confidential   Abelino Bowden all requests for admission clinical reviews, approved or denied determinations and any other requests to dedicated fax number below belonging to the campus where the patient is receiving treatment   List of dedicated fax numbers for the Facilities:  [address] DENIALS (Administrative/Medical Necessity) [phone]   [address] (Maternity/NICU/Pediatrics) [phone]   [address] [place] [phone] [place] [address] [address] [facility] [facility] [phone]

## 2021-05-07 NOTE — ED NOTES
Pt transported to [address] [place] [address], [address] [place] [address]  05/06/21 4432

## 2021-05-07 NOTE — ASSESSMENT & PLAN NOTE
· Dietary and lifestyle modification advised  · Patient with history of obstructive sleep apnea but do not use BiPAP

## 2021-05-07 NOTE — ASSESSMENT & PLAN NOTE
· Presented with word-finding difficulties, CTA head/neck revealing 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 and left hemispheric mass effect causing approximately 3 mm left to right midline shift "   · Neurosurgery evaluation appreciated  · Started on Decadron, received 10 mg loading dose will continue 4 mg q 6 hours  · Neuro checks  · MRI is consistent with primary CNS neoplasm-may need resection  · CT chest abdomen and pelvis pending

## 2021-05-07 NOTE — PLAN OF CARE
Problem: Potential for Falls  Goal: Patient will remain free of falls  Description: INTERVENTIONS:  - Assess patient frequently for physical needs  -  Identify cognitive and physical deficits and behaviors that affect risk of falls    -  [place] fall precautions as indicated by assessment   - Educate patient/family on patient safety including physical limitations  - Instruct patient to call for assistance with activity based on assessment  - Modify environment to reduce risk of injury  - Consider OT/PT consult to assist with strengthening/mobility  Outcome: Progressing     Problem: PAIN - ADULT  Goal: Verbalizes/displays adequate comfort level or baseline comfort level  Description: Interventions:  - Encourage patient to monitor pain and request assistance  - Assess pain using appropriate pain scale  - Administer analgesics based on type and severity of pain and evaluate response  - Implement non-pharmacological measures as appropriate and evaluate response  - Consider cultural and social influences on pain and pain management  - Notify physician/advanced practitioner if interventions unsuccessful or patient reports new pain  Outcome: Progressing     Problem: INFECTION - ADULT  Goal: Absence or prevention of progression during hospitalization  Description: INTERVENTIONS:  - Assess and monitor for signs and symptoms of infection  - Monitor lab/diagnostic results  - Monitor all insertion sites, i e  indwelling lines, tubes, and drains  - Monitor endotracheal if appropriate and nasal secretions for changes in amount and color  - [place] appropriate cooling/warming therapies per order  - Administer medications as ordered  - Instruct and encourage patient and family to use good hand hygiene technique  - Identify and instruct in appropriate isolation precautions for identified infection/condition  Outcome: Progressing  Goal: Absence of fever/infection during neutropenic period  Description: INTERVENTIONS:  - Monitor WBC    Outcome: Progressing     Problem: SAFETY ADULT  Goal: Patient will remain free of falls  Description: INTERVENTIONS:  - Assess patient frequently for physical needs  -  Identify cognitive and physical deficits and behaviors that affect risk of falls    -  [place] fall precautions as indicated by assessment   - Educate patient/family on patient safety including physical limitations  - Instruct patient to call for assistance with activity based on assessment  - Modify environment to reduce risk of injury  - Consider OT/PT consult to assist with strengthening/mobility  Outcome: Progressing  Goal: Maintain or return to baseline ADL function  Description: INTERVENTIONS:  -  Assess patient's ability to carry out ADLs; assess patient's baseline for ADL function and identify physical deficits which impact ability to perform ADLs (bathing, care of mouth/teeth, toileting, grooming, dressing, etc )  - Assess/evaluate cause of self-care deficits   - Assess range of motion  - Assess patient's mobility; develop plan if impaired  - Assess patient's need for assistive devices and provide as appropriate  - Encourage maximum independence but intervene and supervise when necessary  - Involve family in performance of ADLs  - Assess for home care needs following discharge   - Consider OT consult to assist with ADL evaluation and planning for discharge  - Provide patient education as appropriate  Outcome: Progressing  Goal: Maintain or return mobility status to optimal level  Description: INTERVENTIONS:  - Assess patient's baseline mobility status (ambulation, transfers, stairs, etc )    - Identify cognitive and physical deficits and behaviors that affect mobility  - Identify mobility aids required to assist with transfers and/or ambulation (gait belt, sit-to-stand, lift, walker, cane, etc )  - [place] fall precautions as indicated by assessment  - Record patient progress and toleration of activity level on Mobility SBAR; progress patient to next Phase/Stage  - Instruct patient to call for assistance with activity based on assessment  - Consider rehabilitation consult to assist with strengthening/weightbearing, etc   Outcome: Progressing     Problem: DISCHARGE PLANNING  Goal: Discharge to home or other facility with appropriate resources  Description: INTERVENTIONS:  - Identify barriers to discharge w/patient and caregiver  - Arrange for needed discharge resources and transportation as appropriate  - Identify discharge learning needs (meds, wound care, etc )  - Arrange for interpretive services to assist at discharge as needed  - Refer to Case Management Department for coordinating discharge planning if the patient needs post-hospital services based on physician/advanced practitioner order or complex needs related to functional status, cognitive ability, or social support system  Outcome: Progressing     Problem: Knowledge Deficit  Goal: Patient/family/caregiver demonstrates understanding of disease process, treatment plan, medications, and discharge instructions  Description: Complete learning assessment and assess knowledge base    Interventions:  - Provide teaching at level of understanding  - Provide teaching via preferred learning methods  Outcome: Progressing     Problem: NEUROSENSORY - ADULT  Goal: Achieves stable or improved neurological status  Description: INTERVENTIONS  - Monitor and report changes in neurological status  - Monitor vital signs such as temperature, blood pressure, glucose, and any other labs ordered   - Initiate measures to prevent increased intracranial pressure  - Monitor for seizure activity and implement precautions if appropriate      Outcome: Progressing  Goal: Remains free of injury related to seizures activity  Description: INTERVENTIONS  - Maintain airway, patient safety  and administer oxygen as ordered  - Monitor patient for seizure activity, document and report duration and description of seizure to physician/advanced practitioner  - If seizure occurs,  ensure patient safety during seizure  - Reorient patient post seizure  - Seizure pads on all 4 side rails  - Instruct patient/family to notify RN of any seizure activity including if an aura is experienced  - Instruct patient/family to call for assistance with activity based on nursing assessment  - Administer anti-seizure medications if ordered    Outcome: Progressing  Goal: Achieves maximal functionality and self care  Description: INTERVENTIONS  - Monitor swallowing and airway patency with patient fatigue and changes in neurological status  - Encourage and assist patient to increase activity and self care     - Encourage visually impaired, hearing impaired and aphasic patients to use assistive/communication devices  Outcome: Progressing     Problem: CARDIOVASCULAR - ADULT  Goal: Maintains optimal cardiac output and hemodynamic stability  Description: INTERVENTIONS:  - Monitor I/O, vital signs and rhythm  - Monitor for S/S and trends of decreased cardiac output  - Administer and titrate ordered vasoactive medications to optimize hemodynamic stability  - Assess quality of pulses, skin color and temperature  - Assess for signs of decreased coronary artery perfusion  - Instruct patient to report change in severity of symptoms  Outcome: Progressing  Goal: Absence of cardiac dysrhythmias or at baseline rhythm  Description: INTERVENTIONS:  - Continuous cardiac monitoring, vital signs, obtain 12 lead EKG if ordered  - Administer antiarrhythmic and heart rate control medications as ordered  - Monitor electrolytes and administer replacement therapy as ordered  Outcome: Progressing     Problem: RESPIRATORY - ADULT  Goal: Achieves optimal ventilation and oxygenation  Description: INTERVENTIONS:  - Assess for changes in respiratory status  - Assess for changes in mentation and behavior  - Position to facilitate oxygenation and minimize respiratory effort  - Oxygen administered by appropriate delivery if ordered  - Initiate smoking cessation education as indicated  - Encourage broncho-pulmonary hygiene including cough, deep breathe, Incentive Spirometry  - Assess the need for suctioning and aspirate as needed  - Assess and instruct to report SOB or any respiratory difficulty  - Respiratory Therapy support as indicated  Outcome: Progressing     Problem: GASTROINTESTINAL - ADULT  Goal: Minimal or absence of nausea and/or vomiting  Description: INTERVENTIONS:  - Administer IV fluids if ordered to ensure adequate hydration  - Maintain NPO status until nausea and vomiting are resolved  - Nasogastric tube if ordered  - Administer ordered antiemetic medications as needed  - Provide nonpharmacologic comfort measures as appropriate  - Advance diet as tolerated, if ordered  - Consider nutrition services referral to assist patient with adequate nutrition and appropriate food choices  Outcome: Progressing  Goal: Maintains or returns to baseline bowel function  Description: INTERVENTIONS:  - Assess bowel function  - Encourage oral fluids to ensure adequate hydration  - Administer IV fluids if ordered to ensure adequate hydration  - Administer ordered medications as needed  - Encourage mobilization and activity  - Consider nutritional services referral to assist patient with adequate nutrition and appropriate food choices  Outcome: Progressing  Goal: Maintains adequate nutritional intake  Description: INTERVENTIONS:  - Monitor percentage of each meal consumed  - Identify factors contributing to decreased intake, treat as appropriate  - Assist with meals as needed  - Monitor I&O, weight, and lab values if indicated  - Obtain nutrition services referral as needed  Outcome: Progressing     Problem: GENITOURINARY - ADULT  Goal: Maintains or returns to baseline urinary function  Description: INTERVENTIONS:  - Assess urinary function  - Encourage oral fluids to ensure adequate hydration if ordered  - Administer IV fluids as ordered to ensure adequate hydration  - Administer ordered medications as needed  - Offer frequent toileting  - Follow urinary retention protocol if ordered  Outcome: Progressing  Goal: Absence of urinary retention  Description: INTERVENTIONS:  - Assess patients ability to void and empty bladder  - Monitor I/O  - Bladder scan as needed  - Discuss with physician/AP medications to alleviate retention as needed  - Discuss catheterization for long term situations as appropriate  Outcome: Progressing     Problem: SKIN/TISSUE INTEGRITY - ADULT  Goal: Skin integrity remains intact  Description: INTERVENTIONS  - Identify patients at risk for skin breakdown  - Assess and monitor skin integrity  - Assess and monitor nutrition and hydration status  - Monitor labs (i e  albumin)  - Assess for incontinence   - Turn and reposition patient  - Assist with mobility/ambulation  - Relieve pressure over bony prominences  - Avoid friction and shearing  - Provide appropriate hygiene as needed including keeping skin clean and dry  - Evaluate need for skin moisturizer/barrier cream  - Collaborate with interdisciplinary team (i e  Nutrition, Rehabilitation, etc )   - Patient/family teaching  Outcome: Progressing

## 2021-05-07 NOTE — CASE MANAGEMENT
Met with patient, TC to wife, discussed CM role, CM program  LOS 1 d  Bundle no  Unplanned readmit risk color green  30 day readmit no  Lives in 3 story home with wife, Bedroom and full bath are located on 2nd floor  Also has a full bath on 3rd floor  13 steps to enter home, 20 steps to second floor  IADLS and ambulation  No DME/ assistive devices in home  Denies HHC, IP rehab, or OPPT in past   Denies MH illness, D or A abuse  Retired, does not drive  Wife transports patient to appointments   [address] order through [facility], for 1 time prescriptions- [facility], [place]  Primary contact wife, [facility] [phone]  States pt has LW and POA, wife stated she will attempt to provide SL with a copy of documentation  Wife is to make medical decisions if patient were to become incapacitated  Transport/ DC plan TBD    CM reviewed d/c planning process including the following: identifying help at home, patient preference for d/c planning needs, Discharge Lounge, [facility], availability of treatment team to discuss questions or concerns patient and/or family may have regarding understanding medications and recognizing signs and symptoms once discharged  CM also encouraged patient to follow up with all recommended appointments after discharge  Patient advised of importance for patient and family to participate in managing patients medical well being  Patient/caregiver received discharge checklist   Content reviewed  Patient/caregiver encouraged to participate in discharge plan of care prior to discharge home

## 2021-05-07 NOTE — H&P
[facility]  H&P- Yvonne Allen 1950, 80 y o  male MRN: [id number]  Unit/Bed#: ED 29 Encounter: [id number]  Primary Care Provider: German Briggs PA-C   Date and time admitted to hospital: 5/6/2021  7:32 PM    * Brain mass  Assessment & Plan  · Presented with word-finding difficulties, CTA head/neck revealing 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 and left hemispheric mass effect causing approximately 3 mm left to right midline shift "   · MRI brain to further characterize and CT chest/abdomen/pelvis to be obtained to evaluate for other masses  · Neurosurgery consultation requested  · Started on Decadron, received 10 mg loading dose will continue 4 mg q 6 hours  · Neuro checks    COPD (chronic obstructive pulmonary disease) ([facility] [address] )  Assessment & Plan  · Not in acute exacerbation  · Continue home inhalers    Morbid obesity ([facility] [address] )  Assessment & Plan  · Dietary and lifestyle modification advised      VTE Prophylaxis: Pharmacologic VTE Prophylaxis contraindicated due to Pending neurosurgery evaluation given brain mass  / sequential compression device   Code Status: Level 1 - Full Code per patient  POLST: POLST form is not discussed and not completed at this time  Anticipated Length of Stay:  Patient will be admitted on an Inpatient basis with an anticipated length of stay of  greater than 2 midnights  Justification for Hospital Stay: Please see detailed plans noted above  Chief Complaint:     Word-finding difficulties  History of Present Illness: Diallo Linton is a 80 y o  male who presents with word-finding difficulties  Has a past medical history significant for reported COPD, reported prior seizure history not currently on antibiotics, and remote history of tobacco use with last use 16 weeks prior    He reports fall approximately 3 weeks prior to his presentation while ambulating across street with associated head strike and questionable loss of consciousness, however fall was unwitnessed  He did not seek evaluation at that time; was seen by PCP several days later and complain of pain in ribs for which imaging did not reveal rib fracture, but did not mention the head strike at that time  For least past 1 day he has been experiencing intermittent reported aphasia and word-finding difficulties, and given persistence was advised of wife to present for evaluation  Did note some mild headache throughout the day, but controlled, and denies any fevers/chills, focal weakness, numbness/tingling/paresthesias, difficulty walking, or dizziness/lightheadedness  He underwent CTA head/neck in ED which revealed a large left frontotemporal brain mass with surrounding cerebral edema and 3 mm midline shift, concerning for malignancy  He was started on IV Decadron and admitted for further evaluation including MRI brain and neurosurgery consultation to determine ultimate plan  Currently, patient is lying in bed in no acute distress, continued to complain of speech difficulties  Admits to frustration that he knows what he wants to say but cannot get the correct words out  Notes improvement in headache, and without other acute complaints at this time      Review of Systems:    Constitutional:  Denies fever or chills   Eyes:  Denies change in visual acuity   HENT:  Denies nasal congestion or sore throat   Respiratory:  Denies cough or shortness of breath   Cardiovascular:  Denies chest pain or edema   GI:  Denies abdominal pain, nausea, vomiting, bloody stools or diarrhea   :  Denies dysuria   Musculoskeletal:  Denies back pain or joint pain but endorses fall  Integument:  Denies rash   Neurologic:  Denies headache, focal weakness or sensory changes but endorses word-finding difficulties  Endocrine:  Denies polyuria or polydipsia   Lymphatic:  Denies swollen glands   Psychiatric:  Denies depression or anxiety     Past Medical and Surgical History:   Past Medical History:   Diagnosis Date    Asthma     Epilepsy ([facility] [address] )     stopped meds 2006 has been seizure free     Past Surgical History:   Procedure Laterality Date    APPENDECTOMY      MANDIBLE SURGERY      CA COLONOSCOPY FLX DX W/COLLJ SPEC WHEN PFRMD N/A 4/4/2016    Procedure: COLONOSCOPY;  Surgeon: Yudelka Martinez MD;  Location:  GI LAB; Service: Gastroenterology    SKULL FRACTURE ELEVATION      TONSILLECTOMY         Meds/Allergies:    Current Facility-Administered Medications:     albuterol inhalation solution 2 5 mg, 2 5 mg, Nebulization, Q6H PRN, Earna Shed, DO    fluticasone-vilanterol (BREO ELLIPTA) 200-25 MCG/INH inhaler 1 puff, 1 puff, Inhalation, Daily, Earna Shed, DO    lidocaine (LIDODERM) 5 % patch 1 patch, 1 patch, Topical, Daily, Earna Shed, DO    multivitamin-minerals (CENTRUM) tablet 1 tablet, 1 tablet, Oral, Daily, Earna Shed, DO    ondansetron [facility] injection 4 mg, 4 mg, Intravenous, Q6H PRN, Earna Shed, DO    Current Outpatient Medications:     Advair Diskus 250-50 MCG/DOSE inhaler, INHALE ONE PUFF BY MOUTH TWICE A DAY  RINSE MOUTH AFTER USE, Disp: 180 each, Rfl: 3    albuterol (2 5 mg/3 mL) 0 083 % nebulizer solution, Take 1 vial (2 5 mg total) by nebulization every 6 (six) hours as needed for wheezing or shortness of breath, Disp: 60 vial, Rfl: 1    lidocaine (LIDODERM) 5 %, Apply 1 patch topically daily Remove & Discard patch within 12 hours or as directed by MD, Disp: 15 patch, Rfl: 0    Multiple Vitamin (ONE-A-DAY MENS PO), Take by mouth, Disp: , Rfl:     Allergies:    Allergies   Allergen Reactions    Penicillins Other (See Comments)     As a child     History:  Marital Status: /Civil Union     Substance Use History:   Social History     Substance and Sexual Activity   Alcohol Use No     Social History     Tobacco Use   Smoking Status Former Smoker    Types: Cigarettes    Quit date: 2/8/2006    Years since quitting: 15 2   Smokeless Tobacco Never Used     Social History     Substance and Sexual Activity   Drug Use No       Family History:  Family History   Problem Relation Age of Onset    Alcohol abuse Father     Substance Abuse Neg Hx     Mental illness Neg Hx        Physical Exam:     Vitals:   Blood Pressure: (!) 173/86 (05/07/21 0200)  Pulse: 88 (05/07/21 0200)  Temperature: 98 °F (36 7 °C) (05/06/21 1921)  Temp Source: Oral (05/06/21 1921)  Respirations: 20 (05/07/21 0200)  Height: 5' 10" (177 8 cm) (05/06/21 1921)  Weight - Scale: 130 kg (286 lb) (05/06/21 1921)  SpO2: 96 % (05/07/21 0200)    Constitutional:  Well developed, obese, no acute distress, non-toxic appearance   Eyes:  PERRL, conjunctiva normal   HENT:  Atraumatic, external ears normal, nose normal, oropharynx moist, no pharyngeal exudates  Neck- normal range of motion, no tenderness, supple   Respiratory:  No respiratory distress, normal breath sounds, no rales, no wheezing   Cardiovascular:  Normal rate, normal rhythm, no murmurs, no gallops, no rubs   GI:  Soft, nondistended, normal bowel sounds, nontender, no organomegaly, no mass, no rebound, no guarding   :  No costovertebral angle tenderness   Musculoskeletal:  No edema, no tenderness, no deformities  Back- no tenderness  Integument:  Well hydrated, no rash   Lymphatic:  No lymphadenopathy noted   Neurologic:  Alert &awake, communicative but with marked word-finding difficulties, CN 2-12 normal, normal motor function, normal sensory function, no focal deficits noted   Psychiatric:  Speech and behavior appropriate       Lab Results: I have personally reviewed pertinent reports        Results from last 7 days   Lab Units 05/06/21 1954   WBC Thousand/uL 7 56   HEMOGLOBIN g/dL 15 5   HEMATOCRIT % 45 3   PLATELETS Thousands/uL 272     Results from last 7 days   Lab Units 05/06/21 1954   POTASSIUM mmol/L 3 9   CHLORIDE mmol/L 109*   CO2 mmol/L 24   BUN mg/dL 21   CREATININE mg/dL 1 06   CALCIUM mg/dL 9 3     Results from last 7 days Lab Units 05/06/21 1954   INR  0 97       EKG:  Normal sinus rhythm    Imaging: I have personally reviewed pertinent reports  Cta Head And Neck With And Without Contrast    Result Date: 5/6/2021  Narrative: CTA NECK AND BRAIN WITH AND WITHOUT CONTRAST INDICATION: Fall 3 weeks ago with head strike and possible LOC, now with word finding difficulty and aphasia; ICH vs stroke COMPARISON:   None  TECHNIQUE:  Routine CT imaging of the Brain without contrast   Post contrast imaging was performed after administration of iodinated contrast through the neck and brain  Post contrast axial 0 625 mm images timed to opacify the arterial system  3D rendering was performed on an independent workstation  MIP reconstructions performed  Coronal reconstructions were performed of the noncontrast portion of the brain  Radiation dose length product (DLP) for this visit:  1610 63 mGy-cm   This examination, like all CT scans performed in the [facility], was performed utilizing techniques to minimize radiation dose exposure, including the use of iterative reconstruction and automated exposure control  IV Contrast:  100 mL of iohexol (OMNIPAQUE)  IMAGE QUALITY:   Diagnostic FINDINGS: NONCONTRAST BRAIN PARENCHYMA:  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 and mass effect causing approximately 3 mm left to right midline shift  Contrast-enhanced brain MRI is recommended for further evaluation  VENTRICLES AND EXTRA-AXIAL SPACES:  Normal for the patient's age  VISUALIZED ORBITS AND PARANASAL SINUSES:  Unremarkable  CERVICAL VASCULATURE AORTIC ARCH AND GREAT VESSELS:  Normal aortic arch and great vessel origins  Normal visualized subclavian vessels  RIGHT VERTEBRAL ARTERY CERVICAL SEGMENT:  Normal origin  The vessel is normal in caliber throughout the neck  LEFT VERTEBRAL ARTERY CERVICAL SEGMENT:  Normal origin  The vessel is normal in caliber throughout the neck   RIGHT EXTRACRANIAL CAROTID SEGMENT:  Normal caliber common carotid artery  Normal bifurcation and cervical internal carotid artery  No stenosis or dissection  LEFT EXTRACRANIAL CAROTID SEGMENT:  Normal caliber common carotid artery  Normal bifurcation and cervical internal carotid artery  No stenosis or dissection  NASCET criteria was used to determine the degree of internal carotid artery diameter stenosis  INTRACRANIAL VASCULATURE INTERNAL CAROTID ARTERIES:  Normal enhancement of the intracranial portions of the internal carotid arteries  Normal ophthalmic artery origins  Normal ICA terminus  ANTERIOR CIRCULATION:  Symmetric A1 segments and anterior cerebral arteries with normal enhancement  Normal anterior communicating artery  MIDDLE CEREBRAL ARTERY CIRCULATION:  M1 segment and middle cerebral artery branches demonstrate normal enhancement bilaterally  DISTAL VERTEBRAL ARTERIES:  Normal distal vertebral arteries  Posterior inferior cerebellar artery origins are normal  Normal vertebral basilar junction  BASILAR ARTERY:  Basilar artery is normal in caliber  Normal superior cerebellar arteries  POSTERIOR CEREBRAL ARTERIES: Both posterior cerebral arteries arises from the basilar tip  Both arteries demonstrate normal enhancement  Normal posterior communicating arteries  DURAL VENOUS SINUSES:  Normal  NON VASCULAR ANATOMY BONY STRUCTURES:  No acute osseous abnormality  SOFT TISSUES OF THE NECK:  Normal  THORACIC INLET:  Patchy groundglass regions could relate to atelectasis rather than Covid 19 pneumonia, correlate clinically  Impression: 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 and left hemispheric mass effect causing approximately 3 mm left to right midline shift  Contrast-enhanced brain MRI is recommended for further evaluation  No evidence of flow restrictive large vessel occlusive disease    I personally discussed this study with Betty Cooper on 5/6/2021 at 9:40 PM  Workstation performed: [id number]     Xr Ribs Left W Pa Chest Min 3 Views    Result Date: 5/5/2021  Narrative: LEFT RIBS AND CHEST -DUAL ENERGY INDICATION:   R07 81: Pleurodynia  COMPARISON:  Two-view changed to a 19 EXAM PERFORMED/VIEWS:  XR RIBS LEFT W PA CHEST MIN 3 VIEWS FINDINGS: Cardiomediastinal silhouette appears unremarkable  Lungs are clear  No pleural effusions  There is no pneumothorax  No rib fractures are identified  Impression: No active cardiopulmonary disease  No evidence of rib fractures  Workstation performed: [id number]     Mri Brain W Wo Contrast    Result Date: 5/7/2021  Narrative: MRI BRAIN WITH AND WITHOUT CONTRAST INDICATION: Frontotemporal brain mass on CT  Aphasia and headache  COMPARISON:  5/6/2021  TECHNIQUE: Sagittal T1, axial T2, axial FLAIR, axial T1, axial [place], axial diffusion  Sagittal, axial T1 postcontrast   Axial bravo postcontrast with coronal reconstructions  IV Contrast:  13 mL of Gadobutrol injection (SINGLE-DOSE)  IMAGE QUALITY:   Diagnostic  FINDINGS: BRAIN PARENCHYMA: Cystic or necrotic mass in the left temporal lobe, temporal and parietal operculum measuring 15 mm anteroposterior by 34 mm mediolateral by 40 mm superoinferior  Thick rim enhancement  Elevated diffusion centrally  Few foci of susceptibility artifact along the margins may represent blood products or calcification  Significant vasogenic edema in the left temporal lobe, temporal parietal opercula and left internal and external capsules  Left-to-right midline shift of 2 to 3 mm  Few periventricular and subcortical T2/FLAIR hyperintense foci suggesting early microangiopathic disease  VENTRICLES:  Normal for partial effacement of the left lateral ventricle  No hydrocephalus  SELLA AND PITUITARY GLAND:  Normal  ORBITS:  No retro-orbital mass  PARANASAL SINUSES:  No paranasal sinus disease  VASCULATURE:  Evaluation of the major intracranial vasculature demonstrates appropriate flow voids   CALVARIUM AND SKULL BASE:  No osseous lesion  EXTRACRANIAL SOFT TISSUES:  No soft tissue mass  Impression: 1  Rim-enhancing left temporal parietal mass measuring 5 cm with significant surrounding vasogenic edema, 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

## 2021-05-08 PROCEDURE — 99024 POSTOP FOLLOW-UP VISIT: CPT | Performed by: PHYSICIAN ASSISTANT

## 2021-05-08 PROCEDURE — 99232 SBSQ HOSP IP/OBS MODERATE 35: CPT | Performed by: FAMILY MEDICINE

## 2021-05-08 RX ORDER — HEPARIN SODIUM 5000 [USP'U]/ML
5000 INJECTION, SOLUTION INTRAVENOUS; SUBCUTANEOUS EVERY 8 HOURS SCHEDULED
Status: COMPLETED | OUTPATIENT
Start: 2021-05-08 | End: 2021-05-10

## 2021-05-08 RX ADMIN — LEVETIRACETAM 500 MG: 100 INJECTION, SOLUTION INTRAVENOUS at 21:15

## 2021-05-08 RX ADMIN — MELATONIN TAB 3 MG 6 MG: 3 TAB at 21:14

## 2021-05-08 RX ADMIN — DEXAMETHASONE SODIUM PHOSPHATE 4 MG: 4 INJECTION INTRA-ARTICULAR; INTRALESIONAL; INTRAMUSCULAR; INTRAVENOUS; SOFT TISSUE at 18:18

## 2021-05-08 RX ADMIN — SENNOSIDES 8.6 MG: 8.6 TABLET ORAL at 21:13

## 2021-05-08 RX ADMIN — DEXAMETHASONE SODIUM PHOSPHATE 4 MG: 4 INJECTION INTRA-ARTICULAR; INTRALESIONAL; INTRAMUSCULAR; INTRAVENOUS; SOFT TISSUE at 00:11

## 2021-05-08 RX ADMIN — HEPARIN SODIUM 5000 UNITS: 5000 INJECTION INTRAVENOUS; SUBCUTANEOUS at 14:28

## 2021-05-08 RX ADMIN — POLYETHYLENE GLYCOL 3350 17 G: 17 POWDER, FOR SOLUTION ORAL at 08:36

## 2021-05-08 RX ADMIN — DEXAMETHASONE SODIUM PHOSPHATE 4 MG: 4 INJECTION INTRA-ARTICULAR; INTRALESIONAL; INTRAMUSCULAR; INTRAVENOUS; SOFT TISSUE at 05:53

## 2021-05-08 RX ADMIN — Medication 1 TABLET: at 08:36

## 2021-05-08 RX ADMIN — HEPARIN SODIUM 5000 UNITS: 5000 INJECTION INTRAVENOUS; SUBCUTANEOUS at 21:14

## 2021-05-08 RX ADMIN — DEXAMETHASONE SODIUM PHOSPHATE 4 MG: 4 INJECTION INTRA-ARTICULAR; INTRALESIONAL; INTRAMUSCULAR; INTRAVENOUS; SOFT TISSUE at 23:02

## 2021-05-08 RX ADMIN — DOCUSATE SODIUM 100 MG: 100 CAPSULE, LIQUID FILLED ORAL at 18:18

## 2021-05-08 RX ADMIN — DEXAMETHASONE SODIUM PHOSPHATE 4 MG: 4 INJECTION INTRA-ARTICULAR; INTRALESIONAL; INTRAMUSCULAR; INTRAVENOUS; SOFT TISSUE at 12:45

## 2021-05-08 RX ADMIN — LEVETIRACETAM 500 MG: 100 INJECTION, SOLUTION INTRAVENOUS at 08:36

## 2021-05-08 RX ADMIN — DOCUSATE SODIUM 100 MG: 100 CAPSULE, LIQUID FILLED ORAL at 08:36

## 2021-05-08 NOTE — PROGRESS NOTES
[facility]  Progress Note Alek Castrejon Crocus 1950, 80 y o  male MRN: [id number]  Unit/Bed#: [facility] 632-01 Encounter: [id number]  Primary Care Provider: Reyna Lan PA-C   Date and time admitted to hospital: 5/6/2021  7:32 PM    * Brain mass  Assessment & Plan  · With vasogenic edema and mild midline shift  · Presented with word-finding difficulties, CTA head/neck revealing 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 and left hemispheric mass effect causing approximately 3 mm left to right midline shift "   · Neurosurgery evaluation appreciated  · Started on Decadron, received 10 mg loading dose will continue 4 mg q 6 hours  · Neuro checks  · MRI is consistent with primary CNS neoplasm-may need resection  · CT chest abdomen and pelvis -negative for any other primary sources  · Neurosurgery evaluation appreciated and plan for surgery noted on Tuesday    COPD (chronic obstructive pulmonary disease) ([facility] [address] )  Assessment & Plan  · Not in acute exacerbation  · Continue home inhalers    [facility]  Assessment & Plan  · Dietary and lifestyle modification advised  · Patient with history of obstructive sleep apnea but do not use BiPAP  VTE Pharmacologic Prophylaxis:   Pharmacologic: Heparin  Mechanical VTE Prophylaxis in Place: Yes    Patient Centered Rounds: I have performed bedside rounds with nursing staff today  Discussions with Specialists or Other Care Team Provider:     Education and Discussions with Family / Patient:  Patient    Time Spent for Care: 30 minutes  More than 50% of total time spent on counseling and coordination of care as described above      Current Length of Stay: 2 day(s)    Current Patient Status: Inpatient   Certification Statement: The patient will continue to require additional inpatient hospital stay due to Pending surgery on Tuesday    Discharge Plan:     Code Status: Level 1 - Full Code      Subjective:   Patient seen and examined  No specific complaints offered  Patient reported that his speech is improving  Aphasia present during the discussion  Objective:     Vitals:   Temp (24hrs), Av °F (36 7 °C), Min:97 4 °F (36 3 °C), Max:98 7 °F (37 1 °C)    Temp:  [97 4 °F (36 3 °C)-98 7 °F (37 1 °C)] 97 4 °F (36 3 °C)  HR:  [58-85] 58  Resp:  [18] 18  BP: (128-167)/(67-93) 148/80  SpO2:  [92 %-95 %] 95 %  Body mass index is 41 04 kg/m²  Input and Output Summary (last 24 hours): Intake/Output Summary (Last 24 hours) at 2021 1030  Last data filed at 2021 2102  Gross per 24 hour   Intake 480 ml   Output --   Net 480 ml       Physical Exam:     Physical Exam  Constitutional:       General: He is not in acute distress  Appearance: Normal appearance  He is not ill-appearing  HENT:      Head: Normocephalic and atraumatic  Nose: Nose normal    Eyes:      General: No scleral icterus  Neck:      Musculoskeletal: Normal range of motion and neck supple  Cardiovascular:      Rate and Rhythm: Normal rate and regular rhythm  Pulses: Normal pulses  Heart sounds: Normal heart sounds  Pulmonary:      Effort: Pulmonary effort is normal    Abdominal:      General: Abdomen is flat  There is no distension  Musculoskeletal: Normal range of motion  Skin:     General: Skin is warm  Neurological:      Mental Status: He is alert  Comments: Aphasic         Additional Data:     Labs:    Results from last 7 days   Lab Units 21  0713   WBC Thousand/uL 7 02   HEMOGLOBIN g/dL 16 1   HEMATOCRIT % 47 0   PLATELETS Thousands/uL 277     Results from last 7 days   Lab Units 21  0713   POTASSIUM mmol/L 4 0   CHLORIDE mmol/L 110*   CO2 mmol/L 21   BUN mg/dL 16   CREATININE mg/dL 0 97   CALCIUM mg/dL 9 0     Results from last 7 days   Lab Units 21  1954   INR  0 97       * I Have Reviewed All Lab Data Listed Above  * Additional Pertinent Lab Tests Reviewed:  Feliciano Martines Admission Reviewed    Imaging:    Imaging Reports Reviewed Today Include:   Imaging Personally Reviewed by Myself Includes:    Recent Cultures (last 7 days):           Last 24 Hours Medication List:   Current Facility-Administered Medications   Medication Dose Route Frequency Provider Last Rate    dexamethasone  4 mg Intravenous Q6H Mila Ralph MD      docusate sodium  100 mg Oral BID Kofi Mcdaniels MD      fluticasone-vilanterol  1 puff Inhalation Daily [place] Asael, DO      levETIRAcetam  500 mg Intravenous Q12H [facility] [place] Asael,  mg (05/08/21 0836)    lidocaine  1 patch Topical Daily [place] Asael, DO      melatonin  6 mg Oral HS Kofi Mcdaniels MD      multivitamin-minerals  1 tablet Oral Daily [place] Asael, DO      ondansetron  4 mg Intravenous Q6H PRN [place] Asael, DO      polyethylene glycol  17 g Oral Daily Kofi Mcdaniels MD      senna  1 tablet Oral HS Kofi Mcdaniels MD          Today, Patient Was Seen By: Kofi Mcdaniels MD    ** Please Note: Dictation voice to text software may have been used in the creation of this document   **

## 2021-05-08 NOTE — UTILIZATION REVIEW
Notification of Inpatient Admission/Inpatient Authorization Request  This is a Notification of Inpatient Admission/Request for Inpatient Authorization for our facility [facility]  Be advised that this patient was admitted to our facility under Inpatient Status  Please contact the Utilization Review Department where the patient is receiving care services for additional admission information  [facility]ility: [facility] ([address] [place] [address])  Place of Service Code: 21   Place of Service Name: [address]  Presentation Date & Time: 5/6/2021  7:32 PM  Inpatient Admission Date & Time: 5/6/21 2214  Discharge Date & Time: No discharge date for patient encounter  Discharge Disposition (if discharged): Home/Self Care  Attending Physician and NPI#: Libra Collier Md [[id number]]  Admission Orders (From admission, onward)     Ordered        05/06/21 2214  Inpatient Admission  Once                   Network Utilization Review Department  Phone: [phone]; Fax [phone]  [email or web address]  ATTENTION: Please call with any questions or concerns to [phone]  and carefully listen to the prompts so that you are directed to the right person  Send all requests for admission clinical reviews, approved or denied determinations and any other requests to fax [phone]   All voicemails are confidential

## 2021-05-08 NOTE — ASSESSMENT & PLAN NOTE
· With vasogenic edema and mild midline shift  · Presented with word-finding difficulties, CTA head/neck revealing 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 and left hemispheric mass effect causing approximately 3 mm left to right midline shift "   · Neurosurgery evaluation appreciated  · Started on Decadron, received 10 mg loading dose will continue 4 mg q 6 hours  · Neuro checks  · MRI is consistent with primary CNS neoplasm-may need resection  · CT chest abdomen and pelvis -negative for any other primary sources  · Neurosurgery evaluation appreciated and plan for surgery noted on Tuesday  · Neurosurgery cleared for DVT prophylaxis-start on heparin

## 2021-05-08 NOTE — OCCUPATIONAL THERAPY NOTE
OCCUPATIONAL THERAPY SCREEN:    ORDERS RECEIVED  CHART REVIEW COMPLETED  PT PLANNED FOR OR Tuesday WITH [facility] FOR RESECTION OF BRAIN MASS  PLEASE RECONSULT POST-OP  THANK YOU      Alem Roy, CHANELL, OTR/L

## 2021-05-08 NOTE — UTILIZATION REVIEW
Continued Stay Review    Date: 5/8/2021                     Current Patient Class: IP  Current Level of Care:Lvl 2 step down  HPI:71 y o  male initially admitted on 5/6 with newly found brain mass  Assessment/Plan: Pt feels speech is improving, but aphasia noted during exam  MRI is consistent with primary CNS neoplasm  CT c/a/p -negative for any other primary sources  Continue neuro checks q2h, IV decadron  NSx with tentative plan for surgery on Tuesday (5/11)  SCD's  Ambulate 3x/day  Reg diet  Vital Signs:   Date/Time  Temp  Pulse  Resp  BP  MAP (mmHg)  SpO2  O2 Device  Patient Position - Orthostatic VS   05/08/21 06:45:09  97 4 °F (36 3 °C)Abnormal   --  18  148/80  103  --  --  --   05/08/21 02:33:01  98 7 °F (37 1 °C)  58  18  128/67  87  95 %  --  --   05/07/21 22:20:01  98 4 °F (36 9 °C)  65  18  167/91  116  95 %  --  --   05/07/21 1905  --  --  --  --  --  94 %  None (Room air)  --   05/07/21 18:49:10  97 7 °F (36 5 °C)  80  18  151/93  112  92 %  --  --   05/07/21 15:03:34  97 8 °F (36 6 °C)  85  18  147/90  109  92 %  --  --   05/07/21 1400  --  --  --  --  --  --  None (Room air)  --         Pertinent Labs/Diagnostic Results:   none today      Medications:   Scheduled Medications:  dexamethasone, 4 mg, Intravenous, Q6H BI  docusate sodium, 100 mg, Oral, BID  fluticasone-vilanterol, 1 puff, Inhalation, Daily  heparin (porcine), 5,000 Units, Subcutaneous, Q8H BI  levETIRAcetam, 500 mg, Intravenous, Q12H BI  lidocaine, 1 patch, Topical, Daily  melatonin, 6 mg, Oral, HS  multivitamin-minerals, 1 tablet, Oral, Daily  polyethylene glycol, 17 g, Oral, Daily  senna, 1 tablet, Oral, HS    PRN Meds:  ondansetron, 4 mg, Intravenous, Q6H PRN        Discharge Plan: TBD    Network Utilization Review Department  ATTENTION: Please call with any questions or concerns to [phone] and carefully listen to the prompts so that you are directed to the right person   All voicemails are confidential   Valeta Pastel all requests for admission clinical reviews, approved or denied determinations and any other requests to dedicated fax number below belonging to the campus where the patient is receiving treatment   List of dedicated fax numbers for the Facilities:  [address] [place] [address] DENIALS (Administrative/Medical Necessity) [phone]   [address] (Maternity/NICU/Pediatrics) [phone]   [address] [facility] [address] [place] [address] [address] Raven Rodriguez [phone] [place] [address] [phone]

## 2021-05-08 NOTE — PHYSICAL THERAPY NOTE
Physical Therapy Cancellation Note    PT orders received chart review completed  Pt is currently pending OR with nsx  PT will follow and eval as medically appropriate       05/08/21 7163   Note Type   Cancel Reasons Medical status     Kanchan Parker, BETY

## 2021-05-08 NOTE — ASSESSMENT & PLAN NOTE
Patient presents with a few day history of speech difficulties, had prior fall 3 weeks ago with positive head strike  Imaging significant for large left frontotemporal brain mass with mass effect cerebral edema  · No AC/AP therapy  · No personal or family history of cancer    Imaging reviewed personally and with attending, results are as follows:   CTA head and neck with and without contrast 05/06/2021:  Large left frontotemporal brain mass/neoplasm with cerebral edema and left hemispheric brain affect causing approximately 3 mm left-to-right midline shift  Contrast enhanced MRI brain is recommended for further evaluation  No evidence of flow restrictive large vessel occlusive disease   MRI brain with and without contrast 05/06/2021:  Rim enhancing left temporal parietal mass measuring 5 cm with significant surrounding vasogenic edema, 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  Left-to-right midline shift of 2-3 mm   CT chest abdomen pelvis with contrast 05/07/2021:  Left lateral 7th rib nondisplaced fracture could be subacute or acute  Correlate clinically  Small pericardial effusion  Plan:   Plan for surgery tentatively on Tuesday for brain mass resection  o Ongoing discussion regarding awake versus asleep craniotomy  o Patient will need medical clearance  o Patient was bolused with Decadron 10 mg, continues on Decadron 4 mg q 6 hours for cerebral edema  o Currently on Keppra 500 mg b i d  For seizure prophylaxis   Medical management and pain control per primary team   DVT ppx:  SCDs, okay for pharmacological DVT prophylaxis   Mobilize as tolerated with assistance, PT / OT evaluation likely after surgery    Neurosurgery will follow from the periphery over the weekend and resume follow-up on Monday to preop for surgery on Tuesday  Please call with questions or concerns

## 2021-05-08 NOTE — PROGRESS NOTES
[address] [facility]  Progress Note Jabier Allen 1950, 80 y o  male MRN: [id number]  Unit/Bed#: [facility] 632-01 Encounter: [id number]  Primary Care Provider: Cuca Marroquin PA-C   Date and time admitted to hospital: 5/6/2021  7:32 PM    * Brain mass  Assessment & Plan  Patient presents with a few day history of speech difficulties, had prior fall 3 weeks ago with positive head strike  Imaging significant for large left frontotemporal brain mass with mass effect cerebral edema  · No AC/AP therapy  · No personal or family history of cancer    Imaging reviewed personally and with attending, results are as follows:   CTA head and neck with and without contrast 05/06/2021:  Large left frontotemporal brain mass/neoplasm with cerebral edema and left hemispheric brain affect causing approximately 3 mm left-to-right midline shift  Contrast enhanced MRI brain is recommended for further evaluation  No evidence of flow restrictive large vessel occlusive disease   MRI brain with and without contrast 05/06/2021:  Rim enhancing left temporal parietal mass measuring 5 cm with significant surrounding vasogenic edema, 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  Left-to-right midline shift of 2-3 mm   CT chest abdomen pelvis with contrast 05/07/2021:  Left lateral 7th rib nondisplaced fracture could be subacute or acute  Correlate clinically  Small pericardial effusion  Plan:   Plan for surgery tentatively on Tuesday for brain mass resection  o Ongoing discussion regarding awake versus asleep craniotomy  o Patient will need medical clearance  o Patient was bolused with Decadron 10 mg, continues on Decadron 4 mg q 6 hours for cerebral edema  o Currently on Keppra 500 mg b i d   For seizure prophylaxis   Medical management and pain control per primary team   DVT ppx:  SCDs, okay for pharmacological DVT prophylaxis   Mobilize as tolerated with assistance, PT / OT evaluation likely after surgery    Neurosurgery will follow from the periphery over the weekend and resume follow-up on Monday to preop for surgery on Tuesday  Please call with questions or concerns  Brain compression [facility])  Assessment & Plan  See plan above      Cerebral edema [facility])  Assessment & Plan  See plan above      Subjective/Objective   Chief Complaint:  Follow-up brain lesion    Subjective:  Patient states he is doing well  Reports improvements with his speech  Denies any new or worsening symptoms, no seizure-like activity, no headaches, dizziness, chest pain, shortness of breath, abdominal pain, nausea or vomiting, numbness/tingling/weakness  Objective:  Ambulating in room, ordering food with nutrition fluidly, watching TV, NAD    I/O       05/06 0701 - 05/07 0700 05/07 0701 - 05/08 0700 05/08 0701 - 05/09 0700    P  O   480     IV Piggyback 100      Total Intake(mL/kg) 100 (0 8) 480 (3 7)     Net +100 +480            Unmeasured Urine Occurrence  1 x           Invasive Devices     Peripheral Intravenous Line            Peripheral IV 05/06/21 Right Antecubital 1 day                Physical Exam:  Vitals: Blood pressure 148/80, pulse 58, temperature (!) 97 4 °F (36 3 °C), resp  rate 18, height 5' 10" (1 778 m), weight 130 kg (286 lb), SpO2 95 %  ,Body mass index is 41 04 kg/m²        General appearance: alert, appears stated age, cooperative and no distress  Head: Normocephalic, without obvious abnormality, atraumatic  Eyes:  Conjugate gaze  Neck: supple, symmetrical, trachea midline   Lungs: non labored breathing  Heart: regular heart rate  Neurologic:   Mental status: Alert, oriented x4, follows commands, speech is much improved today with only slight difficulties when stating the date but otherwise is able to answer all orientation questions quickly and correctly, able to do simple calculations, able to repeat a sentence, thought content appropriate  Cranial nerves: grossly intact (Cranial nerves II-XII)  Sensory: normal to light touch  Motor: moving all extremities without focal weakness strength 5/5 throughout  Coordination: finger to nose normal bilaterally, no drift bilaterally      Lab Results:  Results from last 7 days   Lab Units 05/07/21  0713 05/06/21 1954   WBC Thousand/uL 7 02 7 56   HEMOGLOBIN g/dL 16 1 15 5   HEMATOCRIT % 47 0 45 3   PLATELETS Thousands/uL 277 272     Results from last 7 days   Lab Units 05/07/21  0713 05/06/21 1954   POTASSIUM mmol/L 4 0 3 9   CHLORIDE mmol/L 110* 109*   CO2 mmol/L 21 24   BUN mg/dL 16 21   CREATININE mg/dL 0 97 1 06   CALCIUM mg/dL 9 0 9 3             Results from last 7 days   Lab Units 05/06/21 1954   INR  0 97   PTT seconds 32     No results found for: TROPONINT  ABG:No results found for: PHART, VRW4OEU, PO2ART, OHC5SHM, P4NDNWJB, BEART, SOURCE    Imaging Studies: I have personally reviewed pertinent reports  and I have personally reviewed pertinent films in PACS    Cta Head And Neck With And Without Contrast    Result Date: 5/6/2021  Impression: 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 and left hemispheric mass effect causing approximately 3 mm left to right midline shift  Contrast-enhanced brain MRI is recommended for further evaluation  No evidence of flow restrictive large vessel occlusive disease  I personally discussed this study with Marc Mcgarry on 5/6/2021 at 9:40 PM  Workstation performed: [id number]     Mri Brain W Wo Contrast    Result Date: 5/7/2021  Impression: 1  Rim-enhancing left temporal parietal mass measuring 5 cm with significant surrounding vasogenic edema, 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

## 2021-05-09 PROBLEM — S22.39XA RIB FRACTURE: Status: ACTIVE | Noted: 2021-05-09

## 2021-05-09 PROBLEM — D72.829 LEUKOCYTOSIS: Status: ACTIVE | Noted: 2021-05-09

## 2021-05-09 LAB
ANION GAP SERPL CALCULATED.3IONS-SCNC: 5 MMOL/L (ref 4–13)
BUN SERPL-MCNC: 24 MG/DL (ref 5–25)
CALCIUM SERPL-MCNC: 8.9 MG/DL (ref 8.3–10.1)
CHLORIDE SERPL-SCNC: 110 MMOL/L (ref 100–108)
CO2 SERPL-SCNC: 24 MMOL/L (ref 21–32)
CREAT SERPL-MCNC: 1.02 MG/DL (ref 0.6–1.3)
ERYTHROCYTE [DISTWIDTH] IN BLOOD BY AUTOMATED COUNT: 13.4 % (ref 11.6–15.1)
GFR SERPL CREATININE-BSD FRML MDRD: 74 ML/MIN/1.73SQ M
GLUCOSE SERPL-MCNC: 146 MG/DL (ref 65–140)
HCT VFR BLD AUTO: 43 % (ref 36.5–49.3)
HGB BLD-MCNC: 14.6 G/DL (ref 12–17)
MCH RBC QN AUTO: 29.7 PG (ref 26.8–34.3)
MCHC RBC AUTO-ENTMCNC: 34 G/DL (ref 31.4–37.4)
MCV RBC AUTO: 87 FL (ref 82–98)
PLATELET # BLD AUTO: 263 THOUSANDS/UL (ref 149–390)
PMV BLD AUTO: 9.9 FL (ref 8.9–12.7)
POTASSIUM SERPL-SCNC: 4 MMOL/L (ref 3.5–5.3)
RBC # BLD AUTO: 4.92 MILLION/UL (ref 3.88–5.62)
SODIUM SERPL-SCNC: 139 MMOL/L (ref 136–145)
WBC # BLD AUTO: 12.24 THOUSAND/UL (ref 4.31–10.16)

## 2021-05-09 PROCEDURE — 85027 COMPLETE CBC AUTOMATED: CPT | Performed by: FAMILY MEDICINE

## 2021-05-09 PROCEDURE — 80048 BASIC METABOLIC PNL TOTAL CA: CPT | Performed by: FAMILY MEDICINE

## 2021-05-09 PROCEDURE — 99232 SBSQ HOSP IP/OBS MODERATE 35: CPT | Performed by: FAMILY MEDICINE

## 2021-05-09 RX ORDER — HYDRALAZINE HYDROCHLORIDE 20 MG/ML
5 INJECTION INTRAMUSCULAR; INTRAVENOUS EVERY 8 HOURS PRN
Status: DISCONTINUED | OUTPATIENT
Start: 2021-05-09 | End: 2021-05-12

## 2021-05-09 RX ADMIN — HEPARIN SODIUM 5000 UNITS: 5000 INJECTION INTRAVENOUS; SUBCUTANEOUS at 21:54

## 2021-05-09 RX ADMIN — FLUTICASONE FUROATE AND VILANTEROL TRIFENATATE 1 PUFF: 200; 25 POWDER RESPIRATORY (INHALATION) at 09:44

## 2021-05-09 RX ADMIN — DOCUSATE SODIUM 100 MG: 100 CAPSULE, LIQUID FILLED ORAL at 17:08

## 2021-05-09 RX ADMIN — DOCUSATE SODIUM 100 MG: 100 CAPSULE, LIQUID FILLED ORAL at 09:43

## 2021-05-09 RX ADMIN — HEPARIN SODIUM 5000 UNITS: 5000 INJECTION INTRAVENOUS; SUBCUTANEOUS at 05:49

## 2021-05-09 RX ADMIN — HEPARIN SODIUM 5000 UNITS: 5000 INJECTION INTRAVENOUS; SUBCUTANEOUS at 13:18

## 2021-05-09 RX ADMIN — LEVETIRACETAM 500 MG: 100 INJECTION, SOLUTION INTRAVENOUS at 09:44

## 2021-05-09 RX ADMIN — LEVETIRACETAM 500 MG: 100 INJECTION, SOLUTION INTRAVENOUS at 22:07

## 2021-05-09 RX ADMIN — DEXAMETHASONE SODIUM PHOSPHATE 4 MG: 4 INJECTION INTRA-ARTICULAR; INTRALESIONAL; INTRAMUSCULAR; INTRAVENOUS; SOFT TISSUE at 05:49

## 2021-05-09 RX ADMIN — DEXAMETHASONE SODIUM PHOSPHATE 4 MG: 4 INJECTION INTRA-ARTICULAR; INTRALESIONAL; INTRAMUSCULAR; INTRAVENOUS; SOFT TISSUE at 17:09

## 2021-05-09 RX ADMIN — MELATONIN TAB 3 MG 6 MG: 3 TAB at 21:54

## 2021-05-09 RX ADMIN — POLYETHYLENE GLYCOL 3350 17 G: 17 POWDER, FOR SOLUTION ORAL at 09:43

## 2021-05-09 RX ADMIN — SENNOSIDES 8.6 MG: 8.6 TABLET ORAL at 21:54

## 2021-05-09 RX ADMIN — DEXAMETHASONE SODIUM PHOSPHATE 4 MG: 4 INJECTION INTRA-ARTICULAR; INTRALESIONAL; INTRAMUSCULAR; INTRAVENOUS; SOFT TISSUE at 13:19

## 2021-05-09 RX ADMIN — Medication 1 TABLET: at 09:43

## 2021-05-09 NOTE — PHYSICAL THERAPY NOTE
Physical Therapy Cancellation Note    Patient's Name: Darrell Montilla    Orders received, chart reviewed  [facility]  Will follow for PT evaluation as medically appropriate post-op  Thank you       Concepcion Gayle, PT, DPT

## 2021-05-09 NOTE — ASSESSMENT & PLAN NOTE
· CT of the chest abdomen and pelvis showed possible left-sided rib fracture   · Patient remained rather asymptomatic

## 2021-05-09 NOTE — ASSESSMENT & PLAN NOTE
· With vasogenic edema and mild midline shift  · Presented with word-finding difficulties, CTA head/neck revealing 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 and left hemispheric mass effect causing approximately 3 mm left to right midline shift "   · Neurosurgery evaluation appreciated  · Started on Decadron, received 10 mg loading dose will continue 4 mg q 6 hours  · Neuro checks  · MRI is consistent with primary CNS neoplasm-may need resection  · CT chest abdomen and pelvis -negative for any other primary sources  · Neurosurgery evaluation appreciated and plan for surgery noted on Tuesday  · Neurosurgery cleared for DVT prophylaxis-start on heparin  ·

## 2021-05-09 NOTE — PROGRESS NOTES
[address] [facility]  Progress Note Brina Allen 1950, 80 y o  male MRN: [id number]  Unit/Bed#: [facility] 632-01 Encounter: [id number]  Primary Care Provider: Cecile Pino PA-C   Date and time admitted to hospital: 5/6/2021  7:32 PM    * Brain mass  Assessment & Plan  · With vasogenic edema and mild midline shift  · Presented with word-finding difficulties, CTA head/neck revealing 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 and left hemispheric mass effect causing approximately 3 mm left to right midline shift "   · Neurosurgery evaluation appreciated  · Started on Decadron, received 10 mg loading dose will continue 4 mg q 6 hours  · Neuro checks  · MRI is consistent with primary CNS neoplasm-may need resection  · CT chest abdomen and pelvis -negative for any other primary sources  · Neurosurgery evaluation appreciated and plan for surgery noted on Tuesday  · Neurosurgery cleared for DVT prophylaxis-start on heparin  ·     Rib fracture  Assessment & Plan  · CT of the chest abdomen and pelvis showed possible left-sided rib fracture   · Patient remained rather asymptomatic    Leukocytosis  Assessment & Plan  · Likely secondary to the steroids    COPD (chronic obstructive pulmonary disease) ([facility] [address] )  Assessment & Plan  · Not in acute exacerbation  · Continue home inhalers    Morbid obesity ([facility] [address] )  Assessment & Plan  · Dietary and lifestyle modification advised  · Patient with history of obstructive sleep apnea but do not use BiPAP  VTE Pharmacologic Prophylaxis:   Pharmacologic: Heparin  Mechanical VTE Prophylaxis in Place: Yes    Patient Centered Rounds: I have performed bedside rounds with nursing staff today  Discussions with Specialists or Other Care Team Provider:     Education and Discussions with Family / Patient:  Wife updated in the room    Time Spent for Care: 30 minutes    More than 50% of total time spent on counseling and coordination of care as described above  Current Length of Stay: 3 day(s)    Current Patient Status: Inpatient   Certification Statement: The patient will continue to require additional inpatient hospital stay due to Pending surgery on Tuesday    Discharge Plan:     Code Status: Level 1 - Full Code      Subjective:   Patient seen and examined  No events overnight  Patient aphasic is improving    Objective:     Vitals:   Temp (24hrs), Av °F (36 7 °C), Min:97 6 °F (36 4 °C), Max:98 4 °F (36 9 °C)    Temp:  [97 6 °F (36 4 °C)-98 4 °F (36 9 °C)] 98 1 °F (36 7 °C)  HR:  [56-75] 61  Resp:  [17-20] 17  BP: (131-158)/(63-94) 131/74  SpO2:  [96 %-97 %] 97 %  Body mass index is 41 04 kg/m²  Input and Output Summary (last 24 hours): Intake/Output Summary (Last 24 hours) at 2021 1717  Last data filed at 2021 1300  Gross per 24 hour   Intake 1780 ml   Output --   Net 1780 ml       Physical Exam:     Physical Exam  Constitutional:       General: He is not in acute distress  Appearance: Normal appearance  HENT:      Head: Normocephalic and atraumatic  Nose: Nose normal    Eyes:      General: No scleral icterus  Neck:      Musculoskeletal: Normal range of motion  Cardiovascular:      Rate and Rhythm: Normal rate and regular rhythm  Pulses: Normal pulses  Pulmonary:      Effort: Pulmonary effort is normal       Breath sounds: Normal breath sounds  Abdominal:      General: Abdomen is flat  Musculoskeletal: Normal range of motion  Skin:     General: Skin is warm and dry  Neurological:      General: No focal deficit present  Mental Status: He is alert           Additional Data:     Labs:    Results from last 7 days   Lab Units 21  0550   WBC Thousand/uL 12 24*   HEMOGLOBIN g/dL 14 6   HEMATOCRIT % 43 0   PLATELETS Thousands/uL 263     Results from last 7 days   Lab Units 21  0550   POTASSIUM mmol/L 4 0   CHLORIDE mmol/L 110*   CO2 mmol/L 24   BUN mg/dL 24   CREATININE mg/dL 1 02   CALCIUM mg/dL 8 9     Results from last 7 days   Lab Units 05/06/21 1954   INR  0 97       * I Have Reviewed All Lab Data Listed Above  * Additional Pertinent Lab Tests Reviewed: [address] Admission Reviewed    Imaging:    Imaging Reports Reviewed Today Include:   Imaging Personally Reviewed by Myself Includes:      Recent Cultures (last 7 days):           Last 24 Hours Medication List:   Current Facility-Administered Medications   Medication Dose Route Frequency Provider Last Rate    dexamethasone  4 mg Intravenous Q6H Mahendra Pacheco MD      docusate sodium  100 mg Oral BID Henry Castillo MD      fluticasone-vilanterol  1 puff Inhalation Daily Cortes Passe, DO      heparin (porcine)  5,000 Units Subcutaneous [address] Henry Castillo MD      levETIRAcetam  500 mg Intravenous Q12H [address] Cortes Passe,  mg (05/09/21 0944)    lidocaine  1 patch Topical Daily Cortes Passe, DO      melatonin  6 mg Oral HS Henry Castillo MD      multivitamin-minerals  1 tablet Oral Daily Cortes Passe, DO      ondansetron  4 mg Intravenous Q6H PRN Cortes Passe, DO      polyethylene glycol  17 g Oral Daily Henry Castillo MD      senna  1 tablet Oral HS Henry Castillo MD          Today, Patient Was Seen By: Henry Castillo MD    ** Please Note: Dictation voice to text software may have been used in the creation of this document   **

## 2021-05-10 ENCOUNTER — ANESTHESIA EVENT (INPATIENT)
Dept: PERIOP | Facility: HOSPITAL | Age: 71
DRG: 025 | End: 2021-05-10
Payer: COMMERCIAL

## 2021-05-10 ENCOUNTER — TELEPHONE (OUTPATIENT)
Dept: RADIOLOGY | Facility: HOSPITAL | Age: 71
End: 2021-05-10

## 2021-05-10 LAB
ABO GROUP BLD: NORMAL
BLD GP AB SCN SERPL QL: NEGATIVE
ERYTHROCYTE [DISTWIDTH] IN BLOOD BY AUTOMATED COUNT: 13.4 % (ref 11.6–15.1)
HCT VFR BLD AUTO: 47.2 % (ref 36.5–49.3)
HGB BLD-MCNC: 15.4 G/DL (ref 12–17)
MCH RBC QN AUTO: 29.6 PG (ref 26.8–34.3)
MCHC RBC AUTO-ENTMCNC: 32.6 G/DL (ref 31.4–37.4)
MCV RBC AUTO: 91 FL (ref 82–98)
PLATELET # BLD AUTO: 246 THOUSANDS/UL (ref 149–390)
PMV BLD AUTO: 10.2 FL (ref 8.9–12.7)
RBC # BLD AUTO: 5.21 MILLION/UL (ref 3.88–5.62)
RH BLD: POSITIVE
SPECIMEN EXPIRATION DATE: NORMAL
WBC # BLD AUTO: 10.98 THOUSAND/UL (ref 4.31–10.16)

## 2021-05-10 PROCEDURE — 99232 SBSQ HOSP IP/OBS MODERATE 35: CPT | Performed by: FAMILY MEDICINE

## 2021-05-10 PROCEDURE — 86850 RBC ANTIBODY SCREEN: CPT | Performed by: PHYSICIAN ASSISTANT

## 2021-05-10 PROCEDURE — 86901 BLOOD TYPING SEROLOGIC RH(D): CPT | Performed by: PHYSICIAN ASSISTANT

## 2021-05-10 PROCEDURE — 86923 COMPATIBILITY TEST ELECTRIC: CPT

## 2021-05-10 PROCEDURE — 86900 BLOOD TYPING SEROLOGIC ABO: CPT | Performed by: PHYSICIAN ASSISTANT

## 2021-05-10 PROCEDURE — 85027 COMPLETE CBC AUTOMATED: CPT | Performed by: FAMILY MEDICINE

## 2021-05-10 PROCEDURE — 99232 SBSQ HOSP IP/OBS MODERATE 35: CPT | Performed by: NEUROLOGICAL SURGERY

## 2021-05-10 RX ORDER — SODIUM CHLORIDE 9 MG/ML
125 INJECTION, SOLUTION INTRAVENOUS CONTINUOUS
Status: DISCONTINUED | OUTPATIENT
Start: 2021-05-10 | End: 2021-05-11

## 2021-05-10 RX ORDER — BISACODYL 10 MG
10 SUPPOSITORY, RECTAL RECTAL ONCE
Status: COMPLETED | OUTPATIENT
Start: 2021-05-10 | End: 2021-05-10

## 2021-05-10 RX ORDER — CHLORHEXIDINE GLUCONATE 0.12 MG/ML
15 RINSE ORAL ONCE
Status: COMPLETED | OUTPATIENT
Start: 2021-05-11 | End: 2021-05-11

## 2021-05-10 RX ADMIN — DEXAMETHASONE SODIUM PHOSPHATE 4 MG: 4 INJECTION INTRA-ARTICULAR; INTRALESIONAL; INTRAMUSCULAR; INTRAVENOUS; SOFT TISSUE at 12:21

## 2021-05-10 RX ADMIN — DOCUSATE SODIUM 100 MG: 100 CAPSULE, LIQUID FILLED ORAL at 18:02

## 2021-05-10 RX ADMIN — DOCUSATE SODIUM 100 MG: 100 CAPSULE, LIQUID FILLED ORAL at 08:47

## 2021-05-10 RX ADMIN — DEXAMETHASONE SODIUM PHOSPHATE 4 MG: 4 INJECTION INTRA-ARTICULAR; INTRALESIONAL; INTRAMUSCULAR; INTRAVENOUS; SOFT TISSUE at 00:01

## 2021-05-10 RX ADMIN — MELATONIN TAB 3 MG 6 MG: 3 TAB at 21:32

## 2021-05-10 RX ADMIN — DEXAMETHASONE SODIUM PHOSPHATE 4 MG: 4 INJECTION INTRA-ARTICULAR; INTRALESIONAL; INTRAMUSCULAR; INTRAVENOUS; SOFT TISSUE at 06:17

## 2021-05-10 RX ADMIN — LEVETIRACETAM 500 MG: 100 INJECTION, SOLUTION INTRAVENOUS at 09:49

## 2021-05-10 RX ADMIN — Medication 1 TABLET: at 08:49

## 2021-05-10 RX ADMIN — SENNOSIDES 8.6 MG: 8.6 TABLET ORAL at 21:32

## 2021-05-10 RX ADMIN — DEXAMETHASONE SODIUM PHOSPHATE 4 MG: 4 INJECTION INTRA-ARTICULAR; INTRALESIONAL; INTRAMUSCULAR; INTRAVENOUS; SOFT TISSUE at 23:32

## 2021-05-10 RX ADMIN — HEPARIN SODIUM 5000 UNITS: 5000 INJECTION INTRAVENOUS; SUBCUTANEOUS at 14:00

## 2021-05-10 RX ADMIN — BISACODYL 10 MG: 10 SUPPOSITORY RECTAL at 12:26

## 2021-05-10 RX ADMIN — FLUTICASONE FUROATE AND VILANTEROL TRIFENATATE 1 PUFF: 200; 25 POWDER RESPIRATORY (INHALATION) at 09:02

## 2021-05-10 RX ADMIN — HEPARIN SODIUM 5000 UNITS: 5000 INJECTION INTRAVENOUS; SUBCUTANEOUS at 21:32

## 2021-05-10 RX ADMIN — LEVETIRACETAM 500 MG: 100 INJECTION, SOLUTION INTRAVENOUS at 21:39

## 2021-05-10 RX ADMIN — HEPARIN SODIUM 5000 UNITS: 5000 INJECTION INTRAVENOUS; SUBCUTANEOUS at 06:17

## 2021-05-10 RX ADMIN — POLYETHYLENE GLYCOL 3350 17 G: 17 POWDER, FOR SOLUTION ORAL at 08:46

## 2021-05-10 RX ADMIN — DEXAMETHASONE SODIUM PHOSPHATE 4 MG: 4 INJECTION INTRA-ARTICULAR; INTRALESIONAL; INTRAMUSCULAR; INTRAVENOUS; SOFT TISSUE at 18:02

## 2021-05-10 NOTE — UTILIZATION REVIEW
Continued Stay Review    Date: 5/10                        Current Patient Class: Inpatient Current Level of Care: Stepdown    HPI:71 y o  male initially admitted on 5/6    Assessment/Plan: Brain mass  Per Neurosurgery; Plan for OR tomorrow, Tuesday 5/11 - Left temporal craniotomy, possible awake, with image guidence and 5ALA; CRANIOTOMY IMAGE-GUIDED (AWAKE) FOR TUMOR  Ongoing discussion regarding awake versus asleep craniotomy  NPO at MN  Continue Iv Decadron q6h for cerebral edema  Continue Keppra  Pt report no BM or gas since Thursday, usually uses suppositories at home  Abdomen soft  Internal med to order KUB and suppository afterwards      Vital Signs:   05/10/21 1049  97 9 °F (36 6 °C)  65  17  145/84  --  95 %  --  --   05/10/21 0800  --  --  --  --  --  --  None (Room air)  --   05/10/21 07:44:19  --  --  18  147/85  106  --  --  --   05/10/21 06:43:34  97 4 °F (36 3 °C)Abnormal   --  18  158/87  111  95 %         Pertinent Labs/Diagnostic Results:       Results from last 7 days   Lab Units 05/10/21  0602 05/09/21  0550 05/07/21  0713 05/06/21 1954   WBC Thousand/uL 10 98* 12 24* 7 02 7 56   HEMOGLOBIN g/dL 15 4 14 6 16 1 15 5   HEMATOCRIT % 47 2 43 0 47 0 45 3   PLATELETS Thousands/uL 246 263 277 272         Results from last 7 days   Lab Units 05/09/21  0550 05/07/21  0713 05/06/21 1954   SODIUM mmol/L 139 139 139   POTASSIUM mmol/L 4 0 4 0 3 9   CHLORIDE mmol/L 110* 110* 109*   CO2 mmol/L 24 21 24   ANION GAP mmol/L 5 8 6   BUN mg/dL 24 16 21   CREATININE mg/dL 1 02 0 97 1 06   EGFR ml/min/1 73sq m 74 79 71   CALCIUM mg/dL 8 9 9 0 9 3         Results from last 7 days   Lab Units 05/06/21 1957   POC GLUCOSE mg/dl 91     Results from last 7 days   Lab Units 05/09/21  0550 05/07/21  0713 05/06/21  1954   GLUCOSE RANDOM mg/dL 146* 148* 95       Results from last 7 days   Lab Units 05/06/21 1954   TROPONIN I ng/mL <0 02         Results from last 7 days   Lab Units 05/06/21 1954   PROTIME seconds 12 9 INR  0 97   PTT seconds 32     Medications:   Scheduled Medications:  [START ON 5/11/2021] 5-aminolevulinic acid, 20 mg/kg, Oral, Once  dexamethasone, 4 mg, Intravenous, Q6H BI  docusate sodium, 100 mg, Oral, BID  fluticasone-vilanterol, 1 puff, Inhalation, Daily  heparin (porcine), 5,000 Units, Subcutaneous, Q8H BI  levETIRAcetam, 500 mg, Intravenous, Q12H BI  lidocaine, 1 patch, Topical, Daily  melatonin, 6 mg, Oral, HS  multivitamin-minerals, 1 tablet, Oral, Daily  polyethylene glycol, 17 g, Oral, Daily  senna, 1 tablet, Oral, HS      Continuous IV Infusions: None     PRN Meds:  hydrALAZINE, 5 mg, Intravenous, Q8H PRN  ondansetron, 4 mg, Intravenous, Q6H PRN      Discharge Plan: D    Network Utilization Review Department  ATTENTION: Please call with any questions or concerns to [phone] and carefully listen to the prompts so that you are directed to the right person  All voicemails are confidential   Alice Thurston all requests for admission clinical reviews, approved or denied determinations and any other requests to dedicated fax number below belonging to the campus where the patient is receiving treatment   List of dedicated fax numbers for the Facilities:  [address] DENIALS (Administrative/Medical Necessity) [phone]   [address] (Maternity/NICU/Pediatrics) [phone]   [address] [address] [place] [address] [address] [facility] [facility] [phone]

## 2021-05-10 NOTE — ASSESSMENT & PLAN NOTE
Patient presents with a few day history of speech difficulties, had prior fall 3 weeks ago with positive head strike  Imaging significant for large left frontotemporal brain mass with mass effect cerebral edema  · No AC/AP therapy  · No personal or family history of cancer  · MOCA 25/30    Imaging reviewed personally and with attending, results are as follows:   CTA head and neck with and without contrast 05/06/2021:  Large left frontotemporal brain mass/neoplasm with cerebral edema and left hemispheric brain affect causing approximately 3 mm left-to-right midline shift  Contrast enhanced MRI brain is recommended for further evaluation  No evidence of flow restrictive large vessel occlusive disease   MRI brain with and without contrast 05/06/2021:  Rim enhancing left temporal parietal mass measuring 5 cm with significant surrounding vasogenic edema, 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  Left-to-right midline shift of 2-3 mm   CT chest abdomen pelvis with contrast 05/07/2021:  Left lateral 7th rib nondisplaced fracture could be subacute or acute  Correlate clinically  Small pericardial effusion  Plan:   Plan for surgery tentatively on Tuesday for brain mass resection  o Ongoing discussion regarding awake versus asleep craniotomy  o Patient will need medical clearance - per SLIM attending he is cleared for surgery  - Patient states he has not had a BM or passed gas since Thursday, usually uses suppositories at home, abdomen soft on exam and he denies abdominal pain - discussed with SLIM and they will order KUB and suppository afterwards  o Patient was bolused with Decadron 10 mg, continues on Decadron 4 mg q 6 hours for cerebral edema  o Currently on Keppra 500 mg b i d   For seizure prophylaxis  o NPO after midnight  o Nose to toes protocol ordered  o Labs reviewed, will order UA to be completed prior to OR  o Hold pharm dvt ppx at midnight  [facility] management and pain control per primary team   DVT ppx:  SCDs, SQH   Mobilize as tolerated with assistance, PT / OT evaluation likely after surgery    Neurosurgery will continue to follow  Please call with questions or concerns

## 2021-05-10 NOTE — ASSESSMENT & PLAN NOTE
POD1 from left temporal craniotomy for tumor resection with 5-ALA  · Patient presents with a few day history of speech difficulties, had prior fall 3 weeks ago with positive head strike  · MOCA 25/30    Imaging reviewed personally and with attending, results are as follows:  · MRI brain 05/11/2021:  Status post resection of left temporal lobe mass with postoperative changes including mild  Peripheral ischemia  Improved surrounding vasogenic edema  Plan:  · Ongoing frequent neurologic checks  · Recommend stat CT head for decline in GCS greater than 2 points in 1 hour  · Decadron 4 Son g q 6 hours with 72 hour taper ordered  · Postoperative antibiotics  · Keppra 500 mg b i d  for seizure prophylaxis x7 days  · MRI brain completed overnight  · DVT prophylaxis:  SCDs, Lovenox to start at noon  · TIM drain removed this morning  Patient tolerated well  Suture tied in place  · Rai removed  Voiding trial pending  · Physical therapy/ occupational therapy evaluation  · Ongoing medical management per primary team     · Patient cleared to transfer out of the ICU from neurosurgical standpoint  Patient is to remain in dark light for 48 hours postoperatively until 05/13/2021 at approximately 6:00 a m  [facility] · Neurosurgery will continue to follow and monitor exam postoperatively  Call with any questions or concerns  · Patient was bolused with Decadron 10 mg, continues on Decadron 4 mg q 6 hours for cerebral edema  · Currently on Keppra 500 mg b i d  For seizure prophylaxis   Medical management and pain control per primary team   DVT ppx:  SCDs, SQH   Mobilize as tolerated with assistance, PT / OT evaluation likely after surgery    Neurosurgery will continue to follow  Please call with questions or concerns

## 2021-05-10 NOTE — ASSESSMENT & PLAN NOTE
· With vasogenic edema and mild midline shift  · Presented with word-finding difficulties, CTA head/neck revealing 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 and left hemispheric mass effect causing approximately 3 mm left to right midline shift "   · Neurosurgery evaluation appreciated  · Started on Decadron, received 10 mg loading dose will continue 4 mg q 6 hours  · Neuro checks  · MRI is consistent with primary CNS neoplasm-may need resection  · CT chest abdomen and pelvis -negative for any other primary sources  · Neurosurgery evaluation appreciated and plan for surgery noted on Tuesday  · Neurosurgery cleared for DVT prophylaxis-start on heparin    Will hold preoperative  · Patient is at acceptable risk for neurosurgery intervention  ·

## 2021-05-10 NOTE — PROGRESS NOTES
[facility] [facility]  Progress Note Agatha Allen 1950, 80 y o  male MRN: [id number]  Unit/Bed#: [facility] 632-01 Encounter: [id number]  Primary Care Provider: Lor Rebolledo PA-C   Date and time admitted to hospital: 5/6/2021  7:32 PM    * Brain mass  Assessment & Plan  · With vasogenic edema and mild midline shift  · Presented with word-finding difficulties, CTA head/neck revealing 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 and left hemispheric mass effect causing approximately 3 mm left to right midline shift "   · Neurosurgery evaluation appreciated  · Started on Decadron, received 10 mg loading dose will continue 4 mg q 6 hours  · Neuro checks  · MRI is consistent with primary CNS neoplasm-may need resection  · CT chest abdomen and pelvis -negative for any other primary sources  · Neurosurgery evaluation appreciated and plan for surgery noted on Tuesday  · Neurosurgery cleared for DVT prophylaxis-start on heparin  Will hold preoperative  · Patient is at acceptable risk for neurosurgery intervention  ·     Rib fracture  Assessment & Plan  · CT of the chest abdomen and pelvis showed possible left-sided rib fracture   · Patient remained rather asymptomatic    Leukocytosis  Assessment & Plan  · Likely secondary to the steroids  · Down trending  · No fever    COPD (chronic obstructive pulmonary disease) ([facility])  Assessment & Plan  · Not in acute exacerbation  · Continue home inhalers    Morbid obesity ([address] )  Assessment & Plan  · Dietary and lifestyle modification advised  · Patient with history of obstructive sleep apnea but do not use BiPAP  VTE Pharmacologic Prophylaxis:   Pharmacologic: Heparin -on hold for Neurosurgery tomorrow  Mechanical VTE Prophylaxis in Place: Yes    Patient Centered Rounds: I have performed bedside rounds with nursing staff today      Discussions with Specialists or Other Care Team Provider:  Pending rehab    Education and Discussions with Family / Patient:  Patient    Time Spent for Care: 30 minutes  More than 50% of total time spent on counseling and coordination of care as described above  Current Length of Stay: 4 day(s)    Current Patient Status: Inpatient   Certification Statement: The patient will continue to require additional inpatient hospital stay due to Pending surgery    Discharge Plan:     Code Status: Level 1 - Full Code      Subjective:   Patient seen and examined  No events overnight  Patient had a bowel movement today after getting the laxatives  Patient passing gas  Objective:     Vitals:   Temp (24hrs), Av 8 °F (36 6 °C), Min:97 4 °F (36 3 °C), Max:98 6 °F (37 °C)    Temp:  [97 4 °F (36 3 °C)-98 6 °F (37 °C)] 98 °F (36 7 °C)  HR:  [58-69] 69  Resp:  [16-18] 17  BP: (134-168)/(64-88) 141/81  SpO2:  [94 %-97 %] 95 %  Body mass index is 41 04 kg/m²  Input and Output Summary (last 24 hours): Intake/Output Summary (Last 24 hours) at 5/10/2021 1529  Last data filed at 5/10/2021 1244  Gross per 24 hour   Intake 720 ml   Output --   Net 720 ml       Physical Exam:     Physical Exam  Constitutional:       General: He is not in acute distress  Appearance: He is not ill-appearing  HENT:      Head: Normocephalic  Nose: Nose normal    Eyes:      General: No scleral icterus  Neck:      Musculoskeletal: Normal range of motion and neck supple  Cardiovascular:      Rate and Rhythm: Normal rate and regular rhythm  Pulses: Normal pulses  Pulmonary:      Effort: Pulmonary effort is normal    Abdominal:      General: Abdomen is flat  Bowel sounds are normal  There is no distension  Tenderness: There is no abdominal tenderness  Musculoskeletal: Normal range of motion  Skin:     General: Skin is warm  Coloration: Skin is not jaundiced  Neurological:      Mental Status: He is alert and oriented to person, place, and time           Additional Data:     Labs:    Results from last 7 days   Lab Units 05/10/21  0602   WBC Thousand/uL 10 98*   HEMOGLOBIN g/dL 15 4   HEMATOCRIT % 47 2   PLATELETS Thousands/uL 246     Results from last 7 days   Lab Units 05/09/21  0550   POTASSIUM mmol/L 4 0   CHLORIDE mmol/L 110*   CO2 mmol/L 24   BUN mg/dL 24   CREATININE mg/dL 1 02   CALCIUM mg/dL 8 9     Results from last 7 days   Lab Units 05/06/21  1954   INR  0 97       * I Have Reviewed All Lab Data Listed Above  * Additional Pertinent Lab Tests Reviewed: [address] Admission Reviewed    Imaging:    Imaging Reports Reviewed Today Include:   Imaging Personally Reviewed by Myself Includes:      Recent Cultures (last 7 days):           Last 24 Hours Medication List:   Current Facility-Administered Medications   Medication Dose Route Frequency Provider Last Rate    [START ON 5/11/2021] 5-aminolevulinic acid  20 mg/kg Oral Once KHALIDA Alcantara      dexamethasone  4 mg Intravenous Q6H Linda Espino MD      docusate sodium  100 mg Oral BID Alexei Harrington MD      fluticasone-vilanterol  1 puff Inhalation Daily [facility] Hospital[facility]      heparin (porcine)  5,000 Units Subcutaneous Q8H [facility] Bernarda Leal PA-C      hydrALAZINE  5 mg Intravenous Q8H PRN Yessi Lundberg DO      levETIRAcetam  500 mg Intravenous Q12H [facility]  mg (05/10/21 0949)    lidocaine  1 patch Topical Daily Annamariacourtney Meneses,       melatonin  6 mg Oral HS Alexei Harrington MD      multivitamin-minerals  1 tablet Oral Daily Annamaria Meneses,       ondansetron  4 mg Intravenous Q6H PRN Annamaria Meneses DO      polyethylene glycol  17 g Oral Daily Alexei Harrington MD      senna  1 tablet Oral HS Alexei Harrington MD          Today, Patient Was Seen By: Alexei Harrington MD    ** Please Note: Dictation voice to text software may have been used in the creation of this document   **

## 2021-05-10 NOTE — PROGRESS NOTES
[address] [facility]  Progress Note Nate Allen 1950, 80 y o  male MRN: [id number]  Unit/Bed#: [facility] 632-01 Encounter: [id number]  Primary Care Provider: Jade Sanford PA-C   Date and time admitted to hospital: 5/6/2021  7:32 PM    * Brain mass  Assessment & Plan  Patient presents with a few day history of speech difficulties, had prior fall 3 weeks ago with positive head strike  Imaging significant for large left frontotemporal brain mass with mass effect cerebral edema  · No AC/AP therapy  · No personal or family history of cancer  · MOCA 25/30    Imaging reviewed personally and with attending, results are as follows:   CTA head and neck with and without contrast 05/06/2021:  Large left frontotemporal brain mass/neoplasm with cerebral edema and left hemispheric brain affect causing approximately 3 mm left-to-right midline shift  Contrast enhanced MRI brain is recommended for further evaluation  No evidence of flow restrictive large vessel occlusive disease   MRI brain with and without contrast 05/06/2021:  Rim enhancing left temporal parietal mass measuring 5 cm with significant surrounding vasogenic edema, 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  Left-to-right midline shift of 2-3 mm   CT chest abdomen pelvis with contrast 05/07/2021:  Left lateral 7th rib nondisplaced fracture could be subacute or acute  Correlate clinically  Small pericardial effusion      Plan:   Plan for surgery Tuesday - Left temporal craniotomy, possible awake, with image guidence and 5ALA; CRANIOTOMY IMAGE-GUIDED (AWAKE) FOR TUMOR  o Ongoing discussion regarding awake versus asleep craniotomy  o Patient will need medical clearance - per [facility] attending he is cleared for surgery  - Patient states he has not had a BM or passed gas since Thursday, usually uses suppositories at home, abdomen soft on exam and he denies abdominal pain - discussed with SLIM and they will order KUB and suppository afterwards  o Patient was bolused with Decadron 10 mg, continues on Decadron 4 mg q 6 hours for cerebral edema  o Currently on Keppra 500 mg b i d  For seizure prophylaxis  o NPO after midnight  o Nose to toes protocol ordered  o Labs reviewed, will order UA to be completed prior to OR  o Hold pharm dvt ppx at midnight  [facility] management and pain control per primary team   DVT ppx:  SCDs, SQH   Mobilize as tolerated with assistance, PT / OT evaluation likely after surgery    Neurosurgery will continue to follow  Please call with questions or concerns  Brain compression [facility])  Assessment & Plan  See plan above      Cerebral edema [facility])  Assessment & Plan  See plan above    Subjective/Objective   Chief Complaint: "I am good "    Subjective:  Patient states speech is fine in the morning buts worsens at nighttime when he is tired  Reports that he has not had a bowel movement or passed gas since Thursday  Usually uses a suppository at home  Denies any abdominal pain but is concerned that he has not gone to the bathroom  Denies any additional concerns  Objective:  Sitting in chair, ambulating around the room as well, NAD    I/O       05/08 0701 - 05/09 0700 05/09 0701 - 05/10 0700 05/10 0701 - 05/11 0700    P  O  1360 840     I V  (mL/kg) 30 (0 2)      IV Piggyback 100      Total Intake(mL/kg) 1490 (11 5) 840 (6 5)     Urine (mL/kg/hr) 0 (0)      Total Output 0      Net +1490 +840            Unmeasured Urine Occurrence 8 x 3 x     Unmeasured Stool Occurrence  0 x           Invasive Devices     Peripheral Intravenous Line            Peripheral IV 05/08/21 Dorsal (posterior); Left Hand 1 day                Physical Exam:  Vitals: Blood pressure 147/85, pulse 60, temperature (!) 97 4 °F (36 3 °C), resp  rate 18, height 5' 10" (1 778 m), weight 130 kg (286 lb), SpO2 95 %  ,Body mass index is 41 04 kg/m²        General appearance: alert, appears stated age, cooperative and no distress  Head: Normocephalic, without obvious abnormality, atraumatic  Eyes: conjugate gaze  Neck: supple, symmetrical, trachea midline   Lungs: non labored breathing  Heart: regular heart rate  Abdomen: soft and nontender  Neurologic:   Mental status: Alert, oriented x3, follows commands, thought content appropriate  Cranial nerves: grossly intact (Cranial nerves II-XII)  Sensory: normal to LT  Motor: moving all extremities without focal weakness, strength 5/5 throughout  Coordination: finger to nose normal bilaterally, no drift bilaterally      Lab Results:  Results from last 7 days   Lab Units 05/10/21  0602 05/09/21  0550 05/07/21  0713   WBC Thousand/uL 10 98* 12 24* 7 02   HEMOGLOBIN g/dL 15 4 14 6 16 1   HEMATOCRIT % 47 2 43 0 47 0   PLATELETS Thousands/uL 246 263 277     Results from last 7 days   Lab Units 05/09/21  0550 05/07/21  0713 05/06/21 1954   POTASSIUM mmol/L 4 0 4 0 3 9   CHLORIDE mmol/L 110* 110* 109*   CO2 mmol/L 24 21 24   BUN mg/dL 24 16 21   CREATININE mg/dL 1 02 0 97 1 06   CALCIUM mg/dL 8 9 9 0 9 3             Results from last 7 days   Lab Units 05/06/21  1954   INR  0 97   PTT seconds 32       Imaging Studies: I have personally reviewed pertinent reports  and I have personally reviewed pertinent films in PACS    Cta Head And Neck With And Without Contrast    Result Date: 5/6/2021  Impression: 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 and left hemispheric mass effect causing approximately 3 mm left to right midline shift  Contrast-enhanced brain MRI is recommended for further evaluation  No evidence of flow restrictive large vessel occlusive disease  I personally discussed this study with Gilson Forde on 5/6/2021 at 9:40 PM  Workstation performed: [id number]     Mri Brain W Wo Contrast    Result Date: 5/7/2021  Impression: 1    Rim-enhancing left temporal parietal mass measuring 5 cm with significant surrounding vasogenic edema, 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

## 2021-05-10 NOTE — UTILIZATION REVIEW
Inpatient Admission Authorization Request   NOTIFICATION OF INPATIENT ADMISSION/INPATIENT AUTHORIZATION REQUEST   SERVICING FACILITY:   [facility]  Address: [address] [place] [address], [address] [place] [address]  Tax ID: [id number]  NPI: [id number]  Place of Service: Inpatient [address] [facility] Code: 24     ATTENDING PROVIDER:  Attending Name and NPI#: Kathleen Queenma [[id number]]  Address: [address] [place] [address], [address] [place] [id number]  Phone: [phone]     UTILIZATION REVIEW CONTACT:  Warren Connolly Utilization   Network Utilization Review Department  Phone: [phone]  Fax: [phone]  Email: [email or web address]     PHYSICIAN ADVISORY SERVICES:  FOR [id number] REVIEW - MEDICAL NECESSITY DENIAL  Phone: [phone]  Fax: [phone]  Email: [email or web address]     TYPE OF REQUEST:  Inpatient Status     ADMISSION INFORMATION:  ADMISSION DATE/TIME: 5/6/21 2214  PATIENT DIAGNOSIS CODE/DESCRIPTION:  Brain mass [G93 89]  Expressive aphasia [R47 01]  Midline shift of brain [R90 89]  DISCHARGE DATE/TIME: No discharge date for patient encounter  DISCHARGE DISPOSITION (IF DISCHARGED): Home/Self Care     IMPORTANT INFORMATION:  Please contact the Warren Connolly directly with any questions or concerns regarding this request  Department voicemails are confidential     Send requests for admission clinical reviews, concurrent reviews, approvals, and administrative denials due to lack of clinical to fax [phone]         Initial Clinical Review     Admission: Date/Time/Statement:       Admission Orders (From admission, onward)              Ordered          05/06/21 2214   Inpatient Admission  Once                             Orders Placed This Encounter   Procedures    Inpatient Admission       Standing Status:   Standing       Number of Occurrences:   1       Order Specific Question:   Level of Care       Answer:   Level 2 Stepdown / HOT [14]       Order Specific Question:   Estimated length of stay       Answer:   More than 2 Midnights       Order Specific Question:   Certification       Answer:   I certify that inpatient services are medically necessary for this patient for a duration of greater than two midnights  See H&P and MD Progress Notes for additional information about the patient's course of treatment                 ED Arrival Information      Expected Arrival Acuity Means of Arrival Escorted By Service Admission Type     - 5/6/2021 19:06 Emergent [facility] Medicine Emergency     Arrival Complaint     speech impairment               Chief Complaint   Patient presents with    Speech Problem       Pt reports he fell approximately two weeks ago and hit his head, +LOC, - thinners  Patient did not seek medical treatment at the time  Patient reports starting yesterday he started with aphasia, patient has trouble finding the right words  Patient denies all other focal neuro deficits           Initial Presentation:  81 y/o male with PMHx of COPD, seizure d/o, remote tobacco presents to ED from home with word finding difficulties  Pt states he fell ~ 3 wks ago while crossing the street with associated head strike and questionable loc, but fall was unwitnessed and he did not seek medical tx at that time  Seen by his PCP several days later with c/o rib pain, with imaging nabil for fxs, did not mention head strike at that time  For past day he has been experiencing intermittent aphasia and word finding difficulties with CTA head/neck in ED today revealing a large left frontotemporal brain mass with surrounding cerebral edema and 3 mm midline shift, concerning for malignancy  Given IV decadron in ED  Admit inpatient to M/S unit with brain mass  MRI brain ordered  Continue IV decadron  Neuro checks q4h  Continue previous po meds  NSx consulted   SCD's       Neuro consult 5/7 -- Patient was bolused with Decadron 10 mg, continues on Decadron 4 mg q 6 hours for cerebral edema  Currently on Keppra 500 mg b i d  for seizure ppx        Date: 5/7   Day 2: Pt wife feels pt aphasia slightly better today  Continue decadron, neuro checks  CT c/a/p pending              ED Triage Vitals   Temperature Pulse Respirations Blood Pressure SpO2   05/06/21 1921 05/06/21 1921 05/06/21 1921 05/06/21 1921 05/06/21 1921   98 °F (36 7 °C) 78 18 (!) 161/105 95 %       Temp Source Heart Rate Source Patient Position - Orthostatic VS BP Location FiO2 (%)   05/06/21 1921 05/06/21 1921 05/06/21 1921 05/06/21 1921 --   Oral Monitor Lying Right arm         Pain Score           05/06/21 2046           5                  Wt Readings from Last 1 Encounters:   05/06/21 130 kg (286 lb)      Additional Vital Signs:   Date/Time   Temp   Pulse   Resp   BP   MAP (mmHg)   SpO2   O2 Device   05/07/21 0600   --   84   18   170/92   --   94 %   --   05/07/21 0414   --   88   20   --   --   96 %   --   05/07/21 0400   --   86   18   170/93   --   96 %   --   05/07/21 0200   --   88   20   173/86Abnormal    --   96 %   --   05/07/21 0152   --   83   --   173/86Abnormal    --   96 %   None (Room air)   05/06/21 2130   --   72   22   154/78   109   97 %   None (Room air)   05/06/21 2048   --   71   18   161/105Abnormal    --   96 %   --   05/06/21 1921   98 °F (36 7 °C)   78   18   161/105Abnormal    --   95 %   None (Room air)         Pertinent Labs/Diagnostic Test Results:   · CTA head and neck with and without contrast 05/06/2021:  Large left frontotemporal brain mass/neoplasm with cerebral edema and left hemispheric brain affect causing approximately 3 mm left-to-right midline shift   Contrast enhanced MRI brain is recommended for further evaluation   No evidence of flow restrictive large vessel occlusive disease    · MRI brain with and without contrast 05/06/2021:  Rim enhancing left temporal parietal mass measuring 5 cm with significant surrounding vasogenic edema, 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   Left-to-right midline shift of 2-3 mm            Results from last 7 days   Lab Units 05/07/21  0713 05/06/21 1954   WBC Thousand/uL 7 02 7 56   HEMOGLOBIN g/dL 16 1 15 5   HEMATOCRIT % 47 0 45 3   PLATELETS Thousands/uL 277 272            Results from last 7 days   Lab Units 05/07/21  0713 05/06/21 1954   SODIUM mmol/L 139 139   POTASSIUM mmol/L 4 0 3 9   CHLORIDE mmol/L 110* 109*   CO2 mmol/L 21 24   ANION GAP mmol/L 8 6   BUN mg/dL 16 21   CREATININE mg/dL 0 97 1 06   EGFR ml/min/1 73sq m 79 71   CALCIUM mg/dL 9 0 9 3           Results from last 7 days   Lab Units 05/06/21 1957   POC GLUCOSE mg/dl 91            Results from last 7 days   Lab Units 05/07/21  0713 05/06/21 1954   GLUCOSE RANDOM mg/dL 148* 95           Results from last 7 days   Lab Units 05/06/21 1954   TROPONIN I ng/mL <0 02               Results from last 7 days   Lab Units 05/06/21 1954   PROTIME seconds 12 9   INR   0 97   PTT seconds 32      ED Treatment:            Medication Administration from 05/06/2021 1905 to 05/07/2021 1329        Date/Time Order Dose Route Action       05/06/2021 2046 acetaminophen (TYLENOL) tablet 975 mg 975 mg Oral Given       05/06/2021 2102 iohexol (OMNIPAQUE) 350 MG/ML injection (SINGLE-DOSE) 100 mL 100 mL Intravenous Given       05/06/2021 2154 dexamethasone (DECADRON) injection 10 mg 10 mg Intravenous Given by Other       05/06/2021 2307 Gadobutrol injection (SINGLE-DOSE) SOLN 13 mL 13 mL Intravenous Given       05/07/2021 0923 levETIRAcetam (KEPPRA) 500 mg in sodium chloride 0 9 % 100 mL IVPB 500 mg Intravenous New Bag       05/07/2021 0431 levETIRAcetam (KEPPRA) 500 mg in sodium chloride 0 9 % 100 mL IVPB 500 mg Intravenous New Bag      Medical History        Past Medical History:   Diagnosis Date    Asthma      Epilepsy ([facility] )       stopped meds 2006 has been seizure free        Present on Admission:   COPD (chronic obstructive pulmonary disease) ([facility] )   Morbid obesity ([facility] )   Brain mass        Admitting Diagnosis: Brain mass [G93 89]  Expressive aphasia [R47 01]  Midline shift of brain [R90 89]  Age/Sex: 80 y o  male  Admission Orders:  Scheduled Medications:  fluticasone-vilanterol, 1 puff, Inhalation, Daily  levETIRAcetam, 500 mg, Intravenous, Q12H BI  lidocaine, 1 patch, Topical, Daily  multivitamin-minerals, 1 tablet, Oral, Daily     PRN Meds:  albuterol, 2 5 mg, Nebulization, Q6H PRN  ondansetron, 4 mg, Intravenous, Q6H PRN

## 2021-05-11 ENCOUNTER — APPOINTMENT (INPATIENT)
Dept: RADIOLOGY | Facility: HOSPITAL | Age: 71
DRG: 025 | End: 2021-05-11
Payer: COMMERCIAL

## 2021-05-11 ENCOUNTER — ANESTHESIA (INPATIENT)
Dept: PERIOP | Facility: HOSPITAL | Age: 71
DRG: 025 | End: 2021-05-11
Payer: COMMERCIAL

## 2021-05-11 LAB
ANION GAP SERPL CALCULATED.3IONS-SCNC: 7 MMOL/L (ref 4–13)
BASE EXCESS BLDA CALC-SCNC: -2 MMOL/L (ref -2–3)
BASE EXCESS BLDA CALC-SCNC: -5 MMOL/L (ref -2–3)
BUN SERPL-MCNC: 20 MG/DL (ref 5–25)
CA-I BLD-SCNC: 1.09 MMOL/L (ref 1.12–1.32)
CA-I BLD-SCNC: 1.31 MMOL/L (ref 1.12–1.32)
CALCIUM SERPL-MCNC: 8.7 MG/DL (ref 8.3–10.1)
CHLORIDE SERPL-SCNC: 113 MMOL/L (ref 100–108)
CO2 SERPL-SCNC: 21 MMOL/L (ref 21–32)
CREAT SERPL-MCNC: 1.13 MG/DL (ref 0.6–1.3)
ERYTHROCYTE [DISTWIDTH] IN BLOOD BY AUTOMATED COUNT: 13.6 % (ref 11.6–15.1)
GFR SERPL CREATININE-BSD FRML MDRD: 65 ML/MIN/1.73SQ M
GLUCOSE SERPL-MCNC: 137 MG/DL (ref 65–140)
GLUCOSE SERPL-MCNC: 146 MG/DL (ref 65–140)
GLUCOSE SERPL-MCNC: 170 MG/DL (ref 65–140)
HCO3 BLDA-SCNC: 21.3 MMOL/L (ref 22–28)
HCO3 BLDA-SCNC: 22.3 MMOL/L (ref 22–28)
HCT VFR BLD AUTO: 42.4 % (ref 36.5–49.3)
HCT VFR BLD CALC: 39 % (ref 36.5–49.3)
HCT VFR BLD CALC: 41 % (ref 36.5–49.3)
HGB BLD-MCNC: 14.2 G/DL (ref 12–17)
HGB BLDA-MCNC: 13.3 G/DL (ref 12–17)
HGB BLDA-MCNC: 13.9 G/DL (ref 12–17)
MAGNESIUM SERPL-MCNC: 2.5 MG/DL (ref 1.6–2.6)
MCH RBC QN AUTO: 29.5 PG (ref 26.8–34.3)
MCHC RBC AUTO-ENTMCNC: 33.5 G/DL (ref 31.4–37.4)
MCV RBC AUTO: 88 FL (ref 82–98)
PCO2 BLD: 23 MMOL/L (ref 21–32)
PCO2 BLD: 23 MMOL/L (ref 21–32)
PCO2 BLD: 36.9 MM HG (ref 36–44)
PCO2 BLD: 42.4 MM HG (ref 36–44)
PH BLD: 7.31 [PH] (ref 7.35–7.45)
PH BLD: 7.39 [PH] (ref 7.35–7.45)
PHOSPHATE SERPL-MCNC: 3.5 MG/DL (ref 2.3–4.1)
PLATELET # BLD AUTO: 262 THOUSANDS/UL (ref 149–390)
PMV BLD AUTO: 9.6 FL (ref 8.9–12.7)
PO2 BLD: 73 MM HG (ref 75–129)
PO2 BLD: 75 MM HG (ref 75–129)
POTASSIUM BLD-SCNC: 3.9 MMOL/L (ref 3.5–5.3)
POTASSIUM BLD-SCNC: 4.7 MMOL/L (ref 3.5–5.3)
POTASSIUM SERPL-SCNC: 4.1 MMOL/L (ref 3.5–5.3)
RBC # BLD AUTO: 4.82 MILLION/UL (ref 3.88–5.62)
SAO2 % BLD FROM PO2: 93 % (ref 60–85)
SAO2 % BLD FROM PO2: 94 % (ref 60–85)
SODIUM BLD-SCNC: 137 MMOL/L (ref 136–145)
SODIUM BLD-SCNC: 139 MMOL/L (ref 136–145)
SODIUM SERPL-SCNC: 141 MMOL/L (ref 136–145)
SPECIMEN SOURCE: ABNORMAL
SPECIMEN SOURCE: ABNORMAL
WBC # BLD AUTO: 16.51 THOUSAND/UL (ref 4.31–10.16)

## 2021-05-11 PROCEDURE — 88334 PATH CONSLTJ SURG CYTO XM EA: CPT | Performed by: PATHOLOGY

## 2021-05-11 PROCEDURE — 85027 COMPLETE CBC AUTOMATED: CPT | Performed by: STUDENT IN AN ORGANIZED HEALTH CARE EDUCATION/TRAINING PROGRAM

## 2021-05-11 PROCEDURE — 82803 BLOOD GASES ANY COMBINATION: CPT

## 2021-05-11 PROCEDURE — G1004 CDSM NDSC: HCPCS

## 2021-05-11 PROCEDURE — 80048 BASIC METABOLIC PNL TOTAL CA: CPT | Performed by: STUDENT IN AN ORGANIZED HEALTH CARE EDUCATION/TRAINING PROGRAM

## 2021-05-11 PROCEDURE — 88331 PATH CONSLTJ SURG 1 BLK 1SPC: CPT | Performed by: PATHOLOGY

## 2021-05-11 PROCEDURE — 70553 MRI BRAIN STEM W/O & W/DYE: CPT

## 2021-05-11 PROCEDURE — 81287 MGMT GENE PRMTR MTHYLTN ALYS: CPT

## 2021-05-11 PROCEDURE — 8E09XBG COMPUTER ASSISTED PROCEDURE OF HEAD AND NECK REGION, WITH COMPUTERIZED TOMOGRAPHY: ICD-10-PCS | Performed by: NEUROLOGICAL SURGERY

## 2021-05-11 PROCEDURE — 82330 ASSAY OF CALCIUM: CPT

## 2021-05-11 PROCEDURE — C1781 MESH (IMPLANTABLE): HCPCS | Performed by: NEUROLOGICAL SURGERY

## 2021-05-11 PROCEDURE — 88333 PATH CONSLTJ SURG CYTO XM 1: CPT | Performed by: PATHOLOGY

## 2021-05-11 PROCEDURE — 83735 ASSAY OF MAGNESIUM: CPT | Performed by: STUDENT IN AN ORGANIZED HEALTH CARE EDUCATION/TRAINING PROGRAM

## 2021-05-11 PROCEDURE — 84100 ASSAY OF PHOSPHORUS: CPT | Performed by: STUDENT IN AN ORGANIZED HEALTH CARE EDUCATION/TRAINING PROGRAM

## 2021-05-11 PROCEDURE — 61510 CRNEC TREPH EXC BRN TUM STTL: CPT | Performed by: NEUROLOGICAL SURGERY

## 2021-05-11 PROCEDURE — 84132 ASSAY OF SERUM POTASSIUM: CPT

## 2021-05-11 PROCEDURE — 84295 ASSAY OF SERUM SODIUM: CPT

## 2021-05-11 PROCEDURE — 85014 HEMATOCRIT: CPT

## 2021-05-11 PROCEDURE — C1713 ANCHOR/SCREW BN/BN,TIS/BN: HCPCS | Performed by: NEUROLOGICAL SURGERY

## 2021-05-11 PROCEDURE — 82947 ASSAY GLUCOSE BLOOD QUANT: CPT

## 2021-05-11 PROCEDURE — 00B70ZZ EXCISION OF CEREBRAL HEMISPHERE, OPEN APPROACH: ICD-10-PCS | Performed by: NEUROLOGICAL SURGERY

## 2021-05-11 PROCEDURE — 88342 IMHCHEM/IMCYTCHM 1ST ANTB: CPT

## 2021-05-11 PROCEDURE — 61781 SCAN PROC CRANIAL INTRA: CPT | Performed by: NEUROLOGICAL SURGERY

## 2021-05-11 PROCEDURE — 61781 SCAN PROC CRANIAL INTRA: CPT | Performed by: PHYSICIAN ASSISTANT

## 2021-05-11 PROCEDURE — 88307 TISSUE EXAM BY PATHOLOGIST: CPT | Performed by: PATHOLOGY

## 2021-05-11 PROCEDURE — A9585 GADOBUTROL INJECTION: HCPCS | Performed by: PHYSICIAN ASSISTANT

## 2021-05-11 PROCEDURE — 69990 MICROSURGERY ADD-ON: CPT | Performed by: NEUROLOGICAL SURGERY

## 2021-05-11 PROCEDURE — 92610 EVALUATE SWALLOWING FUNCTION: CPT

## 2021-05-11 PROCEDURE — 61510 CRNEC TREPH EXC BRN TUM STTL: CPT | Performed by: PHYSICIAN ASSISTANT

## 2021-05-11 PROCEDURE — 99291 CRITICAL CARE FIRST HOUR: CPT | Performed by: EMERGENCY MEDICINE

## 2021-05-11 DEVICE — DURA 62105 SUBSTITUTE DUREPAIR 3X3IN NCE
Type: IMPLANTABLE DEVICE | Site: BRAIN | Status: FUNCTIONAL
Brand: DUREPAIR®

## 2021-05-11 DEVICE — IMPLANTABLE DEVICE
Type: IMPLANTABLE DEVICE | Site: CRANIAL | Status: FUNCTIONAL
Brand: THINFLAP

## 2021-05-11 DEVICE — IMPLANTABLE DEVICE
Type: IMPLANTABLE DEVICE | Site: CRANIAL | Status: FUNCTIONAL
Brand: THINFLAP SYSTEM

## 2021-05-11 RX ORDER — HYDROMORPHONE HCL/PF 1 MG/ML
0.4 SYRINGE (ML) INJECTION
Status: DISCONTINUED | OUTPATIENT
Start: 2021-05-11 | End: 2021-05-11 | Stop reason: HOSPADM

## 2021-05-11 RX ORDER — SODIUM CHLORIDE 9 MG/ML
INJECTION, SOLUTION INTRAVENOUS CONTINUOUS PRN
Status: DISCONTINUED | OUTPATIENT
Start: 2021-05-11 | End: 2021-05-11

## 2021-05-11 RX ORDER — SUCCINYLCHOLINE/SOD CL,ISO/PF 100 MG/5ML
SYRINGE (ML) INTRAVENOUS AS NEEDED
Status: DISCONTINUED | OUTPATIENT
Start: 2021-05-11 | End: 2021-05-11

## 2021-05-11 RX ORDER — ACETAMINOPHEN 325 MG/1
975 TABLET ORAL EVERY 8 HOURS SCHEDULED
Status: DISCONTINUED | OUTPATIENT
Start: 2021-05-11 | End: 2021-05-18 | Stop reason: HOSPADM

## 2021-05-11 RX ORDER — SODIUM CHLORIDE 9 MG/ML
125 INJECTION, SOLUTION INTRAVENOUS CONTINUOUS
Status: DISCONTINUED | OUTPATIENT
Start: 2021-05-11 | End: 2021-05-11

## 2021-05-11 RX ORDER — MIDAZOLAM HYDROCHLORIDE 2 MG/2ML
INJECTION, SOLUTION INTRAMUSCULAR; INTRAVENOUS AS NEEDED
Status: DISCONTINUED | OUTPATIENT
Start: 2021-05-11 | End: 2021-05-11

## 2021-05-11 RX ORDER — SODIUM CHLORIDE, SODIUM LACTATE, POTASSIUM CHLORIDE, CALCIUM CHLORIDE 600; 310; 30; 20 MG/100ML; MG/100ML; MG/100ML; MG/100ML
INJECTION, SOLUTION INTRAVENOUS CONTINUOUS PRN
Status: DISCONTINUED | OUTPATIENT
Start: 2021-05-11 | End: 2021-05-11

## 2021-05-11 RX ORDER — CALCIUM CHLORIDE 100 MG/ML
INJECTION INTRAVENOUS; INTRAVENTRICULAR AS NEEDED
Status: DISCONTINUED | OUTPATIENT
Start: 2021-05-11 | End: 2021-05-11

## 2021-05-11 RX ORDER — ONDANSETRON 2 MG/ML
4 INJECTION INTRAMUSCULAR; INTRAVENOUS ONCE AS NEEDED
Status: DISCONTINUED | OUTPATIENT
Start: 2021-05-11 | End: 2021-05-11 | Stop reason: HOSPADM

## 2021-05-11 RX ORDER — FENTANYL CITRATE/PF 50 MCG/ML
50 SYRINGE (ML) INJECTION
Status: COMPLETED | OUTPATIENT
Start: 2021-05-11 | End: 2021-05-11

## 2021-05-11 RX ORDER — FUROSEMIDE 10 MG/ML
INJECTION INTRAMUSCULAR; INTRAVENOUS AS NEEDED
Status: DISCONTINUED | OUTPATIENT
Start: 2021-05-11 | End: 2021-05-11

## 2021-05-11 RX ORDER — LEVETIRACETAM 500 MG/5ML
INJECTION, SOLUTION, CONCENTRATE INTRAVENOUS AS NEEDED
Status: DISCONTINUED | OUTPATIENT
Start: 2021-05-11 | End: 2021-05-11

## 2021-05-11 RX ORDER — ONDANSETRON 2 MG/ML
INJECTION INTRAMUSCULAR; INTRAVENOUS AS NEEDED
Status: DISCONTINUED | OUTPATIENT
Start: 2021-05-11 | End: 2021-05-11

## 2021-05-11 RX ORDER — LIDOCAINE HYDROCHLORIDE 10 MG/ML
INJECTION, SOLUTION EPIDURAL; INFILTRATION; INTRACAUDAL; PERINEURAL AS NEEDED
Status: DISCONTINUED | OUTPATIENT
Start: 2021-05-11 | End: 2021-05-11

## 2021-05-11 RX ORDER — ALBUMIN, HUMAN INJ 5% 5 %
SOLUTION INTRAVENOUS CONTINUOUS PRN
Status: DISCONTINUED | OUTPATIENT
Start: 2021-05-11 | End: 2021-05-11

## 2021-05-11 RX ORDER — CHLORHEXIDINE GLUCONATE 0.12 MG/ML
15 RINSE ORAL EVERY 12 HOURS SCHEDULED
Status: DISCONTINUED | OUTPATIENT
Start: 2021-05-11 | End: 2021-05-12

## 2021-05-11 RX ORDER — MANNITOL 250 MG/ML
INJECTION, SOLUTION INTRAVENOUS AS NEEDED
Status: DISCONTINUED | OUTPATIENT
Start: 2021-05-11 | End: 2021-05-11

## 2021-05-11 RX ORDER — ALBUTEROL SULFATE 2.5 MG/3ML
2.5 SOLUTION RESPIRATORY (INHALATION) ONCE AS NEEDED
Status: DISCONTINUED | OUTPATIENT
Start: 2021-05-11 | End: 2021-05-11 | Stop reason: HOSPADM

## 2021-05-11 RX ORDER — PROMETHAZINE HYDROCHLORIDE 25 MG/ML
12.5 INJECTION, SOLUTION INTRAMUSCULAR; INTRAVENOUS ONCE AS NEEDED
Status: DISCONTINUED | OUTPATIENT
Start: 2021-05-11 | End: 2021-05-11 | Stop reason: HOSPADM

## 2021-05-11 RX ORDER — PROPOFOL 10 MG/ML
INJECTION, EMULSION INTRAVENOUS AS NEEDED
Status: DISCONTINUED | OUTPATIENT
Start: 2021-05-11 | End: 2021-05-11

## 2021-05-11 RX ORDER — FENTANYL CITRATE 50 UG/ML
INJECTION, SOLUTION INTRAMUSCULAR; INTRAVENOUS AS NEEDED
Status: DISCONTINUED | OUTPATIENT
Start: 2021-05-11 | End: 2021-05-11

## 2021-05-11 RX ORDER — HYDROMORPHONE HCL IN WATER/PF 6 MG/30 ML
0.2 PATIENT CONTROLLED ANALGESIA SYRINGE INTRAVENOUS
Status: DISCONTINUED | OUTPATIENT
Start: 2021-05-11 | End: 2021-05-17

## 2021-05-11 RX ORDER — METHOCARBAMOL 500 MG/1
500 TABLET, FILM COATED ORAL EVERY 6 HOURS SCHEDULED
Status: DISCONTINUED | OUTPATIENT
Start: 2021-05-11 | End: 2021-05-18 | Stop reason: HOSPADM

## 2021-05-11 RX ORDER — MAGNESIUM HYDROXIDE 1200 MG/15ML
LIQUID ORAL AS NEEDED
Status: DISCONTINUED | OUTPATIENT
Start: 2021-05-11 | End: 2021-05-11 | Stop reason: HOSPADM

## 2021-05-11 RX ORDER — LIDOCAINE HYDROCHLORIDE AND EPINEPHRINE 10; 10 MG/ML; UG/ML
INJECTION, SOLUTION INFILTRATION; PERINEURAL AS NEEDED
Status: DISCONTINUED | OUTPATIENT
Start: 2021-05-11 | End: 2021-05-11 | Stop reason: HOSPADM

## 2021-05-11 RX ORDER — OXYCODONE HYDROCHLORIDE 5 MG/1
5 TABLET ORAL EVERY 4 HOURS PRN
Status: DISCONTINUED | OUTPATIENT
Start: 2021-05-11 | End: 2021-05-18 | Stop reason: HOSPADM

## 2021-05-11 RX ORDER — PROPOFOL 10 MG/ML
INJECTION, EMULSION INTRAVENOUS CONTINUOUS PRN
Status: DISCONTINUED | OUTPATIENT
Start: 2021-05-11 | End: 2021-05-11

## 2021-05-11 RX ORDER — METOCLOPRAMIDE HYDROCHLORIDE 5 MG/ML
10 INJECTION INTRAMUSCULAR; INTRAVENOUS ONCE AS NEEDED
Status: DISCONTINUED | OUTPATIENT
Start: 2021-05-11 | End: 2021-05-11 | Stop reason: HOSPADM

## 2021-05-11 RX ORDER — EPHEDRINE SULFATE 50 MG/ML
INJECTION INTRAVENOUS AS NEEDED
Status: DISCONTINUED | OUTPATIENT
Start: 2021-05-11 | End: 2021-05-11

## 2021-05-11 RX ORDER — LIDOCAINE HYDROCHLORIDE 10 MG/ML
0.5 INJECTION, SOLUTION EPIDURAL; INFILTRATION; INTRACAUDAL; PERINEURAL ONCE AS NEEDED
Status: DISCONTINUED | OUTPATIENT
Start: 2021-05-11 | End: 2021-05-11

## 2021-05-11 RX ORDER — OXYCODONE HYDROCHLORIDE 5 MG/1
2.5 TABLET ORAL EVERY 4 HOURS PRN
Status: DISCONTINUED | OUTPATIENT
Start: 2021-05-11 | End: 2021-05-18 | Stop reason: HOSPADM

## 2021-05-11 RX ORDER — CEFAZOLIN SODIUM 1 G/50ML
1000 SOLUTION INTRAVENOUS EVERY 8 HOURS
Status: COMPLETED | OUTPATIENT
Start: 2021-05-11 | End: 2021-05-12

## 2021-05-11 RX ORDER — SODIUM CHLORIDE 9 MG/ML
75 INJECTION, SOLUTION INTRAVENOUS CONTINUOUS
Status: DISCONTINUED | OUTPATIENT
Start: 2021-05-11 | End: 2021-05-12

## 2021-05-11 RX ADMIN — MELATONIN TAB 3 MG 6 MG: 3 TAB at 22:15

## 2021-05-11 RX ADMIN — SODIUM CHLORIDE 75 ML/HR: 0.9 INJECTION, SOLUTION INTRAVENOUS at 14:40

## 2021-05-11 RX ADMIN — LIDOCAINE HYDROCHLORIDE 50 MG: 10 INJECTION, SOLUTION EPIDURAL; INFILTRATION; INTRACAUDAL; PERINEURAL at 08:10

## 2021-05-11 RX ADMIN — SODIUM CHLORIDE: 0.9 INJECTION, SOLUTION INTRAVENOUS at 07:45

## 2021-05-11 RX ADMIN — PHENYLEPHRINE HYDROCHLORIDE 100 MCG: 10 INJECTION INTRAVENOUS at 08:10

## 2021-05-11 RX ADMIN — DEXAMETHASONE SODIUM PHOSPHATE 4 MG: 4 INJECTION INTRA-ARTICULAR; INTRALESIONAL; INTRAMUSCULAR; INTRAVENOUS; SOFT TISSUE at 05:31

## 2021-05-11 RX ADMIN — SODIUM CHLORIDE: 0.9 INJECTION, SOLUTION INTRAVENOUS at 07:46

## 2021-05-11 RX ADMIN — SODIUM CHLORIDE: 0.9 INJECTION, SOLUTION INTRAVENOUS at 07:37

## 2021-05-11 RX ADMIN — Medication 50 MCG: at 13:31

## 2021-05-11 RX ADMIN — HYDRALAZINE HYDROCHLORIDE 5 MG: 20 INJECTION INTRAMUSCULAR; INTRAVENOUS at 13:05

## 2021-05-11 RX ADMIN — NICARDIPINE HYDROCHLORIDE 9 MG/HR: 2.5 INJECTION, SOLUTION INTRAVENOUS at 18:24

## 2021-05-11 RX ADMIN — METHOCARBAMOL 500 MG: 500 TABLET, FILM COATED ORAL at 23:33

## 2021-05-11 RX ADMIN — PROPOFOL 120 MCG/KG/MIN: 10 INJECTION, EMULSION INTRAVENOUS at 07:43

## 2021-05-11 RX ADMIN — CHLORHEXIDINE GLUCONATE 15 ML: 1.2 SOLUTION ORAL at 05:31

## 2021-05-11 RX ADMIN — INSULIN HUMAN 2.5 UNITS: 100 INJECTION, SOLUTION PARENTERAL at 10:44

## 2021-05-11 RX ADMIN — CEFAZOLIN SODIUM 1000 MG: 1 SOLUTION INTRAVENOUS at 18:57

## 2021-05-11 RX ADMIN — NICARDIPINE HYDROCHLORIDE 12 MG/HR: 2.5 INJECTION, SOLUTION INTRAVENOUS at 22:14

## 2021-05-11 RX ADMIN — ONDANSETRON 4 MG: 2 INJECTION INTRAMUSCULAR; INTRAVENOUS at 11:20

## 2021-05-11 RX ADMIN — DEXAMETHASONE SODIUM PHOSPHATE 4 MG: 4 INJECTION INTRA-ARTICULAR; INTRALESIONAL; INTRAMUSCULAR; INTRAVENOUS; SOFT TISSUE at 23:33

## 2021-05-11 RX ADMIN — DOCUSATE SODIUM 100 MG: 100 CAPSULE, LIQUID FILLED ORAL at 18:06

## 2021-05-11 RX ADMIN — ACETAMINOPHEN 975 MG: 325 TABLET, FILM COATED ORAL at 22:15

## 2021-05-11 RX ADMIN — PHENYLEPHRINE HYDROCHLORIDE 100 MCG: 10 INJECTION INTRAVENOUS at 07:48

## 2021-05-11 RX ADMIN — Medication 3000 MG: at 08:20

## 2021-05-11 RX ADMIN — REMIFENTANIL HYDROCHLORIDE 0.15 MCG/KG/MIN: 1 INJECTION, POWDER, LYOPHILIZED, FOR SOLUTION INTRAVENOUS at 07:43

## 2021-05-11 RX ADMIN — GADOBUTROL 13 ML: 604.72 INJECTION INTRAVENOUS at 21:19

## 2021-05-11 RX ADMIN — NICARDIPINE HYDROCHLORIDE 12 MG/HR: 2.5 INJECTION, SOLUTION INTRAVENOUS at 23:59

## 2021-05-11 RX ADMIN — METHOCARBAMOL 500 MG: 500 TABLET, FILM COATED ORAL at 15:04

## 2021-05-11 RX ADMIN — CALCIUM CHLORIDE 0.5 G: 100 INJECTION INTRAVENOUS; INTRAVENTRICULAR at 10:42

## 2021-05-11 RX ADMIN — Medication 50 MCG: at 14:04

## 2021-05-11 RX ADMIN — DEXAMETHASONE SODIUM PHOSPHATE 4 MG: 4 INJECTION INTRA-ARTICULAR; INTRALESIONAL; INTRAMUSCULAR; INTRAVENOUS; SOFT TISSUE at 18:06

## 2021-05-11 RX ADMIN — SODIUM CHLORIDE 125 ML/HR: 0.9 INJECTION, SOLUTION INTRAVENOUS at 14:16

## 2021-05-11 RX ADMIN — PROPOFOL 200 MG: 10 INJECTION, EMULSION INTRAVENOUS at 07:40

## 2021-05-11 RX ADMIN — EPHEDRINE SULFATE 5 MG: 50 INJECTION, SOLUTION INTRAVENOUS at 07:56

## 2021-05-11 RX ADMIN — ACETAMINOPHEN 975 MG: 325 TABLET, FILM COATED ORAL at 15:04

## 2021-05-11 RX ADMIN — FAMOTIDINE 20 MG: 10 INJECTION INTRAVENOUS at 20:03

## 2021-05-11 RX ADMIN — ALBUMIN (HUMAN): 12.5 INJECTION, SOLUTION INTRAVENOUS at 11:00

## 2021-05-11 RX ADMIN — PHENYLEPHRINE HYDROCHLORIDE 40 MCG/MIN: 10 INJECTION INTRAVENOUS at 07:48

## 2021-05-11 RX ADMIN — LEVETIRACETAM 500 MG: 100 INJECTION, SOLUTION INTRAVENOUS at 20:00

## 2021-05-11 RX ADMIN — SODIUM CHLORIDE: 0.9 INJECTION, SOLUTION INTRAVENOUS at 07:35

## 2021-05-11 RX ADMIN — SODIUM CHLORIDE 125 ML/HR: 0.9 INJECTION, SOLUTION INTRAVENOUS at 00:03

## 2021-05-11 RX ADMIN — SENNOSIDES 8.6 MG: 8.6 TABLET ORAL at 22:15

## 2021-05-11 RX ADMIN — NICARDIPINE HYDROCHLORIDE 5 MG/HR: 2.5 INJECTION, SOLUTION INTRAVENOUS at 13:14

## 2021-05-11 RX ADMIN — MIDAZOLAM 1 MG: 1 INJECTION INTRAMUSCULAR; INTRAVENOUS at 07:37

## 2021-05-11 RX ADMIN — Medication 50 MCG: at 14:15

## 2021-05-11 RX ADMIN — FENTANYL CITRATE 100 MCG: 50 INJECTION INTRAMUSCULAR; INTRAVENOUS at 07:40

## 2021-05-11 RX ADMIN — DEXAMETHASONE SODIUM PHOSPHATE 6 MG: 4 INJECTION INTRA-ARTICULAR; INTRALESIONAL; INTRAMUSCULAR; INTRAVENOUS; SOFT TISSUE at 07:40

## 2021-05-11 RX ADMIN — ALBUMIN (HUMAN): 12.5 INJECTION, SOLUTION INTRAVENOUS at 08:18

## 2021-05-11 RX ADMIN — CALCIUM CHLORIDE 0.5 G: 100 INJECTION INTRAVENOUS; INTRAVENTRICULAR at 10:36

## 2021-05-11 RX ADMIN — MANNITOL 65 G: 12.5 INJECTION, SOLUTION INTRAVENOUS at 08:29

## 2021-05-11 RX ADMIN — EPHEDRINE SULFATE 10 MG: 50 INJECTION, SOLUTION INTRAVENOUS at 08:10

## 2021-05-11 RX ADMIN — METHOCARBAMOL 500 MG: 500 TABLET, FILM COATED ORAL at 18:06

## 2021-05-11 RX ADMIN — EPHEDRINE SULFATE 5 MG: 50 INJECTION, SOLUTION INTRAVENOUS at 07:51

## 2021-05-11 RX ADMIN — EPHEDRINE SULFATE 10 MG: 50 INJECTION, SOLUTION INTRAVENOUS at 08:15

## 2021-05-11 RX ADMIN — NICARDIPINE HYDROCHLORIDE 5 MG/HR: 2.5 INJECTION, SOLUTION INTRAVENOUS at 14:15

## 2021-05-11 RX ADMIN — LIDOCAINE HYDROCHLORIDE 100 MG: 10 INJECTION, SOLUTION EPIDURAL; INFILTRATION; INTRACAUDAL; PERINEURAL at 07:40

## 2021-05-11 RX ADMIN — FUROSEMIDE 10 MG: 10 INJECTION, SOLUTION INTRAMUSCULAR; INTRAVENOUS at 08:28

## 2021-05-11 RX ADMIN — Medication 180 MG: at 07:40

## 2021-05-11 RX ADMIN — LEVETIRACETAM 500 MG: 100 INJECTION, SOLUTION INTRAVENOUS at 08:06

## 2021-05-11 RX ADMIN — AMINOLEVULINIC ACID HYDROCHLORIDE 2601 MG: 1500 POWDER, FOR SOLUTION ORAL at 05:54

## 2021-05-11 RX ADMIN — Medication 50 MCG: at 13:15

## 2021-05-11 RX ADMIN — Medication 3000 MG: at 11:20
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
## 2021-05-11 NOTE — OP NOTE
OPERATIVE REPORT  PATIENT NAME: Bayele Moreno    :  1950  MRN: [id number]  Pt Location: [facility] OR ROOM 09    SURGERY DATE: 2021    Surgeon(s) and Role:     * Maryanne Black MD - Primary     * Saud Younger PA-C - Assisting    Preop Diagnosis:  Brain mass [G93 89]  Expressive aphasia [R47 01]  Morbid obesity ([address] ) [E66 01]    Post-Op Diagnosis Codes:     * Brain mass [G93 89]     * Expressive aphasia [R47 01]     * Morbid obesity ([address] ) [E66 01]    Procedure(s) (LRB):  Left temporal craniotomy with image guidance and 5-ALA for resection of mass (Left)    Specimen(s):  ID Type Source Tests Collected by Time Destination   1 : Left temporal mass Tissue Brain TISSUE EXAM Maryanne Black MD 2021 0959    2 : Left temporal mass Tissue Brain TISSUE EXAM Maryanne Black MD 2021 1116        Estimated Blood Loss:   50 mL    Drains:  Closed/Suction Drain Left Head Bulb 10 Fr  (Active)   Site Description Unable to view 21 1600   Dressing Status Clean;Dry; Intact 21 1600   Drainage Appearance Bloody 21 1600   Status To bulb suction 21 1600   Number of days: 0       Urethral Catheter Latex 16 Fr  (Active)   Site Assessment Clean;Skin intact 21 1600   Collection Container Standard drainage bag 21 1600   Securement Method Securing device (Describe) 21 1600   Output (mL) 500 mL 21 1600   Number of days: 0       Anesthesia Type:   General    Operative Indications:  Brain mass [G93 89]  Expressive aphasia [R47 01]  Morbid obesity ([address] ) [E66 01]  Is a pleasant 80-year-old gentleman who presented with progressive speech difficulties and was found to have a large enhancing left temporal lesion concerning for glioma  After discussion of risks and benefits of resection including bleeding, infection, stroke, paralysis, seizure and death elected to proceed with surgery      Operative Findings:  Frozen pathology consistent with high-grade glioma    Complications: None    Procedure and Technique:  After obtaining written informed consent patient was brought to the operating room  General endotracheal anesthesia was induced  Arterial line Rai catheter were then placed  Lasix Decadron mannitol Keppra and Ancef were then administered  He was placed in a Sidhu head ramesh with his head turned to the right  Neuro navigation was then registered and confirmed to be accurate  Image guidance from the [employer] was used throughout the duration of the case  Images were loaded into the system and used for preoperative planning as well as intraoperative guidance  It was critically important through the duration of the case for navigation and avoidance of critical structures  Baseline motor-evoked potentials and somatosensory-evoked potentials were obtained  His hair was then clipped in the usual fashion and question neil temporal incision was then planned  His head was prepped and draped in the usual sterile fashion  Surgical time-out was performed  Ten blade was then used to open the skin taking care to protect underlying temporalis fascia  Next monopolar cautery was used to open the temporalis fascia in the myocutaneous flap was reflected anteriorly  The keyhole was identified and a bur hole was placed here  Temporal fossa was then drilled and a separate temporal mira hole was made  These were then connected using a B1 craniotome  The bone flap was then elevated and saved on the back table  Circumferential tack-up stitches were then placed and secured in place  The dura was then opened using 15 blade and reflected anteriorly  Upon inspecting the cortical surface there was a clear area of gyral edema and expansion  This correlated with neuro navigation and preoperative planning with vascular landmarks  Bipolar cautery was then used to make a small corticectomy along the inferior and posterior portion of the tumor    This portion the tumor was then dissected  Once I established my inferior border the cyst was deflated and drained  Portion of the mass was sent as frozen specimen and returned consistent with high-grade glioma  I then began to work circumferentially further dissecting my inferior border anterior borders and posterior borders  I paid attention to the superior and medial borders last due to the proximity of the middle cerebral artery  The sylvian fissure was carefully dissected in the superior and medial borders of the tumor were carefully resected  The large portion of the enhancing cystic mass was removed EN bloc  At this juncture the operative microscope was brought in and using operative illumination and magnification with blue light assistance and 5 a lay I was able to further resect portions of the tumor medially and anteriorly  There was significant amounts of fluorescence posteriorly  Portions of this were resected, however, due to the concern of his speech I did not go more posterior than the previous border of the mass  One satisfied with my degree of resection hemostasis was achieved using bipolar cautery and Surgicel  The dura was then reapproximated using 4-0 Nurolon  Dura repair was placed over this  Several central tack-up stitches were placed  Tisseel was placed over this  The bone flap was then replaced using titanium plating system with all tack-ups secured  The wound was then copiously irrigated antibiotic irrigation  A 10 [place] PVC drain was placed under the temporalis fascia  The temporalis fascia was then closed 2 0 Vicryl  The galea was closed with inverted 2-0 Vicryls  Skin was closed staples  The drain was secured with a 2-0 Prolene  There are 2 uninterrupted and correct surgical counts  Surgical sign-out was performed  Motor-evoked potentials and somatosensory-evoked potentials remained stable throughout the duration of the case        The patient was then extubated and transferred to the postanesthesia care unit in stable neurologic condition  There was no qualified resident aid in this case  As such, due to its complex nature JER Ta, was present and assisted for the duration of the case including exposure, resection, and closure          I was present for the entire procedure    Patient Disposition:  PACU     SIGNATURE: Hoda Harp MD  DATE: May 11, 2021  TIME: 5:26 PM

## 2021-05-11 NOTE — ANESTHESIA PROCEDURE NOTES
Arterial Line Insertion  Performed by: Katiana Sullivan CRNA  Authorized by: Lizzette Ruiz MD   Consent: Verbal consent obtained  Written consent obtained  Risks and benefits: risks, benefits and alternatives were discussed  Consent given by: patient  Patient understanding: patient states understanding of the procedure being performed  Patient consent: the patient's understanding of the procedure matches consent given  Procedure consent: procedure consent matches procedure scheduled  Patient identity confirmed: anonymous protocol, patient vented/unresponsive and arm band  Time out: Immediately prior to procedure a "time out" was called to verify the correct patient, procedure, equipment, support staff and site/side marked as required  Preparation: Patient was prepped and draped in the usual sterile fashion    Indications: multiple ABGs and hemodynamic monitoring  Orientation:  Left  Location: radial artery  Procedure Details:  Needle gauge: 20  Seldinger technique: Seldinger technique used  Number of attempts: 1    Post-procedure:  Post-procedure: dressing applied  Waveform: good waveform  Post-procedure CNS: normal and unchanged  Patient tolerance: patient tolerated the procedure well with no immediate complications

## 2021-05-11 NOTE — ANESTHESIA PREPROCEDURE EVALUATION
Procedure:  Left temporal craniotomy with image guidence and 5ALA (Left Head)    Relevant Problems   CARDIO   (+) Essential hypertension      /RENAL   (+) Renal cyst, left      PULMONARY   (+) COPD (chronic obstructive pulmonary disease) (HCC)   (+) Obstructive sleep apnea        Physical Exam    Airway    Mallampati score: III  TM Distance: >3 FB  Neck ROM: full     Dental   No notable dental hx     Cardiovascular  Cardiovascular exam normal    Pulmonary  Pulmonary exam normal Breath sounds clear to auscultation,     Other Findings        MANDIBLE SURGERY TONSILLECTOMY   APPENDECTOMY SKULL FRACTURE ELEVATION   [place] COLONOSCOPY FLX DX W/COLLJ SPEC WHEN PFRMD        Sinus rhythm with Premature atrial complexes  Otherwise normal ECG  When compared with ECG of 21-NOV-2006 20:32,  Premature atrial complexes are now Present  Confirmed by Turner Hunter ([id number]) on 5/6/2021 9:39:44 PM  Anesthesia Plan  ASA Score- 3     Anesthesia Type- general with ASA Monitors  Additional Monitors: arterial line  Airway Plan: ETT  Plan Factors-Exercise tolerance (METS): >4 METS  Chart reviewed  EKG reviewed  Imaging results reviewed  Patient is not a current smoker  Patient instructed to abstain from smoking on day of procedure  Patient did not smoke on day of surgery  Obstructive sleep apnea risk education given perioperatively  Induction- intravenous  Postoperative Plan- Plan for postoperative opioid use  Planned trial extubation    Informed Consent- Anesthetic plan and risks discussed with patient  I personally reviewed this patient with the CRNA  Discussed and agreed on the Anesthesia Plan with the CRNA             Lab Results   Component Value Date    WBC 10 98 (H) 05/10/2021    HGB 15 4 05/10/2021    HCT 47 2 05/10/2021    MCV 91 05/10/2021     05/10/2021     Lab Results   Component Value Date    GLUCOSE 101 01/05/2016    CALCIUM 8 9 05/09/2021     01/05/2016    K 4 0 05/09/2021 CO2 24 05/09/2021     (H) 05/09/2021    BUN 24 05/09/2021    CREATININE 1 02 05/09/2021     Lab Results   Component Value Date    INR 0 97 05/06/2021    PROTIME 12 9 05/06/2021     Lab Results   Component Value Date    PTT 32 05/06/2021     Type and Screen:  B        Discussed with pt the benefits/alternatives and risks or General Anesthesia including breathing tube remaining in place if not strong enough, PONV, damage to lips and teeth, sore throat, eye injury or blindness    I, Dr Bibi Harden, the attending physician, have personally seen and evaluated the patient prior to anesthetic care  I have reviewed the pre-anesthetic record, and other medical records if appropriate to the anesthetic care  If a CRNA is involved in the case, I have reviewed the CRNA assessment, if present, and agree  The patient is in a suitable condition to proceed with my formulated anesthetic plan

## 2021-05-11 NOTE — ANESTHESIA POSTPROCEDURE EVALUATION
Post-Op Assessment Note    CV Status:  Stable  Pain Score: 0    Pain management: adequate     Mental Status:  Confused and awake   Hydration Status:  Stable   PONV Controlled:  None   Airway Patency:  Patent  Airway: intubated   Two or more mitigation strategies used for obstructive sleep apnea   Post Op Vitals Reviewed: Yes      Staff: Anesthesiologist, with CRNAs         No complications documented      BP   158/80   Temp  97 F   Pulse  100   Resp   18   SpO2   97% 6 LNC

## 2021-05-11 NOTE — PLAN OF CARE
Problem: Potential for Falls  Goal: Patient will remain free of falls  Description: INTERVENTIONS:  - Assess patient frequently for physical needs  -  Identify cognitive and physical deficits and behaviors that affect risk of falls    -  [place] fall precautions as indicated by assessment   - Educate patient/family on patient safety including physical limitations  - Instruct patient to call for assistance with activity based on assessment  - Modify environment to reduce risk of injury  - Consider OT/PT consult to assist with strengthening/mobility  Outcome: Progressing     Problem: PAIN - ADULT  Goal: Verbalizes/displays adequate comfort level or baseline comfort level  Description: Interventions:  - Encourage patient to monitor pain and request assistance  - Assess pain using appropriate pain scale  - Administer analgesics based on type and severity of pain and evaluate response  - Implement non-pharmacological measures as appropriate and evaluate response  - Consider cultural and social influences on pain and pain management  - Notify physician/advanced practitioner if interventions unsuccessful or patient reports new pain  Outcome: Progressing     Problem: INFECTION - ADULT  Goal: Absence or prevention of progression during hospitalization  Description: INTERVENTIONS:  - Assess and monitor for signs and symptoms of infection  - Monitor lab/diagnostic results  - Monitor all insertion sites, i e  indwelling lines, tubes, and drains  - Monitor endotracheal if appropriate and nasal secretions for changes in amount and color  - [place] appropriate cooling/warming therapies per order  - Administer medications as ordered  - Instruct and encourage patient and family to use good hand hygiene technique  - Identify and instruct in appropriate isolation precautions for identified infection/condition  Outcome: Progressing  Goal: Absence of fever/infection during neutropenic period  Description: INTERVENTIONS:  - Monitor WBC    Outcome: Progressing     Problem: SAFETY ADULT  Goal: Patient will remain free of falls  Description: INTERVENTIONS:  - Assess patient frequently for physical needs  -  Identify cognitive and physical deficits and behaviors that affect risk of falls    -  [place] fall precautions as indicated by assessment   - Educate patient/family on patient safety including physical limitations  - Instruct patient to call for assistance with activity based on assessment  - Modify environment to reduce risk of injury  - Consider OT/PT consult to assist with strengthening/mobility  Outcome: Progressing  Goal: Maintain or return to baseline ADL function  Description: INTERVENTIONS:  -  Assess patient's ability to carry out ADLs; assess patient's baseline for ADL function and identify physical deficits which impact ability to perform ADLs (bathing, care of mouth/teeth, toileting, grooming, dressing, etc )  - Assess/evaluate cause of self-care deficits   - Assess range of motion  - Assess patient's mobility; develop plan if impaired  - Assess patient's need for assistive devices and provide as appropriate  - Encourage maximum independence but intervene and supervise when necessary  - Involve family in performance of ADLs  - Assess for home care needs following discharge   - Consider OT consult to assist with ADL evaluation and planning for discharge  - Provide patient education as appropriate  Outcome: Progressing  Goal: Maintain or return mobility status to optimal level  Description: INTERVENTIONS:  - Assess patient's baseline mobility status (ambulation, transfers, stairs, etc )    - Identify cognitive and physical deficits and behaviors that affect mobility  - Identify mobility aids required to assist with transfers and/or ambulation (gait belt, sit-to-stand, lift, walker, cane, etc )  - [place] fall precautions as indicated by assessment  - Record patient progress and toleration of activity level on Mobility SBAR; progress patient to next Phase/Stage  - Instruct patient to call for assistance with activity based on assessment  - Consider rehabilitation consult to assist with strengthening/weightbearing, etc   Outcome: Progressing     Problem: DISCHARGE PLANNING  Goal: Discharge to home or other facility with appropriate resources  Description: INTERVENTIONS:  - Identify barriers to discharge w/patient and caregiver  - Arrange for needed discharge resources and transportation as appropriate  - Identify discharge learning needs (meds, wound care, etc )  - Arrange for interpretive services to assist at discharge as needed  - Refer to Case Management Department for coordinating discharge planning if the patient needs post-hospital services based on physician/advanced practitioner order or complex needs related to functional status, cognitive ability, or social support system  Outcome: Progressing     Problem: Knowledge Deficit  Goal: Patient/family/caregiver demonstrates understanding of disease process, treatment plan, medications, and discharge instructions  Description: Complete learning assessment and assess knowledge base    Interventions:  - Provide teaching at level of understanding  - Provide teaching via preferred learning methods  Outcome: Progressing     Problem: NEUROSENSORY - ADULT  Goal: Achieves stable or improved neurological status  Description: INTERVENTIONS  - Monitor and report changes in neurological status  - Monitor vital signs such as temperature, blood pressure, glucose, and any other labs ordered   - Initiate measures to prevent increased intracranial pressure  - Monitor for seizure activity and implement precautions if appropriate      Outcome: Progressing  Goal: Remains free of injury related to seizures activity  Description: INTERVENTIONS  - Maintain airway, patient safety  and administer oxygen as ordered  - Monitor patient for seizure activity, document and report duration and description of seizure to physician/advanced practitioner  - If seizure occurs,  ensure patient safety during seizure  - Reorient patient post seizure  - Seizure pads on all 4 side rails  - Instruct patient/family to notify RN of any seizure activity including if an aura is experienced  - Instruct patient/family to call for assistance with activity based on nursing assessment  - Administer anti-seizure medications if ordered    Outcome: Progressing  Goal: Achieves maximal functionality and self care  Description: INTERVENTIONS  - Monitor swallowing and airway patency with patient fatigue and changes in neurological status  - Encourage and assist patient to increase activity and self care     - Encourage visually impaired, hearing impaired and aphasic patients to use assistive/communication devices  Outcome: Progressing     Problem: CARDIOVASCULAR - ADULT  Goal: Maintains optimal cardiac output and hemodynamic stability  Description: INTERVENTIONS:  - Monitor I/O, vital signs and rhythm  - Monitor for S/S and trends of decreased cardiac output  - Administer and titrate ordered vasoactive medications to optimize hemodynamic stability  - Assess quality of pulses, skin color and temperature  - Assess for signs of decreased coronary artery perfusion  - Instruct patient to report change in severity of symptoms  Outcome: Progressing  Goal: Absence of cardiac dysrhythmias or at baseline rhythm  Description: INTERVENTIONS:  - Continuous cardiac monitoring, vital signs, obtain 12 lead EKG if ordered  - Administer antiarrhythmic and heart rate control medications as ordered  - Monitor electrolytes and administer replacement therapy as ordered  Outcome: Progressing     Problem: RESPIRATORY - ADULT  Goal: Achieves optimal ventilation and oxygenation  Description: INTERVENTIONS:  - Assess for changes in respiratory status  - Assess for changes in mentation and behavior  - Position to facilitate oxygenation and minimize respiratory effort  - Oxygen administered by appropriate delivery if ordered  - Initiate smoking cessation education as indicated  - Encourage broncho-pulmonary hygiene including cough, deep breathe, Incentive Spirometry  - Assess the need for suctioning and aspirate as needed  - Assess and instruct to report SOB or any respiratory difficulty  - Respiratory Therapy support as indicated  Outcome: Progressing     Problem: GASTROINTESTINAL - ADULT  Goal: Minimal or absence of nausea and/or vomiting  Description: INTERVENTIONS:  - Administer IV fluids if ordered to ensure adequate hydration  - Maintain NPO status until nausea and vomiting are resolved  - Nasogastric tube if ordered  - Administer ordered antiemetic medications as needed  - Provide nonpharmacologic comfort measures as appropriate  - Advance diet as tolerated, if ordered  - Consider nutrition services referral to assist patient with adequate nutrition and appropriate food choices  Outcome: Progressing  Goal: Maintains or returns to baseline bowel function  Description: INTERVENTIONS:  - Assess bowel function  - Encourage oral fluids to ensure adequate hydration  - Administer IV fluids if ordered to ensure adequate hydration  - Administer ordered medications as needed  - Encourage mobilization and activity  - Consider nutritional services referral to assist patient with adequate nutrition and appropriate food choices  Outcome: Progressing  Goal: Maintains adequate nutritional intake  Description: INTERVENTIONS:  - Monitor percentage of each meal consumed  - Identify factors contributing to decreased intake, treat as appropriate  - Assist with meals as needed  - Monitor I&O, weight, and lab values if indicated  - Obtain nutrition services referral as needed  Outcome: Progressing     Problem: GENITOURINARY - ADULT  Goal: Maintains or returns to baseline urinary function  Description: INTERVENTIONS:  - Assess urinary function  - Encourage oral fluids to ensure adequate hydration if ordered  - Administer IV fluids as ordered to ensure adequate hydration  - Administer ordered medications as needed  - Offer frequent toileting  - Follow urinary retention protocol if ordered  Outcome: Progressing  Goal: Absence of urinary retention  Description: INTERVENTIONS:  - Assess patients ability to void and empty bladder  - Monitor I/O  - Bladder scan as needed  - Discuss with physician/AP medications to alleviate retention as needed  - Discuss catheterization for long term situations as appropriate  Outcome: Progressing     Problem: SKIN/TISSUE INTEGRITY - ADULT  Goal: Skin integrity remains intact  Description: INTERVENTIONS  - Identify patients at risk for skin breakdown  - Assess and monitor skin integrity  - Assess and monitor nutrition and hydration status  - Monitor labs (i e  albumin)  - Assess for incontinence   - Turn and reposition patient  - Assist with mobility/ambulation  - Relieve pressure over bony prominences  - Avoid friction and shearing  - Provide appropriate hygiene as needed including keeping skin clean and dry  - Evaluate need for skin moisturizer/barrier cream  - Collaborate with interdisciplinary team (i e  Nutrition, Rehabilitation, etc )   - Patient/family teaching  Outcome: Progressing     Problem: Nutrition/Hydration-ADULT  Goal: Nutrient/Hydration intake appropriate for improving, restoring or maintaining nutritional needs  Description: Monitor and assess patient's nutrition/hydration status for malnutrition  Collaborate with interdisciplinary team and initiate plan and interventions as ordered  Monitor patient's weight and dietary intake as ordered or per policy  Utilize nutrition screening tool and intervene as necessary  Determine patient's food preferences and provide high-protein, high-caloric foods as appropriate       INTERVENTIONS:  - Monitor oral intake, urinary output, labs, and treatment plans  - Assess nutrition and hydration status and recommend course of action  - Evaluate amount of meals eaten  - Assist patient with eating if necessary   - Allow adequate time for meals  - Recommend/ encourage appropriate diets, oral nutritional supplements, and vitamin/mineral supplements  - Order, calculate, and assess calorie counts as needed  - Recommend, monitor, and adjust tube feedings and TPN/PPN based on assessed needs  - Assess need for intravenous fluids  - Provide specific nutrition/hydration education as appropriate  - Include patient/family/caregiver in decisions related to nutrition  Outcome: Progressing
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
## 2021-05-12 LAB
ABO GROUP BLD BPU: NORMAL
ABO GROUP BLD BPU: NORMAL
ANION GAP SERPL CALCULATED.3IONS-SCNC: 8 MMOL/L (ref 4–13)
APTT PPP: 28 SECONDS (ref 23–37)
BPU ID: NORMAL
BPU ID: NORMAL
BUN SERPL-MCNC: 18 MG/DL (ref 5–25)
CA-I BLD-SCNC: 1.14 MMOL/L (ref 1.12–1.32)
CALCIUM SERPL-MCNC: 8.6 MG/DL (ref 8.3–10.1)
CHLORIDE SERPL-SCNC: 112 MMOL/L (ref 100–108)
CO2 SERPL-SCNC: 22 MMOL/L (ref 21–32)
CREAT SERPL-MCNC: 1.07 MG/DL (ref 0.6–1.3)
CROSSMATCH: NORMAL
CROSSMATCH: NORMAL
ERYTHROCYTE [DISTWIDTH] IN BLOOD BY AUTOMATED COUNT: 13.5 % (ref 11.6–15.1)
GFR SERPL CREATININE-BSD FRML MDRD: 70 ML/MIN/1.73SQ M
GLUCOSE SERPL-MCNC: 156 MG/DL (ref 65–140)
HCT VFR BLD AUTO: 42.7 % (ref 36.5–49.3)
HGB BLD-MCNC: 14.3 G/DL (ref 12–17)
INR PPP: 1.06 (ref 0.84–1.19)
MAGNESIUM SERPL-MCNC: 2.6 MG/DL (ref 1.6–2.6)
MCH RBC QN AUTO: 29.2 PG (ref 26.8–34.3)
MCHC RBC AUTO-ENTMCNC: 33.5 G/DL (ref 31.4–37.4)
MCV RBC AUTO: 87 FL (ref 82–98)
PHOSPHATE SERPL-MCNC: 2.9 MG/DL (ref 2.3–4.1)
PLATELET # BLD AUTO: 262 THOUSANDS/UL (ref 149–390)
PMV BLD AUTO: 9.8 FL (ref 8.9–12.7)
POTASSIUM SERPL-SCNC: 4 MMOL/L (ref 3.5–5.3)
PROTHROMBIN TIME: 13.8 SECONDS (ref 11.6–14.5)
RBC # BLD AUTO: 4.9 MILLION/UL (ref 3.88–5.62)
SODIUM SERPL-SCNC: 142 MMOL/L (ref 136–145)
UNIT DISPENSE STATUS: NORMAL
UNIT DISPENSE STATUS: NORMAL
UNIT PRODUCT CODE: NORMAL
UNIT PRODUCT CODE: NORMAL
UNIT RH: NORMAL
UNIT RH: NORMAL
WBC # BLD AUTO: 17.05 THOUSAND/UL (ref 4.31–10.16)

## 2021-05-12 PROCEDURE — 92523 SPEECH SOUND LANG COMPREHEN: CPT

## 2021-05-12 PROCEDURE — 83735 ASSAY OF MAGNESIUM: CPT | Performed by: STUDENT IN AN ORGANIZED HEALTH CARE EDUCATION/TRAINING PROGRAM

## 2021-05-12 PROCEDURE — 97163 PT EVAL HIGH COMPLEX 45 MIN: CPT

## 2021-05-12 PROCEDURE — 85027 COMPLETE CBC AUTOMATED: CPT | Performed by: PHYSICIAN ASSISTANT

## 2021-05-12 PROCEDURE — 80048 BASIC METABOLIC PNL TOTAL CA: CPT | Performed by: STUDENT IN AN ORGANIZED HEALTH CARE EDUCATION/TRAINING PROGRAM

## 2021-05-12 PROCEDURE — 99024 POSTOP FOLLOW-UP VISIT: CPT | Performed by: PHYSICIAN ASSISTANT

## 2021-05-12 PROCEDURE — 85730 THROMBOPLASTIN TIME PARTIAL: CPT | Performed by: PHYSICIAN ASSISTANT

## 2021-05-12 PROCEDURE — 97167 OT EVAL HIGH COMPLEX 60 MIN: CPT

## 2021-05-12 PROCEDURE — 99291 CRITICAL CARE FIRST HOUR: CPT | Performed by: EMERGENCY MEDICINE

## 2021-05-12 PROCEDURE — NC001 PR NO CHARGE: Performed by: EMERGENCY MEDICINE

## 2021-05-12 PROCEDURE — 84100 ASSAY OF PHOSPHORUS: CPT | Performed by: STUDENT IN AN ORGANIZED HEALTH CARE EDUCATION/TRAINING PROGRAM

## 2021-05-12 PROCEDURE — 82330 ASSAY OF CALCIUM: CPT | Performed by: STUDENT IN AN ORGANIZED HEALTH CARE EDUCATION/TRAINING PROGRAM

## 2021-05-12 PROCEDURE — 85610 PROTHROMBIN TIME: CPT | Performed by: PHYSICIAN ASSISTANT

## 2021-05-12 RX ORDER — HYDRALAZINE HYDROCHLORIDE 20 MG/ML
10 INJECTION INTRAMUSCULAR; INTRAVENOUS EVERY 6 HOURS PRN
Status: DISCONTINUED | OUTPATIENT
Start: 2021-05-12 | End: 2021-05-12

## 2021-05-12 RX ORDER — DEXAMETHASONE 2 MG/1
2 TABLET ORAL EVERY 12 HOURS SCHEDULED
Status: DISCONTINUED | OUTPATIENT
Start: 2021-05-22 | End: 2021-05-18 | Stop reason: HOSPADM

## 2021-05-12 RX ORDER — HYDRALAZINE HYDROCHLORIDE 20 MG/ML
10 INJECTION INTRAMUSCULAR; INTRAVENOUS EVERY 4 HOURS PRN
Status: DISCONTINUED | OUTPATIENT
Start: 2021-05-12 | End: 2021-05-18 | Stop reason: HOSPADM

## 2021-05-12 RX ORDER — FAMOTIDINE 20 MG/1
20 TABLET, FILM COATED ORAL 2 TIMES DAILY
Status: DISCONTINUED | OUTPATIENT
Start: 2021-05-12 | End: 2021-05-18 | Stop reason: HOSPADM

## 2021-05-12 RX ORDER — LEVETIRACETAM 500 MG/1
500 TABLET ORAL EVERY 12 HOURS SCHEDULED
Status: DISCONTINUED | OUTPATIENT
Start: 2021-05-12 | End: 2021-05-18 | Stop reason: HOSPADM

## 2021-05-12 RX ORDER — DEXAMETHASONE 2 MG/1
2 TABLET ORAL EVERY 6 HOURS SCHEDULED
Status: DISCONTINUED | OUTPATIENT
Start: 2021-05-16 | End: 2021-05-18 | Stop reason: HOSPADM

## 2021-05-12 RX ORDER — DEXAMETHASONE 4 MG/1
4 TABLET ORAL EVERY 8 HOURS SCHEDULED
Status: COMPLETED | OUTPATIENT
Start: 2021-05-13 | End: 2021-05-15

## 2021-05-12 RX ORDER — HYDRALAZINE HYDROCHLORIDE 20 MG/ML
10 INJECTION INTRAMUSCULAR; INTRAVENOUS EVERY 8 HOURS PRN
Status: DISCONTINUED | OUTPATIENT
Start: 2021-05-12 | End: 2021-05-12

## 2021-05-12 RX ORDER — DEXAMETHASONE 2 MG/1
2 TABLET ORAL EVERY 8 HOURS SCHEDULED
Status: DISCONTINUED | OUTPATIENT
Start: 2021-05-19 | End: 2021-05-18 | Stop reason: HOSPADM

## 2021-05-12 RX ORDER — DEXAMETHASONE 2 MG/1
2 TABLET ORAL DAILY
Status: DISCONTINUED | OUTPATIENT
Start: 2021-05-25 | End: 2021-05-18 | Stop reason: HOSPADM

## 2021-05-12 RX ORDER — HYDRALAZINE HYDROCHLORIDE 20 MG/ML
10 INJECTION INTRAMUSCULAR; INTRAVENOUS ONCE
Status: COMPLETED | OUTPATIENT
Start: 2021-05-12 | End: 2021-05-12

## 2021-05-12 RX ORDER — LABETALOL 20 MG/4 ML (5 MG/ML) INTRAVENOUS SYRINGE
10 EVERY 4 HOURS
Status: DISCONTINUED | OUTPATIENT
Start: 2021-05-12 | End: 2021-05-15

## 2021-05-12 RX ORDER — AMLODIPINE BESYLATE 10 MG/1
10 TABLET ORAL ONCE
Status: COMPLETED | OUTPATIENT
Start: 2021-05-12 | End: 2021-05-12

## 2021-05-12 RX ORDER — AMLODIPINE BESYLATE 10 MG/1
10 TABLET ORAL DAILY
Status: DISCONTINUED | OUTPATIENT
Start: 2021-05-12 | End: 2021-05-12

## 2021-05-12 RX ORDER — HYDRALAZINE HYDROCHLORIDE 20 MG/ML
5 INJECTION INTRAMUSCULAR; INTRAVENOUS EVERY 8 HOURS PRN
Status: DISCONTINUED | OUTPATIENT
Start: 2021-05-12 | End: 2021-05-12

## 2021-05-12 RX ADMIN — HYDRALAZINE HYDROCHLORIDE 10 MG: 20 INJECTION INTRAMUSCULAR; INTRAVENOUS at 10:48

## 2021-05-12 RX ADMIN — DEXAMETHASONE SODIUM PHOSPHATE 4 MG: 4 INJECTION INTRA-ARTICULAR; INTRALESIONAL; INTRAMUSCULAR; INTRAVENOUS; SOFT TISSUE at 12:36

## 2021-05-12 RX ADMIN — HYDRALAZINE HYDROCHLORIDE 10 MG: 20 INJECTION INTRAMUSCULAR; INTRAVENOUS at 07:53

## 2021-05-12 RX ADMIN — SENNOSIDES 8.6 MG: 8.6 TABLET ORAL at 21:47

## 2021-05-12 RX ADMIN — FAMOTIDINE 20 MG: 20 TABLET ORAL at 17:17

## 2021-05-12 RX ADMIN — NICARDIPINE HYDROCHLORIDE 15 MG/HR: 2.5 INJECTION, SOLUTION INTRAVENOUS at 12:49

## 2021-05-12 RX ADMIN — NICARDIPINE HYDROCHLORIDE 15 MG/HR: 2.5 INJECTION, SOLUTION INTRAVENOUS at 02:15

## 2021-05-12 RX ADMIN — LEVETIRACETAM 500 MG: 500 TABLET, FILM COATED ORAL at 21:47

## 2021-05-12 RX ADMIN — AMLODIPINE BESYLATE 10 MG: 10 TABLET ORAL at 09:23

## 2021-05-12 RX ADMIN — LABETALOL 20 MG/4 ML (5 MG/ML) INTRAVENOUS SYRINGE 10 MG: at 17:17

## 2021-05-12 RX ADMIN — NICARDIPINE HYDROCHLORIDE 15 MG/HR: 2.5 INJECTION, SOLUTION INTRAVENOUS at 04:05

## 2021-05-12 RX ADMIN — ACETAMINOPHEN 975 MG: 325 TABLET, FILM COATED ORAL at 05:08

## 2021-05-12 RX ADMIN — NICARDIPINE HYDROCHLORIDE 5 MG/HR: 2.5 INJECTION, SOLUTION INTRAVENOUS at 16:17

## 2021-05-12 RX ADMIN — NICARDIPINE HYDROCHLORIDE 12.5 MG/HR: 2.5 INJECTION, SOLUTION INTRAVENOUS at 07:48

## 2021-05-12 RX ADMIN — DOCUSATE SODIUM 100 MG: 100 CAPSULE, LIQUID FILLED ORAL at 17:16

## 2021-05-12 RX ADMIN — Medication 1 TABLET: at 08:51

## 2021-05-12 RX ADMIN — DEXAMETHASONE SODIUM PHOSPHATE 4 MG: 4 INJECTION INTRA-ARTICULAR; INTRALESIONAL; INTRAMUSCULAR; INTRAVENOUS; SOFT TISSUE at 17:17

## 2021-05-12 RX ADMIN — METHOCARBAMOL 500 MG: 500 TABLET, FILM COATED ORAL at 12:37

## 2021-05-12 RX ADMIN — LIDOCAINE 1 PATCH: 50 PATCH TOPICAL at 08:52

## 2021-05-12 RX ADMIN — ACETAMINOPHEN 975 MG: 325 TABLET, FILM COATED ORAL at 13:43

## 2021-05-12 RX ADMIN — NICARDIPINE HYDROCHLORIDE 15 MG/HR: 2.5 INJECTION, SOLUTION INTRAVENOUS at 05:53

## 2021-05-12 RX ADMIN — LABETALOL 20 MG/4 ML (5 MG/ML) INTRAVENOUS SYRINGE 10 MG: at 13:43

## 2021-05-12 RX ADMIN — METHOCARBAMOL 500 MG: 500 TABLET, FILM COATED ORAL at 23:45

## 2021-05-12 RX ADMIN — NICARDIPINE HYDROCHLORIDE 10 MG/HR: 2.5 INJECTION, SOLUTION INTRAVENOUS at 10:48

## 2021-05-12 RX ADMIN — DEXAMETHASONE SODIUM PHOSPHATE 4 MG: 4 INJECTION INTRA-ARTICULAR; INTRALESIONAL; INTRAMUSCULAR; INTRAVENOUS; SOFT TISSUE at 05:08

## 2021-05-12 RX ADMIN — POTASSIUM & SODIUM PHOSPHATES POWDER PACK 280-160-250 MG 1 PACKET: 280-160-250 PACK at 08:02

## 2021-05-12 RX ADMIN — OXYCODONE HYDROCHLORIDE 5 MG: 5 TABLET ORAL at 07:52

## 2021-05-12 RX ADMIN — HYDRALAZINE HYDROCHLORIDE 10 MG: 20 INJECTION INTRAMUSCULAR; INTRAVENOUS at 18:45

## 2021-05-12 RX ADMIN — ACETAMINOPHEN 975 MG: 325 TABLET, FILM COATED ORAL at 21:47

## 2021-05-12 RX ADMIN — CEFAZOLIN SODIUM 1000 MG: 1 SOLUTION INTRAVENOUS at 03:12

## 2021-05-12 RX ADMIN — DEXAMETHASONE SODIUM PHOSPHATE 4 MG: 4 INJECTION INTRA-ARTICULAR; INTRALESIONAL; INTRAMUSCULAR; INTRAVENOUS; SOFT TISSUE at 23:41

## 2021-05-12 RX ADMIN — POLYETHYLENE GLYCOL 3350 17 G: 17 POWDER, FOR SOLUTION ORAL at 08:51

## 2021-05-12 RX ADMIN — LABETALOL 20 MG/4 ML (5 MG/ML) INTRAVENOUS SYRINGE 10 MG: at 21:47

## 2021-05-12 RX ADMIN — FLUTICASONE FUROATE AND VILANTEROL TRIFENATATE 1 PUFF: 200; 25 POWDER RESPIRATORY (INHALATION) at 09:23

## 2021-05-12 RX ADMIN — FAMOTIDINE 20 MG: 20 TABLET ORAL at 08:51

## 2021-05-12 RX ADMIN — LEVETIRACETAM 500 MG: 500 TABLET, FILM COATED ORAL at 08:51

## 2021-05-12 RX ADMIN — METHOCARBAMOL 500 MG: 500 TABLET, FILM COATED ORAL at 05:09

## 2021-05-12 RX ADMIN — METHOCARBAMOL 500 MG: 500 TABLET, FILM COATED ORAL at 17:18

## 2021-05-12 RX ADMIN — ENOXAPARIN SODIUM 40 MG: 40 INJECTION SUBCUTANEOUS at 12:37

## 2021-05-12 RX ADMIN — DOCUSATE SODIUM 100 MG: 100 CAPSULE, LIQUID FILLED ORAL at 08:51

## 2021-05-12 NOTE — ASSESSMENT & PLAN NOTE
· Hydralazine on labetalol 10 mg p r n   Ordered  · Started Norvasc 10mg daily  · SBP goals 120-140  ·  MAP goal > 65

## 2021-05-12 NOTE — PROGRESS NOTES
[address] [facility]  Progress Note Sandra Allen 1950, 80 y o  male MRN: [id number]  Unit/Bed#: ICU 04 Encounter: [id number]  Primary Care Provider: Seven Carrera PA-C   Date and time admitted to hospital: 5/6/2021  7:32 PM    * Brain mass  Assessment & Plan  POD1 from left temporal craniotomy for tumor resection with 5-ALA  · Patient presents with a few day history of speech difficulties, had prior fall 3 weeks ago with positive head strike  · MOCA 25/30    Imaging reviewed personally and with attending, results are as follows:  · MRI brain 05/11/2021:  Status post resection of left temporal lobe mass with postoperative changes including mild  Peripheral ischemia  Improved surrounding vasogenic edema  Plan:  · Ongoing frequent neurologic checks  · Recommend stat CT head for decline in GCS greater than 2 points in 1 hour  · Decadron 4 Son g q 6 hours with 72 hour taper ordered  · Postoperative antibiotics  · Keppra 500 mg b i d  for seizure prophylaxis x7 days  · MRI brain completed overnight  · DVT prophylaxis:  SCDs, Lovenox to start at noon  · TIM drain removed this morning  Patient tolerated well  Suture tied in place  · Rai removed  Voiding trial pending  · Physical therapy/ occupational therapy evaluation  · Ongoing medical management per primary team     · Patient cleared to transfer out of the ICU from neurosurgical standpoint  Patient is to remain in dark light for 48 hours postoperatively until 05/13/2021 at approximately 6:00 a m  Idris Cain · Neurosurgery will continue to follow and monitor exam postoperatively  Call with any questions or concerns  · Patient was bolused with Decadron 10 mg, continues on Decadron 4 mg q 6 hours for cerebral edema  · Currently on Keppra 500 mg b i d   For seizure prophylaxis   Medical management and pain control per primary team   DVT ppx:  SCDs, SQH   Mobilize as tolerated with assistance, PT / OT evaluation likely after surgery    Neurosurgery will continue to follow  Please call with questions or concerns  Brain compression [facility])  Assessment & Plan  · See plan above  · Seen on CT and MRI       Cerebral edema [facility])  Assessment & Plan  · See plan above  · Evident on CT and MR imaging  Subjective/Objective   Chief Complaint: Mild headache     Subjective: Patient states she has only mild headache but over all he feels very well  He continues with speech difficulties including, nonsensical speech and word finding difficulty  No issues overnight  Still on cardene for BP >338 systolic post operatively  Rounds completed with Conrad Barthel, RN  Objective: Sitting in bedside chair in NAD  I/O       05/10 0701 - 05/11 0700 05/11 0701 - 05/12 0700 05/12 0701 - 05/13 0700    P  O  1200 598     I V  (mL/kg) 806 3 (6 2) 4523 3 (34 8) 472 1 (3 6)    IV Piggyback 100 790     Total Intake(mL/kg) 2106 3 (16 2) 5911 3 (45 5) 472 1 (3 6)    Urine (mL/kg/hr)  4425 (1 4)     Drains  145 15    Blood  50     Total Output  4620 15    Net +2106 3 +1291 3 +457 1           Unmeasured Urine Occurrence 6 x      Unmeasured Stool Occurrence 1 x            Invasive Devices     Peripheral Intravenous Line            Peripheral IV 05/11/21 Left Forearm 1 day    Peripheral IV 05/11/21 Left Wrist 1 day    Peripheral IV 05/11/21 Right Hand 1 day          Drain            Closed/Suction Drain Left Head Bulb 10 Fr  less than 1 day                Physical Exam:  Vitals: Blood pressure 155/66, pulse 80, temperature 98 5 °F (36 9 °C), temperature source Oral, resp  rate 18, height 5' 10" (1 778 m), weight 130 kg (286 lb), SpO2 90 %  ,Body mass index is 41 04 kg/m²  General appearance: alert, appears stated age, cooperative and no distress  Head: Normocephalic  Left sided temporal incision with dressing still intact  TIM drain was removed and suture tied in place  No active bleeding at this time     Eyes: EOMI, PERRL  Neck: supple, symmetrical, trachea midline   Lungs: non labored breathing  Heart: regular heart rate  Neurologic:   Mental status: Alert, oriented to person, year and place  Patient unable to identify month or day  Some global aphasia with mild receptive and moderate expressive difficuties at times  Did complete calculations with change  Cranial nerves: grossly intact (Cranial nerves II-XII)  Sensory: normal to light touch   Motor: moving all extremities without focal weakness 5/5 strength throughout  Reflexes: 2+ and symmetric    Lab Results:  Results from last 7 days   Lab Units 05/12/21 0512 05/11/21  1508 05/11/21  1129  05/10/21  0602   WBC Thousand/uL 17 05* 16 51*  --   --  10 98*   HEMOGLOBIN g/dL 14 3 14 2  --   --  15 4   I STAT HEMOGLOBIN g/dl  --   --  13 3   < >  --    HEMATOCRIT % 42 7 42 4  --   --  47 2   HEMATOCRIT, ISTAT %  --   --  39   < >  --    PLATELETS Thousands/uL 262 262  --   --  246    < > = values in this interval not displayed  Results from last 7 days   Lab Units 05/12/21 0512 05/11/21  1508 05/11/21  1129 05/11/21  1023  05/09/21  0550   POTASSIUM mmol/L 4 0 4 1  --   --   --  4 0   CHLORIDE mmol/L 112* 113*  --   --   --  110*   CO2 mmol/L 22 21  --   --   --  24   CO2, I-STAT mmol/L  --   --  23 23   < >  --    BUN mg/dL 18 20  --   --   --  24   CREATININE mg/dL 1 07 1 13  --   --   --  1 02   CALCIUM mg/dL 8 6 8 7  --   --   --  8 9   GLUCOSE, ISTAT mg/dl  --   --  146* 170*  --   --     < > = values in this interval not displayed       Results from last 7 days   Lab Units 05/12/21 0512 05/11/21  1508   MAGNESIUM mg/dL 2 6 2 5     Results from last 7 days   Lab Units 05/12/21 0512 05/11/21  1508   PHOSPHORUS mg/dL 2 9 3 5     Results from last 7 days   Lab Units 05/12/21  0512 05/06/21  1954   INR  1 06 0 97   PTT seconds 28 32     No results found for: TROPONINT  ABG:No results found for: PHART, EFW3DVS, PO2ART, PWH5OBJ, K1MQWMXD, BEART, SOURCE    Imaging Studies: I have personally reviewed pertinent reports  and I have personally reviewed pertinent films in PACS  Cta Head And Neck With And Without Contrast    Result Date: 5/6/2021  Impression: 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 and left hemispheric mass effect causing approximately 3 mm left to right midline shift  Contrast-enhanced brain MRI is recommended for further evaluation  No evidence of flow restrictive large vessel occlusive disease  I personally discussed this study with Luiz Buchanan on 5/6/2021 at 9:40 PM  Workstation performed: [id number]     Mri Brain W Wo Contrast    Result Date: 5/12/2021  Impression: Status post resection of left temporal lobe mass with postoperative change as described above, including mild peripheral ischemia  There is improving surrounding vasogenic edema  Workstation performed: [id number]     Mri Brain W Wo Contrast    Result Date: 5/7/2021  Impression: 1  Rim-enhancing left temporal parietal mass measuring 5 cm with significant surrounding vasogenic edema, 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

## 2021-05-12 NOTE — PLAN OF CARE
Problem: PHYSICAL THERAPY ADULT  Goal: Performs mobility at highest level of function for planned discharge setting  See evaluation for individualized goals  Description: Treatment/Interventions: Functional transfer training, LE strengthening/ROM, Therapeutic exercise, Endurance training, Elevations, Gait training, Bed mobility  Equipment Recommended: Walker       See flowsheet documentation for full assessment, interventions and recommendations  Note: Prognosis: Good  Problem List: Decreased strength, Decreased endurance, Impaired balance, Decreased mobility, Decreased cognition, Pain  Assessment: Pt is a 81 yo male admitted to [place] [id number] on 5/11/2021 s/p speech problem  Pt had surgery on 5/11 for L temporal craniotomy with resection of mass on L  Dx: brain mass, brain compression, cerebral edema, rib fracture, leukocytosis, COPD, morbid obesity, 7th rib nondisplaced fracture  Two patient identifiers were used to confirm  Pt lives in a [facility] with 13 HUNTER and 20 steps to the second floor  Pt was I for ADL's and IADL's prior  Pt does not use any DME  Pt transports patient to doctors appointment  Pt's impairments include reduced mobility, high risk of falling, poor activity tolerance, flat affect, gait abnormalities  These impairments limit the ability of the patient to perform mobility without increased assistance, return to PLOF and participate in everyday life activities  Pt would benefit from continued skilled therapy while in the hospital to improve overall mobility and work towards a safe d/c  Recommend discharge to rehab  At the end of the session the patient was left in seated position with call bell and phone within reach  The patient's AM-PAC Basic Mobility Inpatient Short Form Low Function Raw Score 17 , Standardized Score is 27 46  A standardized score less 42 9 suggests the patient may benefit from discharge to post-acute rehab services   Please also refer to the recommendation of the Physical Therapist for safe discharge planning  Chair alarm on  Barriers to Discharge: Inaccessible home environment, Decreased caregiver support        PT Discharge Recommendation: Post acute rehabilitation services     PT - OK to Discharge: Yes    See flowsheet documentation for full assessment

## 2021-05-12 NOTE — PROGRESS NOTES
Daily Progress Note - Critical Care   Venkatesh Allen 80 y o  male MRN: [id number]  Unit/Bed#: ICU 04 Encounter: [id number]        ----------------------------------------------------------------------------------------  HPI/24hr events:   Pt had no acute events overnight  ---------------------------------------------------------------------------------------  SUBJECTIVE  Pt  POD1  Left temporal craniotomy  and 5-ALA for resection of mass  He was seen and examined by bedside , he is on 3L nasal canula, not in any obvious distress and has no complain at this time  Review of Systems  Review of systems was reviewed and negative unless stated above in HPI/24-hour events   ---------------------------------------------------------------------------------------  Assessment and Plan:  Neuro:  Diagnosis : Brain mass,seizures  - 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  Left-to-right midline shift of 2-3 mm    - 5/7 CT chest abdomen pelvis with contrast  Left lateral 7th rib nondisplaced fracture could be subacute or acute  Small pericardial effusion   - 5/11 s/p Left temporal craniotomy with image guidence and 5-ALA for resection of mass  - 5/11 MRI brain Status post resection of left temporal lobe mass with postoperative change as described above, including mild peripheral ischemia  improving surrounding vasogenic edema      Plan  · Follow-up results of 24 hours MRI  · Continue Keppra 500 mg b i d   For seizure prophylaxis x7 days per Nsurg  (day2)  · Q 1 hour neuro checks  · SBP 120-150  · Neurosurgery following recommendation appreciated  · Consider Decadron taper currently on decadron 4mg q6hrs    Per neurosurgery, restart DVT ppx at 12pm today    Px should remain in dark light for 48 hours postoperatively until 05/13/2021 at approximately 6:00 a m   · PT/OT consulted  · Delirium Precautions  · CAM ICU per protocol  · Regulate sleep/wake cycle+     Diagnosis:  Pain  · Tylenol 975 mg Q 8 scheduled, Robaxin 500mg q6 scheduled  · Oxycodone 2 5 mg p r n  for moderate pain and 5 mg Q 4 p r n  For severe pain  · Dilaudid to 0 2 mg q 1 hour p r n  For breakthrough pain        CV:   Diagnosis:  Hypertension patient not on home antihypertensive medication  Plan  · Continue Cardene drip to maintain SBP goals currently @ 7 5  ·  Hydralazine 10 mg p r n  Ordered    Start Norvasc 10mg daily  · SBP goals 120-140  · MAP goal > 65  · Rhythm: NSR  · Continue to monitor on telemetry     Pulm:  Diagnosis: COPD/Asthma stable  Plan:  · Continue home inhaler Advair Diskus 250-50 bid  · Pulmonary toileting        GI:  No issues  Plan  · Zofran prn for nausea  · Stress ulcer PPx: famotidine 20mg bid  · Bowel regimen :Sennakot, Miralax prn       :  No issues  · Indwelling Rai present: No  · Trend UOP and BUN/creat  · Strict I and O     F/E/N:   · Fluid/Diuretic plan: N/A  · Nutrition/diet plan:Dysphagia 3 diet  · Replete electrolytes with goals: K >4 0, Mag >2 0, and Phos >3 0     Heme/Onc:  No Issues  · DVT ppx: Use SCDs until cleared by neurosurgery  · Trend hgb and plts  · Transfuse as needed for goal hgb >*     Endo: No issues     ID:   Diagnosis: Leukocytosis with WBC 10 98 likely reactive   · Trend temps and WBC count     MSK/Skin:   Diagnosis:7th rib nondisplaced fracture   - 5/7 CT chest abdomen pelvis with contrast  Left lateral 7th rib nondisplaced fracture could be subacute or acute    - Continue scheduled pain medication  - PT/OT      Disposition: Continue Critical Care   Code Status: Level 1 - Full Code  ---------------------------------------------------------------------------------------  ICU CORE MEASURES    Prophylaxis   VTE Pharmacologic Prophylaxis: None  VTE Mechanical Prophylaxis: sequential compression device  Stress Ulcer Prophylaxis: Famotidine PO    ABCDE Protocol (if indicated)  Plan to perform spontaneous awakening trial today? Not applicable  Plan to perform spontaneous breathing trial today? Not applicable  Obvious barriers to extubation? Not applicable  CAM-ICU: Negative    Invasive Devices Review  Invasive Devices     Peripheral Intravenous Line            Peripheral IV 21 Left Forearm 1 day    Peripheral IV 21 Left Wrist 1 day    Peripheral IV 21 Right Hand 1 day          Arterial Line            Arterial Line 21 Left Radial 1 day          Drain            Closed/Suction Drain Left Head Bulb 10 Fr  less than 1 day              Can any invasive devices be discontinued today? Not applicable  ---------------------------------------------------------------------------------------  OBJECTIVE    Vitals   Vitals:    21 0600 21 0700 21 0753 21 0800   BP: 144/67 136/68 141/67 155/73   BP Location: Right arm Left arm     Pulse: 68 68  70   Resp:   12   Temp:    98 5 °F (36 9 °C)   TempSrc:    Oral   SpO2: 91% 91%  92%   Weight:       Height:         Temp (24hrs), Av 5 °F (36 9 °C), Min:97 5 °F (36 4 °C), Max:99 2 °F (37 3 °C)  Current: Temperature: 98 5 °F (36 9 °C)    Invasive/non-invasive ventilation settings   Respiratory    Lab Data (Last 4 hours)    None         O2/Vent Data (Last 4 hours)    None                Physical Exam  Vitals signs and nursing note reviewed  Constitutional:       Appearance: He is well-developed  Comments: Dressing around the head CDI   HENT:      Head: Normocephalic and atraumatic        Comments: TIM drain     Mouth/Throat:      Mouth: Mucous membranes are moist    Eyes: Extraocular Movements: Extraocular movements intact  Conjunctiva/sclera: Conjunctivae normal    Neck:      Musculoskeletal: Normal range of motion and neck supple  Cardiovascular:      Rate and Rhythm: Normal rate and regular rhythm  Heart sounds: No murmur  Pulmonary:      Effort: Pulmonary effort is normal  No respiratory distress  Breath sounds: Normal breath sounds  Comments: 3L nc  Abdominal:      General: Bowel sounds are normal       Palpations: Abdomen is soft  Tenderness: There is no abdominal tenderness  Comments: obese   Musculoskeletal: Normal range of motion  General: No swelling or tenderness  Skin:     General: Skin is warm and dry  Capillary Refill: Capillary refill takes less than 2 seconds  Coloration: Skin is not jaundiced  Neurological:      General: No focal deficit present  Mental Status: He is alert and oriented to person, place, and time        Comments: AAOx3 but with some confusion, cant lift both hands up, this is limited due to pain   Psychiatric:         Mood and Affect: Mood normal          Behavior: Behavior normal          Laboratory and Diagnostics:  Results from last 7 days   Lab Units 05/12/21  0512 05/11/21  1508 05/11/21  1129 05/11/21  1023 05/10/21  0602 05/09/21  0550 05/07/21  0713 05/06/21 1954   WBC Thousand/uL 17 05* 16 51*  --   --  10 98* 12 24* 7 02 7 56   HEMOGLOBIN g/dL 14 3 14 2  --   --  15 4 14 6 16 1 15 5   I STAT HEMOGLOBIN g/dl  --   --  13 3 13 9  --   --   --   --    HEMATOCRIT % 42 7 42 4  --   --  47 2 43 0 47 0 45 3   HEMATOCRIT, ISTAT %  --   --  39 41  --   --   --   --    PLATELETS Thousands/uL 262 262  --   --  246 263 277 272     Results from last 7 days   Lab Units 05/12/21  0512 05/11/21  1508 05/11/21  1129 05/11/21  1023 05/09/21  0550 05/07/21  0713 05/06/21 1954   SODIUM mmol/L 142 141  --   --  139 139 139   POTASSIUM mmol/L 4 0 4 1  --   --  4 0 4 0 3 9   CHLORIDE mmol/L 112* 113* --   --  110* 110* 109*   CO2 mmol/L 22 21  --   --  24 21 24   CO2, I-STAT mmol/L  --   --  23 23  --   --   --    ANION GAP mmol/L 8 7  --   --  5 8 6   BUN mg/dL 18 20  --   --  24 16 21   CREATININE mg/dL 1 07 1 13  --   --  1 02 0 97 1 06   CALCIUM mg/dL 8 6 8 7  --   --  8 9 9 0 9 3   GLUCOSE RANDOM mg/dL 156* 137  --   --  146* 148* 95     Results from last 7 days   Lab Units 05/12/21  0512 05/11/21  1508   MAGNESIUM mg/dL 2 6 2 5   PHOSPHORUS mg/dL 2 9 3 5      Results from last 7 days   Lab Units 05/12/21  0512 05/06/21 1954   INR  1 06 0 97   PTT seconds 28 32      Results from last 7 days   Lab Units 05/06/21 1954   TROPONIN I ng/mL <0 02         ABG:    VBG:          Micro        EKG:   Imaging:  I have personally reviewed pertinent reports  Intake and Output  I/O       05/10 0701 - 05/11 0700 05/11 0701 - 05/12 0700    P  O  1200 598    I V  (mL/kg) 806 3 (6 2) 4523 3 (34 8)    IV Piggyback 100 790    Total Intake(mL/kg) 2106 3 (16 2) 5911 3 (45 5)    Urine (mL/kg/hr)  4425 (1 4)    Drains  145    Blood  50    Total Output  4620    Net +2106 3 +1291 3          Unmeasured Urine Occurrence 6 x     Unmeasured Stool Occurrence 1 x         UOP: 4425 ml/hr     Height and Weights   Height: 5' 10" (177 8 cm)  IBW (Ideal Body Weight): 73 kg  Body mass index is 41 04 kg/m²  Weight (last 2 days)     None            Nutrition       Diet Orders   (From admission, onward)             Start     Ordered    05/11/21 1627  Diet Dysphagia/Modified Consistency; Dysphagia 3-Dental Soft; Thin Liquid  Diet effective now     Question Answer Comment   Diet Type Dysphagia/Modified Consistency    Dysphagia/Modified Consistency Dysphagia 3-Dental Soft    Liquid Modifier Thin Liquid    RD to adjust diet per protocol?  Yes        05/11/21 1626                    Active Medications  Scheduled Meds:  Current Facility-Administered Medications   Medication Dose Route Frequency Provider Last Rate    acetaminophen  975 mg Oral Q8H [address] Dessiree Ezequiel Mcelroy PA-C      amLODIPine  10 mg Oral Once Dewey Smith MD      dexamethasone  4 mg Intravenous Q6H [address] Kellerstacy KingKHALIDA dumont      [START ON 5/13/2021] dexamethasone  4 mg Oral Q8H [address] Kellerstacy KingomsKHALIDA      Followed by   Tahir Malin ON 5/16/2021] dexamethasone  2 mg Oral Q6H [address] Kellerstacy KingomsKHALIDA      Followed by   Tahir Malin ON 5/19/2021] dexamethasone  2 mg Oral Q8H [address] Laith Serrato KHALIDA Cooney      Followed by   Tahir Malin ON 5/22/2021] dexamethasone  2 mg Oral Q12H [address] Kellerstacy KingKHALIDA dumont      Followed by   Tahir Malin ON 5/25/2021] dexamethasone  2 mg Oral Daily Kellerstacy KingKHALIDA dumont      docusate sodium  100 mg Oral BID Dessiree Ezequiel Mcelroy PA-C      enoxaparin  40 mg Subcutaneous Q24H [address] Kellerstacy KingomsKHALIDA      famotidine  20 mg Oral BID Betty Pedraza, DO      fluticasone-vilanterol  1 puff Inhalation Daily Dessiree Ezequiel Mcelroy PA-C      hydrALAZINE  10 mg Intravenous Q8H PRN Mica Gallo PA-C      HYDROmorphone  0 2 mg Intravenous Q1H PRN Dessiree Ezequiel Mcelroy PA-C      levETIRAcetam  500 mg Oral Q12H [address] Betty Pedraza, DO      lidocaine  1 patch Topical Daily Dessiree Ezequiel Mcelroy PA-C      methocarbamol  500 mg Oral Q6H [address] Dessiree Ezequiel Mcelroy PA-C      multivitamin-minerals  1 tablet Oral Daily Dessiree KHALIDA Ruth      niCARdipine  1-15 mg/hr Intravenous Titrated Betty Pedraza DO 7 5 mg/hr (05/12/21 0843)    ondansetron  4 mg Intravenous Q6H PRN Dessiree Ezequiel Mcelroy PA-C      oxyCODONE  2 5 mg Oral Q4H PRN Dessiree KHALIDA Ruth      oxyCODONE  5 mg Oral Q4H PRN Massimosiree KHALIDA Ruth      polyethylene glycol  17 g Oral Daily Carmen Ruth PA-C      senna  1 tablet Oral HS [place] KHALIDA Ruth       Continuous Infusions:  niCARdipine, 1-15 mg/hr, Last Rate: 7 5 mg/hr (05/12/21 0843)      PRN Meds:   hydrALAZINE, 10 mg, Q8H PRN  HYDROmorphone, 0 2 mg, Q1H PRN  ondansetron, 4 mg, Q6H PRN  oxyCODONE, 2 5 mg, Q4H PRN  oxyCODONE, 5 mg, Q4H PRN        Allergies   Allergies   Allergen Reactions    Penicillins Other (See Comments)     As a child     ---------------------------------------------------------------------------------------  Advance Directive and Living Will:      Power of :    POLST:    ---------------------------------------------------------------------------------------    Demetris Talamantes, DO      Portions of the record may have been created with voice recognition software  Occasional wrong word or "sound a like" substitutions may have occurred due to the inherent limitations of voice recognition software    Read the chart carefully and recognize, using context, where substitutions have occurred

## 2021-05-12 NOTE — PLAN OF CARE
Problem: OCCUPATIONAL THERAPY ADULT  Goal: Performs self-care activities at highest level of function for planned discharge setting  See evaluation for individualized goals  Description: Treatment Interventions: ADL retraining, Functional transfer training, UE strengthening/ROM, Endurance training, Cognitive reorientation, Patient/family training, Equipment evaluation/education, Fine motor coordination activities, Compensatory technique education, Energy conservation, Activityengagement          See flowsheet documentation for full assessment, interventions and recommendations  Note: Limitation: Decreased ADL status, Decreased UE strength, Decreased Safe judgement during ADL, Decreased cognition, Decreased endurance, Decreased self-care trans, Decreased high-level ADLs, Decreased fine motor control  Prognosis: Good  Assessment: Pt is a 80 y o  male who was admitted to [facility] on 5/6/2021 with speech difficulties and fall 3 weeks ago, (+) headstrike; pt found with 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 and cerebral edema  Pt s/p Left temporal craniotomy with image guidance and 5-ALA for resection of mass (Left) 5/11  Pt  has a past medical history of Asthma and Epilepsy ([facility] [address] )  Pt presents with aphasia; PLOF and home set up obtained primarily from EMR  Per EMR, pt lives in a [address] [facility] with his wife; 2nd floor bed/bath, [address]  Pt was IND w/ ADLs, IADLs, and ambulation; retired, (-) driving  Currently pt requires MOD A for overall ADLS and MOD Ax2 for functional mobility/transfers  Pt currently presents with impairments in the following categories -steps to enter environment, difficulty performing ADLS, difficulty performing IADLS  and limited insight into deficits activity tolerance, endurance, standing balance/tolerance, sitting balance/tolerance, UE strength, memory, insight, safety , judgement  and attention    These impairments, as well as pt's fatigue, pain, risk for falls and home environment limit pt's ability to safely engage in all baseline areas of occupation, includinggrooming, bathing, dressing, toileting, functional mobility/transfers, community mobility, house maintenance, meal prep, cleaning and leisure activities  From OT standpoint, recommend [phone] upon D/C  OT will continue to follow to address the below stated goals        OT Discharge Recommendation: Post acute rehabilitation services
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
## 2021-05-12 NOTE — SPEECH THERAPY NOTE
Speech Language/Pathology  Speech-Language Evaluation    Impression:  Pt presents with mild-mod receptive and mod-severe fluent expressive aphasia  There are frequent semantic and phonemic paraphasias and jargon during conversation and during simple verbal tasks  Pt is at least partially aware of his deficits, and gets frustrated  Motor speech eval does not appear necessary, as there does not appear to be dysarthria or apraxia  Reading and writing need to be assessed; pt was getting tired and repeatedly closing his eyes by the end of the assessment  Pt will benefit from post acute speech tx  Per wife, pt is able to get basic wants and needs across verbally  Recommendation:  Speech tx to address aphasia, 3-5x per week while in acute care  Therapy Prognosis: Fair  Prognosis considerations: Severity of aphasia, current diagnoses, potential      Patient's goal:  Pt unable to state  Goals:  1  Pt will answer simple yes/no questions at 90% acc with min cues  2  Pt will complete sentence completion tasks at 90% with min cues  3  Pt will complete simple responsive naming tasks at 90% with min cues  4  Pt will participate in assessment of reading comprehension and writing skills  Pt is a 79 y/o male admitted with difficulty speaking  Found to have a L sided brain mass; he underwent a craniectomy, and is currently in the ICU  Wife present for assessment  She reports pt's speech is mostly garbled, but occasionally with appropriate speech  She feels he is able to make basic wants/needs known to her  Social:  Lives with his wife         Auditory Comprehension:  R/L discrim:  Body part ID: 5/5  One step commands: 3/3  Two step commands: 3/3  Complex or 3 step commands: 1/1  Picture ID:  Word ID:  Letter ID:  Personal y/n ?'s:  Basic y/n ?'s: 3/5, 60%   Complex y/n ?'s: 3/5, 60%  Paragraph Comprehension:    Reading Comprehension:  Able to read paragraph aloud, mostly fluent speech, mostly accurate words, but occasional paraphasias  Oral Expression:  Auto Sequences:   BRIANNA: 7/7   LO: 12/12   Counting to 21: 21/21  Word Repetition: 5/5  Repetition of words of increasing length/multisyllabic: 10/10  Phrase Completion (simple): 5/5  Sentence Completion: 2/5, 40%  Responsive Naming: 3/5 (60%)  Picture Naming:   Object Naming: 10/10  Divergent Naming: Named an average of 5 items in a given category in one minute  Picture Description: Mostly accurate description of [employer] picture, mostly fluent speech, minimal paraphasias  Conversation: Filled with paraphasias and jargon, not functional overall, but occasionally produced a fluent phrase or sentence  Able to make basic needs known? Yes, according to wife  Written Expression:  Not yet assessed    Motor Speech: There does not appear to be any dysarthria or apraxia       Cognitive -linguistic skills:  Unable to assess due to aphasia    Also noted:  Distractable  Phonemic paraphasias  Semantic paraphasias  Perseveration  Tangential  Verbose  Partially aware of deficits:    Results discussed with:  Pt, wife

## 2021-05-12 NOTE — ASSESSMENT & PLAN NOTE
7th rib nondisplaced fracture   - 5/7 CT chest abdomen pelvis with contrast  Left lateral 7th rib nondisplaced fracture could be subacute or acute    - Continue scheduled pain medication  - PT/OT
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
## 2021-05-12 NOTE — ASSESSMENT & PLAN NOTE
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  Left-to-right midline shift of 2-3 mm    - 5/7 CT chest abdomen pelvis with contrast  Left lateral 7th rib nondisplaced fracture could be subacute or acute  Small pericardial effusion   - 5/11 s/p Left temporal craniotomy with image guidence and 5-ALA for resection of mass  - 5/11 MRI brain Status post resection of left temporal lobe mass with postoperative change as described above, including mild peripheral ischemia  improving surrounding vasogenic celine  Plan:  ·           Continue Keppra 500 mg b i d  For seizure prophylaxis x7 days per Nsurg  (day2)  ·           Q 1 hour neuro checks  ·           -150  ·           Neurosurgery following recommendation appreciated  ·           Consider Decadron taper currently on decadron 4mg q6hrs    Per neurosurgery, restart DVT ppx at 12pm today    Px should remain in dark light for 48 hours postoperatively until 05/13/2021 at approximately 6:00 a m   ·           PT/OT consulted     Tylenol 975 mg Q 8 scheduled, Robaxin 500mg q6 scheduled  ·           Oxycodone 2 5 mg p r n  for moderate pain and 5 mg Q 4 p r n  For severe pain  ·           Dilaudid to 0 2 mg q 1 hour p r n   For breakthrough pain

## 2021-05-12 NOTE — PHYSICAL THERAPY NOTE
Physical Therapy Evaluation Note     Patient Name: Adonay Morataya    FNSAY'Z Date: 5/12/2021     Problem List  Principal Problem:    Brain mass  Active Problems:    Essential hypertension    Morbid obesity ([facility] [address] )    Obstructive sleep apnea    COPD (chronic obstructive pulmonary disease) ([facility] )    History of tobacco use    Cerebral edema (HCC)    Brain compression (HCC)    Leukocytosis    Rib fracture       Past Medical History  Past Medical History:   Diagnosis Date    Asthma     Epilepsy ([facility] )     stopped meds 2006 has been seizure free        Past Surgical History  Past Surgical History:   Procedure Laterality Date    APPENDECTOMY      CRANIOTOMY Left 5/11/2021    Procedure: Left temporal craniotomy with image guidance and 5-ALA for resection of mass;  Surgeon: Gavin Gavin MD;  [facility]: [facility];  Service: Neurosurgery    MANDIBLE SURGERY      AL COLONOSCOPY FLX DX W/COLLJ SPEC WHEN PFRMD N/A 4/4/2016    Procedure: COLONOSCOPY;  Surgeon: Tanja Martinez MD;  Location: [facility]; Service: Gastroenterology    SKULL FRACTURE ELEVATION      TONSILLECTOMY           05/12/21 0846   PT Last Visit   PT Visit Date 05/12/21   Note Type   Note type Evaluation   Pain Assessment   Pain Assessment Tool FLACC   Pain Location/Orientation Location: Head   Pain Rating: FLACC (Rest) - Face 0   Pain Rating: FLACC (Rest) - Legs 0   Pain Rating: FLACC (Rest) - Activity 0   Pain Rating: FLACC (Rest) - Cry 0   Pain Rating: FLACC (Rest) - Consolability 0   Score: FLACC (Rest) 0   Pain Rating: FLACC (Activity) - Face 1   Pain Rating: FLACC (Activity) - Legs 0   Pain Rating: FLACC (Activity) - Activity 0   Pain Rating: FLACC (Activity) - Cry 1   Pain Rating: FLACC (Activity) - Consolability 0   Score: FLACC (Activity) 2   Home Living   Type of Home House   Home Layout Multi-level   Additional Comments Pt lives in a [place] with his wife  Pt's bed and bath are on the 2nd floor   Pt has 13 HUNTER and 20 steps to the 2nd floor  Pt was I for ADL's and mobility prior  Pt did not us any DME prior  Pt's wife drives him to appointments  Confirmed with CM note  Prior Function   Level of Esmeralda Independent with ADLs and functional mobility   Lives With Spouse   Receives Help From Family   ADL Assistance Independent   IADLs Independent   Vocational Retired   Restrictions/Precautions   [facility] Bearing Precautions Per Order No   Other Precautions Cognitive; Chair Alarm; Bed Alarm; Fall Risk;Multiple lines;Telemetry;O2  (aphasia)   General   Family/Caregiver Present No   Cognition   Overall Cognitive Status WFL   Arousal/Participation Alert   Attention Within functional limits   Orientation Level Oriented X4   RLE Assessment   RLE Assessment   (grossly 3/5)   LLE Assessment   LLE Assessment   (groslly 3 5)   Bed Mobility   Supine to Sit 3  Moderate assistance   Additional items Assist x 1   Additional Comments sitting EOB at a min A level   Transfers   Sit to Stand 3  Moderate assistance   Additional items Assist x 2   Stand to Sit 3  Moderate assistance   Additional items Assist x 2   Ambulation/Elevation   Gait pattern Excessively slow; Step to; Foward flexed; Shuffling   Gait Assistance 3  Moderate assist   Additional items Assist x 1   Assistive Device Other (Comment)  (B UE HHA)   Distance 3ft to chair   Balance   Static Sitting Fair +   Dynamic Sitting Fair   Static Standing Fair   Dynamic Standing Fair -   Ambulatory Poor +   Endurance Deficit   Endurance Deficit Yes   Activity Tolerance   Activity Tolerance Patient limited by fatigue;Patient limited by pain   Medical Staff Made Aware OT   Nurse Made Aware nurse approved therapy session   Assessment   Prognosis Good   Problem List Decreased strength;Decreased endurance; Impaired balance;Decreased mobility; Decreased cognition;Pain   Assessment Pt is a 79 yo male admitted to [place] [address] on 5/11/2021 s/p speech problem   Pt had surgery on 5/11 for L temporal craniotomy with resection of mass on L  Dx: brain mass, brain compression, cerebral edema, rib fracture, leukocytosis, COPD, morbid obesity, 7th rib nondisplaced fracture  Two patient identifiers were used to confirm  Pt lives in a [facility] with 13 HUN[address] and 20 steps to the second floor  Pt was I for ADL's and IADL's prior  Pt does not use any DME  Pt transports patient to doctors appointment  Pt's impairments include reduced mobility, high risk of falling, poor activity tolerance, flat affect, gait abnormalities  These impairments limit the ability of the patient to perform mobility without increased assistance, return to PLOF and participate in everyday life activities  Pt would benefit from continued skilled therapy while in the hospital to improve overall mobility and work towards a safe d/c  Recommend discharge to rehab  At the end of the session the patient was left in seated position with call bell and phone within reach  The patient's AM-PAC Basic Mobility Inpatient Short Form Low Function Raw Score 17 , Standardized Score is 27 46  A standardized score less 42 9 suggests the patient may benefit from discharge to post-acute rehab services  Please also refer to the recommendation of the Physical Therapist for safe discharge planning  Chair alarm on  Unable to leave room at this time due to being on light precautions  Barriers to Discharge Inaccessible home environment;Decreased caregiver support   Goals   STG Expiration Date 05/26/21   Short Term Goal #1 STG 1: Pt will perform transfers at a MI level to return to baseline of function  STG 2: Pt will ambulate 300ft with RW at a MI level to reduce the level of assistance needed upon d/c home  STG 3: Pt will negotiate 20 steps with HR at a MI level to ensure safety with activity when able to ambulate 50ft at a min A level  STG 4: Pt will perform bed mobility at a I to safety return to PLOF      PT Treatment Day 0   Plan   Treatment/Interventions Functional transfer training;LE strengthening/ROM; Therapeutic exercise; Endurance training;Elevations;Gait training;Bed mobility   PT Frequency Other (Comment)  (3-6xwk)   Recommendation   PT Discharge Recommendation Post acute rehabilitation services   Equipment Recommended [facility] Recommended Wheeled walker   PT - OK to Discharge Yes   Additional Comments if to rehab, no if home    [address] in Bed Without Bedrails 2   Lying on Back to Sitting on Edge of Flat Bed 2   Moving Bed to Chair 2   Standing Up From Chair 1   Walk in Room 2   Climb 3-5 Stairs 1   Basic Mobility Inpatient Raw Score 10   Turning Head Towards Sound 4   Follow Simple Instructions 3   Low Function Basic Mobility Raw Score 17   Low Function Basic Mobility Standardized Score 27 46   Denae Hankins, Pt, DPT

## 2021-05-13 PROCEDURE — 99024 POSTOP FOLLOW-UP VISIT: CPT | Performed by: PHYSICIAN ASSISTANT

## 2021-05-13 PROCEDURE — 97116 GAIT TRAINING THERAPY: CPT

## 2021-05-13 PROCEDURE — 99232 SBSQ HOSP IP/OBS MODERATE 35: CPT | Performed by: PHYSICIAN ASSISTANT

## 2021-05-13 RX ADMIN — SENNOSIDES 8.6 MG: 8.6 TABLET ORAL at 22:27

## 2021-05-13 RX ADMIN — LABETALOL 20 MG/4 ML (5 MG/ML) INTRAVENOUS SYRINGE 10 MG: at 22:28

## 2021-05-13 RX ADMIN — ENOXAPARIN SODIUM 40 MG: 40 INJECTION SUBCUTANEOUS at 08:13

## 2021-05-13 RX ADMIN — ACETAMINOPHEN 975 MG: 325 TABLET, FILM COATED ORAL at 05:45

## 2021-05-13 RX ADMIN — METHOCARBAMOL 500 MG: 500 TABLET, FILM COATED ORAL at 05:45

## 2021-05-13 RX ADMIN — FAMOTIDINE 20 MG: 20 TABLET ORAL at 19:06

## 2021-05-13 RX ADMIN — LABETALOL 20 MG/4 ML (5 MG/ML) INTRAVENOUS SYRINGE 10 MG: at 10:47

## 2021-05-13 RX ADMIN — DOCUSATE SODIUM 100 MG: 100 CAPSULE, LIQUID FILLED ORAL at 19:06

## 2021-05-13 RX ADMIN — DEXAMETHASONE 4 MG: 4 TABLET ORAL at 05:45

## 2021-05-13 RX ADMIN — LABETALOL 20 MG/4 ML (5 MG/ML) INTRAVENOUS SYRINGE 10 MG: at 02:27

## 2021-05-13 RX ADMIN — LABETALOL 20 MG/4 ML (5 MG/ML) INTRAVENOUS SYRINGE 10 MG: at 13:30

## 2021-05-13 RX ADMIN — FAMOTIDINE 20 MG: 20 TABLET ORAL at 08:13

## 2021-05-13 RX ADMIN — LABETALOL 20 MG/4 ML (5 MG/ML) INTRAVENOUS SYRINGE 10 MG: at 06:20

## 2021-05-13 RX ADMIN — DEXAMETHASONE 4 MG: 4 TABLET ORAL at 13:30

## 2021-05-13 RX ADMIN — DOCUSATE SODIUM 100 MG: 100 CAPSULE, LIQUID FILLED ORAL at 08:13

## 2021-05-13 RX ADMIN — METHOCARBAMOL 500 MG: 500 TABLET, FILM COATED ORAL at 13:30

## 2021-05-13 RX ADMIN — DEXAMETHASONE 4 MG: 4 TABLET ORAL at 22:27

## 2021-05-13 RX ADMIN — LABETALOL 20 MG/4 ML (5 MG/ML) INTRAVENOUS SYRINGE 10 MG: at 19:06

## 2021-05-13 RX ADMIN — OXYCODONE HYDROCHLORIDE 5 MG: 5 TABLET ORAL at 08:13

## 2021-05-13 RX ADMIN — METHOCARBAMOL 500 MG: 500 TABLET, FILM COATED ORAL at 23:53

## 2021-05-13 RX ADMIN — Medication 1 TABLET: at 08:13

## 2021-05-13 RX ADMIN — METHOCARBAMOL 500 MG: 500 TABLET, FILM COATED ORAL at 19:06

## 2021-05-13 RX ADMIN — ACETAMINOPHEN 975 MG: 325 TABLET, FILM COATED ORAL at 13:30

## 2021-05-13 RX ADMIN — ACETAMINOPHEN 975 MG: 325 TABLET, FILM COATED ORAL at 22:27

## 2021-05-13 RX ADMIN — LEVETIRACETAM 500 MG: 500 TABLET, FILM COATED ORAL at 08:13

## 2021-05-13 RX ADMIN — LEVETIRACETAM 500 MG: 500 TABLET, FILM COATED ORAL at 22:27

## 2021-05-13 NOTE — PROGRESS NOTES
[address] [facility]  Progress Note Shandra Allen 1950, 80 y o  male MRN: [id number]  Unit/Bed#: 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  Left to right midline shift of 2-3 mm  · S/p left temporal craniotomy for tumor resection with 5-ALA per neurosurgery (05/11/2021)  · Pathology:  Pending  · CT chest, abdomen, pelvis: negative for any other primary sources  · Continue with oral Decadron taper per Neurosurgery recommendations  · Continue Keppra 500 mg BID for seizure prophylaxis x7 days  · PT/OT recommends acute rehab  · Order PMR consult  Rib fracture  Assessment & Plan  · S/p fall  · Xray ribs (04/27/21): No evidence of fracture  · CT chest abdomen and pelvis (05/07/2021): Left lateral 7th rib nondisplaced fracture which could be subacute or acute  · Conservative management with pain control  · Encourage incentive spirometry  COPD (chronic obstructive pulmonary disease) ([facility])  Assessment & Plan  · Not in acute exacerbation  · Continue respiratory protocol  · Patient's home inhalers are non formulary  Family can bring in from home if he wishes she use them  Otherwise continue non formulary alternatives  Obstructive sleep apnea  Assessment & Plan  · Non compliant with BiPAP      Morbid obesity ([facility] [address] )  Assessment & Plan  · Dietary and lifestyle modification advised  · Patient with history of obstructive sleep apnea but do not use BiPAP  Essential hypertension  Assessment & Plan  · Blood pressures are acceptable off all medications at this time  · Continue to monitor  VTE Pharmacologic Prophylaxis: VTE Score: 6 High Risk (Score >/= 5) - Pharmacological DVT Prophylaxis Ordered: enoxaparin (Lovenox)  Sequential Compression Devices Ordered  Patient Centered Rounds: I performed bedside rounds with nursing staff today  Discussions with Specialists or Other Care Team Provider:  Neurosurgery    Education and Discussions with Family / Patient: Updated  (wife) at bedside  Time Spent for Care: 30 minutes  More than 50% of total time spent on counseling and coordination of care as described above  Current Length of Stay: 7 day(s)  Current Patient Status: Inpatient   Certification Statement: The patient will continue to require additional inpatient hospital stay due to Neurosurgical clearance  Discharge Plan: Anticipate discharge tomorrow to Acute rehab    Code Status: Level 1 - Full Code    Subjective:   Patient was released from the critical care unit yesterday  Today, according to his wife who is at bedside, appears to be slightly improved in regards to word-finding difficulty  The patient himself denies any headache, dizziness, lightheadedness, vision disturbance, sensory or motor changes  Objective:     Vitals:   Temp (24hrs), Av 1 °F (36 7 °C), Min:97 4 °F (36 3 °C), Max:98 7 °F (37 1 °C)    Temp:  [97 4 °F (36 3 °C)-98 7 °F (37 1 °C)] 97 4 °F (36 3 °C)  HR:  [70-81] 76  Resp:  [14-22] 19  BP: (128-175)/(65-81) 155/72  SpO2:  [91 %-95 %] 93 %  Body mass index is 41 04 kg/m²  Input and Output Summary (last 24 hours):      Intake/Output Summary (Last 24 hours) at 2021 1414  Last data filed at 2021 1254  Gross per 24 hour   Intake 627 5 ml   Output 1350 ml   Net -722 5 ml       Physical Exam:   Physical Exam  Constitutional:       General: He is not in acute distress  Appearance: Normal appearance  He is obese  HENT:      Head: Normocephalic and atraumatic  Mouth/Throat:      Mouth: Mucous membranes are moist    Eyes:      General: No scleral icterus  Extraocular Movements: Extraocular movements intact  Neck:      Musculoskeletal: Normal range of motion  Cardiovascular:      Rate and Rhythm: Normal rate and regular rhythm  Heart sounds: No murmur  Pulmonary:      Effort: Pulmonary effort is normal  No respiratory distress  Breath sounds: Normal breath sounds  No wheezing, rhonchi or rales  Abdominal:      General: Bowel sounds are normal       Palpations: Abdomen is soft  Tenderness: There is no abdominal tenderness  Musculoskeletal: Normal range of motion  Skin:     General: Skin is warm  Findings: No rash  Neurological:      General: No focal deficit present  Mental Status: He is alert and oriented to person, place, and time  Psychiatric:         Mood and Affect: Mood normal          Behavior: Behavior normal           Additional Data:     Labs:  Results from last 7 days   Lab Units 05/12/21  0512   WBC Thousand/uL 17 05*   HEMOGLOBIN g/dL 14 3   HEMATOCRIT % 42 7   PLATELETS Thousands/uL 262     Results from last 7 days   Lab Units 05/12/21  0512   SODIUM mmol/L 142   POTASSIUM mmol/L 4 0   CHLORIDE mmol/L 112*   CO2 mmol/L 22   BUN mg/dL 18   CREATININE mg/dL 1 07   ANION GAP mmol/L 8   CALCIUM mg/dL 8 6   GLUCOSE RANDOM mg/dL 156*     Results from last 7 days   Lab Units 05/12/21  0512   INR  1 06     Results from last 7 days   Lab Units 05/06/21  1957   POC GLUCOSE mg/dl 91               Lines/Drains:  Invasive Devices     Peripheral Intravenous Line            Peripheral IV 05/11/21 Left Wrist 2 days    Peripheral IV 05/11/21 Right Hand 2 days                      Imaging: No pertinent imaging reviewed      Recent Cultures (last 7 days):         Last 24 Hours Medication List:   Current Facility-Administered Medications   Medication Dose Route Frequency Provider Last Rate    acetaminophen  975 mg Oral Q8H [address] [place] Anthony, DO      dexamethasone  4 mg Oral Q8H [address] [place] Anthony, DO      Followed by   Laverta Rolls ON 5/16/2021] dexamethasone  2 mg Oral Q6H [address] [place] Anthony, DO      Followed by   Laverta Rolls ON 5/19/2021] dexamethasone  2 mg Oral Q8H [address] [place] Anthony, DO      Followed by   Laverta Rolls ON 5/22/2021] dexamethasone  2 mg Oral Q12H [address] [place] Anthony, DO      Followed by   Laverta Rolls ON 5/25/2021] dexamethasone  2 mg Oral Daily [place], DO      docusate sodium  100 mg Oral BID [place], DO      enoxaparin  40 mg Subcutaneous Q24H [address] [place] Anthony, DO      famotidine  20 mg Oral BID [place], DO      fluticasone-vilanterol  1 puff Inhalation Daily [facility] [place]      hydrALAZINE  10 mg Intravenous Q4H PRN [place], DO      HYDROmorphone  0 2 mg Intravenous Q1H PRN [place], DO      Labetalol HCl  10 mg Intravenous Q4H [place], DO      levETIRAcetam  500 mg Oral Q12H [address] [place], DO      lidocaine  1 patch Topical Daily [place], DO      methocarbamol  500 mg Oral Q6H [address] [place], DO      multivitamin-minerals  1 tablet Oral Daily [place], DO      niCARdipine  1-15 mg/hr Intravenous Titrated [place], DO Stopped (05/12/21 1710)    ondansetron  4 mg Intravenous Q6H PRN [place], DO      oxyCODONE  2 5 mg Oral Q4H PRN [place], DO      oxyCODONE  5 mg Oral Q4H PRN [place], DO      polyethylene glycol  17 g Oral Daily [place], DO      senna  1 tablet Oral HS [place], DO          Today, Patient Was Seen By: Alexander Armenta PA-C    **Please Note: This note may have been constructed using a voice recognition system  **

## 2021-05-13 NOTE — PROGRESS NOTES
[address] [facility]  Progress Note Navid Allen 1950, 80 y o  male MRN: [id number]  Unit/Bed#: [facility] 624-01 Encounter: [id number]  Primary Care Provider: Angi Hargrove PA-C   Date and time admitted to hospital: 5/6/2021  7:32 PM    * Brain mass  Assessment & Plan  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  · Patient presents with a few day history of speech difficulties, had prior fall 3 weeks ago with positive head strike  · MOCA 25/30    Imaging reviewed personally and with attending, results are as follows:  · MRI brain 05/11/2021:  Status post resection of left temporal lobe mass with postoperative changes including mild  Peripheral ischemia  Improved surrounding vasogenic edema  Plan:  · Ongoing frequent neurologic checks  · Recommend stat CT head for decline in GCS greater than 2 points in 1 hour  · Decadron 4mg g q 6 hours with 72 hour taper ordered  · Postoperative antibiotics completed  · Keppra 500 mg b i d  for seizure prophylaxis x7 days  · MRI brain completed  · DVT prophylaxis:  SCDs, Lovenox   · TIM drain removed 5/12/21  · Voiding well  · Physical therapy/ occupational therapy evaluation  · Ongoing medical management per primary team     · Neurosurgery will continue to follow with anticipated clearance for DC tomorrow  Call with any questions or concerns  Neurosurgery will continue to follow  Please call with questions or concerns  Brain compression [facility])  Assessment & Plan  · See plan above  · Seen on CT and MRI       Cerebral edema [facility])  Assessment & Plan  · See plan above  · Evident on CT and MR imaging  Subjective/Objective   Chief Complaint: None     Subjective: patient overall doing very well today  He denies any headaches, change in vision, weakness, tingling dizziness  Patient still has some mild trouble with speech but states slightly better compared to yesterday    Patient urinating without difficulty  Passing gas  Tolerating p o  Intake  No acute issues overnight per nursing staff  Objective:  Sitting in bedside chair no acute distress  I/O       05/11 0701 - 05/12 0700 05/12 0701 - 05/13 0700 05/13 0701 - 05/14 0700    P  O  598 360     I V  (mL/kg) 4523 3 (34 8) 1107 5 (8 5)     IV Piggyback 790      Total Intake(mL/kg) 5911 3 (45 5) 1467 5 (11 3)     Urine (mL/kg/hr) 4425 (1 4) 1350 (0 4)     Drains 145 15     Blood 50      Total Output 4620 1365     Net +1291 3 +102 5            Unmeasured Urine Occurrence  1 x           Invasive Devices     Peripheral Intravenous Line            Peripheral IV 05/11/21 Left Wrist 2 days    Peripheral IV 05/11/21 Right Hand 2 days                Physical Exam:  Vitals: Blood pressure 158/81, pulse 76, temperature (!) 97 4 °F (36 3 °C), resp  rate 19, height 5' 10" (1 778 m), weight 130 kg (286 lb), SpO2 93 %  ,Body mass index is 41 04 kg/m²  General appearance: alert, appears stated age, cooperative and no distress  Head: Normocephalic, left temporal craniotomy incision appears to be healing well  No active bleeding  Eyes: EOMI, PERRL  Neck: supple, symmetrical, trachea midline   Back: no kyphosis present, no tenderness to percussion or palpation  Lungs: non labored breathing  Heart: regular heart rate  Neurologic:   Mental status: Alert, oriented x3, thought content appropriate  Cranial nerves: grossly intact (Cranial nerves II-XII)  Sensory: normal to light touch  Motor: moving all extremities without focal weakness 5/5 strength throughout    Coordination: finger to nose normal bilaterally, no drift bilaterally    Lab Results:  Results from last 7 days   Lab Units 05/12/21  0512 05/11/21  1508 05/11/21  1129  05/10/21  0602   WBC Thousand/uL 17 05* 16 51*  --   --  10 98*   HEMOGLOBIN g/dL 14 3 14 2  --   --  15 4   I STAT HEMOGLOBIN g/dl  --   --  13 3   < >  --    HEMATOCRIT % 42 7 42 4  --   --  47 2   HEMATOCRIT, ISTAT %  --   --  39   < > --    PLATELETS Thousands/uL 262 262  --   --  246    < > = values in this interval not displayed  Results from last 7 days   Lab Units 05/12/21  0512 05/11/21  1508 05/11/21  1129 05/11/21  1023  05/09/21  0550   POTASSIUM mmol/L 4 0 4 1  --   --   --  4 0   CHLORIDE mmol/L 112* 113*  --   --   --  110*   CO2 mmol/L 22 21  --   --   --  24   CO2, I-STAT mmol/L  --   --  23 23   < >  --    BUN mg/dL 18 20  --   --   --  24   CREATININE mg/dL 1 07 1 13  --   --   --  1 02   CALCIUM mg/dL 8 6 8 7  --   --   --  8 9   GLUCOSE, ISTAT mg/dl  --   --  146* 170*  --   --     < > = values in this interval not displayed  Results from last 7 days   Lab Units 05/12/21  0512 05/11/21  1508   MAGNESIUM mg/dL 2 6 2 5     Results from last 7 days   Lab Units 05/12/21  0512 05/11/21  1508   PHOSPHORUS mg/dL 2 9 3 5     Results from last 7 days   Lab Units 05/12/21  0512 05/06/21  1954   INR  1 06 0 97   PTT seconds 28 32     No results found for: TROPONINT  ABG:No results found for: PHART, EEB8YBU, PO2ART, KMX5IBJ, S1TYPCTZ, BEART, SOURCE    Imaging Studies: I have personally reviewed pertinent reports  and I have personally reviewed pertinent films in PACS  Cta Head And Neck With And Without Contrast    Result Date: 5/6/2021  Impression: 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 and left hemispheric mass effect causing approximately 3 mm left to right midline shift  Contrast-enhanced brain MRI is recommended for further evaluation  No evidence of flow restrictive large vessel occlusive disease  I personally discussed this study with Valerie Barbour on 5/6/2021 at 9:40 PM  Workstation performed: [id number]     Mri Brain W Wo Contrast    Result Date: 5/12/2021  Impression: Status post resection of left temporal lobe mass with postoperative change as described above, including mild peripheral ischemia  There is improving surrounding vasogenic edema   Workstation performed: [id number]     Mri Brain W Wo Contrast    Result Date: 5/7/2021  Impression: 1  Rim-enhancing left temporal parietal mass measuring 5 cm with significant surrounding vasogenic edema, 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

## 2021-05-13 NOTE — PLAN OF CARE
Problem: PHYSICAL THERAPY ADULT  Goal: Performs mobility at highest level of function for planned discharge setting  See evaluation for individualized goals  Description: Treatment/Interventions: Functional transfer training, LE strengthening/ROM, Therapeutic exercise, Endurance training, Elevations, Gait training, Bed mobility  Equipment Recommended: Walker       See flowsheet documentation for full assessment, interventions and recommendations  Outcome: Progressing  Note: Prognosis: Good  Problem List: Decreased strength, Decreased endurance, Impaired balance, Decreased mobility, Decreased cognition, Pain  Assessment: Pt demonstrated significantly improved endurance this session, but remains below baseline mobiilty, requiring RW to maintain balance with all OOB mobiilty  Pt demonstrated 1 LOB upon standing to don pants EOB, requiring increased assist, then required frequent direction for obstacle avoidance when turning & navigating obstacles in hallway  Pt ambualted with forward flexed posture & frequent downward gaze, which limited his environmental awareness  RW adjusted post session to improve posture with subsequent ambulatory tasks  Pt demonstrated poor safety awarenss when returning to room, abandoning RW prior to sitting, but did so without LOB  Randalline Fatuma observed upon sitting, but pt able to maintain conversation & did not report any change in symptoms  Remained on 2L O2 nc throughout session  RN informed & present outside room post session  At this time, continue to recommend rehab to improve pt safety awareness, balance, endurance, and proficiency with all mobiilty tasks to ensure safe return to PLOF  May be appropriate for stair trial in upcoming sessions to allow access to home & community  Pt has full flights of steps to enter & navigave floors at home    Barriers to Discharge: Inaccessible home environment, Decreased caregiver support        PT Discharge Recommendation: Post acute rehabilitation services     PT - OK to Discharge: Yes    See flowsheet documentation for full assessment

## 2021-05-13 NOTE — CASE MANAGEMENT
A post acute care recommendation was made by your care team for [facility]  Discussed Freedom of Choice with patient  List of facilities given to patient via in person  patient aware the list is custom filtered for them by zip code location and that [facility] post acute providers are designated  Patient 1st choice BE [facility]

## 2021-05-13 NOTE — ASSESSMENT & PLAN NOTE
· S/p fall  · Xray ribs (04/27/21): No evidence of fracture  · CT chest abdomen and pelvis (05/07/2021): Left lateral 7th rib nondisplaced fracture which could be subacute or acute  · Conservative management with pain control  · Encourage incentive spirometry

## 2021-05-13 NOTE — PLAN OF CARE
Problem: Potential for Falls  Goal: Patient will remain free of falls  Description: INTERVENTIONS:  - Assess patient frequently for physical needs  -  Identify cognitive and physical deficits and behaviors that affect risk of falls    -  [place] fall precautions as indicated by assessment   - Educate patient/family on patient safety including physical limitations  - Instruct patient to call for assistance with activity based on assessment  - Modify environment to reduce risk of injury  - Consider OT/PT consult to assist with strengthening/mobility  Outcome: Progressing     Problem: PAIN - ADULT  Goal: Verbalizes/displays adequate comfort level or baseline comfort level  Description: Interventions:  - Encourage patient to monitor pain and request assistance  - Assess pain using appropriate pain scale  - Administer analgesics based on type and severity of pain and evaluate response  - Implement non-pharmacological measures as appropriate and evaluate response  - Consider cultural and social influences on pain and pain management  - Notify physician/advanced practitioner if interventions unsuccessful or patient reports new pain  Outcome: Progressing     Problem: INFECTION - ADULT  Goal: Absence or prevention of progression during hospitalization  Description: INTERVENTIONS:  - Assess and monitor for signs and symptoms of infection  - Monitor lab/diagnostic results  - Monitor all insertion sites, i e  indwelling lines, tubes, and drains  - Monitor endotracheal if appropriate and nasal secretions for changes in amount and color  - [place] appropriate cooling/warming therapies per order  - Administer medications as ordered  - Instruct and encourage patient and family to use good hand hygiene technique  - Identify and instruct in appropriate isolation precautions for identified infection/condition  Outcome: Progressing  Goal: Absence of fever/infection during neutropenic period  Description: INTERVENTIONS:  - Monitor WBC    Outcome: Progressing     Problem: SAFETY ADULT  Goal: Patient will remain free of falls  Description: INTERVENTIONS:  - Assess patient frequently for physical needs  -  Identify cognitive and physical deficits and behaviors that affect risk of falls    -  [place] fall precautions as indicated by assessment   - Educate patient/family on patient safety including physical limitations  - Instruct patient to call for assistance with activity based on assessment  - Modify environment to reduce risk of injury  - Consider OT/PT consult to assist with strengthening/mobility  Outcome: Progressing  Goal: Maintain or return to baseline ADL function  Description: INTERVENTIONS:  -  Assess patient's ability to carry out ADLs; assess patient's baseline for ADL function and identify physical deficits which impact ability to perform ADLs (bathing, care of mouth/teeth, toileting, grooming, dressing, etc )  - Assess/evaluate cause of self-care deficits   - Assess range of motion  - Assess patient's mobility; develop plan if impaired  - Assess patient's need for assistive devices and provide as appropriate  - Encourage maximum independence but intervene and supervise when necessary  - Involve family in performance of ADLs  - Assess for home care needs following discharge   - Consider OT consult to assist with ADL evaluation and planning for discharge  - Provide patient education as appropriate  Outcome: Progressing  Goal: Maintain or return mobility status to optimal level  Description: INTERVENTIONS:  - Assess patient's baseline mobility status (ambulation, transfers, stairs, etc )    - Identify cognitive and physical deficits and behaviors that affect mobility  - Identify mobility aids required to assist with transfers and/or ambulation (gait belt, sit-to-stand, lift, walker, cane, etc )  - [place] fall precautions as indicated by assessment  - Record patient progress and toleration of activity level on Mobility SBAR; progress patient to next Phase/Stage  - Instruct patient to call for assistance with activity based on assessment  - Consider rehabilitation consult to assist with strengthening/weightbearing, etc   Outcome: Progressing     Problem: DISCHARGE PLANNING  Goal: Discharge to home or other facility with appropriate resources  Description: INTERVENTIONS:  - Identify barriers to discharge w/patient and caregiver  - Arrange for needed discharge resources and transportation as appropriate  - Identify discharge learning needs (meds, wound care, etc )  - Arrange for interpretive services to assist at discharge as needed  - Refer to Case Management Department for coordinating discharge planning if the patient needs post-hospital services based on physician/advanced practitioner order or complex needs related to functional status, cognitive ability, or social support system  Outcome: Progressing     Problem: Knowledge Deficit  Goal: Patient/family/caregiver demonstrates understanding of disease process, treatment plan, medications, and discharge instructions  Description: Complete learning assessment and assess knowledge base    Interventions:  - Provide teaching at level of understanding  - Provide teaching via preferred learning methods  Outcome: Progressing     Problem: NEUROSENSORY - ADULT  Goal: Achieves stable or improved neurological status  Description: INTERVENTIONS  - Monitor and report changes in neurological status  - Monitor vital signs such as temperature, blood pressure, glucose, and any other labs ordered   - Initiate measures to prevent increased intracranial pressure  - Monitor for seizure activity and implement precautions if appropriate      Outcome: Progressing  Goal: Remains free of injury related to seizures activity  Description: INTERVENTIONS  - Maintain airway, patient safety  and administer oxygen as ordered  - Monitor patient for seizure activity, document and report duration and description of seizure to physician/advanced practitioner  - If seizure occurs,  ensure patient safety during seizure  - Reorient patient post seizure  - Seizure pads on all 4 side rails  - Instruct patient/family to notify RN of any seizure activity including if an aura is experienced  - Instruct patient/family to call for assistance with activity based on nursing assessment  - Administer anti-seizure medications if ordered    Outcome: Progressing  Goal: Achieves maximal functionality and self care  Description: INTERVENTIONS  - Monitor swallowing and airway patency with patient fatigue and changes in neurological status  - Encourage and assist patient to increase activity and self care     - Encourage visually impaired, hearing impaired and aphasic patients to use assistive/communication devices  Outcome: Progressing     Problem: CARDIOVASCULAR - ADULT  Goal: Maintains optimal cardiac output and hemodynamic stability  Description: INTERVENTIONS:  - Monitor I/O, vital signs and rhythm  - Monitor for S/S and trends of decreased cardiac output  - Administer and titrate ordered vasoactive medications to optimize hemodynamic stability  - Assess quality of pulses, skin color and temperature  - Assess for signs of decreased coronary artery perfusion  - Instruct patient to report change in severity of symptoms  Outcome: Progressing  Goal: Absence of cardiac dysrhythmias or at baseline rhythm  Description: INTERVENTIONS:  - Continuous cardiac monitoring, vital signs, obtain 12 lead EKG if ordered  - Administer antiarrhythmic and heart rate control medications as ordered  - Monitor electrolytes and administer replacement therapy as ordered  Outcome: Progressing     Problem: RESPIRATORY - ADULT  Goal: Achieves optimal ventilation and oxygenation  Description: INTERVENTIONS:  - Assess for changes in respiratory status  - Assess for changes in mentation and behavior  - Position to facilitate oxygenation and minimize respiratory effort  - Oxygen administered by appropriate delivery if ordered  - Initiate smoking cessation education as indicated  - Encourage broncho-pulmonary hygiene including cough, deep breathe, Incentive Spirometry  - Assess the need for suctioning and aspirate as needed  - Assess and instruct to report SOB or any respiratory difficulty  - Respiratory Therapy support as indicated  Outcome: Progressing     Problem: GASTROINTESTINAL - ADULT  Goal: Minimal or absence of nausea and/or vomiting  Description: INTERVENTIONS:  - Administer IV fluids if ordered to ensure adequate hydration  - Maintain NPO status until nausea and vomiting are resolved  - Nasogastric tube if ordered  - Administer ordered antiemetic medications as needed  - Provide nonpharmacologic comfort measures as appropriate  - Advance diet as tolerated, if ordered  - Consider nutrition services referral to assist patient with adequate nutrition and appropriate food choices  Outcome: Progressing  Goal: Maintains or returns to baseline bowel function  Description: INTERVENTIONS:  - Assess bowel function  - Encourage oral fluids to ensure adequate hydration  - Administer IV fluids if ordered to ensure adequate hydration  - Administer ordered medications as needed  - Encourage mobilization and activity  - Consider nutritional services referral to assist patient with adequate nutrition and appropriate food choices  Outcome: Progressing  Goal: Maintains adequate nutritional intake  Description: INTERVENTIONS:  - Monitor percentage of each meal consumed  - Identify factors contributing to decreased intake, treat as appropriate  - Assist with meals as needed  - Monitor I&O, weight, and lab values if indicated  - Obtain nutrition services referral as needed  Outcome: Progressing     Problem: GENITOURINARY - ADULT  Goal: Maintains or returns to baseline urinary function  Description: INTERVENTIONS:  - Assess urinary function  - Encourage oral fluids to ensure adequate hydration if ordered  - Administer IV fluids as ordered to ensure adequate hydration  - Administer ordered medications as needed  - Offer frequent toileting  - Follow urinary retention protocol if ordered  Outcome: Progressing  Goal: Absence of urinary retention  Description: INTERVENTIONS:  - Assess patients ability to void and empty bladder  - Monitor I/O  - Bladder scan as needed  - Discuss with physician/AP medications to alleviate retention as needed  - Discuss catheterization for long term situations as appropriate  Outcome: Progressing     Problem: SKIN/TISSUE INTEGRITY - ADULT  Goal: Skin integrity remains intact  Description: INTERVENTIONS  - Identify patients at risk for skin breakdown  - Assess and monitor skin integrity  - Assess and monitor nutrition and hydration status  - Monitor labs (i e  albumin)  - Assess for incontinence   - Turn and reposition patient  - Assist with mobility/ambulation  - Relieve pressure over bony prominences  - Avoid friction and shearing  - Provide appropriate hygiene as needed including keeping skin clean and dry  - Evaluate need for skin moisturizer/barrier cream  - Collaborate with interdisciplinary team (i e  Nutrition, Rehabilitation, etc )   - Patient/family teaching  Outcome: Progressing     Problem: Nutrition/Hydration-ADULT  Goal: Nutrient/Hydration intake appropriate for improving, restoring or maintaining nutritional needs  Description: Monitor and assess patient's nutrition/hydration status for malnutrition  Collaborate with interdisciplinary team and initiate plan and interventions as ordered  Monitor patient's weight and dietary intake as ordered or per policy  Utilize nutrition screening tool and intervene as necessary  Determine patient's food preferences and provide high-protein, high-caloric foods as appropriate       INTERVENTIONS:  - Monitor oral intake, urinary output, labs, and treatment plans  - Assess nutrition and hydration status and recommend course of action  - Evaluate amount of meals eaten  - Assist patient with eating if necessary   - Allow adequate time for meals  - Recommend/ encourage appropriate diets, oral nutritional supplements, and vitamin/mineral supplements  - Order, calculate, and assess calorie counts as needed  - Recommend, monitor, and adjust tube feedings and TPN/PPN based on assessed needs  - Assess need for intravenous fluids  - Provide specific nutrition/hydration education as appropriate  - Include patient/family/caregiver in decisions related to nutrition  Outcome: Progressing     Problem: Prexisting or High Potential for Compromised Skin Integrity  Goal: Skin integrity is maintained or improved  Description: INTERVENTIONS:  - Identify patients at risk for skin breakdown  - Assess and monitor skin integrity  - Assess and monitor nutrition and hydration status  - Monitor labs   - Assess for incontinence   - Turn and reposition patient  - Assist with mobility/ambulation  - Relieve pressure over bony prominences  - Avoid friction and shearing  - Provide appropriate hygiene as needed including keeping skin clean and dry  - Evaluate need for skin moisturizer/barrier cream  - Collaborate with interdisciplinary team   - Patient/family teaching  - Consider wound care consult   Outcome: Progressing

## 2021-05-13 NOTE — ASSESSMENT & PLAN NOTE
· Not in acute exacerbation  · Continue respiratory protocol  · Patient's home inhalers are non formulary  Family can bring in from home if he wishes she use them  Otherwise continue non formulary alternatives

## 2021-05-13 NOTE — ASSESSMENT & PLAN NOTE
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  · Patient presents with a few day history of speech difficulties, had prior fall 3 weeks ago with positive head strike  · MOCA 25/30    Imaging reviewed personally and with attending, results are as follows:  · MRI brain 05/11/2021:  Status post resection of left temporal lobe mass with postoperative changes including mild  Peripheral ischemia  Improved surrounding vasogenic edema  Plan:  · Ongoing frequent neurologic checks  · Recommend stat CT head for decline in GCS greater than 2 points in 1 hour  · Decadron 4mg g q 6 hours with 72 hour taper ordered  · Postoperative antibiotics completed  · Keppra 500 mg b i d  for seizure prophylaxis x7 days  · MRI brain completed  · DVT prophylaxis:  SCDs, Lovenox   · TIM drain removed 5/12/21  · Voiding well  · Physical therapy/ occupational therapy evaluation  · Ongoing medical management per primary team     · Neurosurgery will continue to follow with anticipated clearance for DC tomorrow  Call with any questions or concerns  Neurosurgery will continue to follow  Please call with questions or concerns

## 2021-05-13 NOTE — ASSESSMENT & PLAN NOTE
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

## 2021-05-13 NOTE — [facility] ADMISSION
Referral received for consideration of patient for inpatient acute rehab  PM&R consult is pending, and we await their recommendations at this time  Will continue to follow and update as able

## 2021-05-13 NOTE — PHYSICAL THERAPY NOTE
Physical Therapy Progress Note     05/13/21 1030   PT Last Visit   PT Visit Date 05/13/21   Note Type   Note Type Treatment   Pain Assessment   Pain Assessment Tool FLACC   Pain Rating: FLACC (Rest) - Face 0   Pain Rating: FLACC (Rest) - Legs 0   Pain Rating: FLACC (Rest) - Activity 0   Pain Rating: FLACC (Rest) - Cry 0   Pain Rating: FLACC (Rest) - Consolability 0   Score: FLACC (Rest) 0   Pain Rating: FLACC (Activity) - Face 1   Pain Rating: FLACC (Activity) - Legs 0   Pain Rating: FLACC (Activity) - Activity 0   Pain Rating: FLACC (Activity) - Cry 1   Pain Rating: FLACC (Activity) - Consolability 0   Score: FLACC (Activity) 2   Restrictions/Precautions   Other Precautions Cognitive; Chair Alarm; Bed Alarm; Impulsive;Pain; Fall Risk   Subjective   Subjective pt encountered supine in bed, initially resistant to mobiilty, reporting recently going to bathroom with nursing  Agreeable with encouragement  Perserverated on putting pants on & is particular about staff providing assist occasionally  Pt easily frustrated, and remains expressive aphasic with delayed speech & word finding  Reports improved pain control after getting meds this AM    Bed Mobility   Supine to Sit 4  Minimal assistance   Additional items Assist x 1; Increased time required; Bedrails   Transfers   Sit to Stand 3  Moderate assistance  (due to LOB, min A during subsequent attempts)   Additional items Assist x 1   Stand to Sit 4  Minimal assistance   Additional items Assist x 1; Armrests; Increased time required   Ambulation/Elevation   Gait pattern Excessively slow; Short stride; Foward flexed; Inconsistent anthony;Decreased foot clearance; Improper Weight shift; Poor UE support   Gait Assistance 3  Moderate assist   Additional items Assist x 1   Assistive Device Rolling walker   Distance 360' total with occasional brief standing rests   Balance   Static Sitting Fair   Static Standing Poor +   Ambulatory Poor +   Endurance Deficit   Endurance Deficit Yes Endurance Deficit Description fatigue, observed dyspnea with activity on 2L O2 nc  pulse oximeter not present during session   Activity Tolerance   Activity Tolerance Patient tolerated treatment well;Patient limited by fatigue   Nurse [address],[facility]  [address], RN   Assessment   Prognosis Good   Problem List Decreased strength;Decreased endurance; Impaired balance;Decreased mobility; Decreased cognition;Pain   Assessment Pt demonstrated significantly improved endurance this session, but remains below baseline mobiilty, requiring RW to maintain balance with all OOB mobiilty  Pt demonstrated 1 LOB upon standing to don pants EOB, requiring increased assist, then required frequent direction for obstacle avoidance when turning & navigating obstacles in hallway  Pt ambualted with forward flexed posture & frequent downward gaze, which limited his environmental awareness  RW adjusted post session to improve posture with subsequent ambulatory tasks  Pt demonstrated poor safety awarenss when returning to room, abandoning RW prior to sitting, but did so without LOB  Rosa Pale observed upon sitting, but pt able to maintain conversation & did not report any change in symptoms  Remained on 2L O2 nc throughout session  RN informed & present outside room post session  At this time, continue to recommend rehab to improve pt safety awareness, balance, endurance, and proficiency with all mobiilty tasks to ensure safe return to OF  May be appropriate for stair trial in upcoming sessions to allow access to home & community  Pt has full flights of steps to enter & navigave floors at home  Barriers to Discharge Inaccessible home environment;Decreased caregiver support   Goals   Patient Goals to put pants on & walk this afternoon   STG Expiration Date 05/26/21   PT Treatment Day 1   Plan   Treatment/Interventions Functional transfer training;LE strengthening/ROM; Elevations; Therapeutic exercise; Endurance training;Patient/family training;Equipment eval/education; Bed mobility;Gait training   Progress Progressing toward goals   PT Frequency Other (Comment)  (3-6x/week)   Recommendation   PT Discharge Recommendation Post acute rehabilitation services   Equipment Recommended [facility] Recommended Wheeled walker   AM-PAC Basic Mobility Inpatient   Turning in Bed Without Bedrails 3   Lying on Back to Sitting on Edge of Flat Bed 3   Moving Bed to Chair 3   Standing Up From Chair 3   Walk in Room 2   Climb 3-5 Stairs 2   Basic Mobility Inpatient Raw Score 16   Basic Mobility Standardized Score 38 32   The patient's AM-PAC Basic Mobility Inpatient Short Form Raw Score is 16, Standardized Score is 38 32  A standardized score less than 42 9 suggests the patient may benefit from discharge to post-acute rehabilitation services  Please also refer to the recommendation of the Physical Therapist for safe discharge planning            Alyssa Ordoñez PTA

## 2021-05-13 NOTE — CASE MANAGEMENT
This CM briefly spoke to patient about recommendation for IP rehab  Patient requesting a list of facilities be provided to him and wife when wife visits this afternoon

## 2021-05-14 ENCOUNTER — TELEPHONE (OUTPATIENT)
Dept: NEUROSURGERY | Facility: CLINIC | Age: 71
End: 2021-05-14

## 2021-05-14 LAB
ANION GAP SERPL CALCULATED.3IONS-SCNC: 5 MMOL/L (ref 4–13)
BASOPHILS # BLD AUTO: 0.03 THOUSANDS/ΜL (ref 0–0.1)
BASOPHILS NFR BLD AUTO: 0 % (ref 0–1)
BUN SERPL-MCNC: 27 MG/DL (ref 5–25)
CALCIUM SERPL-MCNC: 9 MG/DL (ref 8.3–10.1)
CHLORIDE SERPL-SCNC: 107 MMOL/L (ref 100–108)
CO2 SERPL-SCNC: 25 MMOL/L (ref 21–32)
CREAT SERPL-MCNC: 0.99 MG/DL (ref 0.6–1.3)
EOSINOPHIL # BLD AUTO: 0 THOUSAND/ΜL (ref 0–0.61)
EOSINOPHIL NFR BLD AUTO: 0 % (ref 0–6)
ERYTHROCYTE [DISTWIDTH] IN BLOOD BY AUTOMATED COUNT: 14 % (ref 11.6–15.1)
GFR SERPL CREATININE-BSD FRML MDRD: 77 ML/MIN/1.73SQ M
GLUCOSE SERPL-MCNC: 124 MG/DL (ref 65–140)
HCT VFR BLD AUTO: 44 % (ref 36.5–49.3)
HGB BLD-MCNC: 14.6 G/DL (ref 12–17)
IMM GRANULOCYTES # BLD AUTO: 0.32 THOUSAND/UL (ref 0–0.2)
IMM GRANULOCYTES NFR BLD AUTO: 2 % (ref 0–2)
LYMPHOCYTES # BLD AUTO: 0.7 THOUSANDS/ΜL (ref 0.6–4.47)
LYMPHOCYTES NFR BLD AUTO: 4 % (ref 14–44)
MCH RBC QN AUTO: 29.9 PG (ref 26.8–34.3)
MCHC RBC AUTO-ENTMCNC: 33.2 G/DL (ref 31.4–37.4)
MCV RBC AUTO: 90 FL (ref 82–98)
MONOCYTES # BLD AUTO: 1.19 THOUSAND/ΜL (ref 0.17–1.22)
MONOCYTES NFR BLD AUTO: 7 % (ref 4–12)
NEUTROPHILS # BLD AUTO: 14.23 THOUSANDS/ΜL (ref 1.85–7.62)
NEUTS SEG NFR BLD AUTO: 87 % (ref 43–75)
NRBC BLD AUTO-RTO: 0 /100 WBCS
PLATELET # BLD AUTO: 261 THOUSANDS/UL (ref 149–390)
PMV BLD AUTO: 10.3 FL (ref 8.9–12.7)
POTASSIUM SERPL-SCNC: 4.7 MMOL/L (ref 3.5–5.3)
RBC # BLD AUTO: 4.88 MILLION/UL (ref 3.88–5.62)
SODIUM SERPL-SCNC: 137 MMOL/L (ref 136–145)
WBC # BLD AUTO: 16.47 THOUSAND/UL (ref 4.31–10.16)

## 2021-05-14 PROCEDURE — 99232 SBSQ HOSP IP/OBS MODERATE 35: CPT | Performed by: PHYSICIAN ASSISTANT

## 2021-05-14 PROCEDURE — 85025 COMPLETE CBC W/AUTO DIFF WBC: CPT | Performed by: PHYSICIAN ASSISTANT

## 2021-05-14 PROCEDURE — 99223 1ST HOSP IP/OBS HIGH 75: CPT

## 2021-05-14 PROCEDURE — 99024 POSTOP FOLLOW-UP VISIT: CPT | Performed by: PHYSICIAN ASSISTANT

## 2021-05-14 PROCEDURE — 80048 BASIC METABOLIC PNL TOTAL CA: CPT | Performed by: PHYSICIAN ASSISTANT

## 2021-05-14 PROCEDURE — 97535 SELF CARE MNGMENT TRAINING: CPT

## 2021-05-14 RX ADMIN — LEVETIRACETAM 500 MG: 500 TABLET, FILM COATED ORAL at 21:31

## 2021-05-14 RX ADMIN — DEXAMETHASONE 4 MG: 4 TABLET ORAL at 21:31

## 2021-05-14 RX ADMIN — ACETAMINOPHEN 975 MG: 325 TABLET, FILM COATED ORAL at 06:29

## 2021-05-14 RX ADMIN — DEXAMETHASONE 4 MG: 4 TABLET ORAL at 06:29

## 2021-05-14 RX ADMIN — DEXAMETHASONE 4 MG: 4 TABLET ORAL at 14:21

## 2021-05-14 RX ADMIN — DOCUSATE SODIUM 100 MG: 100 CAPSULE, LIQUID FILLED ORAL at 18:17

## 2021-05-14 RX ADMIN — LABETALOL 20 MG/4 ML (5 MG/ML) INTRAVENOUS SYRINGE 10 MG: at 09:44

## 2021-05-14 RX ADMIN — ENOXAPARIN SODIUM 40 MG: 40 INJECTION SUBCUTANEOUS at 08:14

## 2021-05-14 RX ADMIN — LABETALOL 20 MG/4 ML (5 MG/ML) INTRAVENOUS SYRINGE 10 MG: at 14:21

## 2021-05-14 RX ADMIN — SENNOSIDES 8.6 MG: 8.6 TABLET ORAL at 21:31

## 2021-05-14 RX ADMIN — LABETALOL 20 MG/4 ML (5 MG/ML) INTRAVENOUS SYRINGE 10 MG: at 21:33

## 2021-05-14 RX ADMIN — FAMOTIDINE 20 MG: 20 TABLET ORAL at 18:18

## 2021-05-14 RX ADMIN — POLYETHYLENE GLYCOL 3350 17 G: 17 POWDER, FOR SOLUTION ORAL at 08:14

## 2021-05-14 RX ADMIN — Medication 1 TABLET: at 08:14

## 2021-05-14 RX ADMIN — LEVETIRACETAM 500 MG: 500 TABLET, FILM COATED ORAL at 08:14

## 2021-05-14 RX ADMIN — LABETALOL 20 MG/4 ML (5 MG/ML) INTRAVENOUS SYRINGE 10 MG: at 02:04

## 2021-05-14 RX ADMIN — METHOCARBAMOL 500 MG: 500 TABLET, FILM COATED ORAL at 06:29

## 2021-05-14 RX ADMIN — METHOCARBAMOL 500 MG: 500 TABLET, FILM COATED ORAL at 18:17

## 2021-05-14 RX ADMIN — FLUTICASONE FUROATE AND VILANTEROL TRIFENATATE 1 PUFF: 200; 25 POWDER RESPIRATORY (INHALATION) at 09:42

## 2021-05-14 RX ADMIN — FAMOTIDINE 20 MG: 20 TABLET ORAL at 08:14

## 2021-05-14 RX ADMIN — LABETALOL 20 MG/4 ML (5 MG/ML) INTRAVENOUS SYRINGE 10 MG: at 18:18

## 2021-05-14 RX ADMIN — DOCUSATE SODIUM 100 MG: 100 CAPSULE, LIQUID FILLED ORAL at 08:14

## 2021-05-14 RX ADMIN — ACETAMINOPHEN 975 MG: 325 TABLET, FILM COATED ORAL at 14:21

## 2021-05-14 RX ADMIN — ACETAMINOPHEN 975 MG: 325 TABLET, FILM COATED ORAL at 21:31

## 2021-05-14 RX ADMIN — GLYCERIN 1 SUPPOSITORY: 2 SUPPOSITORY RECTAL at 18:18

## 2021-05-14 RX ADMIN — LABETALOL 20 MG/4 ML (5 MG/ML) INTRAVENOUS SYRINGE 10 MG: at 06:28

## 2021-05-14 NOTE — [facility] ADMISSION
Per PM&R consult, patient is acute appropriate pending medical stability and insurance authorization  However, [facility] currently has no bed availability at either [facility] or [facility] FACILITY locations until early next week  CM notified  Will continue to follow at this time

## 2021-05-14 NOTE — TELEPHONE ENCOUNTER
05/19/2021-PLEASE SEE DARRELL'S TELEPHONE NOTE FROM TODAY       05/18/2021-PT STILL IN HOSPITAL    05/17/2021-PT STILL IN HOSPITAL    05/14/2021-PT STILL IN HOSPITAL  05/26/2021-2 WK POV PATH W/BONITA      05/13/2021-(YAS)NEEDS 2 WEEK PATH APPOINTMENT 
Discharged

## 2021-05-14 NOTE — PLAN OF CARE
Problem: Potential for Falls  Goal: Patient will remain free of falls  Description: INTERVENTIONS:  - Assess patient frequently for physical needs  -  Identify cognitive and physical deficits and behaviors that affect risk of falls    -  Nash fall precautions as indicated by assessment   - Educate patient/family on patient safety including physical limitations  - Instruct patient to call for assistance with activity based on assessment  - Modify environment to reduce risk of injury  - Consider OT/PT consult to assist with strengthening/mobility  Outcome: Progressing     Problem: PAIN - ADULT  Goal: Verbalizes/displays adequate comfort level or baseline comfort level  Description: Interventions:  - Encourage patient to monitor pain and request assistance  - Assess pain using appropriate pain scale  - Administer analgesics based on type and severity of pain and evaluate response  - Implement non-pharmacological measures as appropriate and evaluate response  - Consider cultural and social influences on pain and pain management  - Notify physician/advanced practitioner if interventions unsuccessful or patient reports new pain  Outcome: Progressing     Problem: INFECTION - ADULT  Goal: Absence or prevention of progression during hospitalization  Description: INTERVENTIONS:  - Assess and monitor for signs and symptoms of infection  - Monitor lab/diagnostic results  - Monitor all insertion sites, i e  indwelling lines, tubes, and drains  - Monitor endotracheal if appropriate and nasal secretions for changes in amount and color  - Nash appropriate cooling/warming therapies per order  - Administer medications as ordered  - Instruct and encourage patient and family to use good hand hygiene technique  - Identify and instruct in appropriate isolation precautions for identified infection/condition  Outcome: Progressing  Goal: Absence of fever/infection during neutropenic period  Description: INTERVENTIONS:  - Monitor WBC    Outcome: Progressing     Problem: SAFETY ADULT  Goal: Patient will remain free of falls  Description: INTERVENTIONS:  - Assess patient frequently for physical needs  -  Identify cognitive and physical deficits and behaviors that affect risk of falls    -  [place] fall precautions as indicated by assessment   - Educate patient/family on patient safety including physical limitations  - Instruct patient to call for assistance with activity based on assessment  - Modify environment to reduce risk of injury  - Consider OT/PT consult to assist with strengthening/mobility  Outcome: Progressing  Goal: Maintain or return to baseline ADL function  Description: INTERVENTIONS:  -  Assess patient's ability to carry out ADLs; assess patient's baseline for ADL function and identify physical deficits which impact ability to perform ADLs (bathing, care of mouth/teeth, toileting, grooming, dressing, etc )  - Assess/evaluate cause of self-care deficits   - Assess range of motion  - Assess patient's mobility; develop plan if impaired  - Assess patient's need for assistive devices and provide as appropriate  - Encourage maximum independence but intervene and supervise when necessary  - Involve family in performance of ADLs  - Assess for home care needs following discharge   - Consider OT consult to assist with ADL evaluation and planning for discharge  - Provide patient education as appropriate  Outcome: Progressing  Goal: Maintain or return mobility status to optimal level  Description: INTERVENTIONS:  - Assess patient's baseline mobility status (ambulation, transfers, stairs, etc )    - Identify cognitive and physical deficits and behaviors that affect mobility  - Identify mobility aids required to assist with transfers and/or ambulation (gait belt, sit-to-stand, lift, walker, cane, etc )  - Cros[place] fall precautions as indicated by assessment  - Record patient progress and toleration of activity level on Mobility SBAR; progress patient to next Phase/Stage  - Instruct patient to call for assistance with activity based on assessment  - Consider rehabilitation consult to assist with strengthening/weightbearing, etc   Outcome: Progressing     Problem: DISCHARGE PLANNING  Goal: Discharge to home or other facility with appropriate resources  Description: INTERVENTIONS:  - Identify barriers to discharge w/patient and caregiver  - Arrange for needed discharge resources and transportation as appropriate  - Identify discharge learning needs (meds, wound care, etc )  - Arrange for interpretive services to assist at discharge as needed  - Refer to Case Management Department for coordinating discharge planning if the patient needs post-hospital services based on physician/advanced practitioner order or complex needs related to functional status, cognitive ability, or social support system  Outcome: Progressing     Problem: Knowledge Deficit  Goal: Patient/family/caregiver demonstrates understanding of disease process, treatment plan, medications, and discharge instructions  Description: Complete learning assessment and assess knowledge base  Interventions:  - Provide teaching at level of understanding  - Provide teaching via preferred learning methods  Outcome: Progressing     Problem: Knowledge Deficit  Goal: Patient/family/caregiver demonstrates understanding of disease process, treatment plan, medications, and discharge instructions  Description: Complete learning assessment and assess knowledge base    Interventions:  - Provide teaching at level of understanding  - Provide teaching via preferred learning methods  Outcome: Progressing     Problem: NEUROSENSORY - ADULT  Goal: Achieves stable or improved neurological status  Description: INTERVENTIONS  - Monitor and report changes in neurological status  - Monitor vital signs such as temperature, blood pressure, glucose, and any other labs ordered   - Initiate measures to prevent increased intracranial pressure  - Monitor for seizure activity and implement precautions if appropriate      Outcome: Progressing  Goal: Remains free of injury related to seizures activity  Description: INTERVENTIONS  - Maintain airway, patient safety  and administer oxygen as ordered  - Monitor patient for seizure activity, document and report duration and description of seizure to physician/advanced practitioner  - If seizure occurs,  ensure patient safety during seizure  - Reorient patient post seizure  - Seizure pads on all 4 side rails  - Instruct patient/family to notify RN of any seizure activity including if an aura is experienced  - Instruct patient/family to call for assistance with activity based on nursing assessment  - Administer anti-seizure medications if ordered    Outcome: Progressing  Goal: Achieves maximal functionality and self care  Description: INTERVENTIONS  - Monitor swallowing and airway patency with patient fatigue and changes in neurological status  - Encourage and assist patient to increase activity and self care     - Encourage visually impaired, hearing impaired and aphasic patients to use assistive/communication devices  Outcome: Progressing     Problem: CARDIOVASCULAR - ADULT  Goal: Maintains optimal cardiac output and hemodynamic stability  Description: INTERVENTIONS:  - Monitor I/O, vital signs and rhythm  - Monitor for S/S and trends of decreased cardiac output  - Administer and titrate ordered vasoactive medications to optimize hemodynamic stability  - Assess quality of pulses, skin color and temperature  - Assess for signs of decreased coronary artery perfusion  - Instruct patient to report change in severity of symptoms  Outcome: Progressing  Goal: Absence of cardiac dysrhythmias or at baseline rhythm  Description: INTERVENTIONS:  - Continuous cardiac monitoring, vital signs, obtain 12 lead EKG if ordered  - Administer antiarrhythmic and heart rate control medications as ordered  - Monitor electrolytes and administer replacement therapy as ordered  Outcome: Progressing     Problem: RESPIRATORY - ADULT  Goal: Achieves optimal ventilation and oxygenation  Description: INTERVENTIONS:  - Assess for changes in respiratory status  - Assess for changes in mentation and behavior  - Position to facilitate oxygenation and minimize respiratory effort  - Oxygen administered by appropriate delivery if ordered  - Initiate smoking cessation education as indicated  - Encourage broncho-pulmonary hygiene including cough, deep breathe, Incentive Spirometry  - Assess the need for suctioning and aspirate as needed  - Assess and instruct to report SOB or any respiratory difficulty  - Respiratory Therapy support as indicated  Outcome: Progressing     Problem: GASTROINTESTINAL - ADULT  Goal: Minimal or absence of nausea and/or vomiting  Description: INTERVENTIONS:  - Administer IV fluids if ordered to ensure adequate hydration  - Maintain NPO status until nausea and vomiting are resolved  - Nasogastric tube if ordered  - Administer ordered antiemetic medications as needed  - Provide nonpharmacologic comfort measures as appropriate  - Advance diet as tolerated, if ordered  - Consider nutrition services referral to assist patient with adequate nutrition and appropriate food choices  Outcome: Progressing  Goal: Maintains or returns to baseline bowel function  Description: INTERVENTIONS:  - Assess bowel function  - Encourage oral fluids to ensure adequate hydration  - Administer IV fluids if ordered to ensure adequate hydration  - Administer ordered medications as needed  - Encourage mobilization and activity  - Consider nutritional services referral to assist patient with adequate nutrition and appropriate food choices  Outcome: Progressing  Goal: Maintains adequate nutritional intake  Description: INTERVENTIONS:  - Monitor percentage of each meal consumed  - Identify factors contributing to decreased intake, treat as appropriate  - Assist with meals as needed  - Monitor I&O, weight, and lab values if indicated  - Obtain nutrition services referral as needed  Outcome: Progressing     Problem: GENITOURINARY - ADULT  Goal: Maintains or returns to baseline urinary function  Description: INTERVENTIONS:  - Assess urinary function  - Encourage oral fluids to ensure adequate hydration if ordered  - Administer IV fluids as ordered to ensure adequate hydration  - Administer ordered medications as needed  - Offer frequent toileting  - Follow urinary retention protocol if ordered  Outcome: Progressing  Goal: Absence of urinary retention  Description: INTERVENTIONS:  - Assess patients ability to void and empty bladder  - Monitor I/O  - Bladder scan as needed  - Discuss with physician/AP medications to alleviate retention as needed  - Discuss catheterization for long term situations as appropriate  Outcome: Progressing     Problem: Nutrition/Hydration-ADULT  Goal: Nutrient/Hydration intake appropriate for improving, restoring or maintaining nutritional needs  Description: Monitor and assess patient's nutrition/hydration status for malnutrition  Collaborate with interdisciplinary team and initiate plan and interventions as ordered  Monitor patient's weight and dietary intake as ordered or per policy  Utilize nutrition screening tool and intervene as necessary  Determine patient's food preferences and provide high-protein, high-caloric foods as appropriate       INTERVENTIONS:  - Monitor oral intake, urinary output, labs, and treatment plans  - Assess nutrition and hydration status and recommend course of action  - Evaluate amount of meals eaten  - Assist patient with eating if necessary   - Allow adequate time for meals  - Recommend/ encourage appropriate diets, oral nutritional supplements, and vitamin/mineral supplements  - Order, calculate, and assess calorie counts as needed  - Recommend, monitor, and adjust tube feedings and TPN/PPN based on assessed needs  - Assess need for intravenous fluids  - Provide specific nutrition/hydration education as appropriate  - Include patient/family/caregiver in decisions related to nutrition  Outcome: Progressing     Problem: Prexisting or High Potential for Compromised Skin Integrity  Goal: Skin integrity is maintained or improved  Description: INTERVENTIONS:  - Identify patients at risk for skin breakdown  - Assess and monitor skin integrity  - Assess and monitor nutrition and hydration status  - Monitor labs   - Assess for incontinence   - Turn and reposition patient  - Assist with mobility/ambulation  - Relieve pressure over bony prominences  - Avoid friction and shearing  - Provide appropriate hygiene as needed including keeping skin clean and dry  - Evaluate need for skin moisturizer/barrier cream  - Collaborate with interdisciplinary team   - Patient/family teaching  - Consider wound care consult   Outcome: Progressing

## 2021-05-14 NOTE — ASSESSMENT & PLAN NOTE
· Presented with word-finding difficulties and recent syncopal event  · CTA head/neck (05/06/2021): 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 and left hemispheric mass effect causing approximately 3 mm left to right midline shift "   · MRI brain (05/06/2021): Rim enhancing left temporal parietal mass measuring 5 cm with significant surrounding vasogenic edema, 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  Left to right midline shift of 2-3 mm  · S/p left temporal craniotomy for tumor resection with 5-ALA per neurosurgery (05/11/2021)  · Pathology:  Prelim: favor high grade glioma  Final Pending  · CT chest, abdomen, pelvis: negative for any other primary sources  · Continue with oral Decadron taper per Neurosurgery recommendations  · Continue Keppra 500 mg BID for seizure prophylaxis x7 days  · PT/OT recommends acute rehab  · Patient seen in consultation by PMR who agrees that patient is appropriate for inpatient acute rehab  Placement pending  Unfortunately, there are no beds at [facility] or  until next week  CM working on alternative facilities

## 2021-05-14 NOTE — PROGRESS NOTES
[facility]  Progress Note Nick Allen 1950, 80 y o  male MRN: [id number]  Unit/Bed#: [facility] 624-01 Encounter: [id number]  Primary Care Provider: Silvia Wood PA-C   Date and time admitted to hospital: 5/6/2021  7:32 PM    * Brain mass  Assessment & Plan  POD 3 left temporal craniotomy for tumor resection with 5-ALA  · Patient presented with a few day history of speech difficulties, had prior fall 3 weeks ago with positive head strike  · MOCA 25/30    Imaging reviewed personally and with attending, results are as follows:  · MRI brain 05/11/2021:  Status post resection of left temporal lobe mass with postoperative changes including mild  Peripheral ischemia  Improved surrounding vasogenic edema  Plan:  · Ongoing frequent neurologic checks  GCS 15, nonfocal, incision clean dry intact with mild facial swelling likely dependent edema, speech improving  · Recommend stat CT head for decline in GCS greater than 2 points in 1 hour  · Decadron 4mg g q 6 hours with 72 hour taper ordered  · Postoperative antibiotics completed  · Keppra 500 mg b i d  for seizure prophylaxis x7 days  · DVT prophylaxis:  SCDs, Lovenox   · TIM drain removed 5/12/21  · Physical therapy / occupational therapy recommending rehab  PMR following, feel patient is a good candidate for the acute rehab however no beds available until early next week at [place] [id number]  Ongoing placement per  and primary team   [place] to [place] from [address] [place] [address] standpoint  · Ongoing medical management per primary team       Neurosurgery will follow as needed during hospitalization  Outpatient follow-up in 2 weeks for incision check and pathology review  Please call with questions or concerns signed off  Cerebral edema (HCC)  Assessment & Plan  · See plan above  · Evident on CT and MR imaging         Subjective/Objective   Chief Complaint: "I feel good "    Subjective:  Patient reports no significant incisional pain, headaches, dizziness, chest pain, shortness of breath, abdominal pain, nausea or vomiting, numbness/tingling/weakness, seizure-like activity  Feels speech continues to improve  Patient very excited about how he is going to get a TV for when he goes to rehab and set up his own home TV to watch all his shows, speech is very fluent during this time  Objective:  Sitting in chair, working on laptop, playing scribble on his phone, NAD    I/O       05/12 0701 - 05/13 0700 05/13 0701 - 05/14 0700 05/14 0701 - 05/15 0700    P  O  360 720 480    I V  (mL/kg) 1107 5 (8 5)      IV Piggyback       Total Intake(mL/kg) 1467 5 (11 3) 720 (5 5) 480 (3 6)    Urine (mL/kg/hr) 1350 (0 4)      Drains 15      Blood       Total Output 1365      Net +102 5 +720 +480           Unmeasured Urine Occurrence 1 x 5 x 1 x          Invasive Devices     Peripheral Intravenous Line            Peripheral IV 05/11/21 Left Wrist 3 days    Peripheral IV 05/11/21 Right Hand 3 days                Physical Exam:  Vitals: Blood pressure 150/98, pulse 64, temperature 97 8 °F (36 6 °C), resp  rate 19, height 5' 10" (1 778 m), weight 132 kg (292 lb 1 8 oz), SpO2 94 %  ,Body mass index is 41 91 kg/m²        General appearance: alert, appears stated age, cooperative and no distress  Head:  Left craniotomy site clean dry intact with mild facial edema likely dependent edema  Eyes: EOMI  Neck: supple, symmetrical, trachea midline  Lungs: non labored breathing  Heart: regular heart rate  Neurologic:   Mental status: Alert, oriented x3, follows commands, able to name the days of the week backwards, able to repeat a sentence, thought content appropriate  Cranial nerves: grossly intact (Cranial nerves II-XII)  Sensory: normal to light touch  Motor: moving all extremities without focal weakness strength 5/5 throughout  Coordination: finger to nose normal bilaterally, no drift bilaterally      Lab Results:  Results from last 7 days   Lab Units 05/14/21  0606 05/12/21  0512 05/11/21  1508   WBC Thousand/uL 16 47* 17 05* 16 51*   HEMOGLOBIN g/dL 14 6 14 3 14 2   HEMATOCRIT % 44 0 42 7 42 4   PLATELETS Thousands/uL 261 262 262   NEUTROS PCT % 87*  --   --    MONOS PCT % 7  --   --      Results from last 7 days   Lab Units 05/14/21  0606 05/12/21  0512 05/11/21  1508 05/11/21  1129 05/11/21  1023   POTASSIUM mmol/L 4 7 4 0 4 1  --   --    CHLORIDE mmol/L 107 112* 113*  --   --    CO2 mmol/L 25 22 21  --   --    CO2, I-STAT mmol/L  --   --   --  23 23   BUN mg/dL 27* 18 20  --   --    CREATININE mg/dL 0 99 1 07 1 13  --   --    CALCIUM mg/dL 9 0 8 6 8 7  --   --    GLUCOSE, ISTAT mg/dl  --   --   --  146* 170*     Results from last 7 days   Lab Units 05/12/21  0512 05/11/21  1508   MAGNESIUM mg/dL 2 6 2 5     Results from last 7 days   Lab Units 05/12/21  0512 05/11/21  1508   PHOSPHORUS mg/dL 2 9 3 5     Results from last 7 days   Lab Units 05/12/21  0512   INR  1 06   PTT seconds 28       Imaging Studies: I have personally reviewed pertinent reports  and I have personally reviewed pertinent films in PACS    Cta Head And Neck With And Without Contrast    Result Date: 5/6/2021  Impression: 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 and left hemispheric mass effect causing approximately 3 mm left to right midline shift  Contrast-enhanced brain MRI is recommended for further evaluation  No evidence of flow restrictive large vessel occlusive disease  I personally discussed this study with Yamileth Barajas on 5/6/2021 at 9:40 PM  Workstation performed: [id number]     Mri Brain W Wo Contrast    Result Date: 5/12/2021  Impression: Status post resection of left temporal lobe mass with postoperative change as described above, including mild peripheral ischemia  There is improving surrounding vasogenic edema  Workstation performed: [id number]     Mri Brain W Wo Contrast    Result Date: 5/7/2021  Impression: 1    Rim-enhancing left temporal parietal mass measuring 5 cm with significant surrounding vasogenic edema, 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  2   Left-to-right midline shift of 2 to 3 mm  Workstation performed: [id number]     Ct Chest Abdomen Pelvis W Contrast    Result Date: 5/7/2021  Impression: Left lateral seventh rib nondisplaced fracture could be subacute or acute  Correlate clinically  Small pericardial effusion  Workstation performed: [id number]       EKG, Pathology, and Other Studies: I have personally reviewed pertinent reports        VTE Pharmacologic Prophylaxis: Enoxaparin (Lovenox)    VTE Mechanical Prophylaxis: sequential compression device

## 2021-05-14 NOTE — PLAN OF CARE
Problem: OCCUPATIONAL THERAPY ADULT  Goal: Performs self-care activities at highest level of function for planned discharge setting  See evaluation for individualized goals  Description: Treatment Interventions: ADL retraining, Functional transfer training, UE strengthening/ROM, Endurance training, Cognitive reorientation, Patient/family training, Equipment evaluation/education, Fine motor coordination activities, Compensatory technique education, Energy conservation, Activityengagement          See flowsheet documentation for full assessment, interventions and recommendations  Outcome: Progressing  Note: Limitation: Decreased ADL status, Decreased UE strength, Decreased Safe judgement during ADL, Decreased cognition, Decreased endurance, Decreased self-care trans, Decreased high-level ADLs, Decreased fine motor control  Prognosis: Good  Assessment: pt participated in am ot session and was seen focusing on adls in stance sinkside for ub and seated eob for lb  pt was also seen for education and hands on practice with rw for hamper negotiation and trnasfers to fro low chair  pt requires close s for transfers, mod cues for hand placement and asst to poistion rw fand tactile cues to slow pace  pt did not use rw prior to admission  pt is motivated and cooperative  he was s for adls with minimal need for cues for sequencing, safety and thoroughness       OT Discharge Recommendation: Post acute rehabilitation services     Melly Medley

## 2021-05-14 NOTE — ASSESSMENT & PLAN NOTE
POD 3 left temporal craniotomy for tumor resection with 5-ALA  · Patient presented with a few day history of speech difficulties, had prior fall 3 weeks ago with positive head strike  · MOCA 25/30    Imaging reviewed personally and with attending, results are as follows:  · MRI brain 05/11/2021:  Status post resection of left temporal lobe mass with postoperative changes including mild  Peripheral ischemia  Improved surrounding vasogenic edema  Plan:  · Ongoing frequent neurologic checks  GCS 15, nonfocal, incision clean dry intact with mild facial swelling likely dependent edema, speech improving  · Recommend stat CT head for decline in GCS greater than 2 points in 1 hour  · Decadron 4mg g q 6 hours with 72 hour taper ordered  · Postoperative antibiotics completed  · Keppra 500 mg b i d  for seizure prophylaxis x7 days  · DVT prophylaxis:  SCDs, Lovenox   · TMI drain removed 5/12/21  · Physical therapy / occupational therapy recommending rehab  PMR following, feel patient is a good candidate for the acute rehab however no beds available until early next week at both [place] [address]  Ongoing placement per  and primary team   [place] to [place] from [address] [place] [address] standpoint  · Ongoing medical management per primary team       Neurosurgery will follow as needed during hospitalization  Outpatient follow-up in 2 weeks for incision check and pathology review  Please call with questions or concerns signed off

## 2021-05-14 NOTE — DISCHARGE INSTRUCTIONS
Discharge Instructions  Craniotomy for tumor resection     Activity:  1  Do not lift, push or pull more than 10 pounds for 2 weeks  2  Avoid bending, lifting and twisting for 2 weeks  No running  No athletic activities until cleared  3  No driving for at least 2 weeks or until cleared by Neurosurgery  4  When able to shower, continue to use clean towel and washcloth for 2 weeks post-op  5  Do not use a hair dryer, and avoid hair products such as mousse, oils, and gels  Do not brush your hair away from the incision since this will put strain on the suture line  6  Do not dye or perm hair for 6 weeks or until cleared by physician  7  Continue to change bed linens and pajamas more frequently  Wear clean clothes daily  8  May walk as tolerated  Recommend 4 short walks daily  Surgical incision care:  1  Keep dressings in place for 3 days  After 3 days, incisions may be left open to air, but should remain clean  2  Keep incisions dry for 3 days  3  May shower after 3 days using a baby shampoo including head incision  Rinse off shampoo and pat dry  4  Avoid rubbing the incision but gently massage hair  5  Do not immerse the incisions in water for 6 weeks  6  Staples/suture will be removed at your 2 week postoperative visit  7  Do not apply any creams or ointments to the incision, unless otherwise instructed by [facility]  8  Contact office if increasing redness, drainage, pain or swelling occurs around the incisions or if you develop a fever greater than 101F  9  Do not dye/perm hair or use any hair products until cleared by Neurosurgery  Postoperative medication:  1  [facility] will provide pain medication in the postoperative period  All prescriptions must come from a single practice  a  Take medications as prescribed  Call office with any questions/concerns  b  May use over the counter Tylenol  No NSAIDs (ie   Ibuprofen, Aleve, Advil, Naproxen)  2  Please contact office for questions regarding dosage and modifications  3  No antiplatelet or anticoagulation medication (ie  Coumadin, Aspirin, Plavix) until cleared by [employer], unless otherwise instructed  Please contact [facility] if you have any questions about the effects of any of your medications on blood clotting  4  Do not operate heavy machinery or vehicles while taking sedating medications  5  Use a bowel regimen while on opioids as they induce constipation  Ie  Senokot-S, Miralax, Colace, etc  Increase fiber and water intake  Follow-up as scheduled for a 2 week post-operative visit for an incision check and final pathology  ** Please notify the office if incision becomes red, swollen, tender, or has increased drainage, and temp>101  Return to the ER if you experience increased headache, drowsiness, weakness, nausea/vomiting, or seizures  **

## 2021-05-14 NOTE — OCCUPATIONAL THERAPY NOTE
Occupational Therapy Treatment Note:       05/14/21 0945   OT Last Visit   OT Visit Date 05/14/21   Note Type   Note Type Treatment   Restrictions/Precautions   Other Precautions Impulsive;Cognitive; Fall Risk   Pain Assessment   Pain Assessment Tool 0-10   Pain Score No Pain   ADL   Where Assessed Edge of bed   Grooming Assistance 5  Supervision/Setup   UB Bathing Assistance 5  Supervision/Setup   LB Bathing Assistance 5  Supervision/Setup   UB Dressing Assistance 5  Supervision/Setup   LB Dressing Assistance 5  Supervision/Setup   LB Dressing Comments seated eob to nicolasa doff b socks and slippers, sba for clothing management of pants   Functional Standing Tolerance   Time 10 min sinkside, fair plus balance during ub adls   Bed Mobility   Supine to Sit 4  Minimal assistance   Transfers   Sit to Stand 5  Supervision   Additional items   (limited carryover of hand placement)   Stand to Sit 5  Supervision   Stand pivot 5  Supervision   Additional items   (to from low chair, taller chair and from eob)   Functional Mobility   Functional Mobility 4  Minimal assistance   Additional Comments min asst to position rw inorder to demonstrate recommended techniques while reching into hamper  overall safety was fair minus this session  pt is impulsive with limited carryover   Additional items Rolling walker   Cognition   Arousal/Participation Alert; Cooperative   Attention Attends with cues to redirect   Memory Decreased short term memory;Decreased recall of precautions   Following Commands Follows multistep commands with increased time or repetition   Comments pt is impulsive with rw, has decreased carryover of  pt is very talkative, needs minimal redirection at times   Activity Tolerance   Activity Tolerance Patient tolerated treatment well   Assessment   Assessment pt participated in am ot session and was seen focusing on adls in stance sinkside for ub and seated eob for lb  pt was also seen for education and hands on practice with rw for hamper negotiation and trnasfers to fro low chair  pt requires close s for transfers, mod cues for hand placement and asst to poistion rw fand tactile cues to slow pace  pt did not use rw prior to admission  pt is motivated and cooperative  he was s for adls with minimal need for cues for sequencing, safety and thoroughness  Plan   Treatment Interventions ADL retraining;Functional transfer training; Endurance training;Cognitive reorientation;Patient/family training;Equipment evaluation/education; Activityengagement   Goal Expiration Date 05/26/21   OT Treatment Day 1   OT Frequency 3-5x/wk   Recommendation   OT Discharge Recommendation Post acute rehabilitation services   Melly Bennett

## 2021-05-14 NOTE — CONSULTS
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      Assessment and Recommendations:  80year old M with PMH of COPD, seizure hx, morbid obesity, prior smoker who was developing progressive AMS, aphasia, and imbalance which included recent fall with head strike several weeks ago with questionable LOC who presented to hospital on 5/6 found to have rim-enhancing left temporal parietal mass measuring 5 cm with significant surrounding vasogenic edema, 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 with Left-to-right midline shift of 2 to 3 mm who underwent L temporal craniotomy with image guidance and 5-ALA for resection of mass on 5/11 by Dr Charles Irizarry  Follow-up MRI on 5/11 showed Status post resection of left temporal lobe mass with postoperative change as described above, including mild peripheral ischemia  There is improving surrounding vasogenic edema  TIM drain removed  He remains on decadron taper   Patient did have CT C/A/P on 5/7 which did not show evidence of malignancy but showed left lateral 7th rib acute or subacute fx, small pericardial effusion, colonic diverticulosis, and large L parapelvic/renal sinus cyst       Prior Level of Function and Social history:    Patient lives in 2 level home with 13 HUNTER and bed/bath upstairs with spouse  Independent with ADLs  Independent with ambulation    Current Level of Function:    Total assist toileting  Max assist LB bathing/dressing  Mod assist UB bathing/dressing  Min assist eating   Transfers mod assist x1; ambulation mod assist     Decline in ADLs and mobility:  Multifactorial:  Sizable L temporal lobe mass likely high-grade glioma status post resection with residual impairments in cognition, memory, safety awareness, imbalance, incoordination, aphasia    - Optimal management of each as listed    - Continue PT, OT while in acute setting to improve functional mobility, transfers, upper and lower body strengthening, conditioning, balance, and gait training with appropriate assistive device  - Recommend SLP to evaluate and treat deficits in cognition    Disposition recommendation:     [facility]F - patient is good candidate for transfer to [facility]/IRF pending:    Facility acceptance, insurance authorization, and bed availability     - discussion held with family regarding their current understanding of patient's condition, potential diagnoses, potential complication risks, possible treatments/timelines, and range of functional recovery expectations in short and medium term and how this can impact rehab and deposition  - family appears to have adequate understanding of short term plan with goal to complete acute rehab in short period of time and if needed ideally start chemo and radiation afterward but ultimately at the discretion of the patient's brain tumor specialists  RATIONALE:   I have reviewed this patient's medical and functional history, hospital course, skilled therapy notes, and have evaluated the patient in person  In my professional judgment and rehabilitation experience, this patient MEETS the medical necessity criteria for an inpatient ARU stay:   A  Condition requires supervision by a rehabilitation physician, defined as a licensed physician with specialized training and experience in inpatient rehabilitation    The requirement for medical supervision means that the rehabilitation physician must conduct face-to-face visits with the patient at least 3 days per week throughout the patient's stay in the IRF to assess the patient both medically and functionally, as well as to modify the course of treatment as needed to maximize the patient's capacity to benefit from the rehabilitation process  B  Patient requires an intensive and coordinated interdisciplinary team approach of providing rehabilitation  Under current industry standards, this intensive rehabilitation therapy program generally consists of at least 3 hours of therapy per day at least 5 days per week  In certain well-documented cases, this intensive rehabilitation therapy program might instead consist of at least 15 hours of intensive rehabilitation therapy within a 7 consecutive day period, beginning with the date of admission to the IRF  C  Patient is reasonably expected to adequately participate, and benefit significantly from, the intensive rehabilitation therapy program at time of admission to the IRF  The patient can only be expected to benefit significantly from the intensive rehabilitation therapy program if the patient's condition and functional status are such that the patient can reasonably be expected to make measurable improvement (that will be of practical value to improve the patient's functional capacity or adaptation to impairments) as a result of the rehabilitation treatment, and if such improvement can be expected to be made within a prescribed period of time  The patient need not be expected to achieve complete independence in the domain of self-care nor be expected to return to his or her prior level of functioning in order to meet this standard  D  The patient must require the active and ongoing therapeutic intervention of multiple therapy disciplines (PT, OT, SLP, or prosthetics/orthotics), one of which must be PT or OT  Medicare Benefit Policy Manual Chapter 1 - Inpatient Hospital Services Covered Under Part A; 110 2 - Inpatient Rehabilitation Facility Medical Necessity Criteria NameRegulator es  pdf Overall Medical Status:  - Vitals: stable, patient saturating well on room air  - Recent labs: stable with leukocytosis on steroids  - Recent imaging:  see elsewhere  - Continue to monitor vitals, labs, exam closely   - Additional Work-up/management prior to potential transfer to rehab/discharge:   · None      Bladder function - risk of dysfunction   - monitor for retention, incontinence, signs/symptoms of UTI  - recommend toileting (bedpan only if unable to use commode or toilet, but monitor skin closely) program Q2-4 H during day and Q4-6H overnight      Bowel function - constipation  - Intensify bowel regimen/suppository x1 now   - PRN bowel regimen       VTE prophylaxis   - As outlined by primary team      # Other medical issues:     Per primary service        ==================================================================    Chief Complaint:  Decline in speech     History of Present Illness:   80year old M with PMH of COPD, seizure hx, morbid obesity, prior smoker who was developing progressive AMS, aphasia, and imbalance which included recent fall with head strike several weeks ago with questionable LOC who presented to hospital on 5/6 found to have rim-enhancing left temporal parietal mass measuring 5 cm with significant surrounding vasogenic edema, 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 with Left-to-right midline shift of 2 to 3 mm who underwent L temporal craniotomy with image guidance and 5-ALA for resection of mass on 5/11 by Dr Joaquim Kehr  Follow-up MRI on 5/11 showed Status post resection of left temporal lobe mass with postoperative change as described above, including mild peripheral ischemia  There is improving surrounding vasogenic edema  TIM drain removed    Patient did have CT C/A/P on 5/7 which did not show evidence of malignancy but showed left lateral 7th rib acute or subacute fx, small pericardial effusion, colonic diverticulosis, and large L parapelvic/renal sinus cyst  On evaluation, patient has adequate wakefulness and can follow most simple 1 step commands appropriately  He does have word substitution and some aphasia at times  He is oriented to self, place with multiple corrections year  He has difficulty with multistep commands  He has impaired Luria testing  Strength is near full throughout and finger to nose is fairly intact  He does have some memory impairments  Patient has impaired attention and easy distractibility  Patient impulsive with poor gait balance and safety walking to the bathroom with a walker  He has impaired safety awareness insight into his deficits  Patient states his wife thinks he needs to good rehab but he thinks he can go directly home  He denies headache, other pain, lightheadedness, shortness of breath, fever, chills, calf pain  Patient reports urinating frequently without accidents  Patient reports being constipated decreased gas  He denies difficulty swallowing or visual changes  Review of Systems:     Complete review of systems obtained  Please see HPI for details with other significant symptoms or history listed here: Otherwise, 14 point review of systems completed and was otherwise unremarkable      Allergies   Allergen Reactions    Penicillins Other (See Comments)     As a child       Current Facility-Administered Medications:     acetaminophen (TYLENOL) tablet 975 mg, 975 mg, Oral, Q8H [facility], Sandie Cruz DO, 975 mg at 05/13/21 2227    dexamethasone (DECADRON) tablet 4 mg, 4 mg, Oral, Q8H [facility], 4 mg at 05/13/21 2227 **FOLLOWED BY** [START ON 5/16/2021] dexamethasone (DECADRON) tablet 2 mg, 2 mg, Oral, Q6H [facility] **FOLLOWED BY** [START ON 5/19/2021] dexamethasone (DECADRON) tablet 2 mg, 2 mg, Oral, Q8H [facility] **FOLLOWED BY** [START ON 5/22/2021] dexamethasone (DECADRON) tablet 2 mg, 2 mg, Oral, Q12H [facility] **FOLLOWED BY** [START ON 5/25/2021] dexamethasone (DECADRON) tablet 2 mg, 2 mg, Oral, Daily, Devin Lua DO   docusate sodium (COLACE) capsule 100 mg, 100 mg, Oral, BID, [place] Anthony, DO, 100 mg at 05/13/21 1906    enoxaparin (LOVENOX) subcutaneous injection 40 mg, 40 mg, Subcutaneous, Q24H [address], [place] Anthony, DO, 40 mg at 05/13/21 0813    famotidine (PEPCID) tablet 20 mg, 20 mg, Oral, BID, [place] Anthony, DO, 20 mg at 05/13/21 1906    fluticasone-vilanterol (BREO ELLIPTA) 200-25 MCG/INH inhaler 1 puff, 1 puff, Inhalation, Daily, Bryanston, DO, Stopped at 05/13/21 0815    hydrALAZINE (APRESOLINE) injection 10 mg, 10 mg, Intravenous, Q4H PRN, Bryanston, DO, 10 mg at 05/12/21 1845    HYDROmorphone HCl (DILAUDID) injection 0 2 mg, 0 2 mg, Intravenous, Q1H PRN, Bryanston, DO    Labetalol HCl (NORMODYNE) injection 10 mg, 10 mg, Intravenous, Q4H, Lebanon Anthony, DO, 10 mg at 05/14/21 0204    levETIRAcetam (KEPPRA) tablet 500 mg, 500 mg, Oral, Q12H [address], [place] Anthony, DO, 500 mg at 05/13/21 2227    lidocaine (LIDODERM) 5 % patch 1 patch, 1 patch, Topical, Daily, Lebanon Anthony, DO, 1 patch at 05/12/21 0852    methocarbamol (ROBAXIN) tablet 500 mg, 500 mg, Oral, Q6H [address], Lebanon Anthony, DO, 500 mg at 05/13/21 2353    multivitamin-minerals (CENTRUM) tablet 1 tablet, 1 tablet, Oral, Daily, Bryanston, DO, 1 tablet at 05/13/21 0813    niCARdipine (CARDENE) 25 mg (STANDARD CONCENTRATION) in sodium chloride 0 9% 250 mL, 1-15 mg/hr, Intravenous, Titrated, Lebanon Anthony, DO, Stopped at 05/12/21 1710    ondansetron (ZOFRAN) injection 4 mg, 4 mg, Intravenous, Q6H PRN, Heberanston, DO    oxyCODONE (ROXICODONE) IR tablet 2 5 mg, 2 5 mg, Oral, Q4H PRN, Bryanston, DO    oxyCODONE (ROXICODONE) IR tablet 5 mg, 5 mg, Oral, Q4H PRN, Lebanon Anthony, DO, 5 mg at 05/13/21 0813    polyethylene glycol (MIRALAX) packet 17 g, 17 g, Oral, Daily, Lebanon Anthony, DO, 17 g at 05/12/21 0851    senna (SENOKOT) tablet 8 6 mg, 1 tablet, Oral, HS, Lebanon Anthony, DO, 8 6 mg at 05/13/21 2227    Past Medical History:   Diagnosis Date    Asthma     Epilepsy ([facility] [address] )     stopped meds 2006 has been seizure free       Past Surgical History:   Procedure Laterality Date    APPENDECTOMY      CRANIOTOMY Left 5/11/2021    Procedure: Left temporal craniotomy with image guidance and 5-ALA for resection of mass;  Surgeon: Eleno Valle MD;  [facility];  Service: Neurosurgery    MANDIBLE SURGERY      CT COLONOSCOPY FLX DX W/COLLJ SPEC WHEN PFRMD N/A 4/4/2016    Procedure: COLONOSCOPY;  Surgeon: Rupa Addison MD;  Location: [facility];   Service: Gastroenterology    SKULL FRACTURE ELEVATION      TONSILLECTOMY        Family History   Problem Relation Age of Onset    Alcohol abuse Father     Substance Abuse Neg Hx     Mental illness Neg Hx        Social History available currently in EMR:  (See additional SH as outlined above)   Social History     Socioeconomic History    Marital status: /Civil Union     Spouse name: None    Number of children: None    Years of education: None    Highest education level: None   Occupational History    None   Social Needs    Financial resource strain: None    Food insecurity     Worry: None     Inability: None    Transportation needs     Medical: None     Non-medical: None   Tobacco Use    Smoking status: Former Smoker     Types: Cigarettes     Quit date: 2/8/2006     Years since quitting: 15 2    Smokeless tobacco: Never Used   Substance and Sexual Activity    Alcohol use: Never     Frequency: Never    Drug use: No    Sexual activity: None   Lifestyle    Physical activity     Days per week: None     Minutes per session: None    Stress: None   Relationships    Social connections     Talks on phone: None     Gets together: None     Attends Congregation service: None     Active member of club or organization: None     Attends meetings of clubs or organizations: None     Relationship status: None    Intimate partner violence     Fear of current or ex partner: None     Emotionally abused: None     Physically abused: None     Forced sexual activity: None   Other Topics Concern    None   Social History Narrative    None       Physical Examination:   Temp:  [97 4 °F (36 3 °C)-99 1 °F (37 3 °C)] 99 1 °F (37 3 °C)  HR:  [76-88] 88  Resp:  [16-20] 20  BP: (138-162)/(72-94) 160/83  General: Awake, alert in NAD  HENT: MMM, L crani scar healing adequately   Neck: Supple, no lymphadenopathy  Respiratory:  Mildly labored breathing with activity, breath sounds equal, Lungs CTA, no wheezes, rales, or rhonchi  Cardiovascular: Regular rate and rhythm, no murmurs, rubs, or gallops  Gastrointestinal: Soft, non-tender, moderately-distended, normoactive bowel sounds  Genitourinary: No hassan  SkiN/MSK/Extremities:  Distal extremities appropriately warm, normal cap refill distally, no cyanosis or calf edema, no calf tenderness to palpation  Neurologic/Psych:   adequate wakefulness and can follow most simple 1 step commands appropriately  He does have word substitution and some aphasia at times  He is oriented to self, place with multiple corrections year  He has difficulty with multistep commands  He has impaired Luria testing  Strength is near full throughout and finger to nose is fairly intact  He does have some memory impairments  Patient has impaired attention and easy distractibility  Patient impulsive with poor gait balance and safety walking to the bathroom with a walker  He has impaired safety awareness insight into his deficits       Data:  Lab Results   Component Value Date    HGB 14 3 05/12/2021    HGB 15 2 01/05/2016    HCT 42 7 05/12/2021    HCT 43 8 01/05/2016    WBC 17 05 (H) 05/12/2021    WBC 6 42 01/05/2016     01/05/2016    K 4 0 05/12/2021    K 4 5 01/05/2016     (H) 05/12/2021     01/05/2016    GLUCOSE 146 (H) 05/11/2021    GLUCOSE 101 01/05/2016    CREATININE 1 07 05/12/2021    CREATININE 1 12 01/05/2016    BUN 18 05/12/2021    BUN 19 01/05/2016         Cta Head And Neck With And Without Contrast    Result Date: 5/6/2021  Impression: 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 and left hemispheric mass effect causing approximately 3 mm left to right midline shift  Contrast-enhanced brain MRI is recommended for further evaluation  No evidence of flow restrictive large vessel occlusive disease  I personally discussed this study with Adeel Alfaro on 5/6/2021 at 9:40 PM  Workstation performed: [id number]     Mri Brain W Wo Contrast    Result Date: 5/12/2021  Impression: Status post resection of left temporal lobe mass with postoperative change as described above, including mild peripheral ischemia  There is improving surrounding vasogenic edema  Workstation performed: [id number]     Mri Brain W Wo Contrast    Result Date: 5/7/2021  Impression: 1  Rim-enhancing left temporal parietal mass measuring 5 cm with significant surrounding vasogenic edema, 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  2   Left-to-right midline shift of 2 to 3 mm  Workstation performed: [id number]     Ct Chest Abdomen Pelvis W Contrast    Result Date: 5/7/2021  Impression: Left lateral seventh rib nondisplaced fracture could be subacute or acute  Correlate clinically  Small pericardial effusion  Workstation performed: [id number]       Pertinent labs and imaging reviewed  Thank you for allowing the PM&R service to participate in the care of this patient  We will continue to follow Eusebio Allen's progress with you  Please do not hesitate to call with questions or concerns      Radha Peterson MD, [address] [place] [address]  Physical Medicine and Rehabilitation  Brain Injury Medicine

## 2021-05-15 PROCEDURE — 97116 GAIT TRAINING THERAPY: CPT

## 2021-05-15 PROCEDURE — 97530 THERAPEUTIC ACTIVITIES: CPT

## 2021-05-15 PROCEDURE — 99232 SBSQ HOSP IP/OBS MODERATE 35: CPT | Performed by: NURSE PRACTITIONER

## 2021-05-15 RX ORDER — METOPROLOL SUCCINATE 25 MG/1
25 TABLET, EXTENDED RELEASE ORAL DAILY
Status: DISCONTINUED | OUTPATIENT
Start: 2021-05-15 | End: 2021-05-18 | Stop reason: HOSPADM

## 2021-05-15 RX ORDER — LABETALOL 20 MG/4 ML (5 MG/ML) INTRAVENOUS SYRINGE
10 EVERY 6 HOURS PRN
Status: DISCONTINUED | OUTPATIENT
Start: 2021-05-15 | End: 2021-05-18 | Stop reason: HOSPADM

## 2021-05-15 RX ADMIN — DEXAMETHASONE 4 MG: 4 TABLET ORAL at 21:38

## 2021-05-15 RX ADMIN — ENOXAPARIN SODIUM 40 MG: 40 INJECTION SUBCUTANEOUS at 10:17

## 2021-05-15 RX ADMIN — ACETAMINOPHEN 975 MG: 325 TABLET, FILM COATED ORAL at 21:37

## 2021-05-15 RX ADMIN — GLYCERIN 1 SUPPOSITORY: 2 SUPPOSITORY RECTAL at 15:49

## 2021-05-15 RX ADMIN — ACETAMINOPHEN 975 MG: 325 TABLET, FILM COATED ORAL at 13:29

## 2021-05-15 RX ADMIN — LEVETIRACETAM 500 MG: 500 TABLET, FILM COATED ORAL at 10:17

## 2021-05-15 RX ADMIN — POLYETHYLENE GLYCOL 3350 17 G: 17 POWDER, FOR SOLUTION ORAL at 10:18

## 2021-05-15 RX ADMIN — LABETALOL 20 MG/4 ML (5 MG/ML) INTRAVENOUS SYRINGE 10 MG: at 01:49

## 2021-05-15 RX ADMIN — LEVETIRACETAM 500 MG: 500 TABLET, FILM COATED ORAL at 21:37

## 2021-05-15 RX ADMIN — METHOCARBAMOL 500 MG: 500 TABLET, FILM COATED ORAL at 12:16

## 2021-05-15 RX ADMIN — ACETAMINOPHEN 975 MG: 325 TABLET, FILM COATED ORAL at 05:18

## 2021-05-15 RX ADMIN — FAMOTIDINE 20 MG: 20 TABLET ORAL at 17:45

## 2021-05-15 RX ADMIN — Medication 1 TABLET: at 10:17

## 2021-05-15 RX ADMIN — DOCUSATE SODIUM 100 MG: 100 CAPSULE, LIQUID FILLED ORAL at 10:17

## 2021-05-15 RX ADMIN — LABETALOL 20 MG/4 ML (5 MG/ML) INTRAVENOUS SYRINGE 10 MG: at 05:19

## 2021-05-15 RX ADMIN — METHOCARBAMOL 500 MG: 500 TABLET, FILM COATED ORAL at 17:44

## 2021-05-15 RX ADMIN — DEXAMETHASONE 4 MG: 4 TABLET ORAL at 13:29

## 2021-05-15 RX ADMIN — FAMOTIDINE 20 MG: 20 TABLET ORAL at 10:17

## 2021-05-15 RX ADMIN — METHOCARBAMOL 500 MG: 500 TABLET, FILM COATED ORAL at 05:18

## 2021-05-15 RX ADMIN — METOPROLOL SUCCINATE 25 MG: 25 TABLET, FILM COATED, EXTENDED RELEASE ORAL at 10:17

## 2021-05-15 RX ADMIN — DEXAMETHASONE 4 MG: 4 TABLET ORAL at 05:19

## 2021-05-15 NOTE — PHYSICAL THERAPY NOTE
Physical Therapy Progress Note     05/15/21 1010   PT Last Visit   PT Visit Date 05/15/21   Note Type   Note Type Treatment   Pain Assessment   Pain Assessment Tool Pain Assessment not indicated - pt denies pain   Restrictions/Precautions   Other Precautions Impulsive;Cognitive; Fall Risk  (expressive aphasia)   Subjective   Subjective Pt encountered supine in bed, pleasant and agreeable to treatment  Reports no new complaints at this time  Reports he almost left last night, but is agreeable to stay if they dont keep picking him with needles  Pt tangential with conversation, and although he recognized this therapist, he was unable to recall events of previous session accurately & did not believe events as they were described to him  Bed Mobility   Supine to Sit 5  Supervision   Additional items Assist x 1; Increased time required   Transfers   Sit to Stand 5  Supervision   Additional items Assist x 1; Increased time required; Impulsive;Verbal cues  (hand placement)   Stand to Sit 5  Supervision   Additional items Assist x 1; Increased time required;Verbal cues; Impulsive   Ambulation/Elevation   Gait pattern Short stride; Foward flexed; Inconsistent anthony;Decreased foot clearance; Improper Weight shift; Poor UE support   Gait Assistance 4  Minimal assist  (min A/supervision)   Additional items Assist x 1   Assistive Device Rolling walker   Distance 100', 120', 150', 120'   Stair Management Assistance 5  Supervision   Additional items Assist x 1   Stair Management Technique Two rails; Reciprocal;Foreward   Number of Stairs 14   Balance   Static Sitting Fair +   Static Standing Fair   Ambulatory Poor +   Endurance Deficit   Endurance Deficit Yes   Endurance Deficit Description fatigue   Activity Tolerance   Activity Tolerance Patient tolerated treatment well;Patient limited by fatigue   Nurse Kirstie Callaway RN   Assessment   Prognosis Good   Problem List Decreased strength;Decreased endurance; Impaired balance;Decreased mobility; Decreased cognition;Pain   Assessment Pt continues to require Ax1 for all tasks due to impulsiveness, impaired balance, and limited insight into deficits at this time  Pt able to ambulate repeated household & community distances without significant complaints, but pt did take seated rests without prompting in hallway chairs & on windowsil without  Pt does not demonstrate significant dyspnea as in previous session & remained on RA today  Pt negotiated steps as noted above, doing so without LOB  Did so with both rails, but would have to clarify home set up if he only has unilateral rials  Upon return to room, pt performed repeated sit to stand transfers to change pants, doing so without need for RW upon standing, demonstrating awareness to pull pants MCFP down & completely remove in sitting, but initially declined RW  Anticipate pt will likely disregard or abandon RW in confined spaces given limited motor planning in confined spaces & limited insight as noted earlier  At this time, pt would require assist/supervision for all mobiilty to ensure safe return home due to previously noted deficits  If assist is unavailable, pt will require rehab to safely progress to PLOF  Pt remained in recliner with all needs in reach, chair alarm active & RN present at conclusion of session  Barriers to Discharge Decreased caregiver support   Goals   Patient Goals to get better & go home   STG Expiration Date 05/26/21   PT Treatment Day 2   Plan   Treatment/Interventions Functional transfer training;LE strengthening/ROM; Elevations; Therapeutic exercise; Endurance training;Patient/family training;Equipment eval/education; Bed mobility;Gait training   Progress Progressing toward goals   PT Frequency Other (Comment)  (3-6x/week)   Recommendation   PT Discharge Recommendation Post acute rehabilitation services   Equipment Recommended [facility] Recommended Wheeled walker   AM-PAC Basic Mobility Inpatient   Turning in Bed Without Bedrails 4   Lying on Back to Sitting on Edge of Flat Bed 4   Moving Bed to Chair 3   Standing Up From Chair 4   Walk in Room 3   Climb 3-5 Stairs 3   Basic Mobility Inpatient Raw Score 21   Basic Mobility Standardized Score 45 55   The patient's AM-PAC Basic Mobility Inpatient Short Form Raw Score is 21, Standardized Score is 45 55  A standardized score less than 42 9 suggests the patient may benefit from discharge to post-acute rehabilitation services  Please also refer to the recommendation of the Physical Therapist for safe discharge planning            Lesli Boggs PTA

## 2021-05-15 NOTE — ASSESSMENT & PLAN NOTE
· Presented with word-finding difficulties and recent syncopal event  · CTA head/neck (05/06/2021): 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 and left hemispheric mass effect causing approximately 3 mm left to right midline shift "   · MRI brain (05/06/2021): Rim enhancing left temporal parietal mass measuring 5 cm with significant surrounding vasogenic edema, 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  Left to right midline shift of 2-3 mm  · S/p left temporal craniotomy for tumor resection with 5-ALA per neurosurgery (05/11/2021)  · Pathology:  Prelim: favor high grade glioma  Final Pending  · CT chest, abdomen, pelvis: negative for any other primary sources  · Continue with oral Decadron taper per Neurosurgery recommendations  · Continue Keppra 500 mg BID for seizure prophylaxis x7 days   day 4of 7  · PT/OT recommends acute rehab  · Patient seen in consultation by PMR who agrees that patient is appropriate for inpatient acute rehab  Placement pending  Unfortunately, there are no beds at [facility] or  until next week  CM working on alternative facilities

## 2021-05-15 NOTE — PROGRESS NOTES
[address]  Progress Note Brina Allen 1950, 80 y o  male MRN: [id number]  Unit/Bed#: [facility] 624-01 Encounter: [id number]  Primary Care Provider: Cecile Pino PA-C   Date and time admitted to hospital: 5/6/2021  7:32 PM    * Brain mass  Assessment & Plan  · Presented with word-finding difficulties and recent syncopal event  · CTA head/neck (05/06/2021): 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 and left hemispheric mass effect causing approximately 3 mm left to right midline shift "   · MRI brain (05/06/2021): Rim enhancing left temporal parietal mass measuring 5 cm with significant surrounding vasogenic edema, 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  Left to right midline shift of 2-3 mm  · S/p left temporal craniotomy for tumor resection with 5-ALA per neurosurgery (05/11/2021)  · Pathology:  Prelim: favor high grade glioma  Final Pending  · CT chest, abdomen, pelvis: negative for any other primary sources  · Continue with oral Decadron taper per Neurosurgery recommendations  · Continue Keppra 500 mg BID for seizure prophylaxis x7 days   day 4of 7  · PT/OT recommends acute rehab  · Patient seen in consultation by PMR who agrees that patient is appropriate for inpatient acute rehab  Placement pending  Unfortunately, there are no beds at [facility] or  until next week  CM working on alternative facilities  Rib fracture  Assessment & Plan  · S/p fall  · Xray ribs (04/27/21): No evidence of fracture  · CT chest abdomen and pelvis (05/07/2021): Left lateral 7th rib nondisplaced fracture which could be subacute or acute  · Conservative management with pain control  · Encourage incentive spirometry  Leukocytosis  Assessment & Plan  · Likely secondary to the steroids  · Down trending  Patient is afebrile      COPD (chronic obstructive pulmonary disease) (HCC)  Assessment & Plan  · Not in acute exacerbation  · Continue respiratory protocol  · Patient's home inhalers are non formulary  Family can bring in from home if he wishes she use them  Otherwise continue non formulary alternatives  Obstructive sleep apnea  Assessment & Plan  · Non compliant with BiPAP  Morbid obesity ([address] )  Assessment & Plan  · Dietary and lifestyle modification advised  · Patient with history of obstructive sleep apnea but do not use BiPAP  Essential hypertension  Assessment & Plan  · Blood pressures are acceptable off all medications at this time  · Continue to monitor  VTE Pharmacologic Prophylaxis:   Pharmacologic: Enoxaparin (Lovenox)  Mechanical VTE Prophylaxis in Place: Yes    Patient Centered Rounds: I have performed bedside rounds with nursing staff today  Discussions with Specialists or Other Care Team Provider: nursing     Education and Discussions with Family / Patient: patient     Time Spent for Care: 45 minutes  More than 50% of total time spent on counseling and coordination of care as described above  Current Length of Stay: 9 day(s)    Current Patient Status: Inpatient   Certification Statement: The patient will continue to require additional inpatient hospital stay due to ongoing need for rehab pending placement     Discharge Plan: discharge to rehab when bed available     Code Status: Level 1 - Full Code      Subjective:   Pt is a little tangential shows me how his personal tv is working in the room  He also shows me that he is able to do a crossword puzzle on his phone  He gets a little almost verbally loud in short spurts  She otherwise was pleasant and wanted a suppository then he felt he would feel better    We discussed rehab     Objective:     Vitals:   Temp (24hrs), Av 5 °F (36 9 °C), Min:98 2 °F (36 8 °C), Max:98 7 °F (37 1 °C)    Temp:  [98 2 °F (36 8 °C)-98 7 °F (37 1 °C)] 98 7 °F (37 1 °C)  HR:  [58-72] 72  Resp:  [16-20] 16  BP: (142-169)/(80-96) 154/89  SpO2:  [94 %-95 %] 95 %  Body mass index is 41 34 kg/m²  Input and Output Summary (last 24 hours): Intake/Output Summary (Last 24 hours) at 5/15/2021 1816  Last data filed at 5/15/2021 4882  Gross per 24 hour   Intake --   Output 1725 ml   Net -1725 ml       Physical Exam:     Physical Exam  Constitutional:       General: He is not in acute distress  Appearance: He is not ill-appearing, toxic-appearing or diaphoretic  HENT:      Head:      Comments: Left c shaped Incision  with staples intact   Some old dry blood drainage      Nose: No congestion  Mouth/Throat:      Pharynx: Oropharynx is clear  Eyes:      Conjunctiva/sclera: Conjunctivae normal    Cardiovascular:      Rate and Rhythm: Normal rate  Heart sounds: No murmur  No friction rub  No gallop  Pulmonary:      Effort: Pulmonary effort is normal  No respiratory distress  Breath sounds: No stridor  No wheezing, rhonchi or rales  Chest:      Chest wall: No tenderness  Abdominal:      General: There is no distension  Palpations: There is no mass  Tenderness: There is no abdominal tenderness  There is no right CVA tenderness, left CVA tenderness, guarding or rebound  Hernia: No hernia is present  Musculoskeletal:         General: No swelling, tenderness, deformity or signs of injury  Right lower leg: No edema  Left lower leg: No edema  Skin:     Coloration: Skin is not jaundiced or pale  Findings: No bruising, erythema, lesion or rash        Comments: incision with dry blood and staples intact no drainage    Psychiatric:         Behavior: Behavior normal       Comments:  A little anxious            Additional Data:     Labs:    Results from last 7 days   Lab Units 05/14/21  0606   WBC Thousand/uL 16 47*   HEMOGLOBIN g/dL 14 6   HEMATOCRIT % 44 0   PLATELETS Thousands/uL 261   NEUTROS PCT % 87*   LYMPHS PCT % 4*   MONOS PCT % 7   EOS PCT % 0     Results from last 7 days   Lab Units 05/14/21  0606   SODIUM mmol/L 137   POTASSIUM mmol/L 4 7   CHLORIDE mmol/L 107   CO2 mmol/L 25   BUN mg/dL 27*   CREATININE mg/dL 0 99   ANION GAP mmol/L 5   CALCIUM mg/dL 9 0   GLUCOSE RANDOM mg/dL 124     Results from last 7 days   Lab Units 05/12/21  0512   INR  1 06                       * I Have Reviewed All Lab Data Listed Above  * Additional Pertinent Lab Tests Reviewed:  All Labs Within Last 24 Hours Reviewed    Imaging:    Imaging Reports Reviewed Today Include: reviewed     Recent Cultures (last 7 days):           Last 24 Hours Medication List:   Current Facility-Administered Medications   Medication Dose Route Frequency Provider Last Rate    acetaminophen  975 mg Oral Q8H [address] Sartell Anthony, DO      dexamethasone  4 mg Oral Q8H Mcfar[place]bury, DO      Followed by   [place] 5/16/2021] dexamethasone  2 mg Oral Q6H Mcfarlandbury, DO      Followed by   [place] ON 5/19/2021] dexamethasone  2 mg Oral Q8H Mcfarlandbury, DO      Followed by   [place] ON 5/22/2021] dexamethasone  2 mg Oral Q12H Mcfarlandbury, DO      Followed by   [place] 5/25/2021] dexamethasone  2 mg Oral Daily Sartell Anthony, DO      docusate sodium  100 mg Oral BID Mardel Minus, DO      enoxaparin  40 mg Subcutaneous Q24H [address] Sartell Anthony, DO      famotidine  20 mg Oral BID Mardel Minus, DO      glycerin (adult)  1 suppository Rectal Daily PRN Hitesh Cough, PA-C      hydrALAZINE  10 mg Intravenous Q4H PRN Mardel Minus, DO      HYDROmorphone  0 2 mg Intravenous Q1H PRN Mardel Minus, DO      Labetalol HCl  10 mg Intravenous Q6H PRN Mavictor me Agustiners, CRNP      levETIRAcetam  500 mg Oral Q12H [address] Mardel Minus, DO      lidocaine  1 patch Topical Daily Mardel Minus, DO      methocarbamol  500 mg Oral Q6H [address] Mardel Minus, DO      metoprolol succinate  25 mg Oral Daily Maurene Spillers, CRNP      multivitamin-minerals  1 tablet Oral Daily Sartell Anthony, DO      NON FORMULARY  1 puff Inhalation BID Jake Smith Percy Foote PA-C      ondansetron  4 mg Intravenous Q6H PRN Lacy Ni, DO      oxyCODONE  2 5 mg Oral Q4H PRN Lacy Ni, DO      oxyCODONE  5 mg Oral Q4H PRN Lacy Ni, DO      polyethylene glycol  17 g Oral Daily Lacy Ni, DO      senna  1 tablet Oral HS Lacy Ni, DO          Today, Patient Was Seen By: SUZIE Carnes Sa    ** Please Note: Dictation voice to text software may have been used in the creation of this document   **

## 2021-05-15 NOTE — PLAN OF CARE
Problem: Potential for Falls  Goal: Patient will remain free of falls  Description: INTERVENTIONS:  - Assess patient frequently for physical needs  -  Identify cognitive and physical deficits and behaviors that affect risk of falls    -  [place] fall precautions as indicated by assessment   - Educate patient/family on patient safety including physical limitations  - Instruct patient to call for assistance with activity based on assessment  - Modify environment to reduce risk of injury  - Consider OT/PT consult to assist with strengthening/mobility  Outcome: Progressing     Problem: PAIN - ADULT  Goal: Verbalizes/displays adequate comfort level or baseline comfort level  Description: Interventions:  - Encourage patient to monitor pain and request assistance  - Assess pain using appropriate pain scale  - Administer analgesics based on type and severity of pain and evaluate response  - Implement non-pharmacological measures as appropriate and evaluate response  - Consider cultural and social influences on pain and pain management  - Notify physician/advanced practitioner if interventions unsuccessful or patient reports new pain  Outcome: Progressing     Problem: INFECTION - ADULT  Goal: Absence or prevention of progression during hospitalization  Description: INTERVENTIONS:  - Assess and monitor for signs and symptoms of infection  - Monitor lab/diagnostic results  - Monitor all insertion sites, i e  indwelling lines, tubes, and drains  - Monitor endotracheal if appropriate and nasal secretions for changes in amount and color  - [place] appropriate cooling/warming therapies per order  - Administer medications as ordered  - Instruct and encourage patient and family to use good hand hygiene technique  - Identify and instruct in appropriate isolation precautions for identified infection/condition  Outcome: Progressing  Goal: Absence of fever/infection during neutropenic period  Description: INTERVENTIONS:  - Monitor WBC    Outcome: Progressing     Problem: SAFETY ADULT  Goal: Patient will remain free of falls  Description: INTERVENTIONS:  - Assess patient frequently for physical needs  -  Identify cognitive and physical deficits and behaviors that affect risk of falls    -  [place] fall precautions as indicated by assessment   - Educate patient/family on patient safety including physical limitations  - Instruct patient to call for assistance with activity based on assessment  - Modify environment to reduce risk of injury  - Consider OT/PT consult to assist with strengthening/mobility  Outcome: Progressing  Goal: Maintain or return to baseline ADL function  Description: INTERVENTIONS:  -  Assess patient's ability to carry out ADLs; assess patient's baseline for ADL function and identify physical deficits which impact ability to perform ADLs (bathing, care of mouth/teeth, toileting, grooming, dressing, etc )  - Assess/evaluate cause of self-care deficits   - Assess range of motion  - Assess patient's mobility; develop plan if impaired  - Assess patient's need for assistive devices and provide as appropriate  - Encourage maximum independence but intervene and supervise when necessary  - Involve family in performance of ADLs  - Assess for home care needs following discharge   - Consider OT consult to assist with ADL evaluation and planning for discharge  - Provide patient education as appropriate  Outcome: Progressing  Goal: Maintain or return mobility status to optimal level  Description: INTERVENTIONS:  - Assess patient's baseline mobility status (ambulation, transfers, stairs, etc )    - Identify cognitive and physical deficits and behaviors that affect mobility  - Identify mobility aids required to assist with transfers and/or ambulation (gait belt, sit-to-stand, lift, walker, cane, etc )  - [place] fall precautions as indicated by assessment  - Record patient progress and toleration of activity level on Mobility SBAR; progress patient to next Phase/Stage  - Instruct patient to call for assistance with activity based on assessment  - Consider rehabilitation consult to assist with strengthening/weightbearing, etc   Outcome: Progressing     Problem: DISCHARGE PLANNING  Goal: Discharge to home or other facility with appropriate resources  Description: INTERVENTIONS:  - Identify barriers to discharge w/patient and caregiver  - Arrange for needed discharge resources and transportation as appropriate  - Identify discharge learning needs (meds, wound care, etc )  - Arrange for interpretive services to assist at discharge as needed  - Refer to Case Management Department for coordinating discharge planning if the patient needs post-hospital services based on physician/advanced practitioner order or complex needs related to functional status, cognitive ability, or social support system  Outcome: Progressing     Problem: Knowledge Deficit  Goal: Patient/family/caregiver demonstrates understanding of disease process, treatment plan, medications, and discharge instructions  Description: Complete learning assessment and assess knowledge base    Interventions:  - Provide teaching at level of understanding  - Provide teaching via preferred learning methods  Outcome: Progressing

## 2021-05-15 NOTE — PLAN OF CARE
Problem: PHYSICAL THERAPY ADULT  Goal: Performs mobility at highest level of function for planned discharge setting  See evaluation for individualized goals  Description: Treatment/Interventions: Functional transfer training, LE strengthening/ROM, Therapeutic exercise, Endurance training, Elevations, Gait training, Bed mobility  Equipment Recommended: Walker       See flowsheet documentation for full assessment, interventions and recommendations  Outcome: Progressing  Note: Prognosis: Good  Problem List: Decreased strength, Decreased endurance, Impaired balance, Decreased mobility, Decreased cognition, Pain  Assessment: Pt continues to require Ax1 for all tasks due to impulsiveness, impaired balance, and limited insight into deficits at this time  Pt able to ambulate repeated household & community distances without significant complaints, but pt did take seated rests without prompting in hallway chairs & on windowsil without  Pt does not demonstrate significant dyspnea as in previous session & remained on RA today  Pt negotiated steps as noted above, doing so without LOB  Did so with both rails, but would have to clarify home set up if he only has unilateral rials  Upon return to room, pt performed repeated sit to stand transfers to change pants, doing so without need for RW upon standing, demonstrating awareness to pull pants residential down & completely remove in sitting, but initially declined RW  Anticipate pt will likely disregard or abandon RW in confined spaces given limited motor planning in confined spaces & limited insight as noted earlier  At this time, pt would require assist/supervision for all mobiilty to ensure safe return home due to previously noted deficits  If assist is unavailable, pt will require rehab to safely progress to PLOF  Pt remained in recliner with all needs in reach, chair alarm active & RN present at conclusion of session    Barriers to Discharge: Decreased caregiver support        PT Discharge Recommendation: Post acute rehabilitation services     PT - OK to Discharge: Yes    See flowsheet documentation for full assessment

## 2021-05-16 PROCEDURE — 99232 SBSQ HOSP IP/OBS MODERATE 35: CPT | Performed by: NURSE PRACTITIONER

## 2021-05-16 RX ADMIN — METHOCARBAMOL 500 MG: 500 TABLET, FILM COATED ORAL at 12:07

## 2021-05-16 RX ADMIN — DOCUSATE SODIUM 100 MG: 100 CAPSULE, LIQUID FILLED ORAL at 08:50

## 2021-05-16 RX ADMIN — METOPROLOL SUCCINATE 25 MG: 25 TABLET, FILM COATED, EXTENDED RELEASE ORAL at 08:50

## 2021-05-16 RX ADMIN — LEVETIRACETAM 500 MG: 500 TABLET, FILM COATED ORAL at 08:50

## 2021-05-16 RX ADMIN — FAMOTIDINE 20 MG: 20 TABLET ORAL at 17:38

## 2021-05-16 RX ADMIN — ACETAMINOPHEN 975 MG: 325 TABLET, FILM COATED ORAL at 21:13

## 2021-05-16 RX ADMIN — LEVETIRACETAM 500 MG: 500 TABLET, FILM COATED ORAL at 21:13

## 2021-05-16 RX ADMIN — METHOCARBAMOL 500 MG: 500 TABLET, FILM COATED ORAL at 17:38

## 2021-05-16 RX ADMIN — Medication 1 TABLET: at 08:50

## 2021-05-16 RX ADMIN — ACETAMINOPHEN 975 MG: 325 TABLET, FILM COATED ORAL at 05:37

## 2021-05-16 RX ADMIN — FAMOTIDINE 20 MG: 20 TABLET ORAL at 08:50

## 2021-05-16 RX ADMIN — ENOXAPARIN SODIUM 40 MG: 40 INJECTION SUBCUTANEOUS at 08:50

## 2021-05-16 RX ADMIN — METHOCARBAMOL 500 MG: 500 TABLET, FILM COATED ORAL at 00:13

## 2021-05-16 RX ADMIN — DEXAMETHASONE 2 MG: 2 TABLET ORAL at 12:07

## 2021-05-16 RX ADMIN — GLYCERIN 1 SUPPOSITORY: 2 SUPPOSITORY RECTAL at 18:36

## 2021-05-16 RX ADMIN — ACETAMINOPHEN 975 MG: 325 TABLET, FILM COATED ORAL at 13:19

## 2021-05-16 RX ADMIN — DEXAMETHASONE 2 MG: 2 TABLET ORAL at 17:38

## 2021-05-16 RX ADMIN — DEXAMETHASONE 2 MG: 2 TABLET ORAL at 05:36

## 2021-05-16 RX ADMIN — METHOCARBAMOL 500 MG: 500 TABLET, FILM COATED ORAL at 05:36

## 2021-05-16 NOTE — ASSESSMENT & PLAN NOTE
· Presented with word-finding difficulties and recent syncopal event  · CTA head/neck (05/06/2021): 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 and left hemispheric mass effect causing approximately 3 mm left to right midline shift "   · MRI brain (05/06/2021): Rim enhancing left temporal parietal mass measuring 5 cm with significant surrounding vasogenic edema, 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  Left to right midline shift of 2-3 mm  · S/p left temporal craniotomy for tumor resection with 5-ALA per neurosurgery (05/11/2021)  · Pathology:  Prelim: favor high grade glioma  Final Pending  · CT chest, abdomen, pelvis: negative for any other primary sources  · Continue with oral Decadron taper per Neurosurgery recommendations  · Continue Keppra 500 mg BID for seizure prophylaxis x7 days   day 4of 7  · PT/OT recommends acute rehab  · Patient seen in consultation by PMR who agrees that patient is appropriate for inpatient acute rehab  Placement pending  Unfortunately, there are no beds at [facility] or  until next week  CM working on alternative facilities

## 2021-05-16 NOTE — PROGRESS NOTES
[address] [facility]  Progress Note Leda Allen 1950, 80 y o  male MRN: [id number]  Unit/Bed#: [facility] 624-01 Encounter: [id number]  Primary Care Provider: rBuce Zarate PA-C   Date and time admitted to hospital: 5/6/2021  7:32 PM    * Brain mass  Assessment & Plan  · Presented with word-finding difficulties and recent syncopal event  · CTA head/neck (05/06/2021): 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 and left hemispheric mass effect causing approximately 3 mm left to right midline shift "   · MRI brain (05/06/2021): Rim enhancing left temporal parietal mass measuring 5 cm with significant surrounding vasogenic edema, 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  Left to right midline shift of 2-3 mm  · S/p left temporal craniotomy for tumor resection with 5-ALA per neurosurgery (05/11/2021)  · Pathology:  Prelim: favor high grade glioma  Final Pending  · CT chest, abdomen, pelvis: negative for any other primary sources  · Continue with oral Decadron taper per Neurosurgery recommendations  · Continue Keppra 500 mg BID for seizure prophylaxis x7 days   day 4of 7  · PT/OT recommends acute rehab  · Patient seen in consultation by PMR who agrees that patient is appropriate for inpatient acute rehab  Placement pending  Unfortunately, there are no beds at [facility] or  until next week  CM working on alternative facilities  Rib fracture  Assessment & Plan  · S/p fall  · Xray ribs (04/27/21): No evidence of fracture  · CT chest abdomen and pelvis (05/07/2021): Left lateral 7th rib nondisplaced fracture which could be subacute or acute  · Conservative management with pain control  · Encourage incentive spirometry  Leukocytosis  Assessment & Plan  · Likely secondary to the steroids  · Down trending  Patient is afebrile      COPD (chronic obstructive pulmonary disease) (HCC)  Assessment & Plan  · Not in acute exacerbation  · Continue respiratory protocol  · Patient's home inhalers are non formulary  Family can bring in from home if he wishes she use them  Otherwise continue non formulary alternatives  Obstructive sleep apnea  Assessment & Plan  · Non compliant with BiPAP  Morbid obesity ([address] )  Assessment & Plan  · Dietary and lifestyle modification advised  · Patient with history of obstructive sleep apnea but do not use BiPAP  Essential hypertension  Assessment & Plan  · Blood pressures are acceptable off all medications at this time  · Continue to monitor  VTE Pharmacologic Prophylaxis:   Pharmacologic: Enoxaparin (Lovenox)  Mechanical VTE Prophylaxis in Place: Yes    Patient Centered Rounds: I have performed bedside rounds with nursing staff today  Discussions with Specialists or Other Care Team Provider: nursing     Education and Discussions with Family / Patient: patient and wife at the bedside     Time Spent for Care: 45 minutes  More than 50% of total time spent on counseling and coordination of care as described above  Current Length of Stay: 10 day(s)    Current Patient Status: Inpatient   Certification Statement: The patient will continue to require additional inpatient hospital stay due to pending bed placement     Discharge Plan: to rehab likely Monday     Code Status: Level 1 - Full Code      Subjective:  Pt is sitting in the chair he is taking pictures of his receipts  No pain no headache no visual deficit   Speech improving pt a little tangential or impulsive  Pt relies on daily suppository for bm per the pt and his wife   No sob     Objective:     Vitals:   Temp (24hrs), Av 6 °F (37 °C), Min:97 6 °F (36 4 °C), Max:99 4 °F (37 4 °C)    Temp:  [97 6 °F (36 4 °C)-99 4 °F (37 4 °C)] 99 4 °F (37 4 °C)  HR:  [52-72] 56  Resp:  [16-18] 16  BP: (145-154)/(78-89) 147/78  SpO2:  [95 %-98 %] 98 %  Body mass index is 40 45 kg/m²       Input and Output Summary (last 24 hours): Intake/Output Summary (Last 24 hours) at 5/16/2021 1505  Last data filed at 5/16/2021 2326  Gross per 24 hour   Intake 660 ml   Output 1200 ml   Net -540 ml       Physical Exam:     Physical Exam  Constitutional:       General: He is not in acute distress  Appearance: He is not ill-appearing, toxic-appearing or diaphoretic  HENT:      Head:      Comments: c shaped incision with staples intact some scabbing  Right Ear: There is no impacted cerumen  Nose: No congestion  Mouth/Throat:      Pharynx: Oropharynx is clear  Eyes:      General:         Right eye: No discharge  Left eye: No discharge  Conjunctiva/sclera: Conjunctivae normal    Cardiovascular:      Rate and Rhythm: Normal rate  Heart sounds: No murmur  No friction rub  No gallop  Pulmonary:      Effort: No respiratory distress  Breath sounds: No stridor  No wheezing, rhonchi or rales  Chest:      Chest wall: No tenderness  Abdominal:      General: There is no distension  Palpations: There is no mass  Tenderness: There is no abdominal tenderness  There is no right CVA tenderness, left CVA tenderness, guarding or rebound  Hernia: No hernia is present  Skin:     Coloration: Skin is not jaundiced or pale  Findings: No bruising, erythema, lesion or rash  Neurological:      Mental Status: He is alert and oriented to person, place, and time     Psychiatric:      Comments: Slightly impulsive           Additional Data:     Labs:    Results from last 7 days   Lab Units 05/14/21  0606   WBC Thousand/uL 16 47*   HEMOGLOBIN g/dL 14 6   HEMATOCRIT % 44 0   PLATELETS Thousands/uL 261   NEUTROS PCT % 87*   LYMPHS PCT % 4*   MONOS PCT % 7   EOS PCT % 0     Results from last 7 days   Lab Units 05/14/21  0606   SODIUM mmol/L 137   POTASSIUM mmol/L 4 7   CHLORIDE mmol/L 107   CO2 mmol/L 25   BUN mg/dL 27*   CREATININE mg/dL 0 99   ANION GAP mmol/L 5   CALCIUM mg/dL 9 0   GLUCOSE RANDOM mg/dL 124 Results from last 7 days   Lab Units 05/12/21  0512   INR  1 06                       * I Have Reviewed All Lab Data Listed Above  * Additional Pertinent Lab Tests Reviewed:  All Labs Within Last 24 Hours Reviewed    Imaging:    Imaging Reports Reviewed Today Include: reviewed     Recent Cultures (last 7 days):           Last 24 Hours Medication List:   Current Facility-Administered Medications   Medication Dose Route Frequency Provider Last Rate    acetaminophen  975 mg Oral Q8H [address] Grace Anthony, DO      dexamethasone  2 mg Oral Q6H Mcfarlandbury, DO      Followed by   Genevive Emms ON 5/19/2021] dexamethasone  2 mg Oral Q8H [address] Grace Anthony, DO      Followed by   Genevive Emms ON 5/22/2021] dexamethasone  2 mg Oral Q12H Mcfarlandbury, DO      Followed by   Genevive Emms ON 5/25/2021] dexamethasone  2 mg Oral Daily Grace Anthony, DO      docusate sodium  100 mg Oral BID Jacinta Bj, DO      enoxaparin  40 mg Subcutaneous Q24H [address] Grace Anthony, DO      famotidine  20 mg Oral BID Jacinta Bj, DO      glycerin (adult)  1 suppository Rectal Daily PRN Luna Diop PA-C      hydrALAZINE  10 mg Intravenous Q4H PRN Jacinta Lorenzo, DO      HYDROmorphone  0 2 mg Intravenous Q1H PRN Jacinta Lorenzo, DO      Labetalol HCl  10 mg Intravenous Q6H PRN Pierce Settler, CRNP      levETIRAcetam  500 mg Oral Q12H [address] Jacinta Bj, DO      lidocaine  1 patch Topical Daily Jacinta Lorenzo, DO      methocarbamol  500 mg Oral Q6H [address] Jacinta Bj, DO      metoprolol succinate  25 mg Oral Daily Pierce Settler, CRNP      multivitamin-minerals  1 tablet Oral Daily Jacinta [facility], [facility]      NON FORMULARY  1 puff Inhalation BID Luna Diop PA-C      ondansetron  4 mg Intravenous Q6H PRN Jacinta Bj, DO      oxyCODONE  2 5 mg Oral Q4H PRN Jacinta Bj, DO      oxyCODONE  5 mg Oral Q4H PRN Jacinta Bj, DO      polyethylene glycol  17 g Oral Daily Jacinta Lorenzo, DO      senna  1 tablet Oral HS Puneet Cruz DO          Today, Patient Was Seen By: SUZIE Marcano    ** Please Note: Dictation voice to text software may have been used in the creation of this document   **

## 2021-05-17 PROBLEM — D72.829 LEUKOCYTOSIS: Status: RESOLVED | Noted: 2021-05-09 | Resolved: 2021-05-17

## 2021-05-17 LAB
ANION GAP SERPL CALCULATED.3IONS-SCNC: 6 MMOL/L (ref 4–13)
BASOPHILS # BLD MANUAL: 0 THOUSAND/UL (ref 0–0.1)
BASOPHILS NFR MAR MANUAL: 0 % (ref 0–1)
BUN SERPL-MCNC: 24 MG/DL (ref 5–25)
CALCIUM SERPL-MCNC: 8.7 MG/DL (ref 8.3–10.1)
CHLORIDE SERPL-SCNC: 107 MMOL/L (ref 100–108)
CO2 SERPL-SCNC: 24 MMOL/L (ref 21–32)
CREAT SERPL-MCNC: 0.96 MG/DL (ref 0.6–1.3)
EOSINOPHIL # BLD MANUAL: 0.1 THOUSAND/UL (ref 0–0.4)
EOSINOPHIL NFR BLD MANUAL: 1 % (ref 0–6)
ERYTHROCYTE [DISTWIDTH] IN BLOOD BY AUTOMATED COUNT: 13.2 % (ref 11.6–15.1)
GFR SERPL CREATININE-BSD FRML MDRD: 80 ML/MIN/1.73SQ M
GLUCOSE SERPL-MCNC: 109 MG/DL (ref 65–140)
HCT VFR BLD AUTO: 42.2 % (ref 36.5–49.3)
HGB BLD-MCNC: 14.1 G/DL (ref 12–17)
LYMPHOCYTES # BLD AUTO: 1.59 THOUSAND/UL (ref 0.6–4.47)
LYMPHOCYTES # BLD AUTO: 16 % (ref 14–44)
MCH RBC QN AUTO: 29.7 PG (ref 26.8–34.3)
MCHC RBC AUTO-ENTMCNC: 33.4 G/DL (ref 31.4–37.4)
MCV RBC AUTO: 89 FL (ref 82–98)
MONOCYTES # BLD AUTO: 0.9 THOUSAND/UL (ref 0–1.22)
MONOCYTES NFR BLD: 9 % (ref 4–12)
MYELOCYTES NFR BLD MANUAL: 3 % (ref 0–1)
NEUTROPHILS # BLD MANUAL: 7.06 THOUSAND/UL (ref 1.85–7.62)
NEUTS BAND NFR BLD MANUAL: 2 % (ref 0–8)
NEUTS SEG NFR BLD AUTO: 69 % (ref 43–75)
NRBC BLD AUTO-RTO: 0 /100 WBCS
PLATELET # BLD AUTO: 229 THOUSANDS/UL (ref 149–390)
PLATELET BLD QL SMEAR: ADEQUATE
PMV BLD AUTO: 9.6 FL (ref 8.9–12.7)
POTASSIUM SERPL-SCNC: 4 MMOL/L (ref 3.5–5.3)
RBC # BLD AUTO: 4.75 MILLION/UL (ref 3.88–5.62)
RBC MORPH BLD: NORMAL
SARS-COV-2 RNA RESP QL NAA+PROBE: NEGATIVE
SODIUM SERPL-SCNC: 137 MMOL/L (ref 136–145)
TOTAL CELLS COUNTED SPEC: 100
WBC # BLD AUTO: 9.95 THOUSAND/UL (ref 4.31–10.16)

## 2021-05-17 PROCEDURE — 80048 BASIC METABOLIC PNL TOTAL CA: CPT | Performed by: NURSE PRACTITIONER

## 2021-05-17 PROCEDURE — 85027 COMPLETE CBC AUTOMATED: CPT | Performed by: NURSE PRACTITIONER

## 2021-05-17 PROCEDURE — 99232 SBSQ HOSP IP/OBS MODERATE 35: CPT | Performed by: NURSE PRACTITIONER

## 2021-05-17 PROCEDURE — U0005 INFEC AGEN DETEC AMPLI PROBE: HCPCS | Performed by: NURSE PRACTITIONER

## 2021-05-17 PROCEDURE — 97535 SELF CARE MNGMENT TRAINING: CPT

## 2021-05-17 PROCEDURE — U0003 INFECTIOUS AGENT DETECTION BY NUCLEIC ACID (DNA OR RNA); SEVERE ACUTE RESPIRATORY SYNDROME CORONAVIRUS 2 (SARS-COV-2) (CORONAVIRUS DISEASE [COVID-19]), AMPLIFIED PROBE TECHNIQUE, MAKING USE OF HIGH THROUGHPUT TECHNOLOGIES AS DESCRIBED BY CMS-2020-01-R: HCPCS | Performed by: NURSE PRACTITIONER

## 2021-05-17 PROCEDURE — 85007 BL SMEAR W/DIFF WBC COUNT: CPT | Performed by: NURSE PRACTITIONER

## 2021-05-17 RX ADMIN — DEXAMETHASONE 2 MG: 2 TABLET ORAL at 23:16

## 2021-05-17 RX ADMIN — DEXAMETHASONE 2 MG: 2 TABLET ORAL at 11:41

## 2021-05-17 RX ADMIN — DEXAMETHASONE 2 MG: 2 TABLET ORAL at 00:40

## 2021-05-17 RX ADMIN — DEXAMETHASONE 2 MG: 2 TABLET ORAL at 17:40

## 2021-05-17 RX ADMIN — METHOCARBAMOL 500 MG: 500 TABLET, FILM COATED ORAL at 23:16

## 2021-05-17 RX ADMIN — ACETAMINOPHEN 975 MG: 325 TABLET, FILM COATED ORAL at 13:39

## 2021-05-17 RX ADMIN — SENNOSIDES 8.6 MG: 8.6 TABLET ORAL at 21:37

## 2021-05-17 RX ADMIN — METHOCARBAMOL 500 MG: 500 TABLET, FILM COATED ORAL at 17:40

## 2021-05-17 RX ADMIN — ACETAMINOPHEN 975 MG: 325 TABLET, FILM COATED ORAL at 21:37

## 2021-05-17 RX ADMIN — METHOCARBAMOL 500 MG: 500 TABLET, FILM COATED ORAL at 05:48

## 2021-05-17 RX ADMIN — LEVETIRACETAM 500 MG: 500 TABLET, FILM COATED ORAL at 21:37

## 2021-05-17 RX ADMIN — DOCUSATE SODIUM 100 MG: 100 CAPSULE, LIQUID FILLED ORAL at 08:59

## 2021-05-17 RX ADMIN — FAMOTIDINE 20 MG: 20 TABLET ORAL at 08:59

## 2021-05-17 RX ADMIN — ACETAMINOPHEN 975 MG: 325 TABLET, FILM COATED ORAL at 05:48

## 2021-05-17 RX ADMIN — METHOCARBAMOL 500 MG: 500 TABLET, FILM COATED ORAL at 00:41

## 2021-05-17 RX ADMIN — ENOXAPARIN SODIUM 40 MG: 40 INJECTION SUBCUTANEOUS at 08:59

## 2021-05-17 RX ADMIN — METOPROLOL SUCCINATE 25 MG: 25 TABLET, FILM COATED, EXTENDED RELEASE ORAL at 08:59

## 2021-05-17 RX ADMIN — LEVETIRACETAM 500 MG: 500 TABLET, FILM COATED ORAL at 08:59

## 2021-05-17 RX ADMIN — FAMOTIDINE 20 MG: 20 TABLET ORAL at 17:40

## 2021-05-17 RX ADMIN — DEXAMETHASONE 2 MG: 2 TABLET ORAL at 05:48

## 2021-05-17 RX ADMIN — METHOCARBAMOL 500 MG: 500 TABLET, FILM COATED ORAL at 11:41

## 2021-05-17 RX ADMIN — Medication 1 TABLET: at 08:59

## 2021-05-17 RX ADMIN — DOCUSATE SODIUM 100 MG: 100 CAPSULE, LIQUID FILLED ORAL at 17:40

## 2021-05-17 NOTE — ARC ADMISSION
Per PM&R consult and [facility] physician, patient remains acute appropriate pending insurance authorization  Auth process has been initiated, pending updated OT notes, and we await their determination at this time  Patient will need COVID testing    Patient's benefits/eligibility were confirmed with Dhiraj Balderas, , as follows: $175 copay per day for days #1-5, then no copay for remaining days; out of pocket maximum of $7550, with $7530 remaining  Will meet with patient and discuss the above  1600 - received call from Henry Zuluaga at [facility], and patient's case will require further review by medical director  Will update as able

## 2021-05-17 NOTE — ARC ADMISSION
Per PM&R consult and [facility] physician, patient remains acute appropriate pending insurance authorization  Auth process has been initiated, with pending ref # [id number], and we await their determination at this time  Patient will need COVID testing  Patient's benefits/eligibility were confirmed with Vicente Joiner, , as follows: $390 copay per day for days #1-5, then no copay for remaining days; out of pocket maximum of $6700, with $885 currently met  Will meet with patient and discuss the above

## 2021-05-17 NOTE — ASSESSMENT & PLAN NOTE
After Visit Summary   2018    Stephanie Sánchez    MRN: [id number]           Patient Information     Date Of Birth          1998        Visit Information        Provider Department      2018 11:00 AM Dia Yoon MD [facility]        Today's Diagnoses     Gender dysphoria in adolescent and adult    -  1       Follow-ups after your visit        Follow-up notes from your care team     Return in about 4 weeks (around 2018) for HRT.      Who to contact     Please call your clinic at [phone] to:    Ask questions about your health    Make or cancel appointments    Discuss your medicines    Learn about your test results    Speak to your doctor            Additional Information About Your Visit        MyChart Information     [employer] is an electronic gateway that provides easy, online access to your medical records. With [employer] you can request a clinic appointment, read your test results, renew a prescription or communicate with your care team.     To sign up for [employer] visit the website at [email or web address]   You will be asked to enter the access code listed below, as well as some personal information. Please follow the directions to create your username and password.     Your access code is: [email or web address]  Expires: 2018  3:34 PM     Your access code will  in 90 days. If you need help or a new code, please contact your [facility] or call [phone] for assistance.        Care EveryWhere ID     This is your Care EveryWhere ID. This could be used by other organizations to access your [place] medical records  [id number]        Your Vitals Were     Pulse Temperature Last Period Pulse Oximetry          94 98.2  F (36.8  C) (Oral) 2018 97%         Blood Pressure from Last 3 Encounters:   18 122/77   18 102/68    Weight from Last 3 Encounters:   18 163 lb (73.9 kg) (90 %)*   18 160  · Dietary and lifestyle modification advised "lb 6.4 oz (72.8 kg) (88 %)*     * Growth percentiles are based on [facility] 2-20 Years data.              Today, you had the following     No orders found for display       Primary Care Provider Fax #    Physician No Ref-Primary [phone]       No address on file        Equal Access to Services     DELORES PENNINGTON : Hadii kerry segal hadmarilynno Soomaali, waaxda luqadaha, qaybta kaalmada adeegyada, waxgosia cardenas carminacapri tracy malenaabram coreasnasircapri . So [facility] [phone].    ATENCIÓN: Si habla español, tiene a cash disposición servicios gratuitos de asistencia lingüística. Llame al [phone].    We comply with applicable federal civil rights laws and [place] laws. We do not discriminate on the basis of race, color, national origin, age, disability, sex, sexual orientation, or gender identity.            Thank you!     Thank you for choosing [facility]  for your care. Our goal is always to provide you with excellent care. Hearing back from our patients is one way we can continue to improve our services. Please take a few minutes to complete the written survey that you may receive in the mail after your visit with us. Thank you!             Your Updated Medication List - Protect others around you: Learn how to safely use, store and throw away your medicines at www.disposemymeds.org.          This list is accurate as of 6/13/18 11:59 PM.  Always use your most recent med list.                   Brand Name Dispense Instructions for use Diagnosis    needle (disp) 18G X 1\" Misc     25 each    Use to draw up hormones once weekly    Gender dysphoria in adolescent and adult       Needle (Disp) 25G X 1-1/2\" Misc     25 each    Use once weekly for administering hormone IM    Gender dysphoria in adolescent and adult       syringe (disposable) 1 ML Misc     25 each    Use once weekly to draw up hormones    Gender dysphoria in adolescent and adult       testosterone cypionate 200 MG/ML injection    DEPOTESTOTERONE    1 mL    Inject " 0.2 mLs (40 mg) into the muscle once a week    Gender dysphoria in adolescent and adult

## 2021-05-17 NOTE — OCCUPATIONAL THERAPY NOTE
Occupational Therapy Treatment Note:       05/17/21 1205   OT Last Visit   OT Visit Date 05/17/21   Note Type   Note Type Treatment   Restrictions/Precautions   Other Precautions Impulsive   Pain Assessment   Pain Score No Pain   ADL   Where Assessed Standing at sink   Grooming Assistance 5  Supervision/Setup   Grooming Comments pt was able to read grooming items inorder to id appropraite items for shaving and oral care  UB Bathing Assistance 5  Supervision/Setup   LB Bathing Assistance 5  Supervision/Setup   UB Dressing Assistance 5  Supervision/Setup   LB Dressing Assistance 5  Supervision/Setup   Toileting Assistance  5  Supervision/Setup   Functional Standing Tolerance   Time   (no lob observed, pt bent to floor to retrieve item)   Transfers   Sit to Stand 5  Supervision   Stand to Sit 5  Supervision   Functional Mobility   Functional Mobility 5  Supervision   Additional items   (c and s a d )   Cognition   Arousal/Participation Alert; Cooperative   Attention Attends with cues to redirect   Memory Decreased recall of precautions   Following Commands Follows one step commands without difficulty   Comments pt noted to be minimally disorganized, demonstrates very fast sppech and movements/ actions pt with limited safety awareness c and without a d  during functional trasks  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  Activity Tolerance   Activity Tolerance Patient tolerated treatment well   Assessment   Assessment pt participated in am ot session and was seen focusing on grooming and adls stating at sinkside, functional mobility with and s a d  and carring light objects to from hamper and bathroom  pt was able to perform grooming tasks with sba but was notable disorganized  removing dentures, then reaching for razor etc  pt was khai to self correct and complete one tas at time  recommend cognitive assessment ie acls next session inorder to make further d/c recommendations   pt was s for adls and functional mobility  assistance and supervision is recommended to perform iadl tasks  pt reports mild wordfind when he fatigues towards end of day, otherwise denies difficulty  pts wife works pta but pt reports she will provide s if necessary post d/c  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  Plan   Treatment Interventions ADL retraining;Functional transfer training; Endurance training;Patient/family training;Equipment evaluation/education; Activityengagement   Goal Expiration Date 05/26/21   OT Treatment Day 2   OT Frequency 3-5x/wk   Recommendation   OT Discharge Recommendation Post acute rehabilitation services   AM-PAC Daily Activity Inpatient   Lower Body Dressing 3   Bathing 3   Toileting 3   Upper Body Dressing 3   Grooming 3   Eating 4   Daily Activity Raw Score 19   Daily Activity Standardized Score (Calc for Raw Score >=11) 40 22   AM-PAC Applied Cognition Inpatient   Following a Speech/Presentation 3   Understanding Ordinary Conversation 4   Taking Medications 3   Remembering Where Things Are Placed or Put Away 3   Remembering List of 4-5 Errands 3   Taking Care of Complicated Tasks 3   Applied Cognition Raw Score 19   Applied Cognition Standardized Score 39 77   April A Storm, DORANTES

## 2021-05-17 NOTE — PROGRESS NOTES
[address] [facility]  Progress Note Lalo Allen 1950, 80 y o  male MRN: [id number]  Unit/Bed#: [facility] 624-01 Encounter: [id number]  Primary Care Provider: Tata Fox PA-C   Date and time admitted to hospital: 5/6/2021  7:32 PM    * Brain mass  Assessment & Plan  · Presented with word-finding difficulties and recent syncopal event  · CTA head/neck (05/06/2021): 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 and left hemispheric mass effect causing approximately 3 mm left to right midline shift "   · MRI brain (05/06/2021): Rim enhancing left temporal parietal mass measuring 5 cm with significant surrounding vasogenic edema, 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  Left to right midline shift of 2-3 mm  · S/p left temporal craniotomy for tumor resection with 5-ALA per neurosurgery (05/11/2021)  · Pathology:  Prelim: favor high grade glioma  Final Pending  · CT chest, abdomen, pelvis: negative for any other primary sources  · Continue with oral Decadron taper per Neurosurgery recommendations  · Continue Keppra 500 mg BID for seizure prophylaxis x7 days  · PT/OT recommends acute rehab  · Patient seen in consultation by PMR who agrees that patient is appropriate for inpatient acute rehab  Placement pending, possible ARC dispo tomorrow  Rib fracture  Assessment & Plan  · S/p fall  · Xray ribs (04/27/21): No evidence of fracture  · CT chest abdomen and pelvis (05/07/2021): Left lateral 7th rib nondisplaced fracture which could be subacute or acute  · Conservative management with pain control  · Encourage incentive spirometry  Essential hypertension  Assessment & Plan  · Continue metoprolol    · Continue to monitor  COPD (chronic obstructive pulmonary disease) (HCC)  Assessment & Plan  · Not in acute exacerbation  · Continue respiratory protocol  · Patient's home inhalers are non formulary    Family can bring in from home if he wishes she use them  Otherwise continue non formulary alternatives  Morbid obesity ([address] )  Assessment & Plan  · Dietary and lifestyle modification advised    Leukocytosis-resolved as of 2021  Assessment & Plan  · Now resolved    Obstructive sleep apnea  Assessment & Plan  · Non compliant with BiPAP  VTE Pharmacologic Prophylaxis: VTE Score: 6 Moderate Risk (Score 3-4) - Pharmacological DVT Prophylaxis Ordered: enoxaparin (Lovenox)  Patient Centered Rounds: I performed bedside rounds with nursing staff today  Discussions with Specialists or Other Care Team Provider: nursing, case management     Education and Discussions with Family / Patient: Patient declined call to   Time Spent for Care: 20 minutes  More than 50% of total time spent on counseling and coordination of care as described above  Current Length of Stay: 11 day(s)  Current Patient Status: Inpatient   Certification Statement: The patient will continue to require additional inpatient hospital stay due to pending rehab placement  Discharge Plan: Anticipate discharge tomorrow to rehab facility  Code Status: Level 1 - Full Code    Subjective:   Patient offers no acute    Objective:     Vitals:   Temp (24hrs), Av 8 °F (36 6 °C), Min:97 5 °F (36 4 °C), Max:98 4 °F (36 9 °C)    Temp:  [97 5 °F (36 4 °C)-98 4 °F (36 9 °C)] 97 6 °F (36 4 °C)  HR:  [65] 65  Resp:  [16-17] 17  BP: (127-145)/(64-82) 143/82  SpO2:  [93 %-95 %] 95 %  Body mass index is 40 46 kg/m²  Input and Output Summary (last 24 hours): Intake/Output Summary (Last 24 hours) at 2021 1140  Last data filed at 2021 1840  Gross per 24 hour   Intake 480 ml   Output --   Net 480 ml       Physical Exam:   Physical Exam  Vitals signs and nursing note reviewed  Constitutional:       Appearance: He is obese  HENT:      Head:      Comments: Scalp staples open air  Cardiovascular:      Rate and Rhythm: Normal rate     Pulmonary: Breath sounds: Normal breath sounds  Abdominal:      Tenderness: There is no abdominal tenderness  Musculoskeletal:         General: No swelling  Skin:     General: Skin is warm  Neurological:      Mental Status: He is alert and oriented to person, place, and time  Mental status is at baseline  Psychiatric:         Mood and Affect: Mood normal           Additional Data:     Labs:  Results from last 7 days   Lab Units 05/17/21  0611 05/14/21  0606   WBC Thousand/uL 9 95 16 47*   HEMOGLOBIN g/dL 14 1 14 6   HEMATOCRIT % 42 2 44 0   PLATELETS Thousands/uL 229 261   BANDS PCT % 2  --    NEUTROS PCT %  --  87*   LYMPHS PCT %  --  4*   LYMPHO PCT % 16  --    MONOS PCT %  --  7   MONO PCT % 9  --    EOS PCT % 1 0     Results from last 7 days   Lab Units 05/17/21  0611   SODIUM mmol/L 137   POTASSIUM mmol/L 4 0   CHLORIDE mmol/L 107   CO2 mmol/L 24   BUN mg/dL 24   CREATININE mg/dL 0 96   ANION GAP mmol/L 6   CALCIUM mg/dL 8 7   GLUCOSE RANDOM mg/dL 109     Results from last 7 days   Lab Units 05/12/21  0512   INR  1 06                   Lines/Drains:  Invasive Devices     None                       Imaging: No pertinent imaging reviewed      Recent Cultures (last 7 days):         Last 24 Hours Medication List:   Current Facility-Administered Medications   Medication Dose Route Frequency Provider Last Rate    acetaminophen  975 mg Oral Q8H [address] Eunice Anthony, DO      dexamethasone  2 mg Oral Q6H Mcfarlandbury, DO      Followed by   Jessi Bashir ON 5/19/2021] dexamethasone  2 mg Oral Q8H Mcfarlandbury, DO      Followed by   Jessi Bashir ON 5/22/2021] dexamethasone  2 mg Oral Q12H Mcfarlandbury, DO      Followed by   Jessi Bashir ON 5/25/2021] dexamethasone  2 mg Oral Daily Eunice Anthony, DO      docusate sodium  100 mg Oral BID Stiven España DO      enoxaparin  40 mg Subcutaneous Q24H [address] Eunice Anthony, DO      famotidine  20 mg Oral BID Stiven España DO      glycerin (adult)  1 suppository Rectal Daily PRN Adelbert Patch, PA-C      hydrALAZINE  10 mg Intravenous Q4H PRN Carles Border, DO      Labetalol HCl  10 mg Intravenous Q6H PRN SUZIE Xie      levETIRAcetam  500 mg Oral Q12H [address] [facility], DO      lidocaine  1 patch Topical Daily Carles Border, DO      methocarbamol  500 mg Oral Q6H [address] [facility], DO      metoprolol succinate  25 mg Oral Daily SUZIE Xie      multivitamin-minerals  1 tablet Oral Daily [facility] [place]      NON FORMULARY  1 puff Inhalation BID Adelbert Patch, PA-C      ondansetron  4 mg Intravenous Q6H PRN Carles Border, DO      oxyCODONE  2 5 mg Oral Q4H PRN Carles Border, DO      oxyCODONE  5 mg Oral Q4H PRN Carles Border, DO      polyethylene glycol  17 g Oral Daily [facility], DO      senna  1 tablet Oral HS [facility], DO          Today, Patient Was Seen By: SUZIE Irwin    **Please Note: This note may have been constructed using a voice recognition system  **

## 2021-05-17 NOTE — ASSESSMENT & PLAN NOTE
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

## 2021-05-17 NOTE — PLAN OF CARE
Problem: OCCUPATIONAL THERAPY ADULT  Goal: Performs self-care activities at highest level of function for planned discharge setting  See evaluation for individualized goals  Description: Treatment Interventions: ADL retraining, Functional transfer training, UE strengthening/ROM, Endurance training, Cognitive reorientation, Patient/family training, Equipment evaluation/education, Fine motor coordination activities, Compensatory technique education, Energy conservation, Activityengagement          See flowsheet documentation for full assessment, interventions and recommendations  Outcome: Progressing  Note: Limitation: Decreased ADL status, Decreased UE strength, Decreased Safe judgement during ADL, Decreased cognition, Decreased endurance, Decreased self-care trans, Decreased high-level ADLs, Decreased fine motor control  Prognosis: Good  Assessment: pt participated in am ot session and was seen focusing on grooming and adls stating at sinkside, functional mobility with and s a d  and carring light objects to from hamper and bathroom  pt was able to perform grooming tasks with sba but was notable disorganized  removing dentures, then reaching for razor etc  pt was khai to self correct and complete one tas at time  recommend cognitive assessment ie acls next session inorder to make further d/c recommendations  pt was s for adls and functional mobility  assistance and supervision is recommended to perform iadl tasks  pt reports mild wordfind when he fatigues towards end of day, otherwise denies difficulty  pts wife works pta but pt reports she will provide s if necessary post d/c  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

## 2021-05-18 VITALS
DIASTOLIC BLOOD PRESSURE: 81 MMHG | TEMPERATURE: 97.7 F | BODY MASS INDEX: 40.37 KG/M2 | RESPIRATION RATE: 19 BRPM | HEIGHT: 70 IN | WEIGHT: 282 LBS | OXYGEN SATURATION: 95 % | HEART RATE: 70 BPM | SYSTOLIC BLOOD PRESSURE: 143 MMHG

## 2021-05-18 PROCEDURE — 92526 ORAL FUNCTION THERAPY: CPT

## 2021-05-18 PROCEDURE — 97535 SELF CARE MNGMENT TRAINING: CPT

## 2021-05-18 PROCEDURE — 99239 HOSP IP/OBS DSCHRG MGMT >30: CPT | Performed by: NURSE PRACTITIONER

## 2021-05-18 RX ORDER — DEXAMETHASONE 2 MG/1
2 TABLET ORAL DAILY
Qty: 3 TABLET | Refills: 0 | Status: SHIPPED | OUTPATIENT
Start: 2021-05-25 | End: 2021-05-27

## 2021-05-18 RX ORDER — ACETAMINOPHEN 325 MG/1
975 TABLET ORAL EVERY 8 HOURS SCHEDULED
Refills: 0
Start: 2021-05-18 | End: 2021-11-01 | Stop reason: ALTCHOICE

## 2021-05-18 RX ORDER — DEXAMETHASONE 2 MG/1
2 TABLET ORAL EVERY 8 HOURS SCHEDULED
Qty: 9 TABLET | Refills: 0 | Status: SHIPPED | OUTPATIENT
Start: 2021-05-19 | End: 2021-05-22

## 2021-05-18 RX ORDER — LEVETIRACETAM 500 MG/1
500 TABLET ORAL EVERY 12 HOURS SCHEDULED
Qty: 1 TABLET | Refills: 0 | Status: SHIPPED | OUTPATIENT
Start: 2021-05-18 | End: 2021-05-20

## 2021-05-18 RX ORDER — DEXAMETHASONE 2 MG/1
2 TABLET ORAL EVERY 12 HOURS SCHEDULED
Qty: 6 TABLET | Refills: 0 | Status: SHIPPED | OUTPATIENT
Start: 2021-05-22 | End: 2021-05-25

## 2021-05-18 RX ORDER — DEXAMETHASONE 2 MG/1
2 TABLET ORAL EVERY 6 HOURS SCHEDULED
Qty: 2 TABLET | Refills: 0 | Status: SHIPPED | OUTPATIENT
Start: 2021-05-18 | End: 2021-05-19

## 2021-05-18 RX ORDER — FAMOTIDINE 20 MG/1
20 TABLET, FILM COATED ORAL 2 TIMES DAILY
Qty: 20 TABLET | Refills: 0 | Status: SHIPPED | OUTPATIENT
Start: 2021-05-18 | End: 2021-05-27

## 2021-05-18 RX ORDER — METOPROLOL SUCCINATE 25 MG/1
25 TABLET, EXTENDED RELEASE ORAL DAILY
Qty: 30 TABLET | Refills: 0 | Status: SHIPPED | OUTPATIENT
Start: 2021-05-19 | End: 2021-05-20 | Stop reason: SDUPTHER

## 2021-05-18 RX ADMIN — FAMOTIDINE 20 MG: 20 TABLET ORAL at 08:38

## 2021-05-18 RX ADMIN — METOPROLOL SUCCINATE 25 MG: 25 TABLET, FILM COATED, EXTENDED RELEASE ORAL at 08:38

## 2021-05-18 RX ADMIN — METHOCARBAMOL 500 MG: 500 TABLET, FILM COATED ORAL at 06:15

## 2021-05-18 RX ADMIN — DEXAMETHASONE 2 MG: 2 TABLET ORAL at 12:25

## 2021-05-18 RX ADMIN — LEVETIRACETAM 500 MG: 500 TABLET, FILM COATED ORAL at 08:38

## 2021-05-18 RX ADMIN — DEXAMETHASONE 2 MG: 2 TABLET ORAL at 06:15

## 2021-05-18 RX ADMIN — ACETAMINOPHEN 975 MG: 325 TABLET, FILM COATED ORAL at 06:15

## 2021-05-18 RX ADMIN — DOCUSATE SODIUM 100 MG: 100 CAPSULE, LIQUID FILLED ORAL at 08:38

## 2021-05-18 RX ADMIN — Medication 1 TABLET: at 08:38

## 2021-05-18 RX ADMIN — METHOCARBAMOL 500 MG: 500 TABLET, FILM COATED ORAL at 12:25

## 2021-05-18 RX ADMIN — ACETAMINOPHEN 975 MG: 325 TABLET, FILM COATED ORAL at 13:15

## 2021-05-18 RX ADMIN — FLUTICASONE PROPIONATE AND SALMETEROL 1 PUFF: 50; 250 POWDER RESPIRATORY (INHALATION) at 09:35

## 2021-05-18 NOTE — PLAN OF CARE
Problem: OCCUPATIONAL THERAPY ADULT  Goal: Performs self-care activities at highest level of function for planned discharge setting  See evaluation for individualized goals  Description: Treatment Interventions: ADL retraining, Functional transfer training, UE strengthening/ROM, Endurance training, Cognitive reorientation, Patient/family training, Equipment evaluation/education, Fine motor coordination activities, Compensatory technique education, Energy conservation, Activityengagement          See flowsheet documentation for full assessment, interventions and recommendations  Outcome: Progressing  Note: Limitation: Decreased ADL status, Decreased UE strength, Decreased Safe judgement during ADL, Decreased cognition, Decreased endurance, Decreased self-care trans, Decreased high-level ADLs, Decreased fine motor control  Prognosis: Good  Assessment: pt participated in am ot session and was seen focusing on ongoing cognitive assessment and scored 5 0 indicating need for asst for iadl management  driving is not recommended, s medical compliance nan management, s fincancial management / planning  pt is recommended for out pt ot/st for iadls and cognition as 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  pt was able to perform sit to stand to from very low arm chairs with s while in halls without a d  pt was educated on techniques to incrase safety and decrease fall risks in home enviornmrnt     OT Discharge Recommendation: Home with outpatient rehabilitation  OT - OK to Discharge:  Yes     JAYNA Murguia

## 2021-05-18 NOTE — PLAN OF CARE
Problem: Potential for Falls  Goal: Patient will remain free of falls  Description: INTERVENTIONS:  - Assess patient frequently for physical needs  -  Identify cognitive and physical deficits and behaviors that affect risk of falls    -  [place] fall precautions as indicated by assessment   - Educate patient/family on patient safety including physical limitations  - Instruct patient to call for assistance with activity based on assessment  - Modify environment to reduce risk of injury  - Consider OT/PT consult to assist with strengthening/mobility  Outcome: Adequate for Discharge

## 2021-05-18 NOTE — PROGRESS NOTES
[facility]  Progress Note Rachel Prude Crocus 1950, 80 y o  male MRN: [id number]  Unit/Bed#: [facility] 624-01 Encounter: [id number]  Primary Care Provider: Victor M Helm PA-C   Date and time admitted to hospital: 5/6/2021  7:32 PM    * Brain mass  Assessment & Plan  · Presented with word-finding difficulties and recent syncopal event  · CTA head/neck (05/06/2021): 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 and left hemispheric mass effect causing approximately 3 mm left to right midline shift "   · MRI brain (05/06/2021): Rim enhancing left temporal parietal mass measuring 5 cm with significant surrounding vasogenic edema, 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  Left to right midline shift of 2-3 mm  · S/p left temporal craniotomy for tumor resection with 5-ALA per neurosurgery (05/11/2021)  · Pathology:  Prelim: favor high grade glioma  Final Pending  · CT chest, abdomen, pelvis: negative for any other primary sources  · Continue with oral Decadron taper per Neurosurgery recommendations  · Continue Keppra 500 mg BID for seizure prophylaxis x7 days  · PT/OT recommends acute rehab, PMR in agreement however insurance denied acute  CM following  Rib fracture  Assessment & Plan  · S/p fall  · Xray ribs (04/27/21): No evidence of fracture  · CT chest abdomen and pelvis (05/07/2021): Left lateral 7th rib nondisplaced fracture which could be subacute or acute  · Conservative management with pain control  · Encourage incentive spirometry  Essential hypertension  Assessment & Plan  · Continue metoprolol    · Continue to monitor  COPD (chronic obstructive pulmonary disease) (HCC)  Assessment & Plan  · Not in acute exacerbation  · Continue respiratory protocol  · Patient's home inhalers are non formulary  Family can bring in from home if he wishes she use them  Otherwise continue non formulary alternatives      Morbid obesity ([address] )  Assessment & Plan  · Dietary and lifestyle modification advised    Obstructive sleep apnea  Assessment & Plan  · Non compliant with BiPAP  VTE Pharmacologic Prophylaxis: VTE Score: 6 Moderate Risk (Score 3-4) - Pharmacological DVT Prophylaxis Ordered: enoxaparin (Lovenox)  Patient Centered Rounds: I performed bedside rounds with nursing staff today  Discussions with Specialists or Other Care Team Provider: nursing, case management     Education and Discussions with Family / Patient: Updated  (wife) via phone  Time Spent for Care: 20 minutes  More than 50% of total time spent on counseling and coordination of care as described above  Current Length of Stay: 12 day(s)    Current Patient Status: Inpatient     Certification Statement: The patient will continue to require additional inpatient hospital stay due to pending rehab placement/insurance auth  Discharge Plan: pending insurance auth/rehab placement  Code Status: Level 1 - Full Code    Subjective:     Patient offers no acute complaints  He is upset that his insurance denied acute rehab  He states he would like to go if he cannot go to acute rehab  Objective:     Vitals:   Temp (24hrs), Av 2 °F (36 8 °C), Min:97 7 °F (36 5 °C), Max:98 7 °F (37 1 °C)    Temp:  [97 7 °F (36 5 °C)-98 7 °F (37 1 °C)] 97 7 °F (36 5 °C)  HR:  [58-70] 70  Resp:  [16-19] 19  BP: (125-146)/(80-95) 143/81  Body mass index is 40 46 kg/m²  Input and Output Summary (last 24 hours): Intake/Output Summary (Last 24 hours) at 2021 0933  Last data filed at 2021 7123  Gross per 24 hour   Intake 680 ml   Output --   Net 680 ml       Physical Exam:   Physical Exam  Vitals signs and nursing note reviewed  Constitutional:       General: He is not in acute distress  HENT:      Head:      Comments: Scalp staples open air, no drainage noted  Cardiovascular:      Rate and Rhythm: Normal rate     Pulmonary:      Breath sounds: Normal breath sounds  Abdominal:      Tenderness: There is no abdominal tenderness  Musculoskeletal:         General: No swelling  Skin:     General: Skin is warm  Neurological:      Mental Status: He is alert and oriented to person, place, and time  Mental status is at baseline  Comments: Impulsive at times   Psychiatric:         Mood and Affect: Mood normal           Additional Data:     Labs:  Results from last 7 days   Lab Units 05/17/21  0611 05/14/21  0606   WBC Thousand/uL 9 95 16 47*   HEMOGLOBIN g/dL 14 1 14 6   HEMATOCRIT % 42 2 44 0   PLATELETS Thousands/uL 229 261   BANDS PCT % 2  --    NEUTROS PCT %  --  87*   LYMPHS PCT %  --  4*   LYMPHO PCT % 16  --    MONOS PCT %  --  7   MONO PCT % 9  --    EOS PCT % 1 0     Results from last 7 days   Lab Units 05/17/21  0611   SODIUM mmol/L 137   POTASSIUM mmol/L 4 0   CHLORIDE mmol/L 107   CO2 mmol/L 24   BUN mg/dL 24   CREATININE mg/dL 0 96   ANION GAP mmol/L 6   CALCIUM mg/dL 8 7   GLUCOSE RANDOM mg/dL 109     Results from last 7 days   Lab Units 05/12/21  0512   INR  1 06                   Lines/Drains:  Invasive Devices     None                       Imaging: No pertinent imaging reviewed      Recent Cultures (last 7 days):         Last 24 Hours Medication List:   Current Facility-Administered Medications   Medication Dose Route Frequency Provider Last Rate    acetaminophen  975 mg Oral Q8H [address] Blaine Anthony, DO      dexamethasone  2 mg Oral Q6H Mcfarlandbury, DO      Followed by   Leena Heck ON 5/19/2021] dexamethasone  2 mg Oral Q8H Mcfarlandbury, DO      Followed by   Leena Heck ON 5/22/2021] dexamethasone  2 mg Oral Q12H Mcfarlandbury, DO      Followed by   Leena Heck ON 5/25/2021] dexamethasone  2 mg Oral Daily Blaine Anthony, DO      docusate sodium  100 mg Oral BID Ye Null, DO      enoxaparin  40 mg Subcutaneous Q24H [address] Blaine Anthony, DO      famotidine  20 mg Oral BID Ye Null, DO      fluticasone-salmeterol  1 puff Inhalation BID Delbert Lombardo PA-C      glycerin (adult)  1 suppository Rectal Daily PRN Delbert Lombardo PA-C      hydrALAZINE  10 mg Intravenous Q4H PRN Lauraine Blood, DO      Labetalol HCl  10 mg Intravenous Q6H PRN SUZIE Lees      levETIRAcetam  500 mg Oral Q12H [address] Lauraine Blood, DO      lidocaine  1 patch Topical Daily Lauraine Blood, DO      methocarbamol  500 mg Oral Q6H [address] Lauraine Blood, DO      metoprolol succinate  25 mg Oral Daily SUZIE Lees      multivitamin-minerals  1 tablet Oral Daily Lauraine Blood, [place]      ondansetron  4 mg Intravenous Q6H PRN Lauraine Blood, DO      oxyCODONE  2 5 mg Oral Q4H PRN Lauraine Blood, DO      oxyCODONE  5 mg Oral Q4H PRN Lauraine Blood, DO      polyethylene glycol  17 g Oral Daily Lauraine Blood, DO      senna  1 tablet Oral HS Lauraine Blood, DO          Today, Patient Was Seen By: SUZIE Slater    **Please Note: This note may have been constructed using a voice recognition system  **

## 2021-05-18 NOTE — DISCHARGE SUMMARY
Discharge Summary - [facility]    Patient Information: Dionicio Tolbert 80 y o  male MRN: [id number]  Unit/Bed#: [facility] 624-01 Encounter: [id number]    Discharging Physician / Practitioner: SUZIE Eric  PCP: Seven Carrera PA-C  Admission Date: 5/6/2021  Discharge Date: 05/18/21    Reason for Admission:  Brain mass status post left temporal craniotomy for tumor resection with 5A LA by Neurosurgery 05/11/2021    Discharge Diagnoses:     Principal Problem:    Brain mass  Active Problems:    Essential hypertension    Rib fracture    COPD (chronic obstructive pulmonary disease) ([facility] )    Morbid obesity ([facility] )    Obstructive sleep apnea    History of tobacco use    C[facility]bral edema ([facility] )  Resolved Problems:    Leukocytosis      Consultations During Hospital Stay:  · Neurosurgery  · PMR  · PT/OT  · Case management    Procedures Performed:     · left temporal craniotomy for tumor resection with 5-ALA by neurosurgery (05/11/2021)  Significant Findings / Test Results:     · CTA head and neck with and without contrast 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 and left hemispheric mass effect causing approximately 3 mm left to right midline shift  Contrast-enhanced brain MRI is recommended for further evaluation  No evidence of flow restrictive large vessel occlusive disease  · MRI brain with without contrast Rim-enhancing left temporal parietal mass measuring 5 cm with significant surrounding vasogenic edema, 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  Left-to-right midline shift of 2 to 3 mm  · CT chest abdomen pelvis with contrast Left lateral seventh rib nondisplaced fracture could be subacute or acute  Correlate clinically  Small pericardial effusion  · MRI brain with without Status post resection of left temporal lobe mass with postoperative change as described above, including mild peripheral ischemia    There is improving surrounding vasogenic edema  Incidental Findings:   · None     Test Results Pending at Discharge (will require follow up): · Pathology from brain mass     Outpatient Tests Requested:  · Outpatient follow-up with PCP  · Outpatient physical and occupational therapy  · Outpatient follow-up with Neurosurgery as scheduled    Complications:  None    Hospital Course: Adam Ceja is a 80 y o  male patient with past medical history of obesity, KIZZY, COPD, hypertension who originally presented to the hospital on 5/6/2021 due to word-finding difficulties  Patient was found to have brain mass  Neurosurgery was consulted and patient underwent left temporal craniotomy for tumor resection with 5A LA by Dr Jon Fernandez on 05/11/2021  Pathology is pending however favoring high-grade glioma  CT chest abdomen pelvis negative for other primary sources  Patient was initially planned to go to acute rehab for rehabilitation however was denied by insurance  He has progressed and is cleared for home with outpatient services  Patient does not drive however pen dot form was filled out  He is stable for discharge home today on Decadron taper and outpatient follow-up with Neurosurgery as scheduled  Plan of care discussed with patient and wife at bedside along with Case Management  Condition at Discharge: good     Discharge Day Visit / Exam:     * Please refer to separate progress note for these details *    Discussion with Family:  Patient wife at bedside      Discharge instructions/Information to patient and family:   See after visit summary for information provided to patient and family  Provisions for Follow-Up Care:  See after visit summary for information related to follow-up care and any pertinent home health orders        Disposition:     Home    For Discharges to [facility]:   · Not Applicable to this Patient - Not Applicable to this Patient    Planned Readmission: no    Discharge Statement:  I spent 50 minutes discharging the patient  This time was spent on the day of discharge  I had direct contact with the patient on the day of discharge  Greater than 50% of the total time was spent examining patient, answering all patient questions, arranging and discussing plan of care with patient as well as directly providing post-discharge instructions  Additional time then spent on discharge activities  Discharge Medications:  See after visit summary for reconciled discharge medications provided to patient and family  ** Please Note: This note has been constructed using a voice recognition system   **

## 2021-05-18 NOTE — SPEECH THERAPY NOTE
Speech Language/Pathology    Speech/Language Pathology Progress Note    Patient Name: Wm Cordova  Today's Date: 5/18/2021     Problem List  Principal Problem:    Brain mass  Active Problems:    Essential hypertension    Morbid obesity ([facility] )    Obstructive sleep apnea    COPD (chronic obstructive pulmonary disease) ([facility] [address] )    History of tobacco use    Cerebral edema (HCC)    Rib fracture       Past Medical History  Past Medical History:   Diagnosis Date    Asthma     Epilepsy ([facility] [address] )     stopped meds 2006 has been seizure free        Past Surgical History  Past Surgical History:   Procedure Laterality Date    APPENDECTOMY      CRANIOTOMY Left 5/11/2021    Procedure: Left temporal craniotomy with image guidance and 5-ALA for resection of mass;  Surgeon: Lilli Day MD;  [facility];  Service: Neurosurgery    MANDIBLE SURGERY      OR COLONOSCOPY FLX DX W/COLLJ SPEC WHEN PFRMD N/A 4/4/2016    Procedure: COLONOSCOPY;  Surgeon: Alla Pink MD;  Location: [facility]; Service: Gastroenterology    SKULL FRACTURE ELEVATION      TONSILLECTOMY           Subjective:  Pt upset that he was not going to [facility], finishing lunch tray level 3  D/c plan likely now home w/ OP services  "If I'm not going to [facility], I'm going home there's no questions "     Current Diet:  Dysphagia 3 w/ thin     Objective:  Pt seen for dx dysphagia tx and education prior to d/c home  Pt seen w/ final bites of dysphagia 3 meal, oral and pharyngeal stages appeared appropriate  Pt then given regular texture solid, hard/chewy granola bar  Bite strength is adequate  Mastication and bolus formation is timely and organized  Transfers w/ strong propulsion, no oral residue  Pt also takes straw sips of thin liquids  Swallow initiation suspected prompt for both liquids and solids, hyo-laryngeal movement WNL to palpation   Education provided regarding strategies to optimize swallow safety, s/s dysphagia to monitor for, and importance of frequent and thorough oral care  Pt verbalized understanding, became increasingly agitated about current d/c situation  Throughout session, pt communicating appropriately, no paraphasias or anomia today  Pt admits he feels his language skills worsen throughout the day as he tires  Spoke w/ pt and pt's wife present in room to follow-up w/ OP ST  Assessment:  Pt w/ improved oropharyngeal swallow skill, well-tolerating regular textures and thin liquids without overt s/s aspiration or oropharyngeal difficulties  Pt admits to continue speech/language deficits though was conversationally appropriate today      Plan/Recommendations:  Regular textures w/ thin liquids  OP ST f/u

## 2021-05-18 NOTE — QUICK NOTE
PMR Quick Note:    Unfortunately, patient's insurance denied patient acute rehab admission  His insurance is recommending outpatient therapies  Patient and family want patient then to go home  If patient did not have 24-7 supervision he would not be appropriate to discharge to community alone  He should absolutely get as much rehab as his insurance will allow/is available for cognition, ADLs, and mobility   - Minimum OP PT, OT, ST      Patient's function is largely supervision for ADLs, transfers, and ambulation as well as stairs  He remains a high fall and injury risk due to cognitive impairments, impulsivity, poor safety awareness and absolutely requires 24-7 supervision from family  Patient is not appropriate to drive and I did discuss this with family and primary provider who will notify Kyle Corcoran  Patient's wife has taken care of other family member(s) in past with brain injury type issues and she is familiar with types of needs/oversight needed  She was counseled at length regarding need for supervision, ongoing risks of injury, as well as concerning neurologic and behavioral issues that can arise  She feels emotional and functionally she would be able to manage him at current level  I do recommend close outpatient follow-up not only with neurosurgery and PCP but also outpatient PMR provider  Currently 's has limited OP PMR services and would recommend he follow-up with [facility] at this time  I discussed this with wife and put information in discharge instructions  I also discussed with her what to do if patient displayed any potentially harmful behaviors or expressed any potentially dangerous thoughts in future (no noted significant concerns reported recently in hospital)      Please do not hesitate to call with questions or concerns      Sharon Dumont MD, [facility]  Physical Medicine and Rehabilitation  Brain Injury Medicine

## 2021-05-18 NOTE — OCCUPATIONAL THERAPY NOTE
Occupational Therapy Treatment Note:       05/18/21 1130   OT Last Visit   OT Visit Date 05/18/21   Note Type   Note Type Treatment   Transfers   Sit to Stand 7  Independent   Stand to Sit 7  Independent   Functional Mobility   Functional Mobility 7  Independent   Additional items   (no a d  no lob while carring items)   Cognition   Overall Cognitive Status Impaired   Arousal/Participation Alert   Attention Attends with cues to redirect   Orientation Level Oriented X4   Memory Within functional limits   Following Commands Follows all commands and directions without difficulty   Cognition Assessment Tools ACLS   Score 5   Activity Tolerance   Activity Tolerance Patient tolerated treatment well   Assessment   Assessment pt participated in am ot session and was seen focusing on ongoing cognitive assessment and scored 5 0 indicating need for asst for iadl management  driving is not recommended, s medical compliance nan management, s fincancial management / planning  pt is recommended for out pt ot/st for iadls and cognition as he worked and managed [address] prior to [employer]  pt was able to perform sit to stand to from very low arm chairs with s while in halls without a d  pt was educated on techniques to incrase safety and decrease fall risks in home enviornmrnt   Plan   Treatment Interventions ADL retraining;Cognitive reorientation;Patient/family training;Equipment evaluation/education; Activityengagement   Goal Expiration Date 05/26/21   OT Treatment Day 3   OT Frequency 3-5x/wk   Recommendation   OT Discharge Recommendation Home with outpatient rehabilitation   OT - OK to Discharge Yes   AM-PAC Daily Activity Inpatient   Lower Body Dressing 4   Bathing 4   Toileting 4   Upper Body Dressing 4   Grooming 4   Eating 4   Daily Activity Raw Score 24   Daily Activity Standardized Score (Calc for Raw Score >=11) 57 54   AM-PAC Applied Cognition Inpatient   Following a Speech/Presentation 3   Understanding Ordinary Conversation 4   Taking Medications 3   Remembering Where Things Are Placed or Put Away 3   Remembering List of 4-5 Errands 3   Taking Care of Complicated Tasks 3   Applied Cognition Raw Score 19   Applied Cognition Standardized Score 39 77   5 0    Administered Mando Cognitive Level Screen (ACLS)  The patient scored 5 0/6 0 indicating that they may live alone with weekly checks to monitor safety and check problem solving effectiveness  May be able to work in supported employment with   Behavior:  Stops working to talk  Questions need to do activity  Knows need to keep scheduled appointments however may be apt for forget or miss appointments  Tangential   Manipulative  May blame others when mistakes are made  May argue with suggestions  May be inconsistent with carryover  May resist attempts to provide assistance  Inflexible  May insist upon using an inefficient way of doing things  Conversations may be concrete and self centered  Abstract reasoning may not be understood  Grooming: Completes grooming activity independently  May forget newly learned techniques  May fail to anticipate need to purchase grooming supplies  May not read directions before using new products  Dressing:  May not consider social standards  May argue with suggestions to change selections  Bathing:  Completes bathing routine independently  May solve problems  May fail to anticipate secondary effects of new products like allergies  Walking/Exercising:  May forget newly discovered routes and may have to rediscover them  May refuse to comply with exercise program   Eating:  Eats and talks with others  May not see crumbs or small spills  May need help to comply with dietary restrictions  Toileting:  Independently performs all toileting  Can explore a new environment to locate a bathroom without assistance  May not anticipate needs for new toileting solutions  Medications:  Opens new containers    May understand medication schedules but has trouble taking them on time  May choose to discontinue medications  May recognize side effects like tremors or sedation  Utilize daily pill box  Use of adaptive equipment:  May choose not to use equipment  May utilize in unsafe manner  Housekeeping:  Able to learn new sequence of actions  Does not change or alter pace  May set and follow a weekly schedule  Identifies potential hazards posed by electric, gas, toxins, poisons or improper storage/disposal of items  Food preparation:  May make reservations to eat out but may fail to remember and arrive late  Passes heavy serving dishes safely  May forget discovered solutions to problems  Spending money:  May be able to identify monthly budget of routine expenses but may have trouble adhering to it  May run up bills or overextend credit  May have trouble balancing checkbook  Does not plan for long term financial security  Shopping:  May make daily or weekly schedule of shopping needs with trouble adhering to it  May be impulsive in spending  May discriminate between stores, labels and brands  Laundry:  May set and follow weekly schedule with difficulty adhering to it  Learns new sequence of actions  Uses iron effectively  Traveling: Can make a schedule for traveling to new locations but has difficulty adhering to it  May miss bus  May need assistance to read and understand new schedules  Telephone:  Learns new telephone procedures by initiating several steps at a time  May look up new numbers  May not use yellow pages  May become confused by new options or ignore call waiting  May run up large phone bills  Driving:  Should NOT operate a motor vehicle    JAYNA Gallagher

## 2021-05-18 NOTE — ARC ADMISSION
Received phone call from Henry Zuluaga at [facility], and patient's case has been denied by medical director for inpatient acute rehab  They are recommending outpatient therapy  However, a peer to peer has been offered to be completed by 4pm today by calling [phone] with pending ref#: [id number]  CM has been notified  Please advise

## 2021-05-18 NOTE — ASSESSMENT & PLAN NOTE
· Presented with word-finding difficulties and recent syncopal event  · CTA head/neck (05/06/2021): 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 and left hemispheric mass effect causing approximately 3 mm left to right midline shift "   · MRI brain (05/06/2021): Rim enhancing left temporal parietal mass measuring 5 cm with significant surrounding vasogenic edema, 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  Left to right midline shift of 2-3 mm  · S/p left temporal craniotomy for tumor resection with 5-ALA per neurosurgery (05/11/2021)  · Pathology:  Prelim: favor high grade glioma  Final Pending  · CT chest, abdomen, pelvis: negative for any other primary sources  · Continue with oral Decadron taper per Neurosurgery recommendations  · Continue Keppra 500 mg BID for seizure prophylaxis x7 days  · PT/OT recommends acute rehab, PMR in agreement however insurance denied acute  CM following

## 2021-05-19 ENCOUNTER — TRANSITIONAL CARE MANAGEMENT (OUTPATIENT)
Dept: FAMILY MEDICINE CLINIC | Facility: CLINIC | Age: 71
End: 2021-05-19

## 2021-05-19 ENCOUNTER — TELEMEDICINE (OUTPATIENT)
Dept: NEUROSURGERY | Facility: CLINIC | Age: 71
End: 2021-05-19

## 2021-05-19 DIAGNOSIS — Z98.890 STATUS POST SURGERY: Primary | ICD-10-CM

## 2021-05-19 PROCEDURE — 99024 POSTOP FOLLOW-UP VISIT: CPT

## 2021-05-19 NOTE — PROGRESS NOTES
Virtual Brief Visit    Assessment/Plan:    Problem List Items Addressed This Visit     None      Visit Diagnoses     Status post surgery    -  Primary                Reason for visit is   Chief Complaint   Patient presents with    Virtual Brief Visit        Encounter provider Jock Mortimer, RN    Provider located at [address] [place] [address] [address] [place] [id number]    Recent Visits  No visits were found meeting these conditions  Showing recent visits within past 7 days and meeting all other requirements     Today's Visits  Date Type Provider Dept   05/19/21 Telemedicine Jock Mortimer, RN [facility]   Showing today's visits and meeting all other requirements     Future Appointments  No visits were found meeting these conditions  Showing future appointments within next 150 days and meeting all other requirements        After connecting through telephone, the patient was identified by name and date of birth  Avinash Severo Allen was informed that this is a telemedicine visit and that the visit is being conducted through telephone  My office door was closed  No one else was in the room  He acknowledged consent and understanding of privacy and security of the platform  The patient has agreed to participate and understands he can discontinue the visit at any time  Patient is aware this is a billable service  Subjective    Radhika Wolfe is a 80 y o  male s/p: Left temporal craniotomy with image guidance and 5-ALA for resection of mass  Called patient to see how he is doing after surgery and I spoke with his wife  She reports he is doing well overall and denies any incisional issues or fevers  She said that he didn't sleep well last night and it might be because of the steroid because he is not having any pain  She said he denies any nausea, vomiting, dizziness, headaches or changes to his vision or mental status    She is aware that she should call 911 or take him to the nearest ED with any of those symptoms  Patient is able to ambulate around the house and complete ADLs with assistance and she informed me that he did shower today  Educated the wife about the importance of preventing blood clots and provided measures how to prevent them  Patient has moved his bowels since the surgery  Encouraged patient to take an over the counter stool softener, if he is taking narcotic pain medication  Encouraged fiber intake and fluids  Reviewed incision care with the patient's wife  Advised that he may take a shower and gently wash the surgical site with soap and water  Use clean wash cloth, towels, and clothing  Do not submerge in water until cleared by the surgeon  Do not apply any creams, ointments, or lotions to the site  Patient's wife is aware to call the office if any redness, swelling, drainage, dehiscence of incision, or fever >100 F occurs  Wife is aware to call the office if any concerns or questions may arise  Reminded her of his upcoming appointments with the date/time/location  She was appreciative for the call  Past Medical History:   Diagnosis Date    Asthma     Epilepsy ([facility] [address] )     stopped meds 2006 has been seizure free       Past Surgical History:   Procedure Laterality Date    APPENDECTOMY      CRANIOTOMY Left 5/11/2021    Procedure: Left temporal craniotomy with image guidance and 5-ALA for resection of mass;  Surgeon: Chary Bowen MD;  [facility];  Service: Neurosurgery    MANDIBLE SURGERY      DE COLONOSCOPY FLX DX W/COLLJ SPEC WHEN PFRMD N/A 4/4/2016    Procedure: COLONOSCOPY;  Surgeon: Dorothy Wynne MD;  [facility];   Service: Gastroenterology    SKULL FRACTURE ELEVATION      TONSILLECTOMY         Current Outpatient Medications   Medication Sig Dispense Refill    acetaminophen (TYLENOL) 325 mg tablet Take 3 tablets (975 mg total) by mouth every 8 (eight) hours  0    Advair Diskus 250-50 MCG/DOSE inhaler INHALE ONE PUFF BY MOUTH TWICE A DAY  RINSE MOUTH AFTER  each 3    albuterol (2 5 mg/3 mL) 0 083 % nebulizer solution Take 1 vial (2 5 mg total) by nebulization every 6 (six) hours as needed for wheezing or shortness of breath 60 vial 1    dexamethasone (DECADRON) 2 mg tablet Take 1 tablet (2 mg total) by mouth every 6 (six) hours for 2 doses 2 tablet 0    dexamethasone (DECADRON) 2 mg tablet Take 1 tablet (2 mg total) by mouth every 8 (eight) hours for 9 doses 9 tablet 0    [START ON 5/22/2021] dexamethasone (DECADRON) 2 mg tablet Take 1 tablet (2 mg total) by mouth every 12 (twelve) hours for 6 doses 6 tablet 0    [START ON 5/25/2021] dexamethasone (DECADRON) 2 mg tablet Take 1 tablet (2 mg total) by mouth daily for 3 doses 3 tablet 0    famotidine (PEPCID) 20 mg tablet Take 1 tablet (20 mg total) by mouth 2 (two) times a day for 10 days While on steroids 20 tablet 0    levETIRAcetam (KEPPRA) 500 mg tablet Take 1 tablet (500 mg total) by mouth every 12 (twelve) hours for 1 dose 1 tablet 0    lidocaine (LIDODERM) 5 % Apply 1 patch topically daily Remove & Discard patch within 12 hours or as directed by MD 15 patch 0    metoprolol succinate (TOPROL-XL) 25 mg 24 hr tablet Take 1 tablet (25 mg total) by mouth daily 30 tablet 0    Multiple Vitamin (ONE-A-DAY MENS PO) Take by mouth       No current facility-administered medications for this visit  Allergies   Allergen Reactions    Penicillins Other (See Comments)     As a child       Review of Systems    There were no vitals filed for this visit  VIRTUAL VISIT DISCLAIMER    Yifan Galo acknowledges that he has consented to an online visit or consultation   He understands that the online visit is based solely on information provided by him, and that, in the absence of a face-to-face physical evaluation by the physician, the diagnosis he receives is both limited and provisional in terms of accuracy and completeness  This is not intended to replace a full medical face-to-face evaluation by the physician  Hoda Graham understands and accepts these terms

## 2021-05-20 ENCOUNTER — OFFICE VISIT (OUTPATIENT)
Dept: FAMILY MEDICINE CLINIC | Facility: CLINIC | Age: 71
End: 2021-05-20
Payer: COMMERCIAL

## 2021-05-20 VITALS
HEIGHT: 70 IN | DIASTOLIC BLOOD PRESSURE: 80 MMHG | RESPIRATION RATE: 16 BRPM | WEIGHT: 283.3 LBS | BODY MASS INDEX: 40.56 KG/M2 | SYSTOLIC BLOOD PRESSURE: 142 MMHG | HEART RATE: 58 BPM

## 2021-05-20 DIAGNOSIS — E66.01 MORBID OBESITY (HCC): ICD-10-CM

## 2021-05-20 DIAGNOSIS — J44.9 CHRONIC OBSTRUCTIVE PULMONARY DISEASE, UNSPECIFIED COPD TYPE (HCC): ICD-10-CM

## 2021-05-20 DIAGNOSIS — G93.6 CEREBRAL EDEMA (HCC): ICD-10-CM

## 2021-05-20 DIAGNOSIS — G93.89 BRAIN MASS: Primary | ICD-10-CM

## 2021-05-20 DIAGNOSIS — I10 ESSENTIAL HYPERTENSION: ICD-10-CM

## 2021-05-20 DIAGNOSIS — G47.33 OBSTRUCTIVE SLEEP APNEA: ICD-10-CM

## 2021-05-20 PROCEDURE — 99495 TRANSJ CARE MGMT MOD F2F 14D: CPT | Performed by: PHYSICIAN ASSISTANT

## 2021-05-20 PROCEDURE — 1111F DSCHRG MED/CURRENT MED MERGE: CPT | Performed by: PHYSICIAN ASSISTANT

## 2021-05-20 RX ORDER — METOPROLOL SUCCINATE 25 MG/1
25 TABLET, EXTENDED RELEASE ORAL DAILY
Qty: 90 TABLET | Refills: 1 | Status: SHIPPED | OUTPATIENT
Start: 2021-05-20 | End: 2021-10-26

## 2021-05-20 NOTE — PROGRESS NOTES
TCM Call (since 4/19/2021)     Date and time call was made  5/19/2021  7:56 AM    Hospital care reviewed  Records reviewed    Patient was hospitialized at  [facility]        Date of Admission  05/06/21    Date of discharge  05/18/21    Diagnosis  Brain mass    Disposition  Home    Were the patients medications reviewed and updated  Yes    Current Symptoms  None      TCM Call (since 4/19/2021)     Post hospital issues  None    Should patient be enrolled in anticoag monitoring? No    Scheduled for follow up? Yes    Did you obtain your prescribed medications  Yes    Do you need help managing your prescriptions or medications  No    Is transportation to your appointment needed  No    I have advised the patient to call PCP with any new or worsening symptoms  Shreya Guzman MA, 05/19/2021        Assessment/Plan:    1  Brain mass    - Rim-enhancing left temporal parietal mass measuring 5 cm with significant surrounding vasogenic edema, 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  Left-to-right midline shift of 2 to 3 mm  - s/p left temporal craniotomy 5/11/2021, under care Dr Deni Jordan  Will start Pt/OT/speech 5/25, follow up Dr Deni Jordan 5/26, pathology pending, will continue decadron taper and pepcid as prescribed, wife monitoring BP    2  Cerebral edema (HCC)    - improvement noted on MRI brain s/p surgery    3  Chronic obstructive pulmonary disease, unspecified COPD type ([facility] [address] )    - c/w Advair 250/50 bid    4  Essential hypertension    - well controlled on Metoprolol 25 mg once daily, wife monitoring BP at home    5  Morbid obesity ([facility] [address] )    - encouraged patient to work on [employer], exercise  and adequate sleep for weight loss  Follow up if more help is needed    6  Obstructive sleep apnea    - c/w CPAP at home    F/u as needed      Subjective:   Chief Complaint   Patient presents with    Transition of Care Management      Patient ID: Leandra Mcadams is a 80 y o  male      Patient woke up in his normal state on 5/6/2021, walked 3 blocks to the [employer] shop, upon his return he was having word-finding difficulties  Wife who is an RN took him to the ER thinking he had bleed due to hitting his head after a fall 3 weeks prior  Patient was found to have brain mass  Neurosurgery was consulted and patient underwent left temporal craniotomy for tumor resection with 5A LA by Dr Handy Mehta on 05/11/2021  Pathology is pending however favoring high-grade glioma  CT chest abdomen pelvis negative for other primary sources  Patient was discharged home on 5/18 on Decadron taper and outpatient follow-up with Neurosurgery on 5/26/2021, will start OT/PT/Speech on 5/25/[facility]21 at [facility]       Patient here with wife today  Patient only complaints are he fatigues easily, he is eating more due to the decadron  He was having trouble sleeping but is taking melatonin with relief  Wife concerned about his speech still as he some expressive aphagia and would like a referral for speech  The following portions of the patient's history were reviewed and updated as appropriate: allergies, current medications, past family history, past medical history, past social history, past surgical history and problem list     Past Medical History:   Diagnosis Date    Asthma     Epilepsy ([address] )     stopped meds 2006 has been seizure free     Past Surgical History:   Procedure Laterality Date    APPENDECTOMY      CRANIOTOMY Left 5/11/2021    Procedure: Left temporal craniotomy with image guidance and 5-ALA for resection of mass;  Surgeon: Eleno Valle MD;  [facility];  Service: Neurosurgery    MANDIBLE SURGERY      OK COLONOSCOPY FLX DX W/COLLJ SPEC WHEN PFRMD N/A 4/4/2016    Procedure: COLONOSCOPY;  Surgeon: Rupa Addison MD;  [facility];   Service: Gastroenterology    SKULL FRACTURE ELEVATION      TONSILLECTOMY       Family History   Problem Relation Age of Onset    Alcohol abuse Father     Substance Abuse Neg Hx     Mental illness Neg Hx      Social History     Socioeconomic History    Marital status: /Civil Union     Spouse name: Not on file    Number of children: Not on file    Years of education: Not on file    Highest education level: Not on file   Occupational History    Not on file   Social Needs    Financial resource strain: Not on file    Food insecurity     Worry: Not on file     Inability: Not on file   [employer] needs     Medical: Not on file     Non-medical: Not on file   Tobacco Use    Smoking status: Former Smoker     Types: Cigarettes     Quit date: 2/8/2006     Years since quitting: 15 2    Smokeless tobacco: Never Used   Substance and Sexual Activity    Alcohol use: Never     Frequency: Never    Drug use: No    Sexual activity: Not on file   Lifestyle    Physical activity     Days per week: Not on file     Minutes per session: Not on file    Stress: Not on file   Relationships    Social connections     Talks on phone: Not on file     Gets together: Not on file     Attends [employer] service: Not on file     Active member of club or organization: Not on file     Attends meetings of clubs or organizations: Not on file     Relationship status: Not on file    Intimate partner violence     Fear of current or ex partner: Not on file     Emotionally abused: Not on file     Physically abused: Not on file     Forced sexual activity: Not on file   Other Topics Concern    Not on file   Social History Narrative    Not on file       Current Outpatient Medications:     acetaminophen (TYLENOL) 325 mg tablet, Take 3 tablets (975 mg total) by mouth every 8 (eight) hours, Disp:  , Rfl: 0    Advair Diskus 250-50 MCG/DOSE inhaler, INHALE ONE PUFF BY MOUTH TWICE A DAY   RINSE MOUTH AFTER USE, Disp: 180 each, Rfl: 3    albuterol (2 5 mg/3 mL) 0 083 % nebulizer solution, Take 1 vial (2 5 mg total) by nebulization every 6 (six) hours as needed for wheezing or shortness of breath, Disp: 60 vial, Rfl: 1    dexamethasone (DECADRON) 2 mg tablet, Take 1 tablet (2 mg total) by mouth every 8 (eight) hours for 9 doses, Disp: 9 tablet, Rfl: 0    [START ON 5/22/2021] dexamethasone (DECADRON) 2 mg tablet, Take 1 tablet (2 mg total) by mouth every 12 (twelve) hours for 6 doses, Disp: 6 tablet, Rfl: 0    [START ON 5/25/2021] dexamethasone (DECADRON) 2 mg tablet, Take 1 tablet (2 mg total) by mouth daily for 3 doses, Disp: 3 tablet, Rfl: 0    famotidine (PEPCID) 20 mg tablet, Take 1 tablet (20 mg total) by mouth 2 (two) times a day for 10 days While on steroids, Disp: 20 tablet, Rfl: 0    Melatonin 2 5 MG CAPS, Take 2 5 mg by mouth daily, Disp: , Rfl:     metoprolol succinate (TOPROL-XL) 25 mg 24 hr tablet, Take 1 tablet (25 mg total) by mouth daily, Disp: 30 tablet, Rfl: 0    Multiple Vitamin (ONE-A-DAY MENS PO), Take by mouth, Disp: , Rfl:     levETIRAcetam (KEPPRA) 500 mg tablet, Take 1 tablet (500 mg total) by mouth every 12 (twelve) hours for 1 dose, Disp: 1 tablet, Rfl: 0    Review of Systems   Constitutional: Negative for chills and fever  HENT: Negative for ear pain and sore throat  Eyes: Negative for pain and visual disturbance  Respiratory: Negative for cough and shortness of breath  Cardiovascular: Negative for chest pain and palpitations  Gastrointestinal: Negative for abdominal pain and vomiting  Genitourinary: Negative for dysuria and hematuria  Musculoskeletal: Negative for arthralgias and back pain  Skin: Negative for color change and rash  Neurological: Negative for dizziness, tremors, seizures, syncope, facial asymmetry, speech difficulty, weakness, light-headedness, numbness and headaches  All other systems reviewed and are negative  Objective:    Vitals:    05/20/21 1227   BP: 142/80   Pulse: 58   Resp: 16   Weight: 129 kg (283 lb 4 8 oz)   Height: 5' 10" (1 778 m)        Physical Exam  Constitutional:       Appearance: Normal appearance  He is well-developed  Neck:      Musculoskeletal: Neck supple  Vascular: No carotid bruit  Cardiovascular:      Rate and Rhythm: Normal rate and regular rhythm  Pulses: Normal pulses  Heart sounds: Normal heart sounds  Pulmonary:      Effort: Pulmonary effort is normal       Breath sounds: Normal breath sounds  Musculoskeletal:      Right lower leg: No edema  Left lower leg: No edema  Lymphadenopathy:      Cervical: No cervical adenopathy  Skin:     General: Skin is warm  Comments: Left side scalp with large u shaped scar, staples in place, no erythema, swelling, discharge   Neurological:      General: No focal deficit present  Mental Status: He is alert and oriented to person, place, and time  Psychiatric:         Mood and Affect: Mood normal          Behavior: Behavior normal          Thought Content:  Thought content normal          Judgment: Judgment normal

## 2021-05-26 ENCOUNTER — DOCUMENTATION (OUTPATIENT)
Dept: NEUROSURGERY | Facility: CLINIC | Age: 71
End: 2021-05-26

## 2021-05-26 ENCOUNTER — HOSPITAL ENCOUNTER (OUTPATIENT)
Dept: RADIOLOGY | Facility: HOSPITAL | Age: 71
Discharge: HOME | End: 2021-05-26
Attending: NEUROLOGICAL SURGERY
Payer: COMMERCIAL

## 2021-05-26 ENCOUNTER — TRANSCRIBE ORDERS (OUTPATIENT)
Dept: RADIOLOGY | Facility: HOSPITAL | Age: 71
End: 2021-05-26

## 2021-05-26 ENCOUNTER — TELEPHONE (OUTPATIENT)
Dept: SURGICAL ONCOLOGY | Facility: CLINIC | Age: 71
End: 2021-05-26

## 2021-05-26 ENCOUNTER — OFFICE VISIT (OUTPATIENT)
Dept: NEUROSURGERY | Facility: CLINIC | Age: 71
End: 2021-05-26

## 2021-05-26 VITALS
SYSTOLIC BLOOD PRESSURE: 150 MMHG | HEART RATE: 76 BPM | BODY MASS INDEX: 41.23 KG/M2 | TEMPERATURE: 98.5 F | RESPIRATION RATE: 16 BRPM | HEIGHT: 70 IN | WEIGHT: 288 LBS | DIASTOLIC BLOOD PRESSURE: 80 MMHG

## 2021-05-26 DIAGNOSIS — G93.6 CEREBRAL EDEMA (HCC): ICD-10-CM

## 2021-05-26 DIAGNOSIS — C71.9 GBM (GLIOBLASTOMA MULTIFORME) (HCC): ICD-10-CM

## 2021-05-26 DIAGNOSIS — G93.89 BRAIN MASS: ICD-10-CM

## 2021-05-26 DIAGNOSIS — I26.99 PE (PULMONARY THROMBOEMBOLISM) (HCC): ICD-10-CM

## 2021-05-26 DIAGNOSIS — R06.02 SHORTNESS OF BREATH: ICD-10-CM

## 2021-05-26 DIAGNOSIS — J44.9 CHRONIC OBSTRUCTIVE PULMONARY DISEASE, UNSPECIFIED COPD TYPE (HCC): ICD-10-CM

## 2021-05-26 DIAGNOSIS — G93.89 BRAIN MASS: Primary | ICD-10-CM

## 2021-05-26 PROCEDURE — 71275 CT ANGIOGRAPHY CHEST: CPT

## 2021-05-26 PROCEDURE — G1004 CDSM NDSC: HCPCS

## 2021-05-26 PROCEDURE — 99024 POSTOP FOLLOW-UP VISIT: CPT | Performed by: NEUROLOGICAL SURGERY

## 2021-05-26 RX ORDER — FLUTICASONE PROPIONATE 50 MCG
1 SPRAY, SUSPENSION (ML) NASAL DAILY
COMMUNITY

## 2021-05-26 RX ADMIN — IOHEXOL 85 ML: 350 INJECTION, SOLUTION INTRAVENOUS at 11:06

## 2021-05-26 NOTE — PROGRESS NOTES
Patient Id: Hoda Graham is a 80 y o  male        Handedness: Right      Assessment/Plan:    Diagnoses and all orders for this visit:    Brain mass  -     CTA chest pe study; Future  -     MRI brain with and without contrast; Future  -     Ambulatory referral to Hematology / Oncology; Future  -     Ambulatory referral to Radiation Oncology; Future    Cerebral edema (HCC)  -     CTA chest pe study; Future  -     MRI brain with and without contrast; Future  -     Ambulatory referral to Hematology / Oncology; Future  -     Ambulatory referral to Radiation Oncology; Future    Chronic obstructive pulmonary disease, unspecified COPD type ([facility] )  -     CTA chest pe study; Future  -     MRI brain with and without contrast; Future  -     Ambulatory referral to Hematology / Oncology; Future  -     Ambulatory referral to Radiation Oncology; Future    Shortness of breath  -     CTA chest pe study; Future  -     MRI brain with and without contrast; Future  -     Ambulatory referral to Hematology / Oncology; Future  -     Ambulatory referral to Radiation Oncology; Future    GBM (glioblastoma multiforme) (HCC)  -     CTA chest pe study; Future  -     MRI brain with and without contrast; Future  -     Ambulatory referral to Hematology / Oncology; Future  -     Ambulatory referral to Radiation Oncology; Future    Other orders  -     fluticasone (FLONASE) 50 mcg/act nasal spray; 1 spray into each nostril daily        Discussion Summary:   ADDENDUM:  STAT CT PE DEMONSTRATED BILATERAL PULMONARY EMBOLISM  WILL ORDER ELIQUIS 5 MG B I D  AND ESTABLISH FOLLOW-UP WITH HEME-ONC/PCP  1  1   GBM status post resection 05/11/2021  Final pathology consistent with high-grade glioma, IDH1 non mutant MGMT methylation status pending  Today we discussed the natural history and diagnosis of glioblastoma  We discussed that this is a non curable disease, however therapies are targeted to increase both her quality and quantity of life    We discussed the importance of adjuvant therapy including radiation and chemotherapy  Follow-up will be scheduled with both radiation oncology and Neuro-Oncology      An MRI will be performed prior to his follow-up appointment  Addition I would like to see him at 6 weeks  2  Shortness of breath  O2 sat 94% at rest  Concern of worsening over last few days according to the wife  Give his malignancy and recent surgery I have concern for pulmonary embolism  While clinical exam is negative for Homans I do believe that this is important to rule out  Stat CT PE study was ordered  Follow up with the results shortly  3  Wound care  Some scabbing along his wound  There is minimal erythema  There is no discharge or opening of the incision  We discussed wound care including daily showers with baby shampoo and bacitracin ointment once daily along the incision for scabbing  If there is any drainage or concern we can begin antibiotics  Chief Complaint: Post-op        HPI:   This is a pleasant 67-year-old gentleman who has had 3 days of progressive word-finding difficulties  As such presented to the emergency room which demonstrated a left anterior temporal mass with significant mouth of posterior vasogenic edema, mass effect and swelling  This was most likely concerning for primary glial neoplasm  He was started on Decadron with improvement of his speech  Ultimately underwent craniotomy and resection of his left temporal mass  Postoperatively he required some time in rehab and has been undergoing speech therapy  He has made a significant recovery in terms of his speech  He does have some residual speech just a couple these especially when tired or in the evening  With the morning he states he is doing well and even making phone calls for work  Over the last few days his wife and him have noticed worsening shortness of breath    They are unclear if this is due to his baseline COPD and weaning of the steroids  Review of Systems   Constitutional: Positive for fatigue  HENT: Negative  Eyes: Negative  Respiratory: Positive for shortness of breath (COPD)  Cardiovascular: Negative  Gastrointestinal: Negative  Endocrine: Negative  Genitourinary: Negative  Musculoskeletal: Negative  Skin: Negative  Allergic/Immunologic: Negative  Neurological: Negative  Hematological: Bruises/bleeds easily  Psychiatric/Behavioral: Negative  Physical Exam  Vitals:    05/26/21 0826   BP: 150/80   Pulse: 76   Resp: 16   Temp: 98 5 °F (36 9 °C)   He is awake alert oriented  He is able to adequately chai nickel  He is able to name objects including a key and usb drive  His pupils are equal round reactive to light  Extraocular movements are intact  His face is symmetric  He has full strength in his bilateral upper and lower extremities  His incision is healing well  There is some scabbing along the incision with minimal erythema  No tenderness no drainage  No induration no dehiscence      The following portions of the patient's history were reviewed and updated as appropriate: allergies, current medications, past family history, past medical history, past social history, past surgical history and problem list     Active Ambulatory Problems     Diagnosis Date Noted    Essential hypertension 02/08/2019    Morbid obesity ([facility] ) 05/22/2017    Obstructive sleep apnea 01/25/2016    Renal cyst, left 12/07/2015    COPD (chronic obstructive pulmonary disease) ([facility] ) 02/15/2019    History of tobacco use 02/15/2019    Eczema 10/17/2019    Medicare annual wellness visit, subsequent 10/17/2019    Right flank pain 06/17/2020    Brain mass 05/07/2021    Cerebral edema ([facility] ) 05/07/2021    Rib fracture 05/09/2021    Shortness of breath 05/26/2021    GBM (glioblastoma multiforme) ([facility] ) 05/26/2021     Resolved Ambulatory Problems     Diagnosis Date Noted    Acute bronchitis with bronchospasm 02/08/2019    Mild depression ([facility] ) 05/23/2017    Leukocytosis 05/09/2021     Past Medical History:   Diagnosis Date    Asthma     Epilepsy [facility])        Past Surgical History:   Procedure Laterality Date    APPENDECTOMY      CRANIOTOMY Left 5/11/2021    Procedure: Left temporal craniotomy with image guidance and 5-ALA for resection of mass;  Surgeon: Aditi Lopez MD;  [facility]: [facility];  Service: Neurosurgery    MANDIBLE SURGERY      KS COLONOSCOPY FLX DX W/COLLJ SPEC WHEN PFRMD N/A 4/4/2016    Procedure: COLONOSCOPY;  Surgeon: Federico Root MD;  Location: [facility]; Service: Gastroenterology    SKULL FRACTURE ELEVATION      TONSILLECTOMY           Current Outpatient Medications:     acetaminophen (TYLENOL) 325 mg tablet, Take 3 tablets (975 mg total) by mouth every 8 (eight) hours, Disp:  , Rfl: 0    Advair Diskus 250-50 MCG/DOSE inhaler, INHALE ONE PUFF BY MOUTH TWICE A DAY  RINSE MOUTH AFTER USE, Disp: 180 each, Rfl: 3    albuterol (2 5 mg/3 mL) 0 083 % nebulizer solution, Take 1 vial (2 5 mg total) by nebulization every 6 (six) hours as needed for wheezing or shortness of breath, Disp: 60 vial, Rfl: 1    dexamethasone (DECADRON) 2 mg tablet, Take 1 tablet (2 mg total) by mouth daily for 3 doses, Disp: 3 tablet, Rfl: 0    famotidine (PEPCID) 20 mg tablet, Take 1 tablet (20 mg total) by mouth 2 (two) times a day for 10 days While on steroids, Disp: 20 tablet, Rfl: 0    fluticasone (FLONASE) 50 mcg/act nasal spray, 1 spray into each nostril daily, Disp: , Rfl:     Melatonin 2 5 MG CAPS, Take 2 5 mg by mouth daily, Disp: , Rfl:     metoprolol succinate (TOPROL-XL) 25 mg 24 hr tablet, Take 1 tablet (25 mg total) by mouth daily, Disp: 90 tablet, Rfl: 1    Multiple Vitamin (ONE-A-DAY MENS PO), Take by mouth, Disp: , Rfl:     Results/Data: We reviewed his preoperative and postoperative imaging in detail as well as the report

## 2021-05-26 NOTE — TELEPHONE ENCOUNTER
New Patient Encounter    New Patient Intake Form   Patient Details: Suzie Yanez  1950  [id number]    Background Information:  [address] starts by opening a telephone encounter and gathering the following information   Who is calling to schedule? If not self, relationship to patient? provider   Referring Provider Dr Wade Amanda   What is the diagnosis? GBM   Is this diagnosis confirmed? Yes   When was the diagnosis? 5/2021   Is there a confirmed diagnosis from a biopsy/tissue reviewed by pathology? Yes   Were outside slides requested? NA   Is patient aware of diagnosis? No   Is there a personal history and what kind? Did Not Speak to Patient / Office Scheduled   Is there a family history and what kind? Did Not Speak to Patient / Office Scheduled   Reason for visit? New Diagnosis   Have you had any imaging or labs done? If so: when, where? yes  [facility]   Are records in [employer]? yes   If patient has a prior history of cancer were old records obtained? NA   Was the patient told to bring a disk? no   Does the patient smoke or Vape? Did Not Speak to Patient / Office Scheduled   If yes, how many packs or cartridges per day? Scheduling Information:   Preferred [place]:  [place]     Are there any dates/time the patient cannot be seen? Miscellaneous: requesting Dr Josie Mays   After completing the above information, please route to Financial Counselor and the appropriate Nurse Navigator for review

## 2021-05-26 NOTE — PROGRESS NOTES
Killian Beltre, patient's spouse, returned the call  Provided the rad and med onc appointments with date/time/location  Killian Beltre confirmed the appointments  She reports the patient already started the eliquis  Patient will follow up with Dr Ariel Mckeon for management of the GBM and blood clots  Killian Beltre is aware to call with questions or concerns  Provided my direct line

## 2021-05-26 NOTE — PROGRESS NOTES
Patient needs a rad and med onc appointment  Called rad onc and spoke with Eller Krabbe Eller Krabbe provided the appointment for 5/28/21 at 11:30 am with Dr Meron Villavicencio at the [place] office  This RN then called the [facility] to obtain med onc appointment  The soonest appointment they could offer was 6/2/21 at 8 am with Dr Carine Lizarraga at the [facility] office  Will contact patient to provide these appointments

## 2021-05-26 NOTE — PROGRESS NOTES
Called patient to provide appointments  Patient did not answer  Left a voice message requesting for a call back  Provided my direct line

## 2021-05-27 ENCOUNTER — RADIATION ONCOLOGY CONSULT (OUTPATIENT)
Dept: RADIATION ONCOLOGY | Facility: HOSPITAL | Age: 71
End: 2021-05-27
Attending: RADIOLOGY
Payer: COMMERCIAL

## 2021-05-27 VITALS
SYSTOLIC BLOOD PRESSURE: 131 MMHG | OXYGEN SATURATION: 97 % | DIASTOLIC BLOOD PRESSURE: 79 MMHG | HEART RATE: 67 BPM | HEIGHT: 70 IN | RESPIRATION RATE: 24 BRPM | WEIGHT: 291.2 LBS | TEMPERATURE: 97.9 F | BODY MASS INDEX: 41.69 KG/M2

## 2021-05-27 DIAGNOSIS — G93.6 CEREBRAL EDEMA (HCC): ICD-10-CM

## 2021-05-27 DIAGNOSIS — C71.9 GBM (GLIOBLASTOMA MULTIFORME) (HCC): ICD-10-CM

## 2021-05-27 DIAGNOSIS — G93.89 BRAIN MASS: ICD-10-CM

## 2021-05-27 DIAGNOSIS — J44.9 CHRONIC OBSTRUCTIVE PULMONARY DISEASE, UNSPECIFIED COPD TYPE (HCC): ICD-10-CM

## 2021-05-27 DIAGNOSIS — C71.9 GBM (GLIOBLASTOMA MULTIFORME) (HCC): Primary | ICD-10-CM

## 2021-05-27 DIAGNOSIS — R06.02 SHORTNESS OF BREATH: ICD-10-CM

## 2021-05-27 PROCEDURE — 99211 OFF/OP EST MAY X REQ PHY/QHP: CPT | Performed by: RADIOLOGY
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
## 2021-05-28 ENCOUNTER — PATIENT OUTREACH (OUTPATIENT)
Dept: CASE MANAGEMENT | Facility: HOSPITAL | Age: 71
End: 2021-05-28

## 2021-05-28 NOTE — PROGRESS NOTES
Oncology LSW attempted to outreach PT to review his distress thermometer, on which PT self-scored a 5/10 and indicated breathing and sexual as his primary concerns  LSW left PT detailed voicemail requesting return call at PT's earliest convenience  This is LSW's first outreach attempt  Oncology LSW received return call from PT's wife, Amanda Patel Melisa explained that PT had a difficult time speaking on the phone and that is why she returned LSW call instead of him  Amanda Vincent reported that she was still trying to "process everything " Amanda Vincent reported that her nephew had the same type of cancer that PT has and she was familiar with the treatment process  Amanda Vincent reported no psychosocial areas of concern at this time  LSW explained that, should Amanda Vincent and PT require assistance from , they would be able to connect with MAKAYLA Pickett, through [facility]  Amanda Vincent was receptive to this and thanked LSW for her call  Emotional support provided

## 2021-05-29 ENCOUNTER — HOSPITAL ENCOUNTER (OUTPATIENT)
Dept: MRI IMAGING | Facility: HOSPITAL | Age: 71
Discharge: HOME | End: 2021-05-29
Attending: NEUROLOGICAL SURGERY
Payer: COMMERCIAL

## 2021-05-29 DIAGNOSIS — G93.6 CEREBRAL EDEMA (HCC): ICD-10-CM

## 2021-05-29 DIAGNOSIS — C71.9 GBM (GLIOBLASTOMA MULTIFORME) (HCC): ICD-10-CM

## 2021-05-29 DIAGNOSIS — R06.02 SHORTNESS OF BREATH: ICD-10-CM

## 2021-05-29 DIAGNOSIS — J44.9 CHRONIC OBSTRUCTIVE PULMONARY DISEASE, UNSPECIFIED COPD TYPE (HCC): ICD-10-CM

## 2021-05-29 DIAGNOSIS — G93.89 BRAIN MASS: ICD-10-CM

## 2021-05-29 PROCEDURE — 70553 MRI BRAIN STEM W/O & W/DYE: CPT

## 2021-05-29 PROCEDURE — A9585 GADOBUTROL INJECTION: HCPCS | Performed by: NEUROLOGICAL SURGERY

## 2021-05-29 PROCEDURE — G1004 CDSM NDSC: HCPCS

## 2021-05-29 RX ADMIN — GADOBUTROL 13 ML: 604.72 INJECTION INTRAVENOUS at 14:02

## 2021-06-01 ENCOUNTER — TELEPHONE (OUTPATIENT)
Dept: HEMATOLOGY ONCOLOGY | Facility: CLINIC | Age: 71
End: 2021-06-01

## 2021-06-01 ENCOUNTER — APPOINTMENT (OUTPATIENT)
Dept: RADIATION ONCOLOGY | Facility: HOSPITAL | Age: 71
End: 2021-06-01
Attending: RADIOLOGY
Payer: COMMERCIAL

## 2021-06-01 PROCEDURE — 77470 SPECIAL RADIATION TREATMENT: CPT | Performed by: RADIOLOGY

## 2021-06-01 PROCEDURE — 77334 RADIATION TREATMENT AID(S): CPT | Performed by: RADIOLOGY

## 2021-06-01 NOTE — TELEPHONE ENCOUNTER
Intake received  Benefits review in process  Pt outreach to follow    Per RTE pt has an active [facility] repl   No deduct   Out of pocket $7550 met $895  Will f/u after pts 06/02/21 appt

## 2021-06-01 NOTE — TELEPHONE ENCOUNTER
Spoke with Suzie Villalpando @ [facility] Radiology Reading Room to request that MRI Brain completed on 5/29/2021 be read prior to patient's consult with Dr Jaguar Johnson on 6/2/2021      Suzie Villalpando verified the patient, identified the correct study, and submitted read request

## 2021-06-02 ENCOUNTER — DOCUMENTATION (OUTPATIENT)
Dept: HEMATOLOGY ONCOLOGY | Facility: CLINIC | Age: 71
End: 2021-06-02

## 2021-06-02 ENCOUNTER — TELEPHONE (OUTPATIENT)
Dept: HEMATOLOGY ONCOLOGY | Facility: CLINIC | Age: 71
End: 2021-06-02

## 2021-06-02 ENCOUNTER — CONSULT (OUTPATIENT)
Dept: HEMATOLOGY ONCOLOGY | Facility: CLINIC | Age: 71
End: 2021-06-02
Payer: COMMERCIAL

## 2021-06-02 VITALS
SYSTOLIC BLOOD PRESSURE: 142 MMHG | TEMPERATURE: 97.6 F | BODY MASS INDEX: 39.28 KG/M2 | RESPIRATION RATE: 20 BRPM | DIASTOLIC BLOOD PRESSURE: 68 MMHG | HEART RATE: 68 BPM | WEIGHT: 290 LBS | OXYGEN SATURATION: 99 % | HEIGHT: 72 IN

## 2021-06-02 DIAGNOSIS — C71.9 GBM (GLIOBLASTOMA MULTIFORME) (HCC): Primary | ICD-10-CM

## 2021-06-02 DIAGNOSIS — G93.89 BRAIN MASS: ICD-10-CM

## 2021-06-02 DIAGNOSIS — J44.9 CHRONIC OBSTRUCTIVE PULMONARY DISEASE, UNSPECIFIED COPD TYPE (HCC): ICD-10-CM

## 2021-06-02 DIAGNOSIS — Z78.9 NEED FOR FOLLOW-UP BY SOCIAL WORKER: ICD-10-CM

## 2021-06-02 DIAGNOSIS — G93.6 CEREBRAL EDEMA (HCC): ICD-10-CM

## 2021-06-02 DIAGNOSIS — T45.1X5A CHEMOTHERAPY INDUCED NAUSEA AND VOMITING: ICD-10-CM

## 2021-06-02 DIAGNOSIS — R11.2 CHEMOTHERAPY INDUCED NAUSEA AND VOMITING: ICD-10-CM

## 2021-06-02 DIAGNOSIS — R06.02 SHORTNESS OF BREATH: ICD-10-CM

## 2021-06-02 DIAGNOSIS — I26.99 OTHER PULMONARY EMBOLISM WITHOUT ACUTE COR PULMONALE, UNSPECIFIED CHRONICITY (HCC): ICD-10-CM

## 2021-06-02 DIAGNOSIS — G47.33 OBSTRUCTIVE SLEEP APNEA: ICD-10-CM

## 2021-06-02 DIAGNOSIS — I10 ESSENTIAL HYPERTENSION: ICD-10-CM

## 2021-06-02 PROCEDURE — 1160F RVW MEDS BY RX/DR IN RCRD: CPT | Performed by: INTERNAL MEDICINE

## 2021-06-02 PROCEDURE — 1036F TOBACCO NON-USER: CPT | Performed by: INTERNAL MEDICINE

## 2021-06-02 PROCEDURE — 99205 OFFICE O/P NEW HI 60 MIN: CPT | Performed by: INTERNAL MEDICINE

## 2021-06-02 PROCEDURE — 3008F BODY MASS INDEX DOCD: CPT | Performed by: INTERNAL MEDICINE

## 2021-06-02 RX ORDER — TEMOZOLOMIDE 180 MG/1
180 CAPSULE ORAL DAILY
Qty: 42 CAPSULE | Refills: 0 | Status: SHIPPED | OUTPATIENT
Start: 2021-06-02 | End: 2021-10-26

## 2021-06-02 RX ORDER — ONDANSETRON 4 MG/1
4 TABLET, FILM COATED ORAL EVERY 6 HOURS PRN
Qty: 20 TABLET | Refills: 2 | Status: SHIPPED | OUTPATIENT
Start: 2021-06-02 | End: 2021-06-30 | Stop reason: SDUPTHER

## 2021-06-02 NOTE — TELEPHONE ENCOUNTER
I called the patient and spoke with his wife  I informed her that after speaking to Dr Javy Ty, I was able to schedule the patient for 6/21 @ 10:40 am @ the [facility] on [address]  I also informed the wife that the appointment will be visible in [address]  I then voiced if she has any other questions to call the office back  Patient's wife voiced agreement

## 2021-06-02 NOTE — TELEPHONE ENCOUNTER
Spoke with Shikha Tapia @ the [address] [place] [address] Radiology Reading Room to request an update on MRI Brain completed on 5/29/2021  Dustin Ken that the patient is here in the office waiting to be seen by Dr Dany Gonzalez for a consult  Shikha Tapia verified patient, identified the correct study, and stated that she would request an expedited read

## 2021-06-02 NOTE — PROGRESS NOTES
Consultation - Medical Oncology   Farhat Allen 80 y o  male MRN: [id number]  Unit/Bed#:  Encounter: [id number]    Referring physician Dr Barertt Roldan  Reason for Consult:  GBM  HPI: Matt Mcfarlane is a 80y o  year old male   Patient is here with his wife  In 1st week of May patient presented with speech problem- finding words  MRI brain showed 5 cm mass in the left temporal region  Patient had left temporal craniotomy on 05/11/2021 and that showed GBM  Patient was on Decadron for cerebral edema  He is off Decadron now  On 5/26 /21 when he came to see neurosurgeon he was having increased shortness of breath and was found to have pulmonary embolism and he has been on Eliquis 5 mg twice a day  He feels tired  His speech has returned but when he is tired he has some problem with speech  Has exertional dyspnea  When he is tired he has some balance problem  Jayshree Nissen He gained weight on Decadron  Has sleep apnea  History of COPD  He quit smoking cigarettes 15 years ago  No alcohol  History of hypertension  History of appendectomy  ROS:  06/02/21 Reviewed 12 systems: See symptoms in HPI  Presently no other neurological, cardiac, pulmonary, GI and  symptoms other than listed in HPI  Other symptoms are in HPI  No  fever, chills, bleeding, bone pains, skin rash, weight loss, night sweats, arthritic symptoms,   weakness, numbness, claudication   No frequent infections  Not unusually sensitive to heat or cold  No swelling of the ankles  No swollen glands  Patient is anxious         Historical Information   Past Medical History:   Diagnosis Date    Asthma     [facility]     Epilepsy ([facility]     stopped meds 2006 has been seizure free    Pulmonary emboli ([facility] ) 05/25/2021     Past Surgical History:   Procedure Laterality Date    APPENDECTOMY      CRANIOTOMY Left 5/11/2021    Procedure: Left temporal craniotomy with image guidance and 5-ALA for resection of mass;  Surgeon: Kaylah Miles MD;  [facility];  Service: Neurosurgery    MANDIBLE SURGERY      [place] COLONOSCOPY FLX DX W/COLLJ SPEC WHEN PFRMD N/A 4/4/2016    Procedure: COLONOSCOPY;  Surgeon: Federico Root MD;  Location: [facility]; Service: Gastroenterology    SKULL FRACTURE ELEVATION      TONSILLECTOMY       Social History   Social History     Substance and Sexual Activity   Alcohol Use Never    Frequency: Never     Social History     Substance and Sexual Activity   Drug Use No     Social History     Tobacco Use   Smoking Status Former Smoker    Types: Cigarettes    Quit date: 2/8/2006    Years since quitting: 15 3   Smokeless Tobacco Never Used     Family History:   Family History   Problem Relation Age of Onset    Alcohol abuse Father     Substance Abuse Neg Hx     Mental illness Neg Hx          Current Outpatient Medications:     acetaminophen (TYLENOL) 325 mg tablet, Take 3 tablets (975 mg total) by mouth every 8 (eight) hours, Disp:  , Rfl: 0    Advair Diskus 250-50 MCG/DOSE inhaler, INHALE ONE PUFF BY MOUTH TWICE A DAY   RINSE MOUTH AFTER USE, Disp: 180 each, Rfl: 3    apixaban (ELIQUIS) 5 mg, Take 1 tablet (5 mg total) by mouth 2 (two) times a day, Disp: 60 tablet, Rfl: 2    fluticasone (FLONASE) 50 mcg/act nasal spray, 1 spray into each nostril daily, Disp: , Rfl:     Melatonin 2 5 MG CAPS, Take 2 5 mg by mouth daily, Disp: , Rfl:     metoprolol succinate (TOPROL-XL) 25 mg 24 hr tablet, Take 1 tablet (25 mg total) by mouth daily, Disp: 90 tablet, Rfl: 1    Multiple Vitamin (ONE-A-DAY MENS PO), Take by mouth, Disp: , Rfl:     albuterol (2 5 mg/3 mL) 0 083 % nebulizer solution, Take 1 vial (2 5 mg total) by nebulization every 6 (six) hours as needed for wheezing or shortness of breath (Patient not taking: Reported on 5/27/2021), Disp: 60 vial, Rfl: 1    Allergies   Allergen Reactions    Penicillins Other (See Comments)     As a child     @ ROS@  Physical Exam:  Vitals:    06/02/21 0810   BP: 142/68   BP Location: Left arm Patient Position: Sitting   Cuff Size: Adult   Pulse: 68   Resp: 20   Temp: 97 6 °F (36 4 °C)   SpO2: 99%   Weight: 132 kg (290 lb)   Height: 6' (1 829 m)     Alert, oriented, not in distress, no icterus, no oral thrush, no palpable neck mass, clear lung fields, regular heart rate, abdomen  soft and non tender, no palpable abdominal mass, no ascites, no edema of ankles, no calf tenderness, no focal neurological deficit, no skin rash, no palpable lymphadenopathy in the neck and axillary areas,  no clubbing  Patient is anxious  Performance status 2  Lab Results: I have reviewed all pertinent labs    LABS:  Results for orders placed or performed during the hospital encounter of 05/06/21   Novel Coronavirus (Covid-19),PCR [facility]    Specimen: Nose; Nares   Result Value Ref Range    SARS-CoV-2 Negative Negative   Basic metabolic panel   Result Value Ref Range    Sodium 139 136 - 145 mmol/L    Potassium 3 9 3 5 - 5 3 mmol/L    Chloride 109 (H) 100 - 108 mmol/L    CO2 24 21 - 32 mmol/L    ANION GAP 6 4 - 13 mmol/L    BUN 21 5 - 25 mg/dL    Creatinine 1 06 0 60 - 1 30 mg/dL    Glucose 95 65 - 140 mg/dL    Calcium 9 3 8 3 - 10 1 mg/dL    eGFR 71 ml/min/1 73sq m   CBC and Platelet   Result Value Ref Range    WBC 7 56 4 31 - 10 16 Thousand/uL    RBC 5 33 3 88 - 5 62 Million/uL    Hemoglobin 15 5 12 0 - 17 0 g/dL    Hematocrit 45 3 36 5 - 49 3 %    MCV 85 82 - 98 fL    MCH 29 1 26 8 - 34 3 pg    MCHC 34 2 31 4 - 37 4 g/dL    RDW 13 1 11 6 - 15 1 %    Platelets 026 841 - 967 Thousands/uL    MPV 9 7 8 9 - 12 7 fL   Protime-INR   Result Value Ref Range    Protime 12 9 11 6 - 14 5 seconds    INR 0 97 0 84 - 1 19   APTT   Result Value Ref Range    PTT 32 23 - 37 seconds   Troponin I   Result Value Ref Range    Troponin I <0 02 <=0 04 ng/mL   Basic metabolic panel   Result Value Ref Range    Sodium 139 136 - 145 mmol/L    Potassium 4 0 3 5 - 5 3 mmol/L    Chloride 110 (H) 100 - 108 mmol/L    CO2 21 21 - 32 mmol/L    ANION GAP 8 4 - 13 mmol/L    BUN 16 5 - 25 mg/dL    Creatinine 0 97 0 60 - 1 30 mg/dL    Glucose 148 (H) 65 - 140 mg/dL    Calcium 9 0 8 3 - 10 1 mg/dL    eGFR 79 ml/min/1 73sq m   CBC (With Platelets)   Result Value Ref Range    WBC 7 02 4 31 - 10 16 Thousand/uL    RBC 5 50 3 88 - 5 62 Million/uL    Hemoglobin 16 1 12 0 - 17 0 g/dL    Hematocrit 47 0 36 5 - 49 3 %    MCV 86 82 - 98 fL    MCH 29 3 26 8 - 34 3 pg    MCHC 34 3 31 4 - 37 4 g/dL    RDW 13 1 11 6 - 15 1 %    Platelets 359 322 - 992 Thousands/uL    MPV 9 4 8 9 - 12 7 fL   CBC and Platelet   Result Value Ref Range    WBC 12 24 (H) 4 31 - 10 16 Thousand/uL    RBC 4 92 3 88 - 5 62 Million/uL    Hemoglobin 14 6 12 0 - 17 0 g/dL    Hematocrit 43 0 36 5 - 49 3 %    MCV 87 82 - 98 fL    MCH 29 7 26 8 - 34 3 pg    MCHC 34 0 31 4 - 37 4 g/dL    RDW 13 4 11 6 - 15 1 %    Platelets 916 856 - 004 Thousands/uL    MPV 9 9 8 9 - 12 7 fL   Basic metabolic panel   Result Value Ref Range    Sodium 139 136 - 145 mmol/L    Potassium 4 0 3 5 - 5 3 mmol/L    Chloride 110 (H) 100 - 108 mmol/L    CO2 24 21 - 32 mmol/L    ANION GAP 5 4 - 13 mmol/L    BUN 24 5 - 25 mg/dL    Creatinine 1 02 0 60 - 1 30 mg/dL    Glucose 146 (H) 65 - 140 mg/dL    Calcium 8 9 8 3 - 10 1 mg/dL    eGFR 74 ml/min/1 73sq m   CBC and Platelet   Result Value Ref Range    WBC 10 98 (H) 4 31 - 10 16 Thousand/uL    RBC 5 21 3 88 - 5 62 Million/uL    Hemoglobin 15 4 12 0 - 17 0 g/dL    Hematocrit 47 2 36 5 - 49 3 %    MCV 91 82 - 98 fL    MCH 29 6 26 8 - 34 3 pg    MCHC 32 6 31 4 - 37 4 g/dL    RDW 13 4 11 6 - 15 1 %    Platelets 905 188 - 156 Thousands/uL    MPV 10 2 8 9 - 12 7 fL   CBC and Platelet   Result Value Ref Range    WBC 16 51 (H) 4 31 - 10 16 Thousand/uL    RBC 4 82 3 88 - 5 62 Million/uL    Hemoglobin 14 2 12 0 - 17 0 g/dL    Hematocrit 42 4 36 5 - 49 3 %    MCV 88 82 - 98 fL    MCH 29 5 26 8 - 34 3 pg    MCHC 33 5 31 4 - 37 4 g/dL    RDW 13 6 11 6 - 15 1 %    Platelets 305 864 - 632 Thousands/uL    MPV 9 6 8 9 - 12 7 fL   Basic metabolic panel   Result Value Ref Range    Sodium 141 136 - 145 mmol/L    Potassium 4 1 3 5 - 5 3 mmol/L    Chloride 113 (H) 100 - 108 mmol/L    CO2 21 21 - 32 mmol/L    ANION GAP 7 4 - 13 mmol/L    BUN 20 5 - 25 mg/dL    Creatinine 1 13 0 60 - 1 30 mg/dL    Glucose 137 65 - 140 mg/dL    Calcium 8 7 8 3 - 10 1 mg/dL    eGFR 65 ml/min/1 73sq m   Magnesium   Result Value Ref Range    Magnesium 2 5 1 6 - 2 6 mg/dL   Phosphorus   Result Value Ref Range    Phosphorus 3 5 2 3 - 4 1 mg/dL   CBC   Result Value Ref Range    WBC 17 05 (H) 4 31 - 10 16 Thousand/uL    RBC 4 90 3 88 - 5 62 Million/uL    Hemoglobin 14 3 12 0 - 17 0 g/dL    Hematocrit 42 7 36 5 - 49 3 %    MCV 87 82 - 98 fL    MCH 29 2 26 8 - 34 3 pg    MCHC 33 5 31 4 - 37 4 g/dL    RDW 13 5 11 6 - 15 1 %    Platelets 206 640 - 007 Thousands/uL    MPV 9 8 8 9 - 12 7 fL   Protime-INR   Result Value Ref Range    Protime 13 8 11 6 - 14 5 seconds    INR 1 06 0 84 - 1 19   APTT   Result Value Ref Range    PTT 28 23 - 37 seconds   Basic metabolic panel   Result Value Ref Range    Sodium 142 136 - 145 mmol/L    Potassium 4 0 3 5 - 5 3 mmol/L    Chloride 112 (H) 100 - 108 mmol/L    CO2 22 21 - 32 mmol/L    ANION GAP 8 4 - 13 mmol/L    BUN 18 5 - 25 mg/dL    Creatinine 1 07 0 60 - 1 30 mg/dL    Glucose 156 (H) 65 - 140 mg/dL    Calcium 8 6 8 3 - 10 1 mg/dL    eGFR 70 ml/min/1 73sq m   Magnesium   Result Value Ref Range    Magnesium 2 6 1 6 - 2 6 mg/dL   Phosphorus   Result Value Ref Range    Phosphorus 2 9 2 3 - 4 1 mg/dL   Calcium, ionized   Result Value Ref Range    Calcium, Ionized 1 14 1 12 - 1 32 mmol/L   Basic metabolic panel   Result Value Ref Range    Sodium 137 136 - 145 mmol/L    Potassium 4 7 3 5 - 5 3 mmol/L    Chloride 107 100 - 108 mmol/L    CO2 25 21 - 32 mmol/L    ANION GAP 5 4 - 13 mmol/L    BUN 27 (H) 5 - 25 mg/dL    Creatinine 0 99 0 60 - 1 30 mg/dL    Glucose 124 65 - 140 mg/dL    Calcium 9 0 8 3 - 10 1 mg/dL    eGFR 77 ml/min/1 73sq m   CBC and differential   Result Value Ref Range    WBC 16 47 (H) 4 31 - 10 16 Thousand/uL    RBC 4 88 3 88 - 5 62 Million/uL    Hemoglobin 14 6 12 0 - 17 0 g/dL    Hematocrit 44 0 36 5 - 49 3 %    MCV 90 82 - 98 fL    MCH 29 9 26 8 - 34 3 pg    MCHC 33 2 31 4 - 37 4 g/dL    RDW 14 0 11 6 - 15 1 %    MPV 10 3 8 9 - 12 7 fL    Platelets 790 336 - 687 Thousands/uL    nRBC 0 /100 WBCs    Neutrophils Relative 87 (H) 43 - 75 %    Immat GRANS % 2 0 - 2 %    Lymphocytes Relative 4 (L) 14 - 44 %    Monocytes Relative 7 4 - 12 %    Eosinophils Relative 0 0 - 6 %    Basophils Relative 0 0 - 1 %    Neutrophils Absolute 14 23 (H) 1 85 - 7 62 Thousands/µL    Immature Grans Absolute 0 32 (H) 0 00 - 0 20 Thousand/uL    Lymphocytes Absolute 0 70 0 60 - 4 47 Thousands/µL    Monocytes Absolute 1 19 0 17 - 1 22 Thousand/µL    Eosinophils Absolute 0 00 0 00 - 0 61 Thousand/µL    Basophils Absolute 0 03 0 00 - 0 10 Thousands/µL   CBC and differential   Result Value Ref Range    WBC 9 95 4 31 - 10 16 Thousand/uL    RBC 4 75 3 88 - 5 62 Million/uL    Hemoglobin 14 1 12 0 - 17 0 g/dL    Hematocrit 42 2 36 5 - 49 3 %    MCV 89 82 - 98 fL    MCH 29 7 26 8 - 34 3 pg    MCHC 33 4 31 4 - 37 4 g/dL    RDW 13 2 11 6 - 15 1 %    MPV 9 6 8 9 - 12 7 fL    Platelets 199 437 - 882 Thousands/uL    nRBC 0 /100 WBCs   Basic metabolic panel   Result Value Ref Range    Sodium 137 136 - 145 mmol/L    Potassium 4 0 3 5 - 5 3 mmol/L    Chloride 107 100 - 108 mmol/L    CO2 24 21 - 32 mmol/L    ANION GAP 6 4 - 13 mmol/L    BUN 24 5 - 25 mg/dL    Creatinine 0 96 0 60 - 1 30 mg/dL    Glucose 109 65 - 140 mg/dL    Calcium 8 7 8 3 - 10 1 mg/dL    eGFR 80 ml/min/1 73sq m   ECG 12 lead   Result Value Ref Range    Ventricular Rate 74 BPM    Atrial Rate 74 BPM    AK Interval 206 ms    QRSD Interval 104 ms    QT Interval 384 ms    QTC Interval 426 ms    P Axis 50 degrees    QRS Axis 32 degrees    T Wave Axis 18 degrees   Type and screen   Result Value Ref Range    ABO Grouping B     Rh Factor Positive     Antibody Screen Negative     Specimen Expiration Date [id number]    Type and screen   Result Value Ref Range    ABO Grouping B     Rh Factor Positive     Antibody Screen Negative     Specimen Expiration Date [id number]    Prepare Leukoreduced RBC: 2 Units   Result Value Ref Range    Unit Product Code I0473Z06     Unit Number [id number]     Unit ABO B     Unit RH POS     Crossmatch Compatible     Unit Dispense Status Return to [facility]     Unit Product Code X1766L49     Unit Number [id number]     Unit ABO B     Unit RH POS     Crossmatch Compatible     Unit Dispense Status Return to [facility]    Tissue Exam   Result Value Ref Range    Case Report       Surgical Pathology Report                         Case: [id number]                                   Authorizing Provider:  Blanka Mi MD          Collected:           05/11/2021 0959              Ordering Location:     [address] [place] [address]      Received:            05/11/2021 [address] [facility]                                                      Pathologist:           Geovanni Vasquez MD                                                                 Intraop:               Geovanni Vasquez MD                                                                 Specimens:   A) - Brain, Left temporal mass                                                                      B) - Brain, Left temporal mass                                                             Final Diagnosis       A, B  Left temporal mass, craniotomy:  - 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)  The H & E slides and tissue block were sent to Dr Liya Ceja MD , Department of pathology at [facility], for his expert opinion and review, and the above reflects his diagnosis  His letter with diagnosis reads as below          Preliminary Diagnosis      Note       [facility]  DIAGNOSIS    Brain, Left Temporal Mass:    - I agree that this is a high-grade glioma and would specifically regard it as a glioblastoma (WHO grade IV)  On immunohistochemical study, tumor cells retain ATRX expression and do not express IDH1 R132H, EGFR vIII or BRAF v600E  MGMT promoter methylation assessment is pending  I will keep you abreast   Tristen Calvillo MD      Additional Information       All reported additional testing was performed with appropriately reactive controls  These tests were developed and their performance characteristics determined by [facility] or appropriate performing facility, though some tests may be performed on tissues which have not been validated for performance characteristics (such as staining performed on alcohol exposed cell blocks and decalcified tissues)  Results should be interpreted with caution and in the context of the patients clinical condition  These tests may not be cleared or approved by the [place]  Food and Drug Administration, though the FDA has determined that such clearance or approval is not necessary  These tests are used for clinical purposes and they should not be regarded as investigational or for research  This laboratory has been approved by [place] [address], designated as a high-complexity laboratory and is qualified to perform these tests         Intraoperative Consultation          AF  Left temporal mass, craniotomy:  - Lesional tissue present  Reviewed by Lg YOUNGBLOOD  Interpretation performed at [facility], [address]  Dr Antonio Galicia is notified of the findings via phone on 5 /11/2021 at 10:20 am         Gross Description          A  The specimen is received fresh, labeled with the patient's name and hospital number, and is designated "left temporal mass  The specimen consists of a 0 7 x 0 6 x 0 2 cm aggregate of tan-red soft tissue    The specimen is entirely submitted for frozen section diagnosis and the residue is placed in cassette A1          B  The specimen is received in formalin, labeled with the patient's name and hospital number, and is designated "left temporal mass  The specimen consists of a 4 3 x 3 3 x 1 4 cm aggregate of tan-pink to red soft tissue  The specimen is entirely submitted in embedding bags in 5 cassettes  Note: The estimated total formalin fixation time based upon information provided by the submitting clinician and the standard processing schedule is under 72 hours  Susan      Clinical Information       MMR on 5/6/2021:   1  Rim-enhancing left temporal parietal mass measuring 5 cm with significant surrounding vasogenic edema, 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  2   Left-to-right midline shift of 2 to 3 mm     Fingerstick Glucose (POCT)   Result Value Ref Range    POC Glucose 91 65 - 140 mg/dl   POCT Blood Gas (CG8+)   Result Value Ref Range    pH, Art i-STAT 7 390 7 350 - 7 450    pCO2, Art i-STAT 36 9 36 0 - 44 0 mm HG    pO2, ART i-STAT 73 0 (L) 75 0 - 129 0 mm HG    BE, i-STAT -2 -2 - 3 mmol/L    HCO3, Art i-STAT 22 3 22 0 - 28 0 mmol/L    CO2, i-STAT 23 21 - 32 mmol/L    O2 Sat, i-STAT 94 (H) 60 - 85 %    SODIUM, I-STAT 137 136 - 145 mmol/l    Potassium, i-STAT 4 7 3 5 - 5 3 mmol/L    Calcium, Ionized i-STAT 1 09 (L) 1 12 - 1 32 mmol/L    Hct, i-STAT 41 36 5 - 49 3 %    Hgb, i-STAT 13 9 12 0 - 17 0 g/dl    Glucose, i-STAT 170 (H) 65 - 140 mg/dl    Specimen Type ARTERIAL    POCT Blood Gas (CG8+)   Result Value Ref Range    pH, Art i-STAT 7 310 (L) 7 350 - 7 450    pCO2, Art i-STAT 42 4 36 0 - 44 0 mm HG    pO2, ART i-STAT 75 0 75 0 - 129 0 mm HG    BE, i-STAT -5 (L) -2 - 3 mmol/L    HCO3, Art i-STAT 21 3 (L) 22 0 - 28 0 mmol/L    CO2, i-STAT 23 21 - 32 mmol/L    O2 Sat, i-STAT 93 (H) 60 - 85 %    SODIUM, I-STAT 139 136 - 145 mmol/l    Potassium, i-STAT 3 9 3 5 - 5 3 mmol/L    Calcium, Ionized i-STAT 1 31 1 12 - 1 32 mmol/L    Hct, i-STAT 39 36 5 - 49 3 %    Hgb, i-STAT 13 3 12 0 - 17 0 g/dl    Glucose, i-STAT 146 (H) 65 - 140 mg/dl    Specimen Type ARTERIAL    Manual Differential(PHLEBS Do Not Order)   Result Value Ref Range    Segmented % 69 43 - 75 %    Bands % 2 0 - 8 %    Lymphocytes % 16 14 - 44 %    Monocytes % 9 4 - 12 %    Eosinophils, % 1 0 - 6 %    Basophils % 0 0 - 1 %    Myelocytes % 3 (H) 0 - 1 %    Absolute Neutrophils 7 06 1 85 - 7 62 Thousand/uL    Lymphocytes Absolute 1 59 0 60 - 4 47 Thousand/uL    Monocytes Absolute 0 90 0 00 - 1 22 Thousand/uL    Eosinophils Absolute 0 10 0 00 - 0 40 Thousand/uL    Basophils Absolute 0 00 0 00 - 0 10 Thousand/uL    Total Counted 100     RBC Morphology Normal     Platelet Estimate Adequate Adequate         Imaging Studies: I have personally reviewed pertinent reports  IMPRESSION:     1  Resorbing /involuting vasogenic edema/FLAIR envelope along the margins of the left temporal lobe resection cavity  There is peripheral, somewhat irregular crenulated enhancement along the resection cavity margins, similar to the study a few weeks   ago  These findings may represent postoperative changes  Differential diagnosis includes residual tumor  Continued clinical and imaging surveillance recommended  2   No acute infarction, intracranial hemorrhage or new mass lesion  3  Mild, chronic microangiopathy      Workstation performed: [id number]      Imaging    MRI brain with and without contrast (Order: [id number]) - 5/29/2021    IMPRESSION:     1  Resorbing /involuting vasogenic edema/FLAIR envelope along the margins of the left temporal lobe resection cavity  There is peripheral, somewhat irregular crenulated enhancement along the resection cavity margins, similar to the study a few weeks   ago  These findings may represent postoperative changes  Differential diagnosis includes residual tumor  Continued clinical and imaging surveillance recommended    2   No acute infarction, intracranial hemorrhage or new mass lesion  3  Mild, chronic microangiopathy      Workstation performed: [id number]      Imaging    MRI brain with and without contrast (Order: [id number]) - 5/29/2021  IMPRESSION:     Left lateral seventh rib nondisplaced fracture could be subacute or acute  Correlate clinically      Small pericardial effusion                  Workstation performed: [id number]      Imaging    CT chest abdomen pelvis w contrast (Order: [id number]) - 5/7/2021  IMPRESSION:     Moderate clot burden of bilateral pulmonary emboli without evidence of right heart strain or main pulmonary arterial dilatation  Questionable focal atelectasis versus pulmonary infarct in the anteromedial right middle lobe      Measured RV/LV ratio is within normal limits at less than 0 9      Small pericardial effusion  I personally discussed this study with Adebayo Mcgee on 5/26/2021 at 11:35 AM                     Workstation performed: [id number]      Imaging    CTA chest pe study (Order: [id number]) - 5/26/2021    Pathology, and Other Studies: I have personally reviewed pertinent reports     See path report above     Reviewed CBC D, BMP, PT, PTT, MRI scans of brain, CT scans, and pathology report  Assessment and Plan:  See diagnoses, orders instructions below  1  GBM (glioblastoma multiforme) ([facility] )    - Ambulatory referral to Hematology / Oncology  - CBC and differential; Standing  - Comprehensive metabolic panel; Standing  - CBC and differential  - Comprehensive metabolic panel    2  Brain mass    - Ambulatory referral to Hematology / Oncology    3  [facility]    - Ambulatory referral to Hematology / Oncology    4  Other pulmonary embolism without acute cor pulmonale, unspecified chronicity ([facility] )      5  Chronic obstructive pulmonary disease, unspecified COPD type ([facility] )    - Ambulatory referral to Hematology / Oncology    6  Shortness of breath    - Ambulatory referral to Hematology / Oncology    7  Essential hypertension      8  Obstructive sleep apnea     Discussed GBM with patient and his wife in detail and his other comorbid conditions and planning concurrent Temodar and radiation  Patient has already seen Radiation oncologist   Discussed Temodar with patient and his wife in very much detail especially side effects  Patient signed informed consent for    Temodar and  for blood transfusions if needed     All discussed in very much detail  Questions answered  Discussed importance of eating healthy foods  Activities as tolerated but to avoid trauma and   Hazardous places     No aspirin, NSAIDs and other blood thinners because of Eliquis and is already at increased risk of bleeding in the brain on Eliquis   All discussed in very much detail  Questions answered  Informed consent Temodar-  Obtained  Initial part will be concurrent with radiation 75 milligram/meter squared daily  Blood counts and chemistry every week  Follow-up with Dr Viviana Tai in 2 weeks  Patient will continue to follow with his primary physician and other consultants  for GBM and other comorbid conditions     Patient voiced understanding and agrees   Addendum:  Diet and activity as per primary physician and neurosurgeon  Discussed health screening test later  Patient needs assistance in his care  Goal is cure from GBM if possible  Discussed with Dr Viviana Tai about dose of Temodar based on what BSA because his BSA comes to 2 5  He would like to use BSA of 2 5  Rounding Temodar dose to  180 mg daily  Counseling / Coordination of Care    Mitchell Joya   Provided counseling and support

## 2021-06-02 NOTE — PATIENT INSTRUCTIONS
Informed consent Temodar  Initial part will be concurrent with radiation 75 milligram/meter squared daily  Blood counts and chemistry every week  Follow-up with Dr Jaspal Saunders in 2 weeks

## 2021-06-02 NOTE — PROGRESS NOTES
Reviewed Temodar handout with Pt and Wife  Gave a copy of the handout  Instructed possible side effects and importance of reporting any symptoms  Chemo and blood consent signed  rx for zofran sent to pharmacy  They were instructed to call our office with any questions

## 2021-06-02 NOTE — PROGRESS NOTES
6-2-21   Received new oral chemo start- TEMODAR  No auth required due to covered by medicare part b  Copay is $ 322 06 per [facility]  Call to patient spoke with spouse who agreed to pay copay amount  Email to team to advise patient agreeable

## 2021-06-04 ENCOUNTER — APPOINTMENT (OUTPATIENT)
Dept: LAB | Facility: HOSPITAL | Age: 71
End: 2021-06-04
Attending: INTERNAL MEDICINE
Payer: COMMERCIAL

## 2021-06-04 LAB
ALBUMIN SERPL BCP-MCNC: 3.6 G/DL (ref 3.5–5)
ALP SERPL-CCNC: 61 U/L (ref 46–116)
ALT SERPL W P-5'-P-CCNC: 44 U/L (ref 12–78)
ANION GAP SERPL CALCULATED.3IONS-SCNC: 5 MMOL/L (ref 4–13)
AST SERPL W P-5'-P-CCNC: 18 U/L (ref 5–45)
BASOPHILS # BLD AUTO: 0.01 THOUSANDS/ΜL (ref 0–0.1)
BASOPHILS NFR BLD AUTO: 0 % (ref 0–1)
BILIRUB SERPL-MCNC: 0.86 MG/DL (ref 0.2–1)
BUN SERPL-MCNC: 17 MG/DL (ref 5–25)
CALCIUM SERPL-MCNC: 8.9 MG/DL (ref 8.3–10.1)
CHLORIDE SERPL-SCNC: 111 MMOL/L (ref 100–108)
CO2 SERPL-SCNC: 23 MMOL/L (ref 21–32)
CREAT SERPL-MCNC: 0.93 MG/DL (ref 0.6–1.3)
EOSINOPHIL # BLD AUTO: 0.23 THOUSAND/ΜL (ref 0–0.61)
EOSINOPHIL NFR BLD AUTO: 4 % (ref 0–6)
ERYTHROCYTE [DISTWIDTH] IN BLOOD BY AUTOMATED COUNT: 13.3 % (ref 11.6–15.1)
GFR SERPL CREATININE-BSD FRML MDRD: 83 ML/MIN/1.73SQ M
GLUCOSE P FAST SERPL-MCNC: 106 MG/DL (ref 65–99)
HCT VFR BLD AUTO: 44.6 % (ref 36.5–49.3)
HGB BLD-MCNC: 14.2 G/DL (ref 12–17)
IMM GRANULOCYTES # BLD AUTO: 0.04 THOUSAND/UL (ref 0–0.2)
IMM GRANULOCYTES NFR BLD AUTO: 1 % (ref 0–2)
LYMPHOCYTES # BLD AUTO: 1.12 THOUSANDS/ΜL (ref 0.6–4.47)
LYMPHOCYTES NFR BLD AUTO: 19 % (ref 14–44)
MCH RBC QN AUTO: 29.1 PG (ref 26.8–34.3)
MCHC RBC AUTO-ENTMCNC: 31.8 G/DL (ref 31.4–37.4)
MCV RBC AUTO: 91 FL (ref 82–98)
MONOCYTES # BLD AUTO: 0.59 THOUSAND/ΜL (ref 0.17–1.22)
MONOCYTES NFR BLD AUTO: 10 % (ref 4–12)
NEUTROPHILS # BLD AUTO: 4.01 THOUSANDS/ΜL (ref 1.85–7.62)
NEUTS SEG NFR BLD AUTO: 66 % (ref 43–75)
NRBC BLD AUTO-RTO: 0 /100 WBCS
PLATELET # BLD AUTO: 294 THOUSANDS/UL (ref 149–390)
PMV BLD AUTO: 9.2 FL (ref 8.9–12.7)
POTASSIUM SERPL-SCNC: 4.2 MMOL/L (ref 3.5–5.3)
PROT SERPL-MCNC: 7.1 G/DL (ref 6.4–8.2)
RBC # BLD AUTO: 4.88 MILLION/UL (ref 3.88–5.62)
SODIUM SERPL-SCNC: 139 MMOL/L (ref 136–145)
WBC # BLD AUTO: 6 THOUSAND/UL (ref 4.31–10.16)

## 2021-06-04 PROCEDURE — 36415 COLL VENOUS BLD VENIPUNCTURE: CPT | Performed by: INTERNAL MEDICINE

## 2021-06-04 PROCEDURE — 80053 COMPREHEN METABOLIC PANEL: CPT | Performed by: INTERNAL MEDICINE

## 2021-06-04 PROCEDURE — 85025 COMPLETE CBC W/AUTO DIFF WBC: CPT | Performed by: INTERNAL MEDICINE

## 2021-06-08 ENCOUNTER — APPOINTMENT (OUTPATIENT)
Dept: RADIATION ONCOLOGY | Facility: HOSPITAL | Age: 71
End: 2021-06-08
Attending: RADIOLOGY
Payer: COMMERCIAL

## 2021-06-08 PROCEDURE — 77301 RADIOTHERAPY DOSE PLAN IMRT: CPT | Performed by: RADIOLOGY

## 2021-06-08 PROCEDURE — 77338 DESIGN MLC DEVICE FOR IMRT: CPT | Performed by: RADIOLOGY

## 2021-06-08 PROCEDURE — 77300 RADIATION THERAPY DOSE PLAN: CPT | Performed by: RADIOLOGY

## 2021-06-09 ENCOUNTER — APPOINTMENT (OUTPATIENT)
Dept: RADIATION ONCOLOGY | Facility: HOSPITAL | Age: 71
End: 2021-06-09
Attending: RADIOLOGY
Payer: COMMERCIAL

## 2021-06-09 PROCEDURE — 77417 THER RADIOLOGY PORT IMAGE(S): CPT | Performed by: RADIOLOGY

## 2021-06-09 PROCEDURE — 77386 HB NTSTY MODUL RAD TX DLVR CPLX: CPT | Performed by: RADIOLOGY

## 2021-06-10 ENCOUNTER — APPOINTMENT (OUTPATIENT)
Dept: RADIATION ONCOLOGY | Facility: HOSPITAL | Age: 71
End: 2021-06-10
Attending: RADIOLOGY
Payer: COMMERCIAL

## 2021-06-10 PROCEDURE — 77386 HB NTSTY MODUL RAD TX DLVR CPLX: CPT | Performed by: RADIOLOGY

## 2021-06-11 ENCOUNTER — PATIENT OUTREACH (OUTPATIENT)
Dept: CASE MANAGEMENT | Facility: HOSPITAL | Age: 71
End: 2021-06-11

## 2021-06-11 ENCOUNTER — APPOINTMENT (OUTPATIENT)
Dept: LAB | Facility: HOSPITAL | Age: 71
End: 2021-06-11
Attending: INTERNAL MEDICINE
Payer: COMMERCIAL

## 2021-06-11 ENCOUNTER — APPOINTMENT (OUTPATIENT)
Dept: RADIATION ONCOLOGY | Facility: HOSPITAL | Age: 71
End: 2021-06-11
Attending: RADIOLOGY
Payer: COMMERCIAL

## 2021-06-11 LAB
BACTERIA UR QL AUTO: NORMAL /HPF
BILIRUB UR QL STRIP: NEGATIVE
CLARITY UR: CLEAR
COLOR UR: YELLOW
GLUCOSE UR STRIP-MCNC: NEGATIVE MG/DL
HGB UR QL STRIP.AUTO: NEGATIVE
HYALINE CASTS #/AREA URNS LPF: NORMAL /LPF
KETONES UR STRIP-MCNC: NEGATIVE MG/DL
LEUKOCYTE ESTERASE UR QL STRIP: NEGATIVE
NITRITE UR QL STRIP: NEGATIVE
NON-SQ EPI CELLS URNS QL MICRO: NORMAL /HPF
PH UR STRIP.AUTO: 5.5 [PH]
PROT UR STRIP-MCNC: NEGATIVE MG/DL
RBC #/AREA URNS AUTO: NORMAL /HPF
SP GR UR STRIP.AUTO: 1.02 (ref 1–1.03)
UROBILINOGEN UR QL STRIP.AUTO: 0.2 E.U./DL
WBC #/AREA URNS AUTO: NORMAL /HPF

## 2021-06-11 PROCEDURE — 77386 HB NTSTY MODUL RAD TX DLVR CPLX: CPT | Performed by: RADIOLOGY

## 2021-06-11 PROCEDURE — 81001 URINALYSIS AUTO W/SCOPE: CPT | Performed by: FAMILY MEDICINE

## 2021-06-11 NOTE — PROGRESS NOTES
MSW met with pt and his wife after his radiation treatment on 6/9  Pt presents with a flat affect and suspicious of conversation between spouse and MSW  Pt's wife was tearful, at times, as she explained that her daughter would be taking FMLA, as needed  When she became tearful, pt was asking what was making her cry and MSW gently explained  The pt was in disbelief and felt that MSW and spouse were "hiding information from him "      MSW explained the role of the oncology social worker to the pt and spouse  Contact information was provided and they were encouraged to call as needed

## 2021-06-14 ENCOUNTER — APPOINTMENT (OUTPATIENT)
Dept: RADIATION ONCOLOGY | Facility: HOSPITAL | Age: 71
End: 2021-06-14
Attending: RADIOLOGY
Payer: COMMERCIAL

## 2021-06-14 ENCOUNTER — DOCUMENTATION (OUTPATIENT)
Dept: HEMATOLOGY ONCOLOGY | Facility: CLINIC | Age: 71
End: 2021-06-14

## 2021-06-14 PROCEDURE — 77386 HB NTSTY MODUL RAD TX DLVR CPLX: CPT | Performed by: RADIOLOGY

## 2021-06-15 ENCOUNTER — APPOINTMENT (OUTPATIENT)
Dept: RADIATION ONCOLOGY | Facility: HOSPITAL | Age: 71
End: 2021-06-15
Attending: RADIOLOGY
Payer: COMMERCIAL

## 2021-06-15 PROCEDURE — 77336 RADIATION PHYSICS CONSULT: CPT | Performed by: RADIOLOGY

## 2021-06-15 PROCEDURE — 77386 HB NTSTY MODUL RAD TX DLVR CPLX: CPT | Performed by: RADIOLOGY

## 2021-06-16 ENCOUNTER — APPOINTMENT (OUTPATIENT)
Dept: RADIATION ONCOLOGY | Facility: HOSPITAL | Age: 71
End: 2021-06-16
Attending: RADIOLOGY
Payer: COMMERCIAL

## 2021-06-16 PROCEDURE — 77386 HB NTSTY MODUL RAD TX DLVR CPLX: CPT | Performed by: RADIOLOGY

## 2021-06-17 ENCOUNTER — APPOINTMENT (OUTPATIENT)
Dept: RADIATION ONCOLOGY | Facility: HOSPITAL | Age: 71
End: 2021-06-17
Attending: RADIOLOGY
Payer: COMMERCIAL

## 2021-06-17 PROCEDURE — 77386 HB NTSTY MODUL RAD TX DLVR CPLX: CPT | Performed by: RADIOLOGY

## 2021-06-18 ENCOUNTER — APPOINTMENT (OUTPATIENT)
Dept: LAB | Facility: HOSPITAL | Age: 71
End: 2021-06-18
Attending: INTERNAL MEDICINE
Payer: COMMERCIAL

## 2021-06-18 ENCOUNTER — APPOINTMENT (OUTPATIENT)
Dept: RADIATION ONCOLOGY | Facility: HOSPITAL | Age: 71
End: 2021-06-18
Attending: RADIOLOGY
Payer: COMMERCIAL

## 2021-06-18 DIAGNOSIS — C71.9 GBM (GLIOBLASTOMA MULTIFORME) (HCC): ICD-10-CM

## 2021-06-18 PROCEDURE — 77386 HB NTSTY MODUL RAD TX DLVR CPLX: CPT | Performed by: RADIOLOGY

## 2021-06-21 ENCOUNTER — OFFICE VISIT (OUTPATIENT)
Dept: HEMATOLOGY ONCOLOGY | Facility: CLINIC | Age: 71
End: 2021-06-21
Payer: COMMERCIAL

## 2021-06-21 ENCOUNTER — APPOINTMENT (OUTPATIENT)
Dept: RADIATION ONCOLOGY | Facility: HOSPITAL | Age: 71
End: 2021-06-21
Attending: RADIOLOGY
Payer: COMMERCIAL

## 2021-06-21 ENCOUNTER — APPOINTMENT (OUTPATIENT)
Dept: RADIATION ONCOLOGY | Facility: HOSPITAL | Age: 71
End: 2021-06-21
Payer: COMMERCIAL

## 2021-06-21 VITALS
HEART RATE: 70 BPM | HEIGHT: 70 IN | SYSTOLIC BLOOD PRESSURE: 116 MMHG | WEIGHT: 289 LBS | DIASTOLIC BLOOD PRESSURE: 72 MMHG | TEMPERATURE: 97.3 F | RESPIRATION RATE: 18 BRPM | OXYGEN SATURATION: 97 % | BODY MASS INDEX: 41.37 KG/M2

## 2021-06-21 DIAGNOSIS — C71.9 GBM (GLIOBLASTOMA MULTIFORME) (HCC): Primary | ICD-10-CM

## 2021-06-21 DIAGNOSIS — K59.03 DRUG-INDUCED CONSTIPATION: ICD-10-CM

## 2021-06-21 PROCEDURE — 1160F RVW MEDS BY RX/DR IN RCRD: CPT | Performed by: INTERNAL MEDICINE

## 2021-06-21 PROCEDURE — 99214 OFFICE O/P EST MOD 30 MIN: CPT | Performed by: INTERNAL MEDICINE

## 2021-06-21 PROCEDURE — 77386 HB NTSTY MODUL RAD TX DLVR CPLX: CPT | Performed by: RADIOLOGY

## 2021-06-21 PROCEDURE — 1036F TOBACCO NON-USER: CPT | Performed by: INTERNAL MEDICINE

## 2021-06-21 NOTE — PROGRESS NOTES
[facility] [place]  [address] [address] [address] [facility], [id number]  06/21/21    Discussion:   In summary, this is a 71-year-old male history of GBM  He has completed 9 radiation treatments so far  He is on daily Temodar  He tolerates this relatively well  He had an episode of nausea over the past 2 days  This resolved with antiemetic  He has constipation attributable to Temodar  He is taking MiraLax daily  He recently started Colace which had worked for him in the past   He has fatigue  Benefits from a nap twice a day  Sleeps well at night  CBC and CMP are normal   We will continue with Temodar as outlined  Reimaging 1 month after radiation completion  He has no new neurologic symptoms  Speech is intact  I discussed the above with the patient  The patient and his wife voiced understanding and agreement   ______________________________________________________________________    Chief Complaint   Patient presents with    Follow-up       HPI:  Oncology History   GBM (glioblastoma multiforme) ([facility] [address] )   5/26/2021 Initial Diagnosis    Early May 2021 patient presented with speech difficulties  MRI of the brain showed 5 cm mass in left temporal region  May 11, 2021 patient underwent resection  Pathology indicated GBM, BRAF, EGFR V3 negative  6/9/2021 -  Radiation    The patient saw @[facility]@ for radiation treatment  This is the current list of radiation treatment:  Radiation Treatments     No radiation treatments to show  (Treatments may have been administered in another system )             6/9/2021 -  Chemotherapy    Temodar 75 mg p o  Daily  Interval History:  Clinically stable  ECOG-  1 - Symptomatic but completely ambulatory    Review of Systems   Constitutional: Positive for fatigue  Negative for chills and fever  HENT: Negative for nosebleeds  Eyes: Negative for discharge  Respiratory: Negative for cough and shortness of breath  Cardiovascular: Negative for chest pain  Gastrointestinal: Negative for abdominal pain, constipation and diarrhea  Endocrine: Negative for polydipsia  Genitourinary: Negative for hematuria  Musculoskeletal: Negative for arthralgias  Skin: Negative for color change  Allergic/Immunologic: Negative for immunocompromised state  Neurological: Negative for dizziness and headaches  Hematological: Negative for adenopathy  Psychiatric/Behavioral: Negative for agitation  Past Medical History:   Diagnosis Date    Asthma     Brain cancer ([facility] )     Epilepsy ([place] [address] )     stopped meds 2006 has been seizure free    Pulmonary emboli ([facility] ) 05/25/2021     Patient Active Problem List   Diagnosis    Essential hypertension    Morbid obesity ([facility] )    Obstructive sleep apnea    Renal cyst, left    COPD (chronic obstructive pulmonary disease) ([place] [address] )    History of tobacco use    Eczema    Medicare annual wellness visit, subsequent    Right flank pain    Brain mass    Cerebral edema (HCC)    Rib fracture    Shortness of breath    GBM (glioblastoma multiforme) (HCC)    Other pulmonary embolism without acute cor pulmonale (HCC)       Current Outpatient Medications:     acetaminophen (TYLENOL) 325 mg tablet, Take 3 tablets (975 mg total) by mouth every 8 (eight) hours, Disp:  , Rfl: 0    Advair Diskus 250-50 MCG/DOSE inhaler, INHALE ONE PUFF BY MOUTH TWICE A DAY   RINSE MOUTH AFTER USE, Disp: 180 each, Rfl: 3    apixaban (ELIQUIS) 5 mg, Take 1 tablet (5 mg total) by mouth 2 (two) times a day, Disp: 60 tablet, Rfl: 2    fluticasone (FLONASE) 50 mcg/act nasal spray, 1 spray into each nostril daily, Disp: , Rfl:     Melatonin 2 5 MG CAPS, Take 2 5 mg by mouth daily, Disp: , Rfl:     metoprolol succinate (TOPROL-XL) 25 mg 24 hr tablet, Take 1 tablet (25 mg total) by mouth daily, Disp: 90 tablet, Rfl: 1    Multiple Vitamin (ONE-A-DAY MENS PO), Take by mouth, Disp: , Rfl:     ondansetron (ZOFRAN) 4 mg tablet, Take 1 tablet (4 mg total) by mouth every 6 (six) hours as needed for nausea or vomiting, Disp: 20 tablet, Rfl: 2    temozolomide (TEMODAR) 180 mg capsule, Take 1 capsule (180 mg total) by mouth daily, Disp: 42 capsule, Rfl: 0    albuterol (2 5 mg/3 mL) 0 083 % nebulizer solution, Take 1 vial (2 5 mg total) by nebulization every 6 (six) hours as needed for wheezing or shortness of breath (Patient not taking: Reported on 6/21/2021), Disp: 60 vial, Rfl: 1  Allergies   Allergen Reactions    Penicillins Other (See Comments)     As a child     Past Surgical History:   Procedure Laterality Date    APPENDECTOMY      CRANIOTOMY Left 5/11/2021    Procedure: Left temporal craniotomy with image guidance and 5-ALA for resection of mass;  Surgeon: Dorothy Keyes MD;  [facility];  Service: Neurosurgery    MANDIBLE SURGERY      [place] COLONOSCOPY FLX DX W/COLLJ SPEC WHEN PFRMD N/A 4/4/2016    Procedure: COLONOSCOPY;  Surgeon: An Saunders MD;  Location: [facility]; Service: Gastroenterology    SKULL FRACTURE ELEVATION      TONSILLECTOMY       Social History     Objective:  Vitals:    06/21/21 1043   BP: 116/72   BP Location: Left arm   Patient Position: Sitting   Pulse: 70   Resp: 18   Temp: (!) 97 3 °F (36 3 °C)   TempSrc: Tympanic   SpO2: 97%   Weight: 131 kg (289 lb)   Height: 5' 10" (1 778 m)     Physical Exam  Constitutional:       Appearance: He is well-developed  HENT:      Head: Normocephalic and atraumatic  Eyes:      Pupils: Pupils are equal, round, and reactive to light  Cardiovascular:      Rate and Rhythm: Normal rate and regular rhythm  Heart sounds: No murmur heard  Pulmonary:      Breath sounds: Normal breath sounds  No wheezing or rales  Abdominal:      Palpations: Abdomen is soft  Tenderness: There is no abdominal tenderness  Musculoskeletal:         General: No tenderness  Normal range of motion  Cervical back: Neck supple  Lymphadenopathy:      Cervical: No cervical adenopathy  Skin:     Findings: No erythema or rash  Neurological:      Mental Status: He is alert and oriented to person, place, and time  Cranial Nerves: No cranial nerve deficit  Deep Tendon Reflexes: Reflexes are normal and symmetric  Psychiatric:         Behavior: Behavior normal            Labs: I personally reviewed the labs and imaging pertinent to this patient care

## 2021-06-22 ENCOUNTER — DOCUMENTATION (OUTPATIENT)
Dept: HEMATOLOGY ONCOLOGY | Facility: CLINIC | Age: 71
End: 2021-06-22

## 2021-06-22 ENCOUNTER — APPOINTMENT (OUTPATIENT)
Dept: RADIATION ONCOLOGY | Facility: HOSPITAL | Age: 71
End: 2021-06-22
Attending: RADIOLOGY
Payer: COMMERCIAL

## 2021-06-22 PROCEDURE — 77386 HB NTSTY MODUL RAD TX DLVR CPLX: CPT | Performed by: RADIOLOGY

## 2021-06-22 PROCEDURE — 77336 RADIATION PHYSICS CONSULT: CPT | Performed by: RADIOLOGY

## 2021-06-22 NOTE — PROGRESS NOTES
Pt left a message that he returned my call   called pt back but got his voicemail  left  Message for pt to marti me back

## 2021-06-22 NOTE — PROGRESS NOTES
Called ins & spoke to [facility] call ref# [id number] pt has no deduct out of pocket $7550 met $1790 03  Once the out of pocket is met all co-pays are waived & services are covered 100%   radiation benefits have $20 co-pay per [facility] for standard radiation & $60 co-pay per [facility] for non standard radiation  Then covered 100%   Called pt to go over those benefits but got his voicemail left a message for pt to call me back

## 2021-06-23 ENCOUNTER — APPOINTMENT (OUTPATIENT)
Dept: RADIATION ONCOLOGY | Facility: HOSPITAL | Age: 71
End: 2021-06-23
Attending: RADIOLOGY
Payer: COMMERCIAL

## 2021-06-23 PROCEDURE — 77386 HB NTSTY MODUL RAD TX DLVR CPLX: CPT | Performed by: RADIOLOGY

## 2021-06-23 PROCEDURE — 77417 THER RADIOLOGY PORT IMAGE(S): CPT | Performed by: RADIOLOGY

## 2021-06-24 ENCOUNTER — APPOINTMENT (OUTPATIENT)
Dept: RADIATION ONCOLOGY | Facility: HOSPITAL | Age: 71
End: 2021-06-24
Attending: RADIOLOGY
Payer: COMMERCIAL

## 2021-06-24 PROCEDURE — 77386 HB NTSTY MODUL RAD TX DLVR CPLX: CPT | Performed by: RADIOLOGY

## 2021-06-25 ENCOUNTER — APPOINTMENT (OUTPATIENT)
Dept: LAB | Facility: HOSPITAL | Age: 71
End: 2021-06-25
Attending: INTERNAL MEDICINE
Payer: COMMERCIAL

## 2021-06-25 ENCOUNTER — APPOINTMENT (OUTPATIENT)
Dept: RADIATION ONCOLOGY | Facility: HOSPITAL | Age: 71
End: 2021-06-25
Attending: RADIOLOGY
Payer: COMMERCIAL

## 2021-06-25 DIAGNOSIS — C71.9 GBM (GLIOBLASTOMA MULTIFORME) (HCC): ICD-10-CM

## 2021-06-25 LAB
ALBUMIN SERPL BCP-MCNC: 3.6 G/DL (ref 3.5–5)
ALP SERPL-CCNC: 70 U/L (ref 46–116)
ALT SERPL W P-5'-P-CCNC: 46 U/L (ref 12–78)
ANION GAP SERPL CALCULATED.3IONS-SCNC: 6 MMOL/L (ref 4–13)
AST SERPL W P-5'-P-CCNC: 26 U/L (ref 5–45)
BASOPHILS # BLD AUTO: 0.01 THOUSANDS/ΜL (ref 0–0.1)
BASOPHILS NFR BLD AUTO: 0 % (ref 0–1)
BILIRUB SERPL-MCNC: 0.74 MG/DL (ref 0.2–1)
BUN SERPL-MCNC: 16 MG/DL (ref 5–25)
CALCIUM SERPL-MCNC: 8.9 MG/DL (ref 8.3–10.1)
CHLORIDE SERPL-SCNC: 110 MMOL/L (ref 100–108)
CO2 SERPL-SCNC: 24 MMOL/L (ref 21–32)
CREAT SERPL-MCNC: 1.13 MG/DL (ref 0.6–1.3)
EOSINOPHIL # BLD AUTO: 0.35 THOUSAND/ΜL (ref 0–0.61)
EOSINOPHIL NFR BLD AUTO: 6 % (ref 0–6)
ERYTHROCYTE [DISTWIDTH] IN BLOOD BY AUTOMATED COUNT: 13.6 % (ref 11.6–15.1)
GFR SERPL CREATININE-BSD FRML MDRD: 65 ML/MIN/1.73SQ M
GLUCOSE P FAST SERPL-MCNC: 111 MG/DL (ref 65–99)
HCT VFR BLD AUTO: 45.1 % (ref 36.5–49.3)
HGB BLD-MCNC: 14.6 G/DL (ref 12–17)
IMM GRANULOCYTES # BLD AUTO: 0.04 THOUSAND/UL (ref 0–0.2)
IMM GRANULOCYTES NFR BLD AUTO: 1 % (ref 0–2)
LYMPHOCYTES # BLD AUTO: 0.85 THOUSANDS/ΜL (ref 0.6–4.47)
LYMPHOCYTES NFR BLD AUTO: 14 % (ref 14–44)
MCH RBC QN AUTO: 29 PG (ref 26.8–34.3)
MCHC RBC AUTO-ENTMCNC: 32.4 G/DL (ref 31.4–37.4)
MCV RBC AUTO: 90 FL (ref 82–98)
MONOCYTES # BLD AUTO: 0.66 THOUSAND/ΜL (ref 0.17–1.22)
MONOCYTES NFR BLD AUTO: 11 % (ref 4–12)
NEUTROPHILS # BLD AUTO: 4.37 THOUSANDS/ΜL (ref 1.85–7.62)
NEUTS SEG NFR BLD AUTO: 68 % (ref 43–75)
NRBC BLD AUTO-RTO: 0 /100 WBCS
PLATELET # BLD AUTO: 227 THOUSANDS/UL (ref 149–390)
PMV BLD AUTO: 9.4 FL (ref 8.9–12.7)
POTASSIUM SERPL-SCNC: 4.3 MMOL/L (ref 3.5–5.3)
PROT SERPL-MCNC: 6.7 G/DL (ref 6.4–8.2)
RBC # BLD AUTO: 5.03 MILLION/UL (ref 3.88–5.62)
SODIUM SERPL-SCNC: 140 MMOL/L (ref 136–145)
WBC # BLD AUTO: 6.28 THOUSAND/UL (ref 4.31–10.16)

## 2021-06-25 PROCEDURE — 85025 COMPLETE CBC W/AUTO DIFF WBC: CPT

## 2021-06-25 PROCEDURE — 77386 HB NTSTY MODUL RAD TX DLVR CPLX: CPT | Performed by: RADIOLOGY

## 2021-06-25 PROCEDURE — 36415 COLL VENOUS BLD VENIPUNCTURE: CPT

## 2021-06-25 PROCEDURE — 80053 COMPREHEN METABOLIC PANEL: CPT

## 2021-06-28 ENCOUNTER — APPOINTMENT (OUTPATIENT)
Dept: RADIATION ONCOLOGY | Facility: HOSPITAL | Age: 71
End: 2021-06-28
Payer: COMMERCIAL

## 2021-06-28 ENCOUNTER — APPOINTMENT (OUTPATIENT)
Dept: RADIATION ONCOLOGY | Facility: HOSPITAL | Age: 71
End: 2021-06-28
Attending: RADIOLOGY
Payer: COMMERCIAL

## 2021-06-28 PROCEDURE — 77386 HB NTSTY MODUL RAD TX DLVR CPLX: CPT | Performed by: RADIOLOGY

## 2021-06-29 ENCOUNTER — APPOINTMENT (OUTPATIENT)
Dept: RADIATION ONCOLOGY | Facility: HOSPITAL | Age: 71
End: 2021-06-29
Attending: RADIOLOGY
Payer: COMMERCIAL

## 2021-06-29 PROCEDURE — 77336 RADIATION PHYSICS CONSULT: CPT | Performed by: RADIOLOGY

## 2021-06-29 PROCEDURE — 77386 HB NTSTY MODUL RAD TX DLVR CPLX: CPT | Performed by: RADIOLOGY

## 2021-06-30 ENCOUNTER — APPOINTMENT (OUTPATIENT)
Dept: RADIATION ONCOLOGY | Facility: HOSPITAL | Age: 71
End: 2021-06-30
Attending: RADIOLOGY
Payer: COMMERCIAL

## 2021-06-30 ENCOUNTER — OFFICE VISIT (OUTPATIENT)
Dept: NEUROSURGERY | Facility: CLINIC | Age: 71
End: 2021-06-30

## 2021-06-30 VITALS
RESPIRATION RATE: 16 BRPM | BODY MASS INDEX: 40.66 KG/M2 | HEART RATE: 68 BPM | WEIGHT: 284 LBS | DIASTOLIC BLOOD PRESSURE: 78 MMHG | HEIGHT: 70 IN | SYSTOLIC BLOOD PRESSURE: 128 MMHG | TEMPERATURE: 98.8 F

## 2021-06-30 DIAGNOSIS — R06.02 SHORTNESS OF BREATH: ICD-10-CM

## 2021-06-30 DIAGNOSIS — G93.89 BRAIN MASS: ICD-10-CM

## 2021-06-30 DIAGNOSIS — T45.1X5A CHEMOTHERAPY INDUCED NAUSEA AND VOMITING: ICD-10-CM

## 2021-06-30 DIAGNOSIS — C71.9 GBM (GLIOBLASTOMA MULTIFORME) (HCC): ICD-10-CM

## 2021-06-30 DIAGNOSIS — I26.99 PE (PULMONARY THROMBOEMBOLISM) (HCC): ICD-10-CM

## 2021-06-30 DIAGNOSIS — R11.2 CHEMOTHERAPY INDUCED NAUSEA AND VOMITING: ICD-10-CM

## 2021-06-30 PROCEDURE — 77386 HB NTSTY MODUL RAD TX DLVR CPLX: CPT | Performed by: RADIOLOGY

## 2021-06-30 PROCEDURE — 99024 POSTOP FOLLOW-UP VISIT: CPT | Performed by: NEUROLOGICAL SURGERY

## 2021-06-30 PROCEDURE — 77338 DESIGN MLC DEVICE FOR IMRT: CPT | Performed by: RADIOLOGY

## 2021-06-30 PROCEDURE — 77300 RADIATION THERAPY DOSE PLAN: CPT | Performed by: RADIOLOGY

## 2021-06-30 NOTE — PROGRESS NOTES
Patient Id: Leandra Mcadams is a 80 y o  male        Handedness: Right      Assessment/Plan:    Diagnoses and all orders for this visit:    Brain mass  -     apixaban (ELIQUIS) 5 mg; Take 1 tablet (5 mg total) by mouth 2 (two) times a day    Shortness of breath  -     apixaban (ELIQUIS) 5 mg; Take 1 tablet (5 mg total) by mouth 2 (two) times a day    PE (pulmonary thromboembolism) (HCC)  -     apixaban (ELIQUIS) 5 mg; Take 1 tablet (5 mg total) by mouth 2 (two) times a day        Discussion Summary:   ADDENDUM:  STAT CT PE DEMONSTRATED BILATERAL PULMONARY EMBOLISM  WILL ORDER ELIQUIS 5 MG B I D  AND ESTABLISH FOLLOW-UP WITH HEME-ONC/PCP      1  1   GBM status post resection 05/11/2021  Final pathology consistent with high-grade glioma, 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   Today we discussed the natural history and diagnosis of glioblastoma   We discussed that this is a non curable disease, however therapies are targeted to increase both her quality and quantity of life   We discussed the importance of adjuvant therapy including radiation and chemotherapy   Follow-up will be scheduled with both radiation oncology and Neuro-Oncology      He is completing his course of radiation in 1st chemotherapy cycle  MRI will be planned after this  At this juncture I will see him back as needed  If there is any recurrence or concern for further neurosurgical need I would be happy to see him again and discuss further treatment options  2  Shortness of breath  This is improving  He remains on Eliquis  I have refilled this prescription for him      3  Wound care  Incision much improved  Cleaned carefully  No further scabbing        Chief Complaint: Follow-up        HPI:   This is a pleasant 81-year-old gentleman who has had 3 days of progressive word-finding difficulties   As such presented to the emergency room which demonstrated a left anterior temporal mass with significant mouth of posterior vasogenic edema, mass effect and swelling   This was most likely concerning for primary glial neoplasm   He was started on Decadron with improvement of his speech  Ultimately underwent craniotomy and resection of his left temporal mass  Postoperatively he required some time in rehab and has been undergoing speech therapy  He has made a significant recovery in terms of his speech  He is now 6 weeks status post surgery  His speech is significantly improved  I do not notice any significant speech difficulties or errors  He states that he intermittently has fatigue and has gritty vision  He is able to ambulate with improvement  Review of systems obtained by the MA reviewed and updated below  Review of Systems   Constitutional: Negative  HENT: Negative  Eyes: Negative  Respiratory: Positive for shortness of breath (every once in a while)  Cardiovascular: Negative  Gastrointestinal: Positive for constipation (its been three days since his last bowel movement)  Endocrine: Negative  Genitourinary: Negative  Musculoskeletal: Positive for gait problem (same as before)  Negative for back pain, myalgias and neck pain  Skin: Negative  Allergic/Immunologic: Negative  Neurological: Positive for weakness (yes weak all over)  Negative for dizziness, tremors, seizures, light-headedness, numbness and headaches  Hematological: Bruises/bleeds easily (medication)  Psychiatric/Behavioral: Negative  Physical Exam  Vitals:    06/30/21 0837   BP: 128/78   Pulse: 68   Resp: 16   Temp: 98 8 °F (37 1 °C)   He is well appearing  Affect is appropriate  His BMI is Body mass index is 40 75 kg/m²  Ramon  He is awake alert and oriented  Hearing and vision are grossly intact  His pupils are equal round reactive to light  His extraocular movements are intact  His face is symmetric  Tongue is midline  Facial sensation is intact and symmetric throughout  Shoulder shrug is 5/5    There is no drift or dysmetria  He has full strength in his bilateral upper and lower extremities  His gait is normal     Incision well-healed    The following portions of the patient's history were reviewed and updated as appropriate: allergies, current medications, past family history, past medical history, past social history, past surgical history and problem list     Active Ambulatory Problems     Diagnosis Date Noted    Essential hypertension 02/08/2019    Morbid obesity ([facility] [address] ) 05/22/2017    Obstructive sleep apnea 01/25/2016    Renal cyst, left 12/07/2015    COPD (chronic obstructive pulmonary disease) ([facility] [address] ) 02/15/2019    History of tobacco use 02/15/2019    Eczema 10/17/2019    Medicare annual wellness visit, subsequent 10/17/2019    Right flank pain 06/17/2020    Brain mass 05/07/2021    [facility] 05/07/2021    Rib fracture 05/09/2021    Shortness of breath 05/26/2021    GBM (glioblastoma multiforme) ([facility] ) 05/26/2021    Other pulmonary embolism without acute cor pulmonale ([facility] ) 06/02/2021     Resolved Ambulatory Problems     Diagnosis Date Noted    Acute bronchitis with bronchospasm 02/08/2019    Mild depression ([facility] ) 05/23/2017    Leukocytosis 05/09/2021     Past Medical History:   Diagnosis Date    Asthma     Brain cancer ([facility] )     Epilepsy ([facility] )     Pulmonary emboli ([facility] ) 05/25/2021       Past Surgical History:   Procedure Laterality Date    APPENDECTOMY      CRANIOTOMY Left 5/11/2021    Procedure: Left temporal craniotomy with image guidance and 5-ALA for resection of mass;  Surgeon: Joanna Marcano MD;  [facility];  Service: Neurosurgery    MANDIBLE SURGERY      [place] COLONOSCOPY FLX DX W/COLLJ SPEC WHEN PFRMD N/A 4/4/2016    Procedure: COLONOSCOPY;  Surgeon: Camden Velazquez MD;  [facility];   Service: Gastroenterology    SKULL FRACTURE ELEVATION      TONSILLECTOMY           Current Outpatient Medications:     acetaminophen (TYLENOL) 325 mg tablet, Take 3 tablets (975 mg total) by mouth every 8 (eight) hours, Disp:  , Rfl: 0    Advair Diskus 250-50 MCG/DOSE inhaler, INHALE ONE PUFF BY MOUTH TWICE A DAY  RINSE MOUTH AFTER USE, Disp: 180 each, Rfl: 3    apixaban (ELIQUIS) 5 mg, Take 1 tablet (5 mg total) by mouth 2 (two) times a day, Disp: 180 tablet, Rfl: 1    fluticasone (FLONASE) 50 mcg/act nasal spray, 1 spray into each nostril daily, Disp: , Rfl:     Melatonin 2 5 MG CAPS, Take 2 5 mg by mouth daily, Disp: , Rfl:     metoprolol succinate (TOPROL-XL) 25 mg 24 hr tablet, Take 1 tablet (25 mg total) by mouth daily, Disp: 90 tablet, Rfl: 1    Multiple Vitamin (ONE-A-DAY MENS PO), Take by mouth, Disp: , Rfl:     ondansetron (ZOFRAN) 4 mg tablet, Take 1 tablet (4 mg total) by mouth every 6 (six) hours as needed for nausea or vomiting, Disp: 20 tablet, Rfl: 2    temozolomide (TEMODAR) 180 mg capsule, Take 1 capsule (180 mg total) by mouth daily, Disp: 42 capsule, Rfl: 0    albuterol (2 5 mg/3 mL) 0 083 % nebulizer solution, Take 1 vial (2 5 mg total) by nebulization every 6 (six) hours as needed for wheezing or shortness of breath (Patient not taking: Reported on 6/21/2021), Disp: 60 vial, Rfl: 1    Results/Data: We reviewed his MRI from preop, immediate postop, and pre radiation  We reviewed these in detail

## 2021-06-30 NOTE — TELEPHONE ENCOUNTER
Returned call to patients wife  Reviewed that Eliquis was renewed by Dr Dorothy Bishop today  Wife asking if Zofran can be sent to mail order  She will reach out to see if 90 day supply is necessary    I explained that insurance may not cover 90 day supply at a time for Zofran  Wife will call back with an update    Will send to RN as [facility]

## 2021-06-30 NOTE — TELEPHONE ENCOUNTER
Patient is now seeing Dr Dalia Fay  Last visit 6/21/21  Patient is taking 1-2 tablets of Zofran a day  Patient need 90 day supply of Zofran sent to [facility] order service  Prescription pended to Dr Dalia Blanco RNs notified as Carroll Taylor

## 2021-06-30 NOTE — TELEPHONE ENCOUNTER
Patient is now seeing Dr Leodan Dewitt  Last visit 6/21/21  Patient is taking 1-2 tablets of Zofran a day  Patient need 90 day supply of Zofran sent to [employer] order service  Prescription pended to Dr Leodan Little RNs notified as Danyel Carlos ordered refill for Eliquis

## 2021-07-01 ENCOUNTER — APPOINTMENT (OUTPATIENT)
Dept: RADIATION ONCOLOGY | Facility: HOSPITAL | Age: 71
End: 2021-07-01
Attending: RADIOLOGY
Payer: COMMERCIAL

## 2021-07-01 PROCEDURE — G6002 STEREOSCOPIC X-RAY GUIDANCE: HCPCS | Performed by: RADIOLOGY

## 2021-07-01 PROCEDURE — 77386 HB NTSTY MODUL RAD TX DLVR CPLX: CPT | Performed by: RADIOLOGY

## 2021-07-01 RX ORDER — ONDANSETRON 4 MG/1
4 TABLET, FILM COATED ORAL EVERY 6 HOURS PRN
Qty: 180 TABLET | Refills: 1 | Status: SHIPPED | OUTPATIENT
Start: 2021-07-01 | End: 2021-10-26

## 2021-07-01 NOTE — TELEPHONE ENCOUNTER
Left message on patient's voicemail that his prescription was sent to his pharmacy  Dr Nydia Bolaños RNs notified as Justus Tucker

## 2021-07-02 ENCOUNTER — APPOINTMENT (OUTPATIENT)
Dept: RADIATION ONCOLOGY | Facility: HOSPITAL | Age: 71
End: 2021-07-02
Attending: RADIOLOGY
Payer: COMMERCIAL

## 2021-07-02 ENCOUNTER — APPOINTMENT (OUTPATIENT)
Dept: LAB | Facility: HOSPITAL | Age: 71
End: 2021-07-02
Attending: INTERNAL MEDICINE
Payer: COMMERCIAL

## 2021-07-02 DIAGNOSIS — C71.9 GBM (GLIOBLASTOMA MULTIFORME) (HCC): ICD-10-CM

## 2021-07-02 LAB
ALBUMIN SERPL BCP-MCNC: 3.8 G/DL (ref 3.5–5)
ALP SERPL-CCNC: 61 U/L (ref 46–116)
ALT SERPL W P-5'-P-CCNC: 38 U/L (ref 12–78)
ANION GAP SERPL CALCULATED.3IONS-SCNC: 6 MMOL/L (ref 4–13)
AST SERPL W P-5'-P-CCNC: 19 U/L (ref 5–45)
BASOPHILS # BLD AUTO: 0 THOUSANDS/ΜL (ref 0–0.1)
BASOPHILS NFR BLD AUTO: 0 % (ref 0–1)
BILIRUB SERPL-MCNC: 1.47 MG/DL (ref 0.2–1)
BUN SERPL-MCNC: 19 MG/DL (ref 5–25)
CALCIUM SERPL-MCNC: 8.7 MG/DL (ref 8.3–10.1)
CHLORIDE SERPL-SCNC: 109 MMOL/L (ref 100–108)
CO2 SERPL-SCNC: 22 MMOL/L (ref 21–32)
CREAT SERPL-MCNC: 1.07 MG/DL (ref 0.6–1.3)
EOSINOPHIL # BLD AUTO: 0.85 THOUSAND/ΜL (ref 0–0.61)
EOSINOPHIL NFR BLD AUTO: 14 % (ref 0–6)
ERYTHROCYTE [DISTWIDTH] IN BLOOD BY AUTOMATED COUNT: 14 % (ref 11.6–15.1)
GFR SERPL CREATININE-BSD FRML MDRD: 70 ML/MIN/1.73SQ M
GLUCOSE P FAST SERPL-MCNC: 95 MG/DL (ref 65–99)
HCT VFR BLD AUTO: 42.6 % (ref 36.5–49.3)
HGB BLD-MCNC: 14.1 G/DL (ref 12–17)
IMM GRANULOCYTES # BLD AUTO: 0.02 THOUSAND/UL (ref 0–0.2)
IMM GRANULOCYTES NFR BLD AUTO: 0 % (ref 0–2)
LYMPHOCYTES # BLD AUTO: 0.62 THOUSANDS/ΜL (ref 0.6–4.47)
LYMPHOCYTES NFR BLD AUTO: 10 % (ref 14–44)
MCH RBC QN AUTO: 29.6 PG (ref 26.8–34.3)
MCHC RBC AUTO-ENTMCNC: 33.1 G/DL (ref 31.4–37.4)
MCV RBC AUTO: 90 FL (ref 82–98)
MONOCYTES # BLD AUTO: 0.62 THOUSAND/ΜL (ref 0.17–1.22)
MONOCYTES NFR BLD AUTO: 10 % (ref 4–12)
NEUTROPHILS # BLD AUTO: 3.95 THOUSANDS/ΜL (ref 1.85–7.62)
NEUTS SEG NFR BLD AUTO: 66 % (ref 43–75)
NRBC BLD AUTO-RTO: 0 /100 WBCS
PLATELET # BLD AUTO: 153 THOUSANDS/UL (ref 149–390)
PMV BLD AUTO: 9.9 FL (ref 8.9–12.7)
POTASSIUM SERPL-SCNC: 4.1 MMOL/L (ref 3.5–5.3)
PROT SERPL-MCNC: 7.2 G/DL (ref 6.4–8.2)
RBC # BLD AUTO: 4.76 MILLION/UL (ref 3.88–5.62)
SODIUM SERPL-SCNC: 137 MMOL/L (ref 136–145)
WBC # BLD AUTO: 6.06 THOUSAND/UL (ref 4.31–10.16)

## 2021-07-02 PROCEDURE — G6002 STEREOSCOPIC X-RAY GUIDANCE: HCPCS | Performed by: RADIOLOGY

## 2021-07-02 PROCEDURE — 77386 HB NTSTY MODUL RAD TX DLVR CPLX: CPT | Performed by: RADIOLOGY

## 2021-07-02 PROCEDURE — 85025 COMPLETE CBC W/AUTO DIFF WBC: CPT

## 2021-07-02 PROCEDURE — 80053 COMPREHEN METABOLIC PANEL: CPT

## 2021-07-02 PROCEDURE — 36415 COLL VENOUS BLD VENIPUNCTURE: CPT

## 2021-07-06 ENCOUNTER — APPOINTMENT (OUTPATIENT)
Dept: RADIATION ONCOLOGY | Facility: HOSPITAL | Age: 71
End: 2021-07-06
Attending: RADIOLOGY
Payer: COMMERCIAL

## 2021-07-06 ENCOUNTER — APPOINTMENT (OUTPATIENT)
Dept: RADIATION ONCOLOGY | Facility: HOSPITAL | Age: 71
End: 2021-07-06
Payer: COMMERCIAL

## 2021-07-06 PROCEDURE — 77386 HB NTSTY MODUL RAD TX DLVR CPLX: CPT | Performed by: RADIOLOGY

## 2021-07-06 PROCEDURE — G6002 STEREOSCOPIC X-RAY GUIDANCE: HCPCS | Performed by: RADIOLOGY

## 2021-07-07 ENCOUNTER — APPOINTMENT (OUTPATIENT)
Dept: RADIATION ONCOLOGY | Facility: HOSPITAL | Age: 71
End: 2021-07-07
Attending: RADIOLOGY
Payer: COMMERCIAL

## 2021-07-07 ENCOUNTER — OFFICE VISIT (OUTPATIENT)
Dept: HEMATOLOGY ONCOLOGY | Facility: CLINIC | Age: 71
End: 2021-07-07
Payer: COMMERCIAL

## 2021-07-07 VITALS
HEART RATE: 83 BPM | TEMPERATURE: 98.7 F | SYSTOLIC BLOOD PRESSURE: 138 MMHG | RESPIRATION RATE: 18 BRPM | DIASTOLIC BLOOD PRESSURE: 74 MMHG | OXYGEN SATURATION: 96 % | WEIGHT: 282 LBS | HEIGHT: 70 IN | BODY MASS INDEX: 40.37 KG/M2

## 2021-07-07 DIAGNOSIS — C71.9 GBM (GLIOBLASTOMA MULTIFORME) (HCC): Primary | ICD-10-CM

## 2021-07-07 PROCEDURE — 3008F BODY MASS INDEX DOCD: CPT | Performed by: INTERNAL MEDICINE

## 2021-07-07 PROCEDURE — 77336 RADIATION PHYSICS CONSULT: CPT | Performed by: RADIOLOGY

## 2021-07-07 PROCEDURE — G6002 STEREOSCOPIC X-RAY GUIDANCE: HCPCS | Performed by: RADIOLOGY

## 2021-07-07 PROCEDURE — 77386 HB NTSTY MODUL RAD TX DLVR CPLX: CPT | Performed by: RADIOLOGY

## 2021-07-07 PROCEDURE — 99213 OFFICE O/P EST LOW 20 MIN: CPT | Performed by: NURSE PRACTITIONER

## 2021-07-07 NOTE — PROGRESS NOTES
[facility][1] H[facility]ATOLOGY ONCOL[facility]Y [facility]  [address] [facility] [address] [phone]  Progress Note  Catrina Ellis, 1950, [id number]  7/7/2021    Assessment/Plan:  1  GBM (glioblastoma multiforme) ([facility] )   Patient is a 67-year-old male with a history of GBM currently on concurrent radiation and daily Temodar  He is expected to complete radiation on 07/22/21  He will stop the Temodar at the same time  We will plan to reimage 1 month after his radiation completes  Overall he is doing well however reports increased fatigue, nausea and constipation  He is managing this with stool softeners and MiraLax  He is also taking Zofran 4 mg p o  every 6 hours as needed for nausea  We will plan to see him in 2 weeks around the same time he completes his radiation  Patient is wife verbalized understanding and are in agreement with the plan  Goals and Barriers:    Current Goal:   Prolong Survival from Cancer  Barriers: None  Patient's Capacity to Self Care:  Patient is able to self care   -------------------------------------------------------------------------------------------------------    Chief Complaint   Patient presents with    Follow-up       History of present illness/Cancer History:   Oncology History   GBM (glioblastoma multiforme) ([facility] )   5/26/2021 Initial Diagnosis    Early May 2021 patient presented with speech difficulties  MRI of the brain showed 5 cm mass in left temporal region  May 11, 2021 patient underwent resection  Pathology indicated GBM, BRAF, EGFR V3 negative  6/9/2021 -  Radiation    [facility]  This is the current list of radiation treatment:  Radiation Treatments     No radiation treatments to show  (Treatments may have been administered in another system )             6/9/2021 -  Chemotherapy    Temodar 75 mg p o  Daily            Cancer Staging  GBM (glioblastoma multiforme) ([facility] )  Staging form: Central Nervous System Tumors, Pediatric Staging  - Clinical: No stage assigned - Unsigned       ECO - Symptomatic but completely ambulatory    Interval history:  Clinically stable     Review of Systems   Constitutional: Positive for fatigue  Negative for activity change, appetite change, fever and unexpected weight change  Respiratory: Negative for cough and shortness of breath  Cardiovascular: Negative for chest pain and leg swelling  Gastrointestinal: Positive for constipation and nausea  Negative for abdominal pain and diarrhea  Endocrine: Negative for cold intolerance and heat intolerance  Musculoskeletal: Negative for arthralgias and myalgias  Skin: Negative  Neurological: Negative for dizziness, weakness and headaches  Hematological: Negative for adenopathy  Does not bruise/bleed easily  Current Outpatient Medications:     acetaminophen (TYLENOL) 325 mg tablet, Take 3 tablets (975 mg total) by mouth every 8 (eight) hours, Disp:  , Rfl: 0    Advair Diskus 250-50 MCG/DOSE inhaler, INHALE ONE PUFF BY MOUTH TWICE A DAY   RINSE MOUTH AFTER USE, Disp: 180 each, Rfl: 3    apixaban (ELIQUIS) 5 mg, Take 1 tablet (5 mg total) by mouth 2 (two) times a day, Disp: 180 tablet, Rfl: 1    fluticasone (FLONASE) 50 mcg/act nasal spray, 1 spray into each nostril daily, Disp: , Rfl:     Melatonin 2 5 MG CAPS, Take 2 5 mg by mouth daily, Disp: , Rfl:     metoprolol succinate (TOPROL-XL) 25 mg 24 hr tablet, Take 1 tablet (25 mg total) by mouth daily, Disp: 90 tablet, Rfl: 1    Multiple Vitamin (ONE-A-DAY MENS PO), Take by mouth, Disp: , Rfl:     ondansetron (ZOFRAN) 4 mg tablet, Take 1 tablet (4 mg total) by mouth every 6 (six) hours as needed for nausea or vomiting, Disp: 180 tablet, Rfl: 1    temozolomide (TEMODAR) 180 mg capsule, Take 1 capsule (180 mg total) by mouth daily, Disp: 42 capsule, Rfl: 0    albuterol (2 5 mg/3 mL) 0 083 % nebulizer solution, Take 1 vial (2 5 mg total) by nebulization every 6 (six) hours as needed for wheezing or shortness of breath (Patient not taking: Reported on 6/21/2021), Disp: 60 vial, Rfl: 1    Allergies   Allergen Reactions    Penicillins Other (See Comments)     As a child       Advance Directive and Living Will:        Objective:   /74 (BP Location: Left arm, Patient Position: Sitting, Cuff Size: Large)   Pulse 83   Temp 98 7 °F (37 1 °C) (Tympanic)   Resp 18   Ht 5' 10" (1 778 m)   Wt 128 kg (282 lb)   SpO2 96%   BMI 40 46 kg/m²   Wt Readings from Last 6 Encounters:   07/07/21 128 kg (282 lb)   06/30/21 129 kg (284 lb)   06/21/21 131 kg (289 lb)   06/02/21 132 kg (290 lb)   05/27/21 132 kg (291 lb 3 2 oz)   05/26/21 131 kg (288 lb)       Physical Exam  Vitals reviewed  Constitutional:       Appearance: Normal appearance  He is well-developed  He is obese  HENT:      Head: Normocephalic and atraumatic  Eyes:      Pupils: Pupils are equal, round, and reactive to light  Cardiovascular:      Rate and Rhythm: Normal rate and regular rhythm  Pulses: Normal pulses  Heart sounds: Normal heart sounds  Pulmonary:      Effort: Pulmonary effort is normal  No respiratory distress  Breath sounds: Normal breath sounds  Abdominal:      General: Bowel sounds are normal       Palpations: Abdomen is soft  Musculoskeletal:         General: Tenderness:  lastlabv  Normal range of motion  Cervical back: Normal range of motion  Lymphadenopathy:      Cervical: No cervical adenopathy  Skin:     General: Skin is warm and dry  Capillary Refill: Capillary refill takes less than 2 seconds  Neurological:      Mental Status: He is alert and oriented to person, place, and time     Psychiatric:         Behavior: Behavior normal          Pertinent Laboratory Results and Imaging Review:  Appointment on 07/02/2021   Component Date Value Ref Range Status    WBC 07/02/2021 6 06  4 31 - 10 16 Thousand/uL Final    RBC 07/02/2021 4 76 3 88 - 5 62 Million/uL Final    Hemoglobin 07/02/2021 14 1  12 0 - 17 0 g/dL Final    Hematocrit 07/02/2021 42 6  36 5 - 49 3 % Final    MCV 07/02/2021 90  82 - 98 fL Final    MCH 07/02/2021 29 6  26 8 - 34 3 pg Final    MCHC 07/02/2021 33 1  31 4 - 37 4 g/dL Final    RDW 07/02/2021 14 0  11 6 - 15 1 % Final    MPV 07/02/2021 9 9  8 9 - 12 7 fL Final    Platelets 86/18/6823 153  149 - 390 Thousands/uL Final    nRBC 07/02/2021 0  /100 WBCs Final    Neutrophils Relative 07/02/2021 66  43 - 75 % Final    Immat GRANS % 07/02/2021 0  0 - 2 % Final    Lymphocytes Relative 07/02/2021 10* 14 - 44 % Final    Monocytes Relative 07/02/2021 10  4 - 12 % Final    Eosinophils Relative 07/02/2021 14* 0 - 6 % Final    Basophils Relative 07/02/2021 0  0 - 1 % Final    Neutrophils Absolute 07/02/2021 3 95  1 85 - 7 62 Thousands/µL Final    Immature Grans Absolute 07/02/2021 0 02  0 00 - 0 20 Thousand/uL Final    Lymphocytes Absolute 07/02/2021 0 62  0 60 - 4 47 Thousands/µL Final    Monocytes Absolute 07/02/2021 0 62  0 17 - 1 22 Thousand/µL Final    Eosinophils Absolute 07/02/2021 0 85* 0 00 - 0 61 Thousand/µL Final    Basophils Absolute 07/02/2021 0 00  0 00 - 0 10 Thousands/µL Final    Sodium 07/02/2021 137  136 - 145 mmol/L Final    Potassium 07/02/2021 4 1  3 5 - 5 3 mmol/L Final    Chloride 07/02/2021 109* 100 - 108 mmol/L Final    CO2 07/02/2021 22  21 - 32 mmol/L Final    ANION GAP 07/02/2021 6  4 - 13 mmol/L Final    BUN 07/02/2021 19  5 - 25 mg/dL Final    Creatinine 07/02/2021 1 07  0 60 - 1 30 mg/dL Final    Standardized to IDMS reference method    Glucose, Fasting 07/02/2021 95  65 - 99 mg/dL Final    Specimen collection should occur prior to Sulfasalazine administration due to the potential for falsely depressed results  Specimen collection should occur prior to Sulfapyridine administration due to the potential for falsely elevated results      Calcium 07/02/2021 8 7  8 3 - 10 1 mg/dL Final  AST 07/02/2021 19  5 - 45 U/L Final    Specimen collection should occur prior to Sulfasalazine administration due to the potential for falsely depressed results   ALT 07/02/2021 38  12 - 78 U/L Final    Specimen collection should occur prior to Sulfasalazine and/or Sulfapyridine administration due to the potential for falsely depressed results   Alkaline Phosphatase 07/02/2021 61  46 - 116 U/L Final    Total Protein 07/02/2021 7 2  6 4 - 8 2 g/dL Final    Albumin 07/02/2021 3 8  3 5 - 5 0 g/dL Final    Total Bilirubin 07/02/2021 1 47* 0 20 - 1 00 mg/dL Final    Use of this assay is not recommended for patients undergoing treatment with eltrombopag due to the potential for falsely elevated results   eGFR 07/02/2021 70  ml/min/1 73sq m Final         The following historical data was reviewed  Past Medical History:   Diagnosis Date    Asthma     [facility]     Epilepsy ([facility] )     stopped meds 2006 has been seizure free    Pulmonary emboli ([facility] ) 05/25/2021       Past Surgical History:   Procedure Laterality Date    APPENDECTOMY      CRANIOTOMY Left 5/11/2021    Procedure: Left temporal craniotomy with image guidance and 5-ALA for resection of mass;  Surgeon: Effie Lowery MD;  [facility];  Service: Neurosurgery    MANDIBLE SURGERY      [place] COLONOSCOPY FLX DX W/COLLJ SPEC WHEN PFRMD N/A 4/4/2016    Procedure: COLONOSCOPY;  Surgeon: Meghna Manley MD;  Location: [facility];   Service: Gastroenterology    SKULL FRACTURE ELEVATION      TONSILLECTOMY         Social History     Socioeconomic History    Marital status: /Civil Union     Spouse name: None    Number of children: None    Years of education: None    Highest education level: None   Occupational History    None   Tobacco Use    Smoking status: Former Smoker     Types: Cigarettes     Quit date: 2/8/2006     Years since quitting: 15 4    Smokeless tobacco: Never Used   Vaping Use    Vaping Use: Never used Substance and Sexual Activity    Alcohol use: Never    Drug use: Yes     Types: Marijuana     Comment: not medical marijuana    Sexual activity: None   Other Topics Concern    None   Social History Narrative    None     Social Determinants of Health     Financial Resource Strain:     Difficulty of Paying Living Expenses:    Food Insecurity:     Worried About Running Out of Food in the Last Year:     Ran Out of Food in the Last Year:    Transportation Needs:     Lack of Transportation (Medical):  Lack of Transportation (Non-Medical):    Physical Activity:     Days of Exercise per Week:     Minutes of Exercise per Session:    Stress:     Feeling of Stress :    Social Connections:     Frequency of Communication with Friends and Family:     Frequency of Social Gatherings with Friends and Family:     Attends Jew Services:     Active Member of Clubs or Organizations:     Attends Club or Organization Meetings:     Marital Status:    Intimate Partner Violence:     Fear of Current or Ex-Partner:     Emotionally Abused:     Physically Abused:     Sexually Abused:        Family History   Problem Relation Age of Onset    Alcohol abuse Father     Substance Abuse Neg Hx     Mental illness Neg Hx        Please note: This report has been generated by a voice recognition software system  Therefore there may be syntax, spelling, and/or grammatical errors  Please call if you have any questions

## 2021-07-08 ENCOUNTER — APPOINTMENT (OUTPATIENT)
Dept: RADIATION ONCOLOGY | Facility: HOSPITAL | Age: 71
End: 2021-07-08
Attending: RADIOLOGY
Payer: COMMERCIAL

## 2021-07-08 ENCOUNTER — APPOINTMENT (OUTPATIENT)
Dept: RADIATION ONCOLOGY | Facility: HOSPITAL | Age: 71
End: 2021-07-08
Payer: COMMERCIAL

## 2021-07-08 PROCEDURE — G6002 STEREOSCOPIC X-RAY GUIDANCE: HCPCS | Performed by: RADIOLOGY

## 2021-07-08 PROCEDURE — 77427 RADIATION TX MANAGEMENT X5: CPT | Performed by: RADIOLOGY

## 2021-07-08 PROCEDURE — 77386 HB NTSTY MODUL RAD TX DLVR CPLX: CPT | Performed by: RADIOLOGY

## 2021-07-09 ENCOUNTER — APPOINTMENT (OUTPATIENT)
Dept: RADIATION ONCOLOGY | Facility: HOSPITAL | Age: 71
End: 2021-07-09
Attending: RADIOLOGY
Payer: COMMERCIAL

## 2021-07-09 ENCOUNTER — TELEPHONE (OUTPATIENT)
Dept: HEMATOLOGY ONCOLOGY | Facility: CLINIC | Age: 71
End: 2021-07-09

## 2021-07-09 ENCOUNTER — APPOINTMENT (OUTPATIENT)
Dept: LAB | Facility: HOSPITAL | Age: 71
End: 2021-07-09
Attending: INTERNAL MEDICINE
Payer: COMMERCIAL

## 2021-07-09 DIAGNOSIS — C71.9 GBM (GLIOBLASTOMA MULTIFORME) (HCC): ICD-10-CM

## 2021-07-09 LAB
ALBUMIN SERPL BCP-MCNC: 3.5 G/DL (ref 3.5–5)
ALP SERPL-CCNC: 62 U/L (ref 46–116)
ALT SERPL W P-5'-P-CCNC: 47 U/L (ref 12–78)
ANION GAP SERPL CALCULATED.3IONS-SCNC: 8 MMOL/L (ref 4–13)
AST SERPL W P-5'-P-CCNC: 23 U/L (ref 5–45)
BASOPHILS # BLD AUTO: 0 THOUSANDS/ΜL (ref 0–0.1)
BASOPHILS NFR BLD AUTO: 0 % (ref 0–1)
BILIRUB SERPL-MCNC: 1.17 MG/DL (ref 0.2–1)
BUN SERPL-MCNC: 16 MG/DL (ref 5–25)
CALCIUM SERPL-MCNC: 8.7 MG/DL (ref 8.3–10.1)
CHLORIDE SERPL-SCNC: 110 MMOL/L (ref 100–108)
CO2 SERPL-SCNC: 21 MMOL/L (ref 21–32)
CREAT SERPL-MCNC: 1.08 MG/DL (ref 0.6–1.3)
EOSINOPHIL # BLD AUTO: 0.62 THOUSAND/ΜL (ref 0–0.61)
EOSINOPHIL NFR BLD AUTO: 13 % (ref 0–6)
ERYTHROCYTE [DISTWIDTH] IN BLOOD BY AUTOMATED COUNT: 14.1 % (ref 11.6–15.1)
GFR SERPL CREATININE-BSD FRML MDRD: 69 ML/MIN/1.73SQ M
GLUCOSE P FAST SERPL-MCNC: 98 MG/DL (ref 65–99)
HCT VFR BLD AUTO: 38 % (ref 36.5–49.3)
HGB BLD-MCNC: 12.7 G/DL (ref 12–17)
IMM GRANULOCYTES # BLD AUTO: 0.01 THOUSAND/UL (ref 0–0.2)
IMM GRANULOCYTES NFR BLD AUTO: 0 % (ref 0–2)
LYMPHOCYTES # BLD AUTO: 0.65 THOUSANDS/ΜL (ref 0.6–4.47)
LYMPHOCYTES NFR BLD AUTO: 14 % (ref 14–44)
MCH RBC QN AUTO: 29.7 PG (ref 26.8–34.3)
MCHC RBC AUTO-ENTMCNC: 33.4 G/DL (ref 31.4–37.4)
MCV RBC AUTO: 89 FL (ref 82–98)
MONOCYTES # BLD AUTO: 0.56 THOUSAND/ΜL (ref 0.17–1.22)
MONOCYTES NFR BLD AUTO: 12 % (ref 4–12)
NEUTROPHILS # BLD AUTO: 2.92 THOUSANDS/ΜL (ref 1.85–7.62)
NEUTS SEG NFR BLD AUTO: 61 % (ref 43–75)
NRBC BLD AUTO-RTO: 0 /100 WBCS
PLATELET # BLD AUTO: 24 THOUSANDS/UL (ref 149–390)
PMV BLD AUTO: 10.1 FL (ref 8.9–12.7)
POTASSIUM SERPL-SCNC: 3.9 MMOL/L (ref 3.5–5.3)
PROT SERPL-MCNC: 7 G/DL (ref 6.4–8.2)
RBC # BLD AUTO: 4.27 MILLION/UL (ref 3.88–5.62)
SODIUM SERPL-SCNC: 139 MMOL/L (ref 136–145)
WBC # BLD AUTO: 4.76 THOUSAND/UL (ref 4.31–10.16)

## 2021-07-09 PROCEDURE — 36415 COLL VENOUS BLD VENIPUNCTURE: CPT

## 2021-07-09 PROCEDURE — 80053 COMPREHEN METABOLIC PANEL: CPT

## 2021-07-09 PROCEDURE — 77386 HB NTSTY MODUL RAD TX DLVR CPLX: CPT | Performed by: RADIOLOGY

## 2021-07-09 PROCEDURE — 85025 COMPLETE CBC W/AUTO DIFF WBC: CPT

## 2021-07-09 PROCEDURE — G6002 STEREOSCOPIC X-RAY GUIDANCE: HCPCS | Performed by: RADIOLOGY

## 2021-07-09 NOTE — TELEPHONE ENCOUNTER
Attempted to call pt without success  Left VM  Stated in VM that pt's platelet count is low, he should be on bleeding precautions and to go to the ER if he has any bleeding that does not stop  Also instructed pt to have a repeat cbc in one week  F/U c/b to the [facility] [place]  Phone # provided 
Critical Results   Call Received From [facility]   Lab Department Location  [facility] [place] [place]   Lab Study Platelets 24 thousand   Date Blood Work was Done  07/09/2021   Read Back of Information Done yes   Relevant Information
Patient is a 69-year-old male with a history of GBM currently on concurrent radiation and daily Temodar  He is expected to complete radiation on 07/22/21  He will stop the Temodar at the same time  We will plan to reimage 1 month after his radiation completes        Will send critical plt count to Dr Brenna Zepeda RNs
bilateral

## 2021-07-10 NOTE — PROGRESS NOTES
The patient is currently on Eliquis because of a recent pulmonary embolus  His platelet count was called in at 24 yesterday  His wife had not gotten any information as to whether he should continue Eliquis or not  Since the platelet count is 24 I advised her he should hold his Eliquis and have his blood work repeated on Monday  I also advised them they should hold his Temodar until they hear back from his primary oncologist on Monday

## 2021-07-11 ENCOUNTER — TELEPHONE (OUTPATIENT)
Dept: HEMATOLOGY ONCOLOGY | Facility: CLINIC | Age: 71
End: 2021-07-11

## 2021-07-12 ENCOUNTER — HOSPITAL ENCOUNTER (EMERGENCY)
Facility: HOSPITAL | Age: 71
Discharge: HOME | End: 2021-07-12
Attending: EMERGENCY MEDICINE | Admitting: EMERGENCY MEDICINE
Payer: COMMERCIAL

## 2021-07-12 ENCOUNTER — APPOINTMENT (OUTPATIENT)
Dept: RADIATION ONCOLOGY | Facility: HOSPITAL | Age: 71
End: 2021-07-12
Attending: RADIOLOGY
Payer: COMMERCIAL

## 2021-07-12 ENCOUNTER — APPOINTMENT (OUTPATIENT)
Dept: LAB | Facility: HOSPITAL | Age: 71
End: 2021-07-12
Attending: INTERNAL MEDICINE
Payer: COMMERCIAL

## 2021-07-12 ENCOUNTER — APPOINTMENT (OUTPATIENT)
Dept: RADIATION ONCOLOGY | Facility: HOSPITAL | Age: 71
End: 2021-07-12
Payer: COMMERCIAL

## 2021-07-12 ENCOUNTER — TELEPHONE (OUTPATIENT)
Dept: HEMATOLOGY ONCOLOGY | Facility: CLINIC | Age: 71
End: 2021-07-12

## 2021-07-12 VITALS
RESPIRATION RATE: 18 BRPM | DIASTOLIC BLOOD PRESSURE: 76 MMHG | SYSTOLIC BLOOD PRESSURE: 136 MMHG | OXYGEN SATURATION: 99 % | TEMPERATURE: 98.6 F | HEART RATE: 56 BPM

## 2021-07-12 DIAGNOSIS — C71.9 GBM (GLIOBLASTOMA MULTIFORME) (HCC): Primary | ICD-10-CM

## 2021-07-12 DIAGNOSIS — Z78.9 PATIENT ON COMBINED CHEMOTHERAPY AND RADIATION: ICD-10-CM

## 2021-07-12 DIAGNOSIS — D69.6 THROMBOCYTOPENIA (HCC): Primary | ICD-10-CM

## 2021-07-12 DIAGNOSIS — Z79.899 PATIENT ON COMBINED CHEMOTHERAPY AND RADIATION: ICD-10-CM

## 2021-07-12 DIAGNOSIS — C71.9 GBM (GLIOBLASTOMA MULTIFORME) (HCC): ICD-10-CM

## 2021-07-12 DIAGNOSIS — C71.9 GLIOBLASTOMA MULTIFORME (HCC): ICD-10-CM

## 2021-07-12 LAB
ABO GROUP BLD: NORMAL
ALBUMIN SERPL BCP-MCNC: 3.5 G/DL (ref 3.5–5)
ALP SERPL-CCNC: 64 U/L (ref 46–116)
ALT SERPL W P-5'-P-CCNC: 65 U/L (ref 12–78)
ANION GAP SERPL CALCULATED.3IONS-SCNC: 6 MMOL/L (ref 4–13)
AST SERPL W P-5'-P-CCNC: 36 U/L (ref 5–45)
BASOPHILS # BLD AUTO: 0 THOUSANDS/ΜL (ref 0–0.1)
BASOPHILS NFR BLD AUTO: 0 % (ref 0–1)
BILIRUB SERPL-MCNC: 1.11 MG/DL (ref 0.2–1)
BLD GP AB SCN SERPL QL: NEGATIVE
BUN SERPL-MCNC: 12 MG/DL (ref 5–25)
CALCIUM SERPL-MCNC: 8.7 MG/DL (ref 8.3–10.1)
CHLORIDE SERPL-SCNC: 110 MMOL/L (ref 100–108)
CO2 SERPL-SCNC: 23 MMOL/L (ref 21–32)
CREAT SERPL-MCNC: 0.99 MG/DL (ref 0.6–1.3)
EOSINOPHIL # BLD AUTO: 0.41 THOUSAND/ΜL (ref 0–0.61)
EOSINOPHIL NFR BLD AUTO: 11 % (ref 0–6)
ERYTHROCYTE [DISTWIDTH] IN BLOOD BY AUTOMATED COUNT: 14.1 % (ref 11.6–15.1)
GFR SERPL CREATININE-BSD FRML MDRD: 77 ML/MIN/1.73SQ M
GLUCOSE P FAST SERPL-MCNC: 98 MG/DL (ref 65–99)
HCT VFR BLD AUTO: 37.7 % (ref 36.5–49.3)
HGB BLD-MCNC: 12.8 G/DL (ref 12–17)
IMM GRANULOCYTES # BLD AUTO: 0.01 THOUSAND/UL (ref 0–0.2)
IMM GRANULOCYTES NFR BLD AUTO: 0 % (ref 0–2)
LYMPHOCYTES # BLD AUTO: 0.59 THOUSANDS/ΜL (ref 0.6–4.47)
LYMPHOCYTES NFR BLD AUTO: 16 % (ref 14–44)
MCH RBC QN AUTO: 30.3 PG (ref 26.8–34.3)
MCHC RBC AUTO-ENTMCNC: 34 G/DL (ref 31.4–37.4)
MCV RBC AUTO: 89 FL (ref 82–98)
MONOCYTES # BLD AUTO: 0.32 THOUSAND/ΜL (ref 0.17–1.22)
MONOCYTES NFR BLD AUTO: 8 % (ref 4–12)
NEUTROPHILS # BLD AUTO: 2.47 THOUSANDS/ΜL (ref 1.85–7.62)
NEUTS SEG NFR BLD AUTO: 65 % (ref 43–75)
NRBC BLD AUTO-RTO: 0 /100 WBCS
PLATELET # BLD AUTO: 10 THOUSANDS/UL (ref 149–390)
PMV BLD AUTO: 9.9 FL (ref 8.9–12.7)
POTASSIUM SERPL-SCNC: 4 MMOL/L (ref 3.5–5.3)
PROT SERPL-MCNC: 6.8 G/DL (ref 6.4–8.2)
RBC # BLD AUTO: 4.23 MILLION/UL (ref 3.88–5.62)
RH BLD: POSITIVE
SODIUM SERPL-SCNC: 139 MMOL/L (ref 136–145)
SPECIMEN EXPIRATION DATE: NORMAL
WBC # BLD AUTO: 3.8 THOUSAND/UL (ref 4.31–10.16)

## 2021-07-12 PROCEDURE — P9037 PLATE PHERES LEUKOREDU IRRAD: HCPCS

## 2021-07-12 PROCEDURE — 96374 THER/PROPH/DIAG INJ IV PUSH: CPT

## 2021-07-12 PROCEDURE — 86900 BLOOD TYPING SEROLOGIC ABO: CPT | Performed by: EMERGENCY MEDICINE

## 2021-07-12 PROCEDURE — 80053 COMPREHEN METABOLIC PANEL: CPT

## 2021-07-12 PROCEDURE — 86850 RBC ANTIBODY SCREEN: CPT | Performed by: EMERGENCY MEDICINE

## 2021-07-12 PROCEDURE — G6002 STEREOSCOPIC X-RAY GUIDANCE: HCPCS | Performed by: RADIOLOGY

## 2021-07-12 PROCEDURE — 36415 COLL VENOUS BLD VENIPUNCTURE: CPT

## 2021-07-12 PROCEDURE — 99283 EMERGENCY DEPT VISIT LOW MDM: CPT

## 2021-07-12 PROCEDURE — 86901 BLOOD TYPING SEROLOGIC RH(D): CPT | Performed by: EMERGENCY MEDICINE

## 2021-07-12 PROCEDURE — 36430 TRANSFUSION BLD/BLD COMPNT: CPT

## 2021-07-12 PROCEDURE — 85025 COMPLETE CBC W/AUTO DIFF WBC: CPT

## 2021-07-12 PROCEDURE — 77386 HB NTSTY MODUL RAD TX DLVR CPLX: CPT | Performed by: RADIOLOGY

## 2021-07-12 PROCEDURE — 99285 EMERGENCY DEPT VISIT HI MDM: CPT | Performed by: EMERGENCY MEDICINE

## 2021-07-12 RX ORDER — DIPHENHYDRAMINE HYDROCHLORIDE 50 MG/ML
50 INJECTION INTRAMUSCULAR; INTRAVENOUS ONCE
Status: COMPLETED | OUTPATIENT
Start: 2021-07-12 | End: 2021-07-12

## 2021-07-12 RX ADMIN — DIPHENHYDRAMINE HYDROCHLORIDE 50 MG: 50 INJECTION, SOLUTION INTRAMUSCULAR; INTRAVENOUS at 17:27

## 2021-07-12 NOTE — TELEPHONE ENCOUNTER
Called and spoke with Pt wife  Advised her to take pt to ER for platelet transfusion  Wife verbalized understanding

## 2021-07-12 NOTE — ED PROVIDER NOTES
History  Chief Complaint   Patient presents with    Medical Problem     pt sent in from oncology doctor for platelet transfusion     69-year-old male with glioblastoma multiforme presents the ED for evaluation of thrombocytopenia and for platelet transfusion  Patient is currently receiving both radiation and chemotherapy  He is expected to complete both treatments on 07/22  He had outpatient labs drawn which demonstrated a platelet count of 10938 and he was advised by oncologist to go to the emergency department for platelet transfusion  He was taking Eliquis for recent pulmonary embolism however has not been taking this the past few days secondary to thrombocytopenia  No active bleeding at this time  He offers no new complaints at this time  Patient was diagnosed in May, is now status post brain surgery  While hospitalized for surgery, developed a PE and was started on Eliquis  He had labs initially checked on Friday which demonstrated a platelet count of 83541  Eliquis and Temodar have been held since  He does have occasional nausea and vomiting which is baseline since starting cancer treatment  Prior to Admission Medications   Prescriptions Last Dose Informant Patient Reported? Taking? Advair Diskus 250-50 MCG/DOSE inhaler  Self No No   Sig: INHALE ONE PUFF BY MOUTH TWICE A DAY   RINSE MOUTH AFTER USE   Melatonin 2 5 MG CAPS 7/11/2021 at Unknown time Self Yes Yes   Sig: Take 2 5 mg by mouth daily   Multiple Vitamin (ONE-A-DAY MENS PO) 7/11/2021 at Unknown time Self Yes Yes   Sig: Take by mouth   acetaminophen (TYLENOL) 325 mg tablet  Self No No   Sig: Take 3 tablets (975 mg total) by mouth every 8 (eight) hours   albuterol (2 5 mg/3 mL) 0 083 % nebulizer solution  Self No No   Sig: Take 1 vial (2 5 mg total) by nebulization every 6 (six) hours as needed for wheezing or shortness of breath   Patient not taking: Reported on 6/21/2021   apixaban (ELIQUIS) 5 mg   No No   Sig: Take 1 tablet (5 mg total) by mouth 2 (two) times a day   fluticasone (FLONASE) 50 mcg/act nasal spray  Self Yes No   Si spray into each nostril daily   metoprolol succinate (TOPROL-XL) 25 mg 24 hr tablet 2021 at Unknown time Self No Yes   Sig: Take 1 tablet (25 mg total) by mouth daily   ondansetron (ZOFRAN) 4 mg tablet 2021 at Unknown time  No Yes   Sig: Take 1 tablet (4 mg total) by mouth every 6 (six) hours as needed for nausea or vomiting   temozolomide (TEMODAR) 180 mg capsule 2021 at Unknown time Self No Yes   Sig: Take 1 capsule (180 mg total) by mouth daily      Facility-Administered Medications: None       Past Medical History:   Diagnosis Date    Asthma     [facility] )     Epilepsy ([facility] )     stopped meds 2006 has been seizure free    Pulmonary emboli ([facility] ) 2021       Past Surgical History:   Procedure Laterality Date    APPENDECTOMY      CRANIOTOMY Left 2021    Procedure: Left temporal craniotomy with image guidance and 5-ALA for resection of mass;  Surgeon: Alejandro Wilson MD;  [facility];  Service: Neurosurgery    MANDIBLE SURGERY      [place] COLONOSCOPY FLX DX W/COLLJ SPEC WHEN PFRMD N/A 2016    Procedure: COLONOSCOPY;  Surgeon: Dante Ewing MD;  [facility]; Service: Gastroenterology    SKULL FRACTURE ELEVATION      TONSILLECTOMY         Family History   Problem Relation Age of Onset    Alcohol abuse Father     Substance Abuse Neg Hx     Mental illness Neg Hx      I have reviewed and agree with the history as documented      E-Cigarette/Vaping    E-Cigarette Use Never User      E-Cigarette/Vaping Substances    Nicotine No     THC No     CBD No     Flavoring No     Other No     Unknown No      Social History     Tobacco Use    Smoking status: Former Smoker     Types: Cigarettes     Quit date: 2006     Years since quitting: 15 4    Smokeless tobacco: Never Used   Vaping Use    Vaping Use: Never used   Substance Use Topics    Alcohol use: Never    Drug use: Yes     Types: Marijuana     Comment: not medical marijuana        Review of Systems   Constitutional: Negative for chills and fever  Eyes: Positive for photophobia  Gastrointestinal: Positive for nausea and vomiting  Skin: Positive for color change and wound  Neurological: Negative for light-headedness, numbness and headaches  All other systems reviewed and are negative  Physical Exam  ED Triage Vitals [07/12/21 1541]   Temperature Pulse Respirations Blood Pressure SpO2   98 3 °F (36 8 °C) 71 18 140/84 97 %      Temp Source Heart Rate Source Patient Position - Orthostatic VS BP Location FiO2 (%)   Tympanic Monitor Lying Right arm --      Pain Score       No Pain             Orthostatic Vital Signs  Vitals:    07/12/21 1645 07/12/21 1700 07/12/21 1742 07/12/21 2002   BP: 143/86 135/71 142/76 136/76   Pulse: 74 70 72 56   Patient Position - Orthostatic VS:    Lying       Physical Exam  Vitals and nursing note reviewed  Constitutional:       General: He is not in acute distress  Appearance: Normal appearance  HENT:      Head: Normocephalic and atraumatic  Right Ear: External ear normal       Left Ear: External ear normal       Nose: Nose normal       Mouth/Throat:      Mouth: Mucous membranes are moist    Eyes:      Extraocular Movements: Extraocular movements intact  Conjunctiva/sclera: Conjunctivae normal       Pupils: Pupils are equal, round, and reactive to light  Cardiovascular:      Rate and Rhythm: Normal rate  Pulses: Normal pulses  Pulmonary:      Effort: Pulmonary effort is normal  No respiratory distress  Breath sounds: No stridor  Musculoskeletal:         General: No deformity  Normal range of motion  Cervical back: Normal range of motion and neck supple  Skin:     General: Skin is warm and dry  Capillary Refill: Capillary refill takes less than 2 seconds        Comments: Excoriations bilateral lower extremities, petechial rash in the same area   Neurological:      General: No focal deficit present  Mental Status: He is alert and oriented to person, place, and time  Psychiatric:         Mood and Affect: Mood normal          Behavior: Behavior normal          ED Medications  Medications   diphenhydrAMINE (BENADRYL) injection 50 mg (50 mg Intravenous Given 7/12/21 1727)       Diagnostic Studies  Results Reviewed     None                 No orders to display         Procedures  Procedures      ED Course  ED Course as of Jul 12 2353   Mon Jul 12, 2021   1554 Blood Pressure: 140/84   1554 Temperature: 98 3 °F (36 8 °C)   1554 Pulse: 71   1554 Respirations: 18   1554 SpO2: 97 %   1619 Consent for transfusion obtained and placed in the patient's chart                                          MDM  Number of Diagnoses or Management Options  Glioblastoma multiforme ([facility] )  Patient on combined chemotherapy and radiation  Thrombocytopenia ([facility] )  Diagnosis management comments: 20-year-old male presents to the ED for thrombocytopenia to have platelet transfusion  On evaluation, patient is overall well appearing in no acute distress  Small petechial rash noted on bilateral lower extremity in the area of excoriations  No other signs of active bleeding at this time  Patient is otherwise asymptomatic and is at his baseline  Will transfuse 2 units of platelets and discharge home to follow-up with oncology  Following 1st unit of platelets, patient developed some isolated pruritus which improved following Benadryl  He tolerated the 2nd unit of platelets without difficulty  He will be discharged home  He was given strict return precautions        Disposition  Final diagnoses:   [facility] )   Patient on combined chemotherapy and radiation   Thrombocytopenia ([facility] )     Time reflects when diagnosis was documented in both MDM as applicable and the Disposition within this note     Time User Action Codes Description Comment    7/12/2021  6:14 PM Check, Sunita Erps Add [C71 9] Glioblastoma multiforme ([facility] )     7/12/2021  6:14 PM Check, Zeynep Burner [[id number],  Z78 9] Patient on combined chemotherapy and radiation     7/12/2021  6:14 PM Check, Sunita Erps Add [D69 6] Thrombocytopenia ([facility] )     7/12/2021  6:14 PM Check, Sunita Erps Modify [C71 9] Glioblastoma multiforme ([place] [address] )     7/12/2021  6:14 PM Check, Sunita Erps Modify [D69 6] Thrombocytopenia [facility])       ED Disposition     ED Disposition Condition Date/Time Comment    Discharge Stable Mon Jul 12, 2021  8:06 PM Kamala Allen discharge to home/self care  Follow-up Information     Follow up With Specialties Details Why Contact Info Additional [address] [facility] Emergency Medicine  If symptoms worsen [address]  [address] [facility] Emergency Department, [address] [place] [address] 108          Discharge Medication List as of 7/12/2021  8:13 PM      CONTINUE these medications which have NOT CHANGED    Details   Melatonin 2 5 MG CAPS Take 2 5 mg by mouth daily, Historical Med      metoprolol succinate (TOPROL-XL) 25 mg 24 hr tablet Take 1 tablet (25 mg total) by mouth daily, Starting Thu 5/20/2021, Normal      Multiple Vitamin (ONE-A-DAY MENS PO) Take by mouth, Historical Med      ondansetron (ZOFRAN) 4 mg tablet Take 1 tablet (4 mg total) by mouth every 6 (six) hours as needed for nausea or vomiting, Starting Thu 7/1/2021, Normal      temozolomide (TEMODAR) 180 mg capsule Take 1 capsule (180 mg total) by mouth daily, Starting Wed 6/2/2021, Normal      acetaminophen (TYLENOL) 325 mg tablet Take 3 tablets (975 mg total) by mouth every 8 (eight) hours, Starting Tue 5/18/2021, No Print      Advair Diskus 250-50 MCG/DOSE inhaler INHALE ONE PUFF BY MOUTH TWICE A DAY   RINSE MOUTH AFTER USE, Normal      albuterol (2 5 mg/3 mL) 0 083 % nebulizer solution Take 1 vial (2 5 mg total) by nebulization every 6 (six) hours as needed for wheezing or shortness of breath, Starting Fri 2/8/2019, Normal      apixaban (ELIQUIS) 5 mg Take 1 tablet (5 mg total) by mouth 2 (two) times a day, Starting Wed 6/30/2021, Normal      fluticasone (FLONASE) 50 mcg/act nasal spray 1 spray into each nostril daily, Historical Med           No discharge procedures on file  PDMP Review       Value Time User    PDMP Reviewed  Yes 5/7/2021 12:07 AM Nay Henson DO           ED Provider  Attending physically available and evaluated Binh Tucker  FLAKITA managed the patient along with the ED Attending      Electronically Signed by         Jacky Joel MD  07/12/21 4535

## 2021-07-12 NOTE — TELEPHONE ENCOUNTER
Spoke with patients wife    Reviewed platelet count = 59,729  Patient does not have any active bleeding at this time  Patient continues to hold Eliquis and Temodar as prescribed by on call MD over the weekend ( please see note)  Will send to RN to review with covering MD/NP    Wife requesting a call back with further recommendation [phone]

## 2021-07-12 NOTE — ED ATTENDING ATTESTATION
7/12/2021  Roberta Moore DO, saw and evaluated the patient  I have discussed the patient with the resident/non-physician practitioner and agree with the resident's/non-physician practitioner's findings, Plan of Care, and MDM as documented in the resident's/non-physician practitioner's note, except where noted  All available labs and Radiology studies were reviewed  I was present for key portions of any procedure(s) performed by the resident/non-physician practitioner and I was immediately available to provide assistance  At this point I agree with the current assessment done in the Emergency Department  I have conducted an independent evaluation of this patient a history and physical is as follows:  80-year-old male with history of glioblastoma multiform a presents for evaluation of low platelets  Last week platelets were in the 150s  Rapidly declining and currently 10  Patient was sent in for platelet transfusion patient does have a petechial rash after he scratched his skin  No other complaints this time including headache, focal weakness, numbness, or tingling  Will transfuse 2 units of platelets and likely discharge home to continue to follow-up with oncology          ED Course         Critical Care Time  Procedures

## 2021-07-13 ENCOUNTER — APPOINTMENT (OUTPATIENT)
Dept: RADIATION ONCOLOGY | Facility: HOSPITAL | Age: 71
End: 2021-07-13
Attending: RADIOLOGY
Payer: COMMERCIAL

## 2021-07-13 ENCOUNTER — TELEPHONE (OUTPATIENT)
Dept: HEMATOLOGY ONCOLOGY | Facility: CLINIC | Age: 71
End: 2021-07-13

## 2021-07-13 LAB
ABO GROUP BLD BPU: NORMAL
ABO GROUP BLD BPU: NORMAL
BPU ID: NORMAL
BPU ID: NORMAL
UNIT DISPENSE STATUS: NORMAL
UNIT DISPENSE STATUS: NORMAL
UNIT PRODUCT CODE: NORMAL
UNIT PRODUCT CODE: NORMAL
UNIT RH: NORMAL
UNIT RH: NORMAL

## 2021-07-13 PROCEDURE — 77386 HB NTSTY MODUL RAD TX DLVR CPLX: CPT | Performed by: RADIOLOGY

## 2021-07-13 PROCEDURE — G6002 STEREOSCOPIC X-RAY GUIDANCE: HCPCS | Performed by: RADIOLOGY

## 2021-07-13 NOTE — TELEPHONE ENCOUNTER
Patient's wife Romelia Dubon advised of above  She will take patient for his weekly labs on Thursday  No standing weekly labs noted in Epic  Please verify if patient is to have standing weekly(or more frequently)  CBCD and CMP orders placed?       Will forward to Bernice's RN to review with NP

## 2021-07-13 NOTE — DISCHARGE INSTRUCTIONS
Return to the emergency department if you develop bleeding they cannot control with direct pressure, difficulty breathing, severe headache, or vision changes    Follow-up with oncologist

## 2021-07-13 NOTE — TELEPHONE ENCOUNTER
Patient received 2 units of platelets yesterday in the ED for critical platelet level of 10 thousand  Please advise treatment plan for resuming Eliquis and Temodar  Will forward to Peyman Lucero for review with MARKEL Read [facility]) [phone] Stacy Walker

## 2021-07-13 NOTE — TELEPHONE ENCOUNTER
Call from wife Hoda Escort [phone]  Patient was in ED yesterday  Please call to discuss resumption of eliquis and tramadol

## 2021-07-14 ENCOUNTER — APPOINTMENT (OUTPATIENT)
Dept: RADIATION ONCOLOGY | Facility: HOSPITAL | Age: 71
End: 2021-07-14
Attending: RADIOLOGY
Payer: COMMERCIAL

## 2021-07-14 PROCEDURE — 77336 RADIATION PHYSICS CONSULT: CPT | Performed by: RADIOLOGY

## 2021-07-14 PROCEDURE — G6002 STEREOSCOPIC X-RAY GUIDANCE: HCPCS | Performed by: RADIOLOGY

## 2021-07-14 PROCEDURE — 77386 HB NTSTY MODUL RAD TX DLVR CPLX: CPT | Performed by: RADIOLOGY

## 2021-07-15 ENCOUNTER — TELEPHONE (OUTPATIENT)
Dept: HEMATOLOGY ONCOLOGY | Facility: CLINIC | Age: 71
End: 2021-07-15

## 2021-07-15 ENCOUNTER — APPOINTMENT (OUTPATIENT)
Dept: RADIATION ONCOLOGY | Facility: HOSPITAL | Age: 71
End: 2021-07-15
Attending: RADIOLOGY
Payer: COMMERCIAL

## 2021-07-15 ENCOUNTER — APPOINTMENT (OUTPATIENT)
Dept: LAB | Facility: HOSPITAL | Age: 71
End: 2021-07-15
Payer: COMMERCIAL

## 2021-07-15 DIAGNOSIS — D69.6 THROMBOCYTOPENIA (HCC): ICD-10-CM

## 2021-07-15 DIAGNOSIS — C71.9 GBM (GLIOBLASTOMA MULTIFORME) (HCC): Primary | ICD-10-CM

## 2021-07-15 DIAGNOSIS — C71.9 GBM (GLIOBLASTOMA MULTIFORME) (HCC): ICD-10-CM

## 2021-07-15 DIAGNOSIS — E80.6 HYPERBILIRUBINEMIA: ICD-10-CM

## 2021-07-15 LAB
ALBUMIN SERPL BCP-MCNC: 3.7 G/DL (ref 3.5–5)
ALP SERPL-CCNC: 68 U/L (ref 46–116)
ALT SERPL W P-5'-P-CCNC: 83 U/L (ref 12–78)
ANION GAP SERPL CALCULATED.3IONS-SCNC: 5 MMOL/L (ref 4–13)
AST SERPL W P-5'-P-CCNC: 34 U/L (ref 5–45)
BASOPHILS # BLD AUTO: 0 THOUSANDS/ΜL (ref 0–0.1)
BASOPHILS NFR BLD AUTO: 0 % (ref 0–1)
BILIRUB SERPL-MCNC: 1.3 MG/DL (ref 0.2–1)
BUN SERPL-MCNC: 12 MG/DL (ref 5–25)
CALCIUM SERPL-MCNC: 9 MG/DL (ref 8.3–10.1)
CHLORIDE SERPL-SCNC: 109 MMOL/L (ref 100–108)
CO2 SERPL-SCNC: 23 MMOL/L (ref 21–32)
CREAT SERPL-MCNC: 1.2 MG/DL (ref 0.6–1.3)
EOSINOPHIL # BLD AUTO: 0.18 THOUSAND/ΜL (ref 0–0.61)
EOSINOPHIL NFR BLD AUTO: 5 % (ref 0–6)
ERYTHROCYTE [DISTWIDTH] IN BLOOD BY AUTOMATED COUNT: 14 % (ref 11.6–15.1)
GFR SERPL CREATININE-BSD FRML MDRD: 61 ML/MIN/1.73SQ M
GLUCOSE P FAST SERPL-MCNC: 101 MG/DL (ref 65–99)
HCT VFR BLD AUTO: 35.4 % (ref 36.5–49.3)
HGB BLD-MCNC: 12.1 G/DL (ref 12–17)
IMM GRANULOCYTES # BLD AUTO: 0.01 THOUSAND/UL (ref 0–0.2)
IMM GRANULOCYTES NFR BLD AUTO: 0 % (ref 0–2)
LYMPHOCYTES # BLD AUTO: 0.94 THOUSANDS/ΜL (ref 0.6–4.47)
LYMPHOCYTES NFR BLD AUTO: 27 % (ref 14–44)
MCH RBC QN AUTO: 30.3 PG (ref 26.8–34.3)
MCHC RBC AUTO-ENTMCNC: 34.2 G/DL (ref 31.4–37.4)
MCV RBC AUTO: 89 FL (ref 82–98)
MONOCYTES # BLD AUTO: 0.27 THOUSAND/ΜL (ref 0.17–1.22)
MONOCYTES NFR BLD AUTO: 8 % (ref 4–12)
NEUTROPHILS # BLD AUTO: 2.1 THOUSANDS/ΜL (ref 1.85–7.62)
NEUTS SEG NFR BLD AUTO: 60 % (ref 43–75)
NRBC BLD AUTO-RTO: 0 /100 WBCS
PLATELET # BLD AUTO: 31 THOUSANDS/UL (ref 149–390)
PMV BLD AUTO: 10.2 FL (ref 8.9–12.7)
POTASSIUM SERPL-SCNC: 3.6 MMOL/L (ref 3.5–5.3)
PROT SERPL-MCNC: 7.1 G/DL (ref 6.4–8.2)
RBC # BLD AUTO: 3.99 MILLION/UL (ref 3.88–5.62)
SODIUM SERPL-SCNC: 137 MMOL/L (ref 136–145)
WBC # BLD AUTO: 3.5 THOUSAND/UL (ref 4.31–10.16)

## 2021-07-15 PROCEDURE — G6002 STEREOSCOPIC X-RAY GUIDANCE: HCPCS | Performed by: RADIOLOGY

## 2021-07-15 PROCEDURE — 80053 COMPREHEN METABOLIC PANEL: CPT

## 2021-07-15 PROCEDURE — 77427 RADIATION TX MANAGEMENT X5: CPT | Performed by: RADIOLOGY

## 2021-07-15 PROCEDURE — 77386 HB NTSTY MODUL RAD TX DLVR CPLX: CPT | Performed by: RADIOLOGY

## 2021-07-15 PROCEDURE — 36415 COLL VENOUS BLD VENIPUNCTURE: CPT | Performed by: NURSE PRACTITIONER

## 2021-07-15 PROCEDURE — 85025 COMPLETE CBC W/AUTO DIFF WBC: CPT | Performed by: NURSE PRACTITIONER

## 2021-07-15 NOTE — TELEPHONE ENCOUNTER
Joshua Hence would like a call back to discuss patients lab results and what is recommended, she could best be reached at [phone]

## 2021-07-15 NOTE — TELEPHONE ENCOUNTER
Spoke with Noble Garzon - she will run CMP order for today  Reviewed weekly standing order is not needed

## 2021-07-15 NOTE — TELEPHONE ENCOUNTER
Called to review CBC from today with patient's wife  Platelets are currently 31,000  Instructed her to continue monitoring for any signs of bleeding  She indicated that if he has any bleeding she would take him to the emergency room  We will continue holding his Temodar and Eliquis  Patient has a follow-up appointment on 07/21/2021

## 2021-07-15 NOTE — TELEPHONE ENCOUNTER
Standing weekly CBCD orders noted in Epic from Mac Vaughn NP today  Lab called to see if a CMP was also to be drawn  I did not note order in Epic however previous lab josefina had been CBCD and CMP  Please verify with [facility] if she would like a CMP to be processed today  Lab did draw a tube  If [facility] says yes would she like CMP weekly to be a standing order? OK to retask to contact lab and enter order

## 2021-07-15 NOTE — TELEPHONE ENCOUNTER
Critical Results   Call Received From [facility]   Lab Department Location [place]   Lab Study PLATELET 64,490   Date Blood Work was Done 7-15-21   Read Back of Information Done YES   Relevant Information

## 2021-07-15 NOTE — TELEPHONE ENCOUNTER
Patients wife, Sabino Mauro calling in requesting lab orders CMP be placed if needed  The lab did draw the order and is holding it   If CMP is needed please reach out to the [place] lab at     Driver ( )  [phone]

## 2021-07-15 NOTE — TELEPHONE ENCOUNTER
Called patient's wife to confirm weekly blood work will be obtained today  Patient's wife states they are sitting in the lab waiting for blood work to be drawn  I will be in touch with her once I see the results and we will manage accordingly  Patient is wife verbalized understanding

## 2021-07-15 NOTE — TELEPHONE ENCOUNTER
Returned call to [facility]  Reviewed platelet count = 89,548  No platelet transfusion needed at this time  Patient will continue with weekly CBC  Reviewed to call or report to the ED with bleeding  [facility] asking if Temoar and Eliquis should remain on hold  Reviewed Temodar is to remain on hold at this time  How long will patient continue to hold Eliquis?     Will send to RN to confirm with NP

## 2021-07-16 ENCOUNTER — APPOINTMENT (OUTPATIENT)
Dept: RADIATION ONCOLOGY | Facility: HOSPITAL | Age: 71
End: 2021-07-16
Attending: RADIOLOGY
Payer: COMMERCIAL

## 2021-07-16 PROCEDURE — 77386 HB NTSTY MODUL RAD TX DLVR CPLX: CPT | Performed by: RADIOLOGY

## 2021-07-16 PROCEDURE — G6002 STEREOSCOPIC X-RAY GUIDANCE: HCPCS | Performed by: RADIOLOGY

## 2021-07-19 ENCOUNTER — APPOINTMENT (OUTPATIENT)
Dept: RADIATION ONCOLOGY | Facility: HOSPITAL | Age: 71
End: 2021-07-19
Payer: COMMERCIAL

## 2021-07-19 ENCOUNTER — APPOINTMENT (OUTPATIENT)
Dept: RADIATION ONCOLOGY | Facility: HOSPITAL | Age: 71
End: 2021-07-19
Attending: RADIOLOGY
Payer: COMMERCIAL

## 2021-07-19 PROCEDURE — G6002 STEREOSCOPIC X-RAY GUIDANCE: HCPCS | Performed by: RADIOLOGY

## 2021-07-19 PROCEDURE — 77386 HB NTSTY MODUL RAD TX DLVR CPLX: CPT | Performed by: RADIOLOGY

## 2021-07-20 ENCOUNTER — APPOINTMENT (OUTPATIENT)
Dept: LAB | Facility: HOSPITAL | Age: 71
End: 2021-07-20
Payer: COMMERCIAL

## 2021-07-20 ENCOUNTER — TELEPHONE (OUTPATIENT)
Dept: HEMATOLOGY ONCOLOGY | Facility: CLINIC | Age: 71
End: 2021-07-20

## 2021-07-20 ENCOUNTER — APPOINTMENT (OUTPATIENT)
Dept: RADIATION ONCOLOGY | Facility: HOSPITAL | Age: 71
End: 2021-07-20
Attending: RADIOLOGY
Payer: COMMERCIAL

## 2021-07-20 PROBLEM — D69.6 THROMBOCYTOPENIA (HCC): Status: ACTIVE | Noted: 2021-07-20

## 2021-07-20 LAB
ALBUMIN SERPL BCP-MCNC: 3.8 G/DL (ref 3.5–5)
ALP SERPL-CCNC: 65 U/L (ref 46–116)
ALT SERPL W P-5'-P-CCNC: 53 U/L (ref 12–78)
ANION GAP SERPL CALCULATED.3IONS-SCNC: 7 MMOL/L (ref 4–13)
AST SERPL W P-5'-P-CCNC: 20 U/L (ref 5–45)
BILIRUB SERPL-MCNC: 1.57 MG/DL (ref 0.2–1)
BUN SERPL-MCNC: 14 MG/DL (ref 5–25)
CALCIUM SERPL-MCNC: 8.8 MG/DL (ref 8.3–10.1)
CHLORIDE SERPL-SCNC: 109 MMOL/L (ref 100–108)
CO2 SERPL-SCNC: 23 MMOL/L (ref 21–32)
CREAT SERPL-MCNC: 1.14 MG/DL (ref 0.6–1.3)
GFR SERPL CREATININE-BSD FRML MDRD: 65 ML/MIN/1.73SQ M
GLUCOSE P FAST SERPL-MCNC: 102 MG/DL (ref 65–99)
POTASSIUM SERPL-SCNC: 4 MMOL/L (ref 3.5–5.3)
PROT SERPL-MCNC: 7 G/DL (ref 6.4–8.2)
SODIUM SERPL-SCNC: 139 MMOL/L (ref 136–145)

## 2021-07-20 PROCEDURE — 80053 COMPREHEN METABOLIC PANEL: CPT | Performed by: NURSE PRACTITIONER

## 2021-07-20 PROCEDURE — 77386 HB NTSTY MODUL RAD TX DLVR CPLX: CPT | Performed by: RADIOLOGY

## 2021-07-20 PROCEDURE — 36415 COLL VENOUS BLD VENIPUNCTURE: CPT | Performed by: NURSE PRACTITIONER

## 2021-07-20 PROCEDURE — G6002 STEREOSCOPIC X-RAY GUIDANCE: HCPCS | Performed by: RADIOLOGY

## 2021-07-20 RX ORDER — SODIUM CHLORIDE 9 MG/ML
20 INJECTION, SOLUTION INTRAVENOUS ONCE
Status: CANCELLED | OUTPATIENT
Start: 2021-07-22

## 2021-07-20 NOTE — TELEPHONE ENCOUNTER
Per Dr Mauricio Ken, pt to hold eliquis and hold temodar    Repeat cbc on Wednesday 7/22/21 and platelet transfusion to be set up for 7/23/21

## 2021-07-20 NOTE — TELEPHONE ENCOUNTER
Called Derrek's wife, Meggan Boogie, and let her know I set Derrek up for a platelet transfusion on 7/22 at 1330 at the [facility] center  She is aware that Luke Marinelli will need to get labs done tomorrow  We will have him sign consent when he is in the office  She knows to watch for s/s of bleeding

## 2021-07-20 NOTE — TELEPHONE ENCOUNTER
Per Bernice's 7/15/21 telephone note: continue holding his Temodar and Yakov Rivera patient's wife called and patient does not have any active signs of bleeding at present time  Temodar and Eliquis are on hold  Will forward to Bernice's RN to review with PA for further recommendations

## 2021-07-21 ENCOUNTER — APPOINTMENT (OUTPATIENT)
Dept: RADIATION ONCOLOGY | Facility: HOSPITAL | Age: 71
End: 2021-07-21
Attending: RADIOLOGY
Payer: COMMERCIAL

## 2021-07-21 ENCOUNTER — OFFICE VISIT (OUTPATIENT)
Dept: HEMATOLOGY ONCOLOGY | Facility: CLINIC | Age: 71
End: 2021-07-21
Payer: COMMERCIAL

## 2021-07-21 ENCOUNTER — APPOINTMENT (OUTPATIENT)
Dept: LAB | Facility: HOSPITAL | Age: 71
End: 2021-07-21
Payer: COMMERCIAL

## 2021-07-21 ENCOUNTER — TELEPHONE (OUTPATIENT)
Dept: HEMATOLOGY ONCOLOGY | Facility: CLINIC | Age: 71
End: 2021-07-21

## 2021-07-21 VITALS
TEMPERATURE: 98.6 F | HEIGHT: 70 IN | WEIGHT: 275.5 LBS | OXYGEN SATURATION: 98 % | RESPIRATION RATE: 18 BRPM | SYSTOLIC BLOOD PRESSURE: 108 MMHG | BODY MASS INDEX: 39.44 KG/M2 | DIASTOLIC BLOOD PRESSURE: 74 MMHG | HEART RATE: 72 BPM

## 2021-07-21 DIAGNOSIS — C71.9 GBM (GLIOBLASTOMA MULTIFORME) (HCC): ICD-10-CM

## 2021-07-21 DIAGNOSIS — E80.6 HYPERBILIRUBINEMIA: ICD-10-CM

## 2021-07-21 DIAGNOSIS — D69.6 THROMBOCYTOPENIA (HCC): ICD-10-CM

## 2021-07-21 DIAGNOSIS — C71.9 GBM (GLIOBLASTOMA MULTIFORME) (HCC): Primary | ICD-10-CM

## 2021-07-21 LAB
ALBUMIN SERPL BCP-MCNC: 3.8 G/DL (ref 3.5–5)
ALP SERPL-CCNC: 65 U/L (ref 46–116)
ALT SERPL W P-5'-P-CCNC: 53 U/L (ref 12–78)
ANION GAP SERPL CALCULATED.3IONS-SCNC: 7 MMOL/L (ref 4–13)
AST SERPL W P-5'-P-CCNC: 19 U/L (ref 5–45)
BASOPHILS # BLD AUTO: 0 THOUSANDS/ΜL (ref 0–0.1)
BASOPHILS NFR BLD AUTO: 0 % (ref 0–1)
BILIRUB SERPL-MCNC: 1.14 MG/DL (ref 0.2–1)
BUN SERPL-MCNC: 11 MG/DL (ref 5–25)
CALCIUM SERPL-MCNC: 9.1 MG/DL (ref 8.3–10.1)
CHLORIDE SERPL-SCNC: 110 MMOL/L (ref 100–108)
CO2 SERPL-SCNC: 21 MMOL/L (ref 21–32)
CREAT SERPL-MCNC: 1.15 MG/DL (ref 0.6–1.3)
EOSINOPHIL # BLD AUTO: 0.08 THOUSAND/ΜL (ref 0–0.61)
EOSINOPHIL NFR BLD AUTO: 4 % (ref 0–6)
ERYTHROCYTE [DISTWIDTH] IN BLOOD BY AUTOMATED COUNT: 14.7 % (ref 11.6–15.1)
GFR SERPL CREATININE-BSD FRML MDRD: 64 ML/MIN/1.73SQ M
GLUCOSE P FAST SERPL-MCNC: 106 MG/DL (ref 65–99)
HCT VFR BLD AUTO: 35.6 % (ref 36.5–49.3)
HGB BLD-MCNC: 12.3 G/DL (ref 12–17)
IMM GRANULOCYTES # BLD AUTO: 0.01 THOUSAND/UL (ref 0–0.2)
IMM GRANULOCYTES NFR BLD AUTO: 1 % (ref 0–2)
LYMPHOCYTES # BLD AUTO: 0.77 THOUSANDS/ΜL (ref 0.6–4.47)
LYMPHOCYTES NFR BLD AUTO: 40 % (ref 14–44)
MCH RBC QN AUTO: 30.7 PG (ref 26.8–34.3)
MCHC RBC AUTO-ENTMCNC: 34.6 G/DL (ref 31.4–37.4)
MCV RBC AUTO: 89 FL (ref 82–98)
MONOCYTES # BLD AUTO: 0.18 THOUSAND/ΜL (ref 0.17–1.22)
MONOCYTES NFR BLD AUTO: 9 % (ref 4–12)
NEUTROPHILS # BLD AUTO: 0.91 THOUSANDS/ΜL (ref 1.85–7.62)
NEUTS SEG NFR BLD AUTO: 46 % (ref 43–75)
NRBC BLD AUTO-RTO: 0 /100 WBCS
PLATELET # BLD AUTO: 19 THOUSANDS/UL (ref 149–390)
PMV BLD AUTO: 10.8 FL (ref 8.9–12.7)
POTASSIUM SERPL-SCNC: 3.7 MMOL/L (ref 3.5–5.3)
PROT SERPL-MCNC: 7.1 G/DL (ref 6.4–8.2)
RBC # BLD AUTO: 4.01 MILLION/UL (ref 3.88–5.62)
SODIUM SERPL-SCNC: 138 MMOL/L (ref 136–145)
WBC # BLD AUTO: 1.95 THOUSAND/UL (ref 4.31–10.16)

## 2021-07-21 PROCEDURE — 80053 COMPREHEN METABOLIC PANEL: CPT

## 2021-07-21 PROCEDURE — 1036F TOBACCO NON-USER: CPT | Performed by: NURSE PRACTITIONER

## 2021-07-21 PROCEDURE — G6002 STEREOSCOPIC X-RAY GUIDANCE: HCPCS | Performed by: RADIOLOGY

## 2021-07-21 PROCEDURE — 36415 COLL VENOUS BLD VENIPUNCTURE: CPT

## 2021-07-21 PROCEDURE — 77336 RADIATION PHYSICS CONSULT: CPT | Performed by: RADIOLOGY

## 2021-07-21 PROCEDURE — 77386 HB NTSTY MODUL RAD TX DLVR CPLX: CPT | Performed by: RADIOLOGY

## 2021-07-21 PROCEDURE — 85025 COMPLETE CBC W/AUTO DIFF WBC: CPT

## 2021-07-21 PROCEDURE — 99214 OFFICE O/P EST MOD 30 MIN: CPT | Performed by: NURSE PRACTITIONER

## 2021-07-21 PROCEDURE — 3008F BODY MASS INDEX DOCD: CPT | Performed by: NURSE PRACTITIONER

## 2021-07-21 PROCEDURE — 1160F RVW MEDS BY RX/DR IN RCRD: CPT | Performed by: NURSE PRACTITIONER

## 2021-07-21 RX ORDER — SODIUM CHLORIDE 9 MG/ML
20 INJECTION, SOLUTION INTRAVENOUS ONCE
Status: CANCELLED | OUTPATIENT
Start: 2021-07-22

## 2021-07-21 NOTE — PROGRESS NOTES
[address] [facility] [place]  [address] [address]  [facility] [id number] [phone]  Progress Note  Binh Tucker, 1950, [id number]  7/21/2021    Assessment/Plan:  1  GBM (glioblastoma multiforme) ([facility] [address] )  2  [facility]ocyt[facility] ([facility] )   Patient is a 78-year-old male with a history of GBM who has completed daily radiation today  He had been on concurrent Temodar however this was discontinued secondary to significant thrombocytopenia noted on 07/09/2021  Patient's platelet count was found to be 24 with a normal hemoglobin and white blood cell count  Repeat platelet count on 86/73/2100 was 10 with a white blood cell count of 3 8 and normal hemoglobin  Patient underwent a platelet transfusion in the emergency room that same day  Platelets 31  On 71/02/5659 with a white blood cell count of 3 5 otherwise normal CBC and differential   We have been monitoring his platelets on a weekly basis and is scheduled to have another pool of platelets tomorrow morning for a platelet count of 19  I reviewed with patient indications and adverse reactions of blood transfusion and obtained patient's consent  Patient denies any significant bleeding, including oral nasal rectal or urinary  He does report bleeding from a scratch on his leg however this stopped after a few minutes  Patient's Eliquis was also stopped secondary to the thrombocytopenia  At this time we will continue managing thrombocytopenia with transfusions as indicated  We will also continue holding the Temodar at this time  We will plan an MRI of the brain 1 month after the completion of radiation  We will see patient shortly after for review  Patient and his wife verbalized understanding and are in agreement with plan  - Type and screen;  Future  - MRI brain w wo contrast; Future  - CBC and differential; Standing  - Comprehensive metabolic panel; Standing  - CBC and differential  - Comprehensive metabolic panel    Goals and Barriers:    Current Goal:   Prolong Survival from Cancer  Barriers: None  Patient's Capacity to Self Care:  Patient is able to self care   -------------------------------------------------------------------------------------------------------    Chief Complaint   Patient presents with    Follow-up       History of present illness/Cancer History:   Oncology History   GBM (glioblastoma multiforme) ([facility] [address] )   2021 Initial Diagnosis    Early May 2021 patient presented with speech difficulties  MRI of the brain showed 5 cm mass in left temporal region  May 11, 2021 patient underwent resection  Pathology indicated GBM, BRAF, EGFR V3 negative  2021 -  Radiation    The patient saw @[employer] for radiation treatment  This is the current list of radiation treatment:  Radiation Treatments     No radiation treatments to show  (Treatments may have been administered in another system )             2021 -  Chemotherapy    Temodar 75 mg p o  Daily  Cancer Staging  GBM (glioblastoma multiforme) (HCC)  Staging form: Central Nervous System Tumors, Pediatric Staging  - Clinical: No stage assigned - Unsigned       ECO - symptomatic but completely ambulatory    Interval history:  Clinically stable     Review of Systems   Constitutional: Positive for fatigue  Negative for activity change, appetite change, fever and unexpected weight change  Sleeps frequently   Respiratory: Negative for cough and shortness of breath  Cardiovascular: Negative for chest pain and leg swelling  Gastrointestinal: Negative for abdominal pain, constipation, diarrhea and nausea  Endocrine: Negative for cold intolerance and heat intolerance  Musculoskeletal: Negative for arthralgias and myalgias  Skin: Negative  Neurological: Negative for dizziness, weakness and headaches  Hematological: Negative for adenopathy  Does not bruise/bleed easily           Current Outpatient Medications:    acetaminophen (TYLENOL) 325 mg tablet, Take 3 tablets (975 mg total) by mouth every 8 (eight) hours, Disp:  , Rfl: 0    Advair Diskus 250-50 MCG/DOSE inhaler, INHALE ONE PUFF BY MOUTH TWICE A DAY  RINSE MOUTH AFTER USE, Disp: 180 each, Rfl: 3    fluticasone (FLONASE) 50 mcg/act nasal spray, 1 spray into each nostril daily, Disp: , Rfl:     Melatonin 2 5 MG CAPS, Take 2 5 mg by mouth daily, Disp: , Rfl:     metoprolol succinate (TOPROL-XL) 25 mg 24 hr tablet, Take 1 tablet (25 mg total) by mouth daily, Disp: 90 tablet, Rfl: 1    Multiple Vitamin (ONE-A-DAY MENS PO), Take by mouth, Disp: , Rfl:     ondansetron (ZOFRAN) 4 mg tablet, Take 1 tablet (4 mg total) by mouth every 6 (six) hours as needed for nausea or vomiting, Disp: 180 tablet, Rfl: 1    albuterol (2 5 mg/3 mL) 0 083 % nebulizer solution, Take 1 vial (2 5 mg total) by nebulization every 6 (six) hours as needed for wheezing or shortness of breath (Patient not taking: Reported on 6/21/2021), Disp: 60 vial, Rfl: 1    apixaban (ELIQUIS) 5 mg, Take 1 tablet (5 mg total) by mouth 2 (two) times a day (Patient not taking: Reported on 7/21/2021), Disp: 180 tablet, Rfl: 1    temozolomide (TEMODAR) 180 mg capsule, Take 1 capsule (180 mg total) by mouth daily (Patient not taking: Reported on 7/21/2021), Disp: 42 capsule, Rfl: 0    Allergies   Allergen Reactions    Penicillins Other (See Comments)     As a child       Advance Directive and Living Will:        Objective:   /74 (BP Location: Left arm, Patient Position: Sitting, Cuff Size: Adult)   Pulse 72   Temp 98 6 °F (37 °C) (Tympanic)   Resp 18   Ht 5' 10" (1 778 m)   Wt 125 kg (275 lb 8 oz)   SpO2 98%   BMI 39 53 kg/m²   Wt Readings from Last 6 Encounters:   07/21/21 125 kg (275 lb 8 oz)   07/07/21 128 kg (282 lb)   06/30/21 129 kg (284 lb)   06/21/21 131 kg (289 lb)   06/02/21 132 kg (290 lb)   05/27/21 132 kg (291 lb 3 2 oz)       Physical Exam  Vitals reviewed     Constitutional: Appearance: Normal appearance  He is well-developed  HENT:      Head: Normocephalic and atraumatic  Eyes:      Pupils: Pupils are equal, round, and reactive to light  Cardiovascular:      Rate and Rhythm: Normal rate and regular rhythm  Pulses: Normal pulses  Heart sounds: Normal heart sounds  Pulmonary:      Effort: Pulmonary effort is normal  No respiratory distress  Breath sounds: Normal breath sounds  Abdominal:      General: Bowel sounds are normal       Palpations: Abdomen is soft  Musculoskeletal:         General: Normal range of motion  Cervical back: Normal range of motion  Lymphadenopathy:      Cervical: No cervical adenopathy  Skin:     General: Skin is warm and dry  Capillary Refill: Capillary refill takes less than 2 seconds  Neurological:      Mental Status: He is alert and oriented to person, place, and time  Psychiatric:         Behavior: Behavior normal          Pertinent Laboratory Results and Imaging Review:  Appointment on 07/21/2021   Component Date Value Ref Range Status    WBC 07/21/2021 1 95* 4 31 - 10 16 Thousand/uL Final    This result has been called to [facility] by Jimenez Acosta on 07 21 2021 at 1024, and has been read back   RBC 07/21/2021 4 01  3 88 - 5 62 Million/uL Final    Hemoglobin 07/21/2021 12 3  12 0 - 17 0 g/dL Final    Hematocrit 07/21/2021 35 6* 36 5 - 49 3 % Final    MCV 07/21/2021 89  82 - 98 fL Final    MCH 07/21/2021 30 7  26 8 - 34 3 pg Final    MCHC 07/21/2021 34 6  31 4 - 37 4 g/dL Final    RDW 07/21/2021 14 7  11 6 - 15 1 % Final    MPV 07/21/2021 10 8  8 9 - 12 7 fL Final    Platelets 61/79/7104 19* 149 - 390 Thousands/uL Final    plt reviewed on 7-9-2021  This result has been called to [facility] by Jimenez Acosta on 07 21 2021 at 1024, and has been read back       nRBC 07/21/2021 0  /100 WBCs Final    Neutrophils Relative 07/21/2021 46  43 - 75 % Final    Immat GRANS % 07/21/2021 1  0 - 2 % Final    Lymphocytes Relative 07/21/2021 40  14 - 44 % Final    Monocytes Relative 07/21/2021 9  4 - 12 % Final    Eosinophils Relative 07/21/2021 4  0 - 6 % Final    Basophils Relative 07/21/2021 0  0 - 1 % Final    Neutrophils Absolute 07/21/2021 0 91* 1 85 - 7 62 Thousands/µL Final    Immature Grans Absolute 07/21/2021 0 01  0 00 - 0 20 Thousand/uL Final    Lymphocytes Absolute 07/21/2021 0 77  0 60 - 4 47 Thousands/µL Final    Monocytes Absolute 07/21/2021 0 18  0 17 - 1 22 Thousand/µL Final    Eosinophils Absolute 07/21/2021 0 08  0 00 - 0 61 Thousand/µL Final    Basophils Absolute 07/21/2021 0 00  0 00 - 0 10 Thousands/µL Final    Sodium 07/21/2021 138  136 - 145 mmol/L Final    Potassium 07/21/2021 3 7  3 5 - 5 3 mmol/L Final    Chloride 07/21/2021 110* 100 - 108 mmol/L Final    CO2 07/21/2021 21  21 - 32 mmol/L Final    ANION GAP 07/21/2021 7  4 - 13 mmol/L Final    BUN 07/21/2021 11  5 - 25 mg/dL Final    Creatinine 07/21/2021 1 15  0 60 - 1 30 mg/dL Final    Standardized to IDMS reference method    Glucose, Fasting 07/21/2021 106* 65 - 99 mg/dL Final    Specimen collection should occur prior to Sulfasalazine administration due to the potential for falsely depressed results  Specimen collection should occur prior to Sulfapyridine administration due to the potential for falsely elevated results   Calcium 07/21/2021 9 1  8 3 - 10 1 mg/dL Final    AST 07/21/2021 19  5 - 45 U/L Final    Specimen collection should occur prior to Sulfasalazine administration due to the potential for falsely depressed results   ALT 07/21/2021 53  12 - 78 U/L Final    Specimen collection should occur prior to Sulfasalazine and/or Sulfapyridine administration due to the potential for falsely depressed results       Alkaline Phosphatase 07/21/2021 65  46 - 116 U/L Final    Total Protein 07/21/2021 7 1  6 4 - 8 2 g/dL Final    Albumin 07/21/2021 3 8  3 5 - 5 0 g/dL Final    Total Bilirubin 07/21/2021 1 14* 0 20 -  ANION GAP 07/20/2021 7  4 - 13 mmol/L Final    BUN 07/20/2021 14  5 - 25 mg/dL Final    Creatinine 07/20/2021 1 14  0 60 - 1 30 mg/dL Final    Standardized to IDMS reference method    Glucose, Fasting 07/20/2021 102* 65 - 99 mg/dL Final    Specimen collection should occur prior to Sulfasalazine administration due to the potential for falsely depressed results  Specimen collection should occur prior to Sulfapyridine administration due to the potential for falsely elevated results   Calcium 07/20/2021 8 8  8 3 - 10 1 mg/dL Final    AST 07/20/2021 20  5 - 45 U/L Final    Specimen collection should occur prior to Sulfasalazine administration due to the potential for falsely depressed results   ALT 07/20/2021 53  12 - 78 U/L Final    Specimen collection should occur prior to Sulfasalazine and/or Sulfapyridine administration due to the potential for falsely depressed results   Alkaline Phosphatase 07/20/2021 65  46 - 116 U/L Final    Total Protein 07/20/2021 7 0  6 4 - 8 2 g/dL Final    Albumin 07/20/2021 3 8  3 5 - 5 0 g/dL Final    Total Bilirubin 07/20/2021 1 57* 0 20 - 1 00 mg/dL Final    Use of this assay is not recommended for patients undergoing treatment with eltrombopag due to the potential for falsely elevated results   eGFR 07/20/2021 65  ml/min/1 73sq m Final   Appointment on 07/15/2021   Component Date Value Ref Range Status    Sodium 07/15/2021 137  136 - 145 mmol/L Final    Potassium 07/15/2021 3 6  3 5 - 5 3 mmol/L Final    Chloride 07/15/2021 109* 100 - 108 mmol/L Final    CO2 07/15/2021 23  21 - 32 mmol/L Final    ANION GAP 07/15/2021 5  4 - 13 mmol/L Final    BUN 07/15/2021 12  5 - 25 mg/dL Final    Creatinine 07/15/2021 1 20  0 60 - 1 30 mg/dL Final    Standardized to IDMS reference method    Glucose, Fasting 07/15/2021 101* 65 - 99 mg/dL Final    Specimen collection should occur prior to Sulfasalazine administration due to the potential for falsely depressed results  Specimen collection should occur prior to Sulfapyridine administration due to the potential for falsely elevated results   Calcium 07/15/2021 9 0  8 3 - 10 1 mg/dL Final    AST 07/15/2021 34  5 - 45 U/L Final    Specimen collection should occur prior to Sulfasalazine administration due to the potential for falsely depressed results   ALT 07/15/2021 83* 12 - 78 U/L Final    Specimen collection should occur prior to Sulfasalazine and/or Sulfapyridine administration due to the potential for falsely depressed results   Alkaline Phosphatase 07/15/2021 68  46 - 116 U/L Final    Total Protein 07/15/2021 7 1  6 4 - 8 2 g/dL Final    Albumin 07/15/2021 3 7  3 5 - 5 0 g/dL Final    Total Bilirubin 07/15/2021 1 30* 0 20 - 1 00 mg/dL Final    Use of this assay is not recommended for patients undergoing treatment with eltrombopag due to the potential for falsely elevated results   eGFR 07/15/2021 61  ml/min/1 73sq m Final   Orders Only on 07/15/2021   Component Date Value Ref Range Status    WBC 07/15/2021 3 50* 4 31 - 10 16 Thousand/uL Final    RBC 07/15/2021 3 99  3 88 - 5 62 Million/uL Final    Hemoglobin 07/15/2021 12 1  12 0 - 17 0 g/dL Final    Hematocrit 07/15/2021 35 4* 36 5 - 49 3 % Final    MCV 07/15/2021 89  82 - 98 fL Final    MCH 07/15/2021 30 3  26 8 - 34 3 pg Final    MCHC 07/15/2021 34 2  31 4 - 37 4 g/dL Final    RDW 07/15/2021 14 0  11 6 - 15 1 % Final    MPV 07/15/2021 10 2  8 9 - 12 7 fL Final    Platelets 73/91/8280 31* 149 - 390 Thousands/uL Final    Plt prev low and reviewed  This result has been called to [facility] by Rodo Torres on 07 15 2021 at [phone], and has been read back       nRBC 07/15/2021 0  /100 WBCs Final    Neutrophils Relative 07/15/2021 60  43 - 75 % Final    Immat GRANS % 07/15/2021 0  0 - 2 % Final    Lymphocytes Relative 07/15/2021 27  14 - 44 % Final    Monocytes Relative 07/15/2021 8  4 - 12 % Final    Eosinophils Relative 07/15/2021 5  0 - 6 % Final    Basophils Relative 07/15/2021 0  0 - 1 % Final    Neutrophils Absolute 07/15/2021 2 10  1 85 - 7 62 Thousands/µL Final    Immature Grans Absolute 07/15/2021 0 01  0 00 - 0 20 Thousand/uL Final    Lymphocytes Absolute 07/15/2021 0 94  0 60 - 4 47 Thousands/µL Final    Monocytes Absolute 07/15/2021 0 27  0 17 - 1 22 Thousand/µL Final    Eosinophils Absolute 07/15/2021 0 18  0 00 - 0 61 Thousand/µL Final    Basophils Absolute 07/15/2021 0 00  0 00 - 0 10 Thousands/µL Final           The following historical data was reviewed  Past Medical History:   Diagnosis Date    Asthma     Brain cancer [facility] [address] [facility]     Epilepsy ([facility] )     stopped meds 2006 has been seizure free    Pulmonary emboli ([facility] [address] ) 05/25/2021       Past Surgical History:   Procedure Laterality Date    APPENDECTOMY      CRANIOTOMY Left 5/11/2021    Procedure: Left temporal craniotomy with image guidance and 5-ALA for resection of mass;  Surgeon: Taylor Godwin MD;  [facility];  Service: Neurosurgery    MANDIBLE SURGERY      FL COLONOSCOPY FLX DX W/COLLJ SPEC WHEN PFRMD N/A 4/4/2016    Procedure: COLONOSCOPY;  Surgeon: Tasha Pitts MD;  Location: [facility];   Service: Gastroenterology    SKULL FRACTURE ELEVATION      TONSILLECTOMY         Social History     Socioeconomic History    Marital status: /Civil Union     Spouse name: None    Number of children: None    Years of education: None    Highest education level: None   Occupational History    None   Tobacco Use    Smoking status: Former Smoker     Types: Cigarettes     Quit date: 2/8/2006     Years since quitting: 15 4    Smokeless tobacco: Never Used   Vaping Use    Vaping Use: Never used   Substance and Sexual Activity    Alcohol use: Never    Drug use: Yes     Types: Marijuana     Comment: not medical marijuana    Sexual activity: None   Other Topics Concern    None   Social History Narrative    None     Social Determinants of Health     Financial Resource Strain:     Difficulty of Paying Living Expenses:    Food Insecurity:     Worried About Running Out of Food in the Last Year:     [address] in the Last Year:    Transportation Needs:     Lack of Transportation (Medical):  Lack of Transportation (Non-Medical):    Physical Activity:     Days of Exercise per Week:     Minutes of Exercise per Session:    Stress:     Feeling of Stress :    Social Connections:     Frequency of Communication with Friends and Family:     Frequency of Social Gatherings with Friends and Family:     Attends [employer] Services:     Active Member of Clubs or Organizations:     Attends Club or Organization Meetings:     Marital Status:    Intimate Partner Violence:     Fear of Current or Ex-Partner:     Emotionally Abused:     Physically Abused:     Sexually Abused:        Family History   Problem Relation Age of Onset    Alcohol abuse Father     Substance Abuse Neg Hx     Mental illness Neg Hx        Please note: This report has been generated by a voice recognition software system  Therefore there may be syntax, spelling, and/or grammatical errors  Please call if you have any questions

## 2021-07-21 NOTE — TELEPHONE ENCOUNTER
Critical Results   Call Received From [facility] Department Location [place]    Lab Study WBC 1 95  Platelet   20,994   Date Blood Work was Done Today    Read Back of Information Done Yes    Relevant Information

## 2021-07-21 NOTE — TELEPHONE ENCOUNTER
Will forward critical results to RN to review with Dr Johnson List  Patient is scheduled for platelet transfusion tomorrow

## 2021-07-22 ENCOUNTER — HOSPITAL ENCOUNTER (OUTPATIENT)
Dept: INFUSION CENTER | Facility: HOSPITAL | Age: 71
Discharge: HOME | End: 2021-07-22
Payer: COMMERCIAL

## 2021-07-22 ENCOUNTER — APPOINTMENT (OUTPATIENT)
Dept: RADIATION ONCOLOGY | Facility: HOSPITAL | Age: 71
End: 2021-07-22
Payer: COMMERCIAL

## 2021-07-22 VITALS
RESPIRATION RATE: 16 BRPM | TEMPERATURE: 98.8 F | DIASTOLIC BLOOD PRESSURE: 68 MMHG | HEART RATE: 63 BPM | SYSTOLIC BLOOD PRESSURE: 117 MMHG

## 2021-07-22 DIAGNOSIS — D69.6 THROMBOCYTOPENIA (HCC): Primary | ICD-10-CM

## 2021-07-22 PROCEDURE — P9037 PLATE PHERES LEUKOREDU IRRAD: HCPCS

## 2021-07-22 PROCEDURE — 36430 TRANSFUSION BLD/BLD COMPNT: CPT

## 2021-07-22 RX ORDER — SODIUM CHLORIDE 9 MG/ML
20 INJECTION, SOLUTION INTRAVENOUS ONCE
Status: CANCELLED | OUTPATIENT
Start: 2021-07-22

## 2021-07-22 RX ORDER — SODIUM CHLORIDE 9 MG/ML
20 INJECTION, SOLUTION INTRAVENOUS ONCE
Status: DISCONTINUED | OUTPATIENT
Start: 2021-07-22 | End: 2021-07-25 | Stop reason: HOSPADM

## 2021-07-23 LAB
ABO GROUP BLD BPU: NORMAL
BPU ID: NORMAL
UNIT DISPENSE STATUS: NORMAL
UNIT PRODUCT CODE: NORMAL
UNIT RH: NORMAL

## 2021-07-26 ENCOUNTER — APPOINTMENT (OUTPATIENT)
Dept: RADIATION ONCOLOGY | Facility: HOSPITAL | Age: 71
End: 2021-07-26
Payer: COMMERCIAL

## 2021-07-28 ENCOUNTER — APPOINTMENT (OUTPATIENT)
Dept: LAB | Facility: HOSPITAL | Age: 71
End: 2021-07-28
Payer: COMMERCIAL

## 2021-07-28 ENCOUNTER — TELEPHONE (OUTPATIENT)
Dept: HEMATOLOGY ONCOLOGY | Facility: CLINIC | Age: 71
End: 2021-07-28

## 2021-07-28 DIAGNOSIS — D69.6 THROMBOCYTOPENIA (HCC): ICD-10-CM

## 2021-07-28 DIAGNOSIS — C71.9 GBM (GLIOBLASTOMA MULTIFORME) (HCC): ICD-10-CM

## 2021-07-28 DIAGNOSIS — E80.6 HYPERBILIRUBINEMIA: ICD-10-CM

## 2021-07-28 LAB
ALBUMIN SERPL BCP-MCNC: 3.8 G/DL (ref 3.5–5)
ALP SERPL-CCNC: 64 U/L (ref 46–116)
ALT SERPL W P-5'-P-CCNC: 49 U/L (ref 12–78)
ANION GAP SERPL CALCULATED.3IONS-SCNC: 4 MMOL/L (ref 4–13)
AST SERPL W P-5'-P-CCNC: 23 U/L (ref 5–45)
BASOPHILS # BLD AUTO: 0 THOUSANDS/ΜL (ref 0–0.1)
BASOPHILS NFR BLD AUTO: 0 % (ref 0–1)
BILIRUB SERPL-MCNC: 1.12 MG/DL (ref 0.2–1)
BUN SERPL-MCNC: 13 MG/DL (ref 5–25)
CALCIUM SERPL-MCNC: 9.2 MG/DL (ref 8.3–10.1)
CHLORIDE SERPL-SCNC: 110 MMOL/L (ref 100–108)
CO2 SERPL-SCNC: 23 MMOL/L (ref 21–32)
CREAT SERPL-MCNC: 1.11 MG/DL (ref 0.6–1.3)
EOSINOPHIL # BLD AUTO: 0.03 THOUSAND/ΜL (ref 0–0.61)
EOSINOPHIL NFR BLD AUTO: 2 % (ref 0–6)
ERYTHROCYTE [DISTWIDTH] IN BLOOD BY AUTOMATED COUNT: 16.7 % (ref 11.6–15.1)
GFR SERPL CREATININE-BSD FRML MDRD: 67 ML/MIN/1.73SQ M
GLUCOSE SERPL-MCNC: 116 MG/DL (ref 65–140)
HCT VFR BLD AUTO: 37 % (ref 36.5–49.3)
HGB BLD-MCNC: 13 G/DL (ref 12–17)
IMM GRANULOCYTES # BLD AUTO: 0.01 THOUSAND/UL (ref 0–0.2)
IMM GRANULOCYTES NFR BLD AUTO: 1 % (ref 0–2)
LYMPHOCYTES # BLD AUTO: 0.66 THOUSANDS/ΜL (ref 0.6–4.47)
LYMPHOCYTES NFR BLD AUTO: 43 % (ref 14–44)
MCH RBC QN AUTO: 30.8 PG (ref 26.8–34.3)
MCHC RBC AUTO-ENTMCNC: 35.1 G/DL (ref 31.4–37.4)
MCV RBC AUTO: 88 FL (ref 82–98)
MONOCYTES # BLD AUTO: 0.43 THOUSAND/ΜL (ref 0.17–1.22)
MONOCYTES NFR BLD AUTO: 28 % (ref 4–12)
NEUTROPHILS # BLD AUTO: 0.39 THOUSANDS/ΜL (ref 1.85–7.62)
NEUTS SEG NFR BLD AUTO: 26 % (ref 43–75)
NRBC BLD AUTO-RTO: 0 /100 WBCS
PLATELET # BLD AUTO: 69 THOUSANDS/UL (ref 149–390)
PMV BLD AUTO: 10.2 FL (ref 8.9–12.7)
POTASSIUM SERPL-SCNC: 3.7 MMOL/L (ref 3.5–5.3)
PROT SERPL-MCNC: 7.3 G/DL (ref 6.4–8.2)
RBC # BLD AUTO: 4.22 MILLION/UL (ref 3.88–5.62)
SODIUM SERPL-SCNC: 137 MMOL/L (ref 136–145)
WBC # BLD AUTO: 1.52 THOUSAND/UL (ref 4.31–10.16)

## 2021-07-28 PROCEDURE — 80053 COMPREHEN METABOLIC PANEL: CPT | Performed by: NURSE PRACTITIONER

## 2021-07-28 PROCEDURE — 85025 COMPLETE CBC W/AUTO DIFF WBC: CPT | Performed by: NURSE PRACTITIONER

## 2021-07-28 PROCEDURE — 36415 COLL VENOUS BLD VENIPUNCTURE: CPT | Performed by: NURSE PRACTITIONER

## 2021-07-28 NOTE — TELEPHONE ENCOUNTER
Critical Results   Call Received From  [facility]   Lab Study WBC   Date Blood Work was Done 07/28/2021   Read Back of Information Done yes   Relevant Information 1 52

## 2021-07-28 NOTE — TELEPHONE ENCOUNTER
Per 7/21 OV note:      Patient's Eliquis was also stopped secondary to the thrombocytopenia  At this time we will continue managing thrombocytopenia with transfusions as indicated  We will also continue holding the Temodar at this time  We will plan an MRI of the brain 1 month after the completion of radiation  We will see patient shortly after for review  Patient and his wife verbalized understanding and are in agreement with plan      Will send critical WBC to Dr Jason Mensah RN, plt count pending

## 2021-07-29 ENCOUNTER — DOCUMENTATION (OUTPATIENT)
Dept: HEMATOLOGY ONCOLOGY | Facility: CLINIC | Age: 71
End: 2021-07-29

## 2021-07-29 NOTE — PROGRESS NOTES
2ND ATTEMPT    Called pt & spoke to his wife  Went over the benefits & sh thanked me for the call   She asked about getting bills  explained that I don't know the billing cycle & then explained about the f/a as a last resort she thanked me for the info  Mayra Bright

## 2021-08-04 ENCOUNTER — APPOINTMENT (OUTPATIENT)
Dept: LAB | Facility: HOSPITAL | Age: 71
End: 2021-08-04
Payer: COMMERCIAL

## 2021-08-04 DIAGNOSIS — D69.6 THROMBOCYTOPENIA (HCC): ICD-10-CM

## 2021-08-04 DIAGNOSIS — C71.9 GBM (GLIOBLASTOMA MULTIFORME) (HCC): ICD-10-CM

## 2021-08-11 ENCOUNTER — APPOINTMENT (OUTPATIENT)
Dept: LAB | Facility: HOSPITAL | Age: 71
End: 2021-08-11
Payer: COMMERCIAL

## 2021-08-12 ENCOUNTER — TELEPHONE (OUTPATIENT)
Dept: NUTRITION | Facility: CLINIC | Age: 71
End: 2021-08-12

## 2021-08-12 ENCOUNTER — TELEPHONE (OUTPATIENT)
Dept: HEMATOLOGY ONCOLOGY | Facility: CLINIC | Age: 71
End: 2021-08-12

## 2021-08-12 DIAGNOSIS — T45.1X5A CHEMOTHERAPY INDUCED NAUSEA AND VOMITING: Primary | ICD-10-CM

## 2021-08-12 DIAGNOSIS — R11.2 CHEMOTHERAPY INDUCED NAUSEA AND VOMITING: Primary | ICD-10-CM

## 2021-08-12 DIAGNOSIS — G93.6 CEREBRAL EDEMA (HCC): ICD-10-CM

## 2021-08-12 RX ORDER — DEXAMETHASONE 2 MG/1
2 TABLET ORAL 2 TIMES DAILY WITH MEALS
Qty: 30 TABLET | Refills: 1 | Status: SHIPPED | OUTPATIENT
Start: 2021-08-12 | End: 2021-11-03

## 2021-08-12 NOTE — TELEPHONE ENCOUNTER
Patient's wife Sabino Mauro is calling to report that patient has completed his chemo and radiation  She stated that his appetite and energy level has not improved  Patient's wife stated that he is barely getting 500 calories a day  Patient is attempting small meals and is drinking 1 Ensure a day  Rarely he will have 2 Ensure  Yesterday patient had 1 Ensure, 1 chicken wing and 2-3 small celery sticks the entire day  Patient is drinking water 40-60 oz of water a day  Patient continues with nausea and is using prn Reglan with only questionable slight relief     7/21/21 Wt  275 lbs 8 oz  Per wife WT is 260-262 lbs  Will refer to [facility]  Will forward to [facility] JONATHAN QUINTERO to review with provider    Sabino Mauro [facility]) [phone] Prabhakar Barbour)

## 2021-08-12 NOTE — TELEPHONE ENCOUNTER
Lizz Alvarado to discuss his nutrition after receiving notification by Xiomy Oakes RN on 8/12/21 that pt is appropriate for oncology nutrition care (reason for referral: Patient's wife stated that he is barely getting 500 calories a day  Patient is attempting small meals and is drinking 1 Ensure a day  Rarely he will have 2 Ensure  )  Spoke to Kashif and his wife Marisabel today  Kashif reports that he is experiencing nausea which is causing little desire to eat  Today he has only eaten 2 crackers and some water  Marisabel reports that Kashif has zofran to help control nausea but Kashif states that this does not help his nausea  Provided nutrition therapy for nausea, suggestions include:   Eat 5-6 smaller meals throughout the day instead of 3 large meals   Sip on calorie containing fluids throughout the day   o Fruit juice, Gatorade, and other clear liquids (see page 41 of Eating Hints Book for other examples)  o Caution with carbonated beverages   Eat bland foods and foods easy on the stomach such as vanilla yogurt, toast, crackers, oatmeal, pasta   Consume foods and drinks at room temperature  Try to avoid eating/drinking anything too hot or cold   Try to avoid foods with strong odors   Suck on tart candies such as lemon drops to help relieve nausea   Edilia can help ease nausea   Foods to avoid:  o High sugar foods such as soda, candies, desserts  o Greasy/fried foods  o Gas producing foods such as broccoli, cabbage, [place] sprouts, raw fruits/vegetables, beans  o Caution with diary products unless they are low lactose or lactose free    o Alcohol  o Spicy foods   o Avoid eating your favorite foods when you feel nauseous so you don't develop a dislike of those foods   Refer to pages 21-22 in [address] for additional suggestions       Also discussed ways to increase calorie and protein intake, suggestions include: eating smaller/more frequent meals, including high protein foods in diet (eggs, chicken, fish, beans/legumes, nuts/nut butters), eating when feeling most hungry, sipping on calorie containing beverages, utilizing oral nutrition supplements, and keeping non perishable foods nearby to snack on  [employer] are appreciative of call and information  Provided this RDs contact information asking that Tia Hunter or Matthew Cutler reach out prn  All questions/concerns addressed at this time  Materials sent: Email send to '[email or web address]': Nausea Handout, Eating Hints Book, Nutrition During Cancer Treatment     Follow Up Plan: prn per pt request       Oncology History   GBM (glioblastoma multiforme) ([address] )   5/26/2021 Initial Diagnosis    Early May 2021 patient presented with speech difficulties  MRI of the brain showed 5 cm mass in left temporal region  May 11, 2021 patient underwent resection  Pathology indicated GBM, BRAF, EGFR V3 negative  6/9/2021 -  Radiation    The patient saw [facility] for radiation treatment  This is the current list of radiation treatment:  Radiation Treatments     No radiation treatments to show  (Treatments may have been administered in another system )             6/9/2021 -  Chemotherapy    Temodar 75 mg p o  Daily         Patient Active Problem List   Diagnosis    Essential hypertension    Morbid obesity ([address] )    Obstructive sleep apnea    Renal cyst, left    COPD (chronic obstructive pulmonary disease) ([address] )    History of tobacco use    Eczema    Medicare annual wellness visit, subsequent    Right flank pain    Brain mass    Cerebral edema ([address] )    Rib fracture    Shortness of breath    GBM (glioblastoma multiforme) ([address] )    Other pulmonary embolism without acute cor pulmonale (HCC)    Thrombocytopenia (HCC)     Past Medical History:   Diagnosis Date    Asthma     Brain cancer ([address] )     Epilepsy ([address] )     stopped meds 2006 has been seizure free    Pulmonary emboli ([address] ) 05/25/2021     Past Surgical History:   Procedure Laterality Date    APPENDECTOMY      CRANIOTOMY Left 5/11/2021    Procedure: Left temporal craniotomy with image guidance and 5-ALA for resection of mass;  Surgeon: Vidya Burris MD;  [facility];  Service: Neurosurgery    MANDIBLE SURGERY      MO COLONOSCOPY FLX DX W/COLLJ SPEC WHEN PFRMD N/A 4/4/2016    Procedure: COLONOSCOPY;  Surgeon: Naheed Garibay MD;  [facility]; Service: Gastroenterology    SKULL FRACTURE ELEVATION      TONSILLECTOMY         Review of Medications:     Current Outpatient Medications:     acetaminophen (TYLENOL) 325 mg tablet, Take 3 tablets (975 mg total) by mouth every 8 (eight) hours, Disp:  , Rfl: 0    Advair Diskus 250-50 MCG/DOSE inhaler, INHALE ONE PUFF BY MOUTH TWICE A DAY   RINSE MOUTH AFTER USE, Disp: 180 each, Rfl: 3    albuterol (2 5 mg/3 mL) 0 083 % nebulizer solution, Take 1 vial (2 5 mg total) by nebulization every 6 (six) hours as needed for wheezing or shortness of breath (Patient not taking: Reported on 6/21/2021), Disp: 60 vial, Rfl: 1    apixaban (ELIQUIS) 5 mg, Take 1 tablet (5 mg total) by mouth 2 (two) times a day (Patient not taking: Reported on 7/21/2021), Disp: 180 tablet, Rfl: 1    fluticasone (FLONASE) 50 mcg/act nasal spray, 1 spray into each nostril daily, Disp: , Rfl:     Melatonin 2 5 MG CAPS, Take 2 5 mg by mouth daily, Disp: , Rfl:     metoprolol succinate (TOPROL-XL) 25 mg 24 hr tablet, Take 1 tablet (25 mg total) by mouth daily, Disp: 90 tablet, Rfl: 1    Multiple Vitamin (ONE-A-DAY MENS PO), Take by mouth, Disp: , Rfl:     ondansetron (ZOFRAN) 4 mg tablet, Take 1 tablet (4 mg total) by mouth every 6 (six) hours as needed for nausea or vomiting, Disp: 180 tablet, Rfl: 1    temozolomide (TEMODAR) 180 mg capsule, Take 1 capsule (180 mg total) by mouth daily (Patient not taking: Reported on 7/21/2021), Disp: 42 capsule, Rfl: 0    Most Recent Lab Results:   Lab Results   Component Value Date    WBC 4 28 (L) 08/11/2021    CHOLESTEROL 146 10/14/2019 CHOL 138 01/05/2016    TRIG 87 10/14/2019    HDL 47 10/14/2019    LDLCALC 82 10/14/2019    ALT 35 08/11/2021    AST 19 08/11/2021    ALB 3 7 08/11/2021     01/05/2016    SODIUM 141 08/11/2021    SODIUM 139 08/04/2021    K 3 7 08/11/2021    K 3 6 08/04/2021     (H) 08/11/2021    BUN 13 08/11/2021    BUN 16 08/04/2021    CREATININE 1 19 08/11/2021    CREATININE 1 08 08/04/2021    EGFR 62 08/11/2021    PHOS 2 9 05/12/2021    PHOS 3 5 05/11/2021    GLUCOSE 146 (H) 05/11/2021    POCGLU 91 05/06/2021    GLUF 110 (H) 08/11/2021    GLUF 112 (H) 08/04/2021    GLUC 116 07/28/2021    HGBA1C 5 6 05/27/2016    HGBA1C 5 1 08/26/2015    CALCIUM 9 3 08/11/2021    MG 2 6 05/12/2021       Anthropometric Measurements:   Ht Readings from Last 1 Encounters:   07/21/21 5' 10" (1 778 m)     -Weight History: Wt Readings from Last 15 Encounters:   07/21/21 125 kg (275 lb 8 oz)   07/07/21 128 kg (282 lb)   06/30/21 129 kg (284 lb)   06/21/21 131 kg (289 lb)   06/02/21 132 kg (290 lb)   05/27/21 132 kg (291 lb 3 2 oz)   05/26/21 131 kg (288 lb)   05/20/21 129 kg (283 lb 4 8 oz)   05/17/21 128 kg (282 lb)   04/27/21 131 kg (287 lb 14 4 oz)   10/21/20 128 kg (281 lb 9 6 oz)   06/17/20 127 kg (281 lb)   10/17/19 129 kg (284 lb)   02/15/19 124 kg (273 lb 6 4 oz)   02/08/19 123 kg (272 lb)     Estimated body mass index is 39 53 kg/m² as calculated from the following:    Height as of 7/21/21: 5' 10" (1 778 m)  Weight as of 7/21/21: 125 kg (275 lb 8 oz)

## 2021-08-12 NOTE — TELEPHONE ENCOUNTER
Returned call to patients wife regarding decreased appetite and weight change  Patient recently completed radiation to the brain, differential diagnosis includes cerebral edema affecting his appetite  We will start on Dexamethasone 2 mg po BID  I instructed her to call our office in a few days with update

## 2021-08-16 ENCOUNTER — TELEPHONE (OUTPATIENT)
Dept: HEMATOLOGY ONCOLOGY | Facility: CLINIC | Age: 71
End: 2021-08-16

## 2021-08-16 NOTE — TELEPHONE ENCOUNTER
Patient's wife Je Rivera called stating patient is doing well on the steroids Bernice prescribed him  The only issue is the steroids are causing insomnia   Patient  tried reducing it to 1 pill and he is still having sleeping issues   Please call dillan back at [phone]

## 2021-08-17 NOTE — TELEPHONE ENCOUNTER
Returned call to Adriana Ronquillo  instructing her the d/c Teds evening dose of steroid and just take morning dose  Advised her to make sure he is taking it with breakfast  Left [employer] number for her to call back with any questions

## 2021-08-18 ENCOUNTER — APPOINTMENT (OUTPATIENT)
Dept: LAB | Facility: HOSPITAL | Age: 71
End: 2021-08-18
Payer: COMMERCIAL

## 2021-08-22 ENCOUNTER — HOSPITAL ENCOUNTER (OUTPATIENT)
Dept: RADIOLOGY | Facility: HOSPITAL | Age: 71
Discharge: HOME | End: 2021-08-22
Payer: COMMERCIAL

## 2021-08-22 DIAGNOSIS — D69.6 THROMBOCYTOPENIA (HCC): ICD-10-CM

## 2021-08-22 DIAGNOSIS — C71.9 GBM (GLIOBLASTOMA MULTIFORME) (HCC): ICD-10-CM

## 2021-08-22 PROCEDURE — 70553 MRI BRAIN STEM W/O & W/DYE: CPT

## 2021-08-22 PROCEDURE — A9585 GADOBUTROL INJECTION: HCPCS | Performed by: NURSE PRACTITIONER

## 2021-08-22 PROCEDURE — G1004 CDSM NDSC: HCPCS

## 2021-08-22 RX ADMIN — GADOBUTROL 12 ML: 604.72 INJECTION INTRAVENOUS at 12:35

## 2021-08-25 ENCOUNTER — APPOINTMENT (OUTPATIENT)
Dept: LAB | Facility: HOSPITAL | Age: 71
End: 2021-08-25
Payer: COMMERCIAL

## 2021-08-25 ENCOUNTER — TELEPHONE (OUTPATIENT)
Dept: HEMATOLOGY ONCOLOGY | Facility: CLINIC | Age: 71
End: 2021-08-25

## 2021-08-25 NOTE — TELEPHONE ENCOUNTER
Heydi from the lab is calling to see if blood bank on hold lab needed to be drawn  Advised it did not  Patient will have his weekly CBCD drawn  Most recent hgb have been stable  LEON -Bryon Foster

## 2021-08-30 ENCOUNTER — OFFICE VISIT (OUTPATIENT)
Dept: HEMATOLOGY ONCOLOGY | Facility: CLINIC | Age: 71
End: 2021-08-30
Payer: COMMERCIAL

## 2021-08-30 VITALS
OXYGEN SATURATION: 97 % | RESPIRATION RATE: 17 BRPM | TEMPERATURE: 97.8 F | DIASTOLIC BLOOD PRESSURE: 72 MMHG | SYSTOLIC BLOOD PRESSURE: 126 MMHG | WEIGHT: 272.4 LBS | BODY MASS INDEX: 39.09 KG/M2 | HEART RATE: 72 BPM

## 2021-08-30 DIAGNOSIS — C71.9 GBM (GLIOBLASTOMA MULTIFORME) (HCC): Primary | ICD-10-CM

## 2021-08-30 PROCEDURE — 1036F TOBACCO NON-USER: CPT | Performed by: INTERNAL MEDICINE

## 2021-08-30 PROCEDURE — 1160F RVW MEDS BY RX/DR IN RCRD: CPT | Performed by: INTERNAL MEDICINE

## 2021-08-30 PROCEDURE — 99215 OFFICE O/P EST HI 40 MIN: CPT | Performed by: INTERNAL MEDICINE

## 2021-08-30 RX ORDER — TEMOZOLOMIDE 140 MG/1
140 CAPSULE ORAL DAILY
Qty: 5 CAPSULE | Refills: 1 | Status: SHIPPED | OUTPATIENT
Start: 2021-08-30

## 2021-08-30 RX ORDER — TEMOZOLOMIDE 100 MG/1
100 CAPSULE ORAL DAILY
Qty: 5 CAPSULE | Refills: 1 | Status: SHIPPED | OUTPATIENT
Start: 2021-08-30

## 2021-08-30 NOTE — PROGRESS NOTES
[facility] [address]  [phone]  Libertad Cruz, [phone]  08/30/21    Discussion:   In summary, this is a 80-year-old male history of GBM as outlined  He is status post resection followed by radiation therapy with concomitant Temodar  He received about 65% of his Temodar dose at which time he developed pancytopenia with thrombocytopenia requiring platelet transfusion  Temodar was suspended  With the passage of time, CBC has now normalized  Repeat MRI shows post treatment changes without any evidence of enhancement or disease activity  We reviewed that the optimal path forward is not clear  I would say it is likely that Temodar contributed to his cytopenias and that this was likely to occur again  However, time exposure is much shorter, 5/28 days  This makes it difficult to predict how severe cytopenias would be or how long they would last   A compromise would be to reinstitute Temodar at 100 mg per m2 p o  Daily 1-5, Q 28 days and increase dose as hematologic parameters allow  The patient wishes to proceed in this manner  I indicated it is okay to restart Eliquis  I discussed the above with the patient  The patient and his wife voiced understanding and agreement   ______________________________________________________________________    Chief Complaint   Patient presents with    Follow-up       HPI:  Oncology History Overview Note   80-year-old male with recently diagnosed glioblastoma of the left temporoparietal lobe status post gross total resection  He has now completed a course of adjuvant chemoradiation therapy on 7/21/21  He returns for one month s/p RT follow-up with repeat MRI brain  8/12/21 Pt's wife called med onc reporting pt has loss of appetite, nausea and weight loss since completing chemoRT   Referred to dietician and pt started on Dex 2 mg PO BID on 8/12 by Benita Arce, CRNP      8/22/21 MRI brain      8/30/21 Med Onc, Dr Mike Benitez follow-up       GBM (glioblastoma multiforme) [facility] )   5/26/2021 Initial Diagnosis    Early May 2021 patient presented with speech difficulties  MRI of the brain showed 5 cm mass in left temporal region  May 11, 2021 patient underwent resection  Pathology indicated GBM, BRAF, EGFR V3 negative  6/9/2021 - 7/21/2021 Radiation    Course: C1    Plan ID Energy Fractions Dose per Fraction (cGy) Dose Correction (cGy) Total Dose Delivered (cGy) Elapsed Days   CD L Fr Temp 6X 7 / 7 200 0 1,400 8   L Fr Temporal 6X 23 / 23 200 0 4,600 33           6/9/2021 - 7/10/2021 Chemotherapy    Temodar 75 mg p o  Daily  Discontinued due to pancytopenia  Interval History:  Clinically stable  ECOG-  1 - Symptomatic but completely ambulatory    Review of Systems   Constitutional: Negative for chills and fever  HENT: Negative for nosebleeds  Eyes: Negative for discharge  Respiratory: Negative for cough and shortness of breath  Cardiovascular: Negative for chest pain  Gastrointestinal: Negative for abdominal pain, constipation and diarrhea  Endocrine: Negative for polydipsia  Genitourinary: Negative for hematuria  Musculoskeletal: Negative for arthralgias  Skin: Negative for color change  Allergic/Immunologic: Negative for immunocompromised state  Neurological: Negative for dizziness and headaches  Hematological: Negative for adenopathy  Psychiatric/Behavioral: Negative for agitation         Past Medical History:   Diagnosis Date    Asthma     Brain cancer ([facility] )     Epilepsy ([facility] )     stopped meds 2006 has been seizure free    Pulmonary emboli ([facility] ) 05/25/2021     Patient Active Problem List   Diagnosis    Essential hypertension    Morbid obesity ([facility] )    Obstructive sleep apnea    Renal cyst, left    COPD (chronic obstructive pulmonary disease) ([facility] )    History of tobacco use    Eczema    Medicare annual wellness visit, subsequent    Right flank pain    Brain mass    Cerebral edema (HCC)    Rib fracture    Shortness of breath    GBM (glioblastoma multiforme) (HCC)    Other pulmonary embolism without acute cor pulmonale (HCC)    Thrombocytopenia (HCC)       Current Outpatient Medications:     acetaminophen (TYLENOL) 325 mg tablet, Take 3 tablets (975 mg total) by mouth every 8 (eight) hours, Disp:  , Rfl: 0    Advair Diskus 250-50 MCG/DOSE inhaler, INHALE ONE PUFF BY MOUTH TWICE A DAY   RINSE MOUTH AFTER USE, Disp: 180 each, Rfl: 3    dexamethasone (DECADRON) 2 mg tablet, Take 1 tablet (2 mg total) by mouth 2 (two) times a day with meals, Disp: 30 tablet, Rfl: 1    fluticasone (FLONASE) 50 mcg/act nasal spray, 1 spray into each nostril daily, Disp: , Rfl:     Melatonin 2 5 MG CAPS, Take 2 5 mg by mouth daily, Disp: , Rfl:     Multiple Vitamin (ONE-A-DAY MENS PO), Take by mouth, Disp: , Rfl:     psyllium (METAMUCIL) 58 6 % packet, Take 1 packet by mouth as needed, Disp: , Rfl:     albuterol (2 5 mg/3 mL) 0 083 % nebulizer solution, Take 1 vial (2 5 mg total) by nebulization every 6 (six) hours as needed for wheezing or shortness of breath (Patient not taking: Reported on 6/21/2021), Disp: 60 vial, Rfl: 1    apixaban (ELIQUIS) 5 mg, Take 1 tablet (5 mg total) by mouth 2 (two) times a day (Patient not taking: Reported on 7/21/2021), Disp: 180 tablet, Rfl: 1    metoprolol succinate (TOPROL-XL) 25 mg 24 hr tablet, Take 1 tablet (25 mg total) by mouth daily (Patient not taking: Reported on 8/30/2021), Disp: 90 tablet, Rfl: 1    ondansetron (ZOFRAN) 4 mg tablet, Take 1 tablet (4 mg total) by mouth every 6 (six) hours as needed for nausea or vomiting (Patient not taking: Reported on 8/30/2021), Disp: 180 tablet, Rfl: 1    temozolomide (TEMODAR) 180 mg capsule, Take 1 capsule (180 mg total) by mouth daily (Patient not taking: Reported on 7/21/2021), Disp: 42 capsule, Rfl: 0  Allergies   Allergen Reactions    Penicillins Other (See Comments)     As a child     Past Surgical History:   Procedure Laterality Date    APPENDECTOMY      CRANIOTOMY Left 5/11/2021    Procedure: Left temporal craniotomy with image guidance and 5-ALA for resection of mass;  Surgeon: Peter Rhodes MD;  [facility];  Service: Neurosurgery    MANDIBLE SURGERY      KS COLONOSCOPY FLX DX W/COLLJ SPEC WHEN PFRMD N/A 4/4/2016    Procedure: COLONOSCOPY;  Surgeon: Mejia Hamilton MD;  [facility]; Service: Gastroenterology    SKULL FRACTURE ELEVATION      TONSILLECTOMY       Social History     Objective:  Vitals:    08/30/21 1409   BP: 126/72   BP Location: Left arm   Patient Position: Sitting   Pulse: 72   Resp: 17   Temp: 97 8 °F (36 6 °C)   TempSrc: Tympanic   SpO2: 97%   Weight: 124 kg (272 lb 6 4 oz)     Physical Exam  Constitutional:       Appearance: He is well-developed  HENT:      Head: Normocephalic and atraumatic  Eyes:      Pupils: Pupils are equal, round, and reactive to light  Cardiovascular:      Rate and Rhythm: Normal rate and regular rhythm  Heart sounds: No murmur heard  Pulmonary:      Breath sounds: Normal breath sounds  No wheezing or rales  Abdominal:      Palpations: Abdomen is soft  Tenderness: There is no abdominal tenderness  Musculoskeletal:         General: No tenderness  Normal range of motion  Cervical back: Neck supple  Lymphadenopathy:      Cervical: No cervical adenopathy  Skin:     Findings: No erythema or rash  Neurological:      Mental Status: He is alert and oriented to person, place, and time  Cranial Nerves: No cranial nerve deficit  Deep Tendon Reflexes: Reflexes are normal and symmetric  Psychiatric:         Behavior: Behavior normal            Labs: I personally reviewed the labs and imaging pertinent to this patient care

## 2021-09-01 ENCOUNTER — CLINICAL SUPPORT (OUTPATIENT)
Dept: RADIATION ONCOLOGY | Facility: HOSPITAL | Age: 71
End: 2021-09-01
Attending: RADIOLOGY
Payer: COMMERCIAL

## 2021-09-01 VITALS
BODY MASS INDEX: 38.94 KG/M2 | TEMPERATURE: 97 F | RESPIRATION RATE: 18 BRPM | SYSTOLIC BLOOD PRESSURE: 130 MMHG | OXYGEN SATURATION: 96 % | WEIGHT: 271.4 LBS | HEART RATE: 74 BPM | DIASTOLIC BLOOD PRESSURE: 78 MMHG

## 2021-09-01 DIAGNOSIS — C71.9 GBM (GLIOBLASTOMA MULTIFORME) (HCC): Primary | ICD-10-CM

## 2021-09-01 PROCEDURE — 99024 POSTOP FOLLOW-UP VISIT: CPT | Performed by: RADIOLOGY

## 2021-09-01 PROCEDURE — 99211 OFF/OP EST MAY X REQ PHY/QHP: CPT | Performed by: RADIOLOGY

## 2021-09-01 NOTE — PROGRESS NOTES
Jevon Allen 1950 is a 80 y o  male       Follow up visit   67-year-old male with recently diagnosed glioblastoma of the left temporoparietal lobe status post gross total resection  He has now completed a course of adjuvant chemoradiation therapy on 7/21/21  He returns for one month s/p RT follow-up with repeat MRI brain  8/12/21 Pt's wife called med onc reporting pt has loss of appetite, nausea and weight loss since completing chemoRT  Referred to dietician and pt started on Dex 2 mg PO BID on 8/12 by Travis Mora      8/22/21 MRI brain  No evidence of residual or recurrent disease status post left temporal lobe mass resection    8/17/21 Med Onc - instructed pt to d/c evening dose of Dex due to insomnia  Continue 2mg in AM       8/30/21 Med Onc, Dr Buck Canales follow-up  He is status post resection followed by radiation therapy with concomitant Temodar  He received about 65% of his Temodar dose at which time he developed pancytopenia with thrombocytopenia requiring platelet transfusion  Temodar was suspended  CBC has now normalized  Repeat MRI shows post treatment changes without any evidence of enhancement or disease activity  Plan: Reinstitute Temodar at 100 mg/m2 PO daily D1-5, Q 28 days and increase dose as hematologic parameters allow  10/4/21 Dr Buck Canales follow-up            Oncology History   GBM (glioblastoma multiforme) ([facility] [address] )   5/26/2021 Initial Diagnosis    Early May 2021 patient presented with speech difficulties  MRI of the brain showed 5 cm mass in left temporal region  May 11, 2021 patient underwent resection  Pathology indicated GBM, BRAF, EGFR V3 negative  6/9/2021 - 7/21/2021 Radiation    Course: C1    Plan ID Energy Fractions Dose per Fraction (cGy) Dose Correction (cGy) Total Dose Delivered (cGy) Elapsed Days   CD L Fr Temp 6X 7 / 7 200 0 1,400 8   L Fr Temporal 6X 23 / 23 200 0 4,600 33           6/9/2021 - 7/10/2021 Chemotherapy    Temodar 75 mg p o  Daily   Discontinued due to pancytopenia       8/31/2021 -  Chemotherapy    Temodar at 100 mg/m2 PO daily D1-5, Q 28 days          Clinical Trial: no      Health Maintenance   Topic Date Due    Influenza Vaccine (1) 09/01/2021    Medicare Annual Wellness Visit (AWV)  10/21/2021    Fall Risk  11/19/2021    BMI: Followup Plan  04/27/2022    Depression Screening PHQ  05/27/2022    BMI: Adult  08/30/2022    DTaP,Tdap,and Td Vaccines (2 - Td or Tdap) 08/14/2024    Pneumococcal Vaccine: 65+ Years (2 of 2 - PPSV23) 10/17/2024    Colorectal Cancer Screening  04/04/2026    Hepatitis C Screening  Completed    COVID-19 Vaccine  Completed    HIB Vaccine  Aged Out    Hepatitis B Vaccine  Aged Out    IPV Vaccine  Aged Out    Hepatitis A Vaccine  Aged Out    Meningococcal ACWY Vaccine  Aged Out    HPV Vaccine  Aged Out       Patient Active Problem List   Diagnosis    Essential hypertension    Morbid obesity ([facility] [address] )    Obstructive sleep apnea    Renal cyst, left    COPD (chronic obstructive pulmonary disease) ([facility] [address] [facility]    History of tobacco use    Eczema    Medicare annual wellness visit, subsequent    Right flank pain    Brain mass    Cerebral edema ([facility] [address] [facility]    Rib fracture    Shortness of breath    GBM (glioblastoma multiforme) ([facility] [address] [facility]    Other pulmonary embolism without acute cor pulmonale (HCC)    Thrombocytopenia (HCC)     Past Medical History:   Diagnosis Date    Asthma     Brain cancer ([facility] [address] [facility]     Epilepsy ([facility] [address] )     stopped meds 2006 has been seizure free    Pulmonary emboli ([facility] [address] ) 05/25/2021     Past Surgical History:   Procedure Laterality Date    APPENDECTOMY      CRANIOTOMY Left 5/11/2021    Procedure: Left temporal craniotomy with image guidance and 5-ALA for resection of mass;  Surgeon: Vidya Burris MD;  [facility]: [facility];  Service: Neurosurgery    MANDIBLE SURGERY      NJ COLONOSCOPY FLX DX W/COLLJ SPEC WHEN PFRMD N/A 4/4/2016    Procedure: COLONOSCOPY;  Surgeon: Naheed Garibay MD; Location:  GI LAB; Service: Gastroenterology    SKULL FRACTURE ELEVATION      TONSILLECTOMY       Family History   Problem Relation Age of Onset    Alcohol abuse Father     Substance Abuse Neg Hx     Mental illness Neg Hx      Social History     Socioeconomic History    Marital status: /Civil Union     Spouse name: Not on file    Number of children: Not on file    Years of education: Not on file    Highest education level: Not on file   Occupational History    Not on file   Tobacco Use    Smoking status: Former Smoker     Types: Cigarettes     Quit date: 2/8/2006     Years since quitting: 15 5    Smokeless tobacco: Never Used   Vaping Use    Vaping Use: Never used   Substance and Sexual Activity    Alcohol use: Never    Drug use: Yes     Types: Marijuana     Comment: not medical marijuana    Sexual activity: Not on file   Other Topics Concern    Not on file   Social History Narrative    Not on file     Social Determinants of Health     Financial Resource Strain:     Difficulty of Paying Living Expenses:    Food Insecurity:     Worried About [address] in the Last Year:     [address] in the Last Year:    Transportation Needs:     Lack of Transportation (Medical):  Lack of Transportation (Non-Medical):    Physical Activity:     Days of Exercise per Week:     Minutes of Exercise per Session:    Stress:     Feeling of Stress :    Social Connections:     Frequency of Communication with Friends and Family:     Frequency of Social Gatherings with Friends and Family:     Attends Anabaptism Services:     Active Member of Clubs or Organizations:     Attends Club or Organization Meetings:     Marital Status:    Intimate Partner Violence:     Fear of Current or Ex-Partner:     Emotionally Abused:     Physically Abused:     Sexually Abused:        Current Outpatient Medications:     Advair Diskus 250-50 MCG/DOSE inhaler, INHALE ONE PUFF BY MOUTH TWICE A DAY   RINSE MOUTH AFTER USE, Disp: 180 each, Rfl: 3    apixaban (ELIQUIS) 5 mg, Take 1 tablet (5 mg total) by mouth 2 (two) times a day, Disp: 180 tablet, Rfl: 1    dexamethasone (DECADRON) 2 mg tablet, Take 1 tablet (2 mg total) by mouth 2 (two) times a day with meals (Patient taking differently: Take 2 mg by mouth daily with breakfast ), Disp: 30 tablet, Rfl: 1    fluticasone (FLONASE) 50 mcg/act nasal spray, 1 spray into each nostril daily, Disp: , Rfl:     Melatonin 2 5 MG CAPS, Take 2 5 mg by mouth daily, Disp: , Rfl:     Multiple Vitamin (ONE-A-DAY MENS PO), Take by mouth, Disp: , Rfl:     psyllium (METAMUCIL) 58 6 % packet, Take 1 packet by mouth as needed, Disp: , Rfl:     temozolomide (TEMODAR) 140 mg capsule, Take 1 capsule (140 mg total) by mouth daily On days 1 through 5  Repeat every 28 days  with 1 other temozolomide prescription for 240 mg (100 mg/m2/day x 2 38 m2) total, Disp: 5 capsule, Rfl: 1    acetaminophen (TYLENOL) 325 mg tablet, Take 3 tablets (975 mg total) by mouth every 8 (eight) hours (Patient not taking: Reported on 9/1/2021), Disp:  , Rfl: 0    albuterol (2 5 mg/3 mL) 0 083 % nebulizer solution, Take 1 vial (2 5 mg total) by nebulization every 6 (six) hours as needed for wheezing or shortness of breath (Patient not taking: Reported on 6/21/2021), Disp: 60 vial, Rfl: 1    metoprolol succinate (TOPROL-XL) 25 mg 24 hr tablet, Take 1 tablet (25 mg total) by mouth daily (Patient not taking: Reported on 8/30/2021), Disp: 90 tablet, Rfl: 1    ondansetron (ZOFRAN) 4 mg tablet, Take 1 tablet (4 mg total) by mouth every 6 (six) hours as needed for nausea or vomiting (Patient not taking: Reported on 8/30/2021), Disp: 180 tablet, Rfl: 1    temozolomide (TEMODAR) 100 mg capsule, Take 1 capsule (100 mg total) by mouth daily On days 1 through 5  Repeat every 28 days   with 1 other temozolomide prescription for 240 mg (100 mg/m2/day x 2 38 m2) total, Disp: 5 capsule, Rfl: 1    temozolomide (TEMODAR) 180 mg capsule, Take 1 capsule (180 mg total) by mouth daily (Patient not taking: Reported on 7/21/2021), Disp: 42 capsule, Rfl: 0  Allergies   Allergen Reactions    Penicillins Other (See Comments)     As a child       Review of Systems:  Review of Systems   Constitutional: Negative  HENT: Negative  Eyes: Negative  Respiratory: Negative  Cardiovascular: Negative  Gastrointestinal: Negative  Genitourinary: Negative  Musculoskeletal: Negative  Skin: Negative  Allergic/Immunologic: Negative  Neurological: Negative  Hematological: Negative  Psychiatric/Behavioral: Negative  Vitals:    09/01/21 0828   BP: 130/78   BP Location: Left arm   Pulse: 74   Resp: 18   Temp: (!) 97 °F (36 1 °C)   TempSrc: Temporal   SpO2: 96%   Weight: 123 kg (271 lb 6 4 oz)          Imaging:MRI brain w wo contrast    Result Date: 8/26/2021  Narrative: MRI BRAIN WITH AND WITHOUT CONTRAST INDICATION: C71 9: Malignant neoplasm of brain, unspecified D69 6: Thrombocytopenia, unspecified  COMPARISON:  MRI brain with and without contrast May 29, 2021  MRI brain with and without contrast May 6, 2021  TECHNIQUE: Sagittal T1, axial T2, axial FLAIR, axial T1, axial [place], axial diffusion  Sagittal, axial T1 postcontrast   Axial bravo postcontrast with coronal reconstructions  IV Contrast:  12 mL of Gadobutrol injection (SINGLE-DOSE)  IMAGE QUALITY:   Diagnostic  FINDINGS: BRAIN PARENCHYMA: Postsurgical changes of left temporal craniotomy for resection of left temporal lobe mass  Left temporal resection cavity with peripheral hemosiderin deposition, resolved intrinsic T1 hyperintense blood products, similar surrounding gliosis, and resolved marginal ischemic changes and mild peripheral enhancement  No new nodular abnormal enhancement is within or surrounds the left temporal resection cavity  Unchanged mild dural thickening and enhancement deep to the craniotomy site, likely postsurgical change  No new brain mass  No mass effect or midline shift  There is no intracranial hemorrhage  Moderate diffuse cerebral atrophy  Diffusion imaging is unremarkable  Small scattered hyperintensities on T2/FLAIR imaging are noted in the periventricular and subcortical white matter demonstrating an appearance that is statistically most likely to represent mild microangiopathic change  VENTRICLES:  Ex vacuo dilatation of left lateral ventricle, unchanged  SELLA AND PITUITARY GLAND:  Normal  ORBITS:  Normal  PARANASAL SINUSES:  Small bilateral maxillary mucous retention cysts  VASCULATURE:  Evaluation of the major intracranial vasculature demonstrates appropriate flow voids  CALVARIUM AND SKULL BASE:  Left temporoparietal craniotomy  Small left mastoid effusion  EXTRACRANIAL SOFT TISSUES:  Normal      Impression: No evidence of residual or recurrent disease status post left temporal lobe mass resection    Workstation performed: [id number]

## 2021-09-01 NOTE — PROGRESS NOTES
Follow-up - Radiation Oncology   Sal Allen 1950 80 y o  male [id number]      History of Present Illness   Cancer Staging  GBM (glioblastoma multiforme) ([facility] [address] )  Staging form: Central Nervous System Tumors, Pediatric Staging  - Clinical: No stage assigned - Unsigned      Harrison Allen   Returns today for routine scheduled follow-up visit approximately 1 month status post completion of chemoradiation therapy for his left temporoparietal glioblastoma  Overall he feels well  He was struggling with decreased appetite and energy following completion of treatment and was restarted on dexamethasone 2 mg b i d   This significantly improved his appetite and energy but he had issues with insomnia and this has now been reduced to 2 mg daily  He denies any headaches or new neurologic symptoms  Interval History:  71-year-old male with recently diagnosed glioblastoma of the left temporoparietal lobe status post gross total resection  He has now completed a course of adjuvant chemoradiation therapy on 7/21/21  He returns for one month s/p RT follow-up with repeat MRI brain  8/12/21 Pt's wife called med onc reporting pt has loss of appetite, nausea and weight loss since completing chemoRT  Referred to dietician and pt started on Dex 2 mg PO BID on 8/12 by Travis Cook      8/22/21 MRI brain  No evidence of residual or recurrent disease status post left temporal lobe mass resection    8/17/21 Med Onc - instructed pt to d/c evening dose of Dex due to insomnia  Continue 2mg in AM       8/30/21 Med OncDr Hayley follow-up  He is status post resection followed by radiation therapy with concomitant Temodar  He received about 65% of his Temodar dose at which time he developed pancytopenia with thrombocytopenia requiring platelet transfusion  Temodar was suspended  CBC has now normalized  Repeat MRI shows post treatment changes without any evidence of enhancement or disease activity    Plan: Reinstitute Temodar at 100 mg/m2 PO daily D1-5, Q 28 days and increase dose as hematologic parameters allow  10/4/21 Dr Deyanira Lopez follow-up          Historical Information   Oncology History   GBM (glioblastoma multiforme) ([facility] [address] )   5/26/2021 Initial Diagnosis    Early May 2021 patient presented with speech difficulties  MRI of the brain showed 5 cm mass in left temporal region  May 11, 2021 patient underwent resection  Pathology indicated GBM, BRAF, EGFR V3 negative  6/9/2021 - 7/21/2021 Radiation    Course: C1    Plan ID Energy Fractions Dose per Fraction (cGy) Dose Correction (cGy) Total Dose Delivered (cGy) Elapsed Days   CD L Fr Temp 6X 7 / 7 200 0 1,400 8   L Fr Temporal 6X 23 / 23 200 0 4,600 33           6/9/2021 - 7/10/2021 Chemotherapy    Temodar 75 mg p o  Daily  Discontinued due to pancytopenia  8/31/2021 -  Chemotherapy    Temodar at 100 mg/m2 PO daily D1-5, Q 28 days          Past Medical History:   Diagnosis Date    Asthma     Brain cancer ([facility] [address] )     Epilepsy ([facility] [address] )     stopped meds 2006 has been seizure free    Pulmonary emboli ([facility] [address] ) 05/25/2021     Past Surgical History:   Procedure Laterality Date    APPENDECTOMY      CRANIOTOMY Left 5/11/2021    Procedure: Left temporal craniotomy with image guidance and 5-ALA for resection of mass;  Surgeon: Omar Crum MD;  [facility]: [facility];  Service: Neurosurgery    MANDIBLE SURGERY      MD COLONOSCOPY FLX DX W/COLLJ SPEC WHEN PFRMD N/A 4/4/2016    Procedure: COLONOSCOPY;  Surgeon: Jose Ho MD;  Location: [facility];   Service: Gastroenterology    SKULL FRACTURE ELEVATION      TONSILLECTOMY         Social History   Social History     Substance and Sexual Activity   Alcohol Use Never     Social History     Substance and Sexual Activity   Drug Use Yes    Types: Marijuana    Comment: not medical marijuana     Social History     Tobacco Use   Smoking Status Former Smoker    Types: Cigarettes    Quit date: 2/8/2006    Years since quitting: 15 5   Smokeless Tobacco Never Used         Meds/Allergies     Current Outpatient Medications:     Advair Diskus 250-50 MCG/DOSE inhaler, INHALE ONE PUFF BY MOUTH TWICE A DAY  RINSE MOUTH AFTER USE, Disp: 180 each, Rfl: 3    apixaban (ELIQUIS) 5 mg, Take 1 tablet (5 mg total) by mouth 2 (two) times a day, Disp: 180 tablet, Rfl: 1    dexamethasone (DECADRON) 2 mg tablet, Take 1 tablet (2 mg total) by mouth 2 (two) times a day with meals (Patient taking differently: Take 2 mg by mouth daily with breakfast ), Disp: 30 tablet, Rfl: 1    fluticasone (FLONASE) 50 mcg/act nasal spray, 1 spray into each nostril daily, Disp: , Rfl:     Melatonin 2 5 MG CAPS, Take 2 5 mg by mouth daily, Disp: , Rfl:     Multiple Vitamin (ONE-A-DAY MENS PO), Take by mouth, Disp: , Rfl:     psyllium (METAMUCIL) 58 6 % packet, Take 1 packet by mouth as needed, Disp: , Rfl:     temozolomide (TEMODAR) 140 mg capsule, Take 1 capsule (140 mg total) by mouth daily On days 1 through 5  Repeat every 28 days   with 1 other temozolomide prescription for 240 mg (100 mg/m2/day x 2 38 m2) total, Disp: 5 capsule, Rfl: 1    acetaminophen (TYLENOL) 325 mg tablet, Take 3 tablets (975 mg total) by mouth every 8 (eight) hours (Patient not taking: Reported on 9/1/2021), Disp:  , Rfl: 0    albuterol (2 5 mg/3 mL) 0 083 % nebulizer solution, Take 1 vial (2 5 mg total) by nebulization every 6 (six) hours as needed for wheezing or shortness of breath (Patient not taking: Reported on 6/21/2021), Disp: 60 vial, Rfl: 1    metoprolol succinate (TOPROL-XL) 25 mg 24 hr tablet, Take 1 tablet (25 mg total) by mouth daily (Patient not taking: Reported on 8/30/2021), Disp: 90 tablet, Rfl: 1    ondansetron (ZOFRAN) 4 mg tablet, Take 1 tablet (4 mg total) by mouth every 6 (six) hours as needed for nausea or vomiting (Patient not taking: Reported on 8/30/2021), Disp: 180 tablet, Rfl: 1    temozolomide (TEMODAR) 100 mg capsule, Take 1 capsule (100 mg total) by mouth daily On days 1 through 5  Repeat every 28 days  with 1 other temozolomide prescription for 240 mg (100 mg/m2/day x 2 38 m2) total, Disp: 5 capsule, Rfl: 1    temozolomide (TEMODAR) 180 mg capsule, Take 1 capsule (180 mg total) by mouth daily (Patient not taking: Reported on 7/21/2021), Disp: 42 capsule, Rfl: 0  Allergies   Allergen Reactions    Penicillins Other (See Comments)     As a child         Review of Systems   Review of Systems   Constitutional: Negative  HENT: Negative  Eyes: Negative  Respiratory: Negative  Cardiovascular: Negative  Gastrointestinal: Negative  Genitourinary: Negative  Musculoskeletal: Negative  Skin: Negative  Allergic/Immunologic: Negative  Neurological: Negative  Hematological: Negative  Psychiatric/Behavioral: Negative  OBJECTIVE:   /78 (BP Location: Left arm)   Pulse 74   Temp (!) 97 °F (36 1 °C) (Temporal)   Resp 18   Wt 123 kg (271 lb 6 4 oz)   SpO2 96%   BMI 38 94 kg/m²   Pain Assessment:  0  Karnofsky: 70    Physical Exam     The patient presents today no apparent distress  Sclera anicteric  Persistent partial alopecia  Erythema of the scalp has resolved  Normal respiratory effort  Normal speech  Normal affect  Normal gait          RESULTS    Lab Results:   Recent Results (from the past 672 hour(s))   CBC and differential    Collection Time: 08/04/21  9:11 AM   Result Value Ref Range    WBC 2 39 (L) 4 31 - 10 16 Thousand/uL    RBC 3 81 (L) 3 88 - 5 62 Million/uL    Hemoglobin 12 2 12 0 - 17 0 g/dL    Hematocrit 34 4 (L) 36 5 - 49 3 %    MCV 90 82 - 98 fL    MCH 32 0 26 8 - 34 3 pg    MCHC 35 5 31 4 - 37 4 g/dL    RDW 17 7 (H) 11 6 - 15 1 %    MPV 9 5 8 9 - 12 7 fL    Platelets 014 (L) 754 - 390 Thousands/uL    nRBC 0 /100 WBCs    Neutrophils Relative 50 43 - 75 %    Immat GRANS % 0 0 - 2 %    Lymphocytes Relative 29 14 - 44 %    Monocytes Relative 20 (H) 4 - 12 %    Eosinophils Relative 1 0 - 6 %    Basophils Relative 0 0 - 1 %    Neutrophils Absolute 1 17 (L) 1 85 - 7 62 Thousands/µL    Immature Grans Absolute 0 01 0 00 - 0 20 Thousand/uL    Lymphocytes Absolute 0 70 0 60 - 4 47 Thousands/µL    Monocytes Absolute 0 48 0 17 - 1 22 Thousand/µL    Eosinophils Absolute 0 03 0 00 - 0 61 Thousand/µL    Basophils Absolute 0 00 0 00 - 0 10 Thousands/µL   Comprehensive metabolic panel    Collection Time: 08/04/21  9:11 AM   Result Value Ref Range    Sodium 139 136 - 145 mmol/L    Potassium 3 6 3 5 - 5 3 mmol/L    Chloride 109 (H) 100 - 108 mmol/L    CO2 23 21 - 32 mmol/L    ANION GAP 7 4 - 13 mmol/L    BUN 16 5 - 25 mg/dL    Creatinine 1 08 0 60 - 1 30 mg/dL    Glucose, Fasting 112 (H) 65 - 99 mg/dL    Calcium 8 8 8 3 - 10 1 mg/dL    AST 21 5 - 45 U/L    ALT 42 12 - 78 U/L    Alkaline Phosphatase 60 46 - 116 U/L    Total Protein 6 9 6 4 - 8 2 g/dL    Albumin 3 6 3 5 - 5 0 g/dL    Total Bilirubin 0 95 0 20 - 1 00 mg/dL    eGFR 69 ml/min/1 73sq m   CBC and differential    Collection Time: 08/11/21 10:03 AM   Result Value Ref Range    WBC 4 28 (L) 4 31 - 10 16 Thousand/uL    RBC 4 14 3 88 - 5 62 Million/uL    Hemoglobin 12 9 12 0 - 17 0 g/dL    Hematocrit 37 3 36 5 - 49 3 %    MCV 90 82 - 98 fL    MCH 31 2 26 8 - 34 3 pg    MCHC 34 6 31 4 - 37 4 g/dL    RDW 18 0 (H) 11 6 - 15 1 %    MPV 8 9 8 9 - 12 7 fL    Platelets 347 724 - 313 Thousands/uL    nRBC 0 /100 WBCs    Neutrophils Relative 58 43 - 75 %    Immat GRANS % 1 0 - 2 %    Lymphocytes Relative 17 14 - 44 %    Monocytes Relative 22 (H) 4 - 12 %    Eosinophils Relative 2 0 - 6 %    Basophils Relative 0 0 - 1 %    Neutrophils Absolute 2 52 1 85 - 7 62 Thousands/µL    Immature Grans Absolute 0 03 0 00 - 0 20 Thousand/uL    Lymphocytes Absolute 0 71 0 60 - 4 47 Thousands/µL    Monocytes Absolute 0 92 0 17 - 1 22 Thousand/µL    Eosinophils Absolute 0 09 0 00 - 0 61 Thousand/µL    Basophils Absolute 0 01 0 00 - 0 10 Thousands/µL   Comprehensive metabolic panel    Collection Time: 08/11/21 10:03 AM   Result Value Ref Range    Sodium 141 136 - 145 mmol/L    Potassium 3 7 3 5 - 5 3 mmol/L    Chloride 111 (H) 100 - 108 mmol/L    CO2 23 21 - 32 mmol/L    ANION GAP 7 4 - 13 mmol/L    BUN 13 5 - 25 mg/dL    Creatinine 1 19 0 60 - 1 30 mg/dL    Glucose, Fasting 110 (H) 65 - 99 mg/dL    Calcium 9 3 8 3 - 10 1 mg/dL    AST 19 5 - 45 U/L    ALT 35 12 - 78 U/L    Alkaline Phosphatase 80 46 - 116 U/L    Total Protein 7 4 6 4 - 8 2 g/dL    Albumin 3 7 3 5 - 5 0 g/dL    Total Bilirubin 0 84 0 20 - 1 00 mg/dL    eGFR 62 ml/min/1 73sq m   CBC and differential    Collection Time: 08/18/21 10:02 AM   Result Value Ref Range    WBC 7 78 4 31 - 10 16 Thousand/uL    RBC 4 20 3 88 - 5 62 Million/uL    Hemoglobin 13 1 12 0 - 17 0 g/dL    Hematocrit 38 3 36 5 - 49 3 %    MCV 91 82 - 98 fL    MCH 31 2 26 8 - 34 3 pg    MCHC 34 2 31 4 - 37 4 g/dL    RDW 18 1 (H) 11 6 - 15 1 %    MPV 9 2 8 9 - 12 7 fL    Platelets 071 274 - 356 Thousands/uL   Comprehensive metabolic panel    Collection Time: 08/18/21 10:02 AM   Result Value Ref Range    Sodium 141 136 - 145 mmol/L    Potassium 3 8 3 5 - 5 3 mmol/L    Chloride 112 (H) 100 - 108 mmol/L    CO2 24 21 - 32 mmol/L    ANION GAP 5 4 - 13 mmol/L    BUN 16 5 - 25 mg/dL    Creatinine 1 01 0 60 - 1 30 mg/dL    Glucose, Fasting 105 (H) 65 - 99 mg/dL    Calcium 9 1 8 3 - 10 1 mg/dL    AST 17 5 - 45 U/L    ALT 38 12 - 78 U/L    Alkaline Phosphatase 74 46 - 116 U/L    Total Protein 7 2 6 4 - 8 2 g/dL    Albumin 3 5 3 5 - 5 0 g/dL    Total Bilirubin 0 58 0 20 - 1 00 mg/dL    eGFR 75 ml/min/1 73sq m   Manual Differential(PHLEBS Do Not Order)    Collection Time: 08/18/21 10:02 AM   Result Value Ref Range    Segmented % 66 43 - 75 %    Bands % 2 0 - 8 %    Lymphocytes % 14 14 - 44 %    Monocytes % 10 4 - 12 %    Eosinophils, % 3 0 - 6 %    Basophils % 0 0 - 1 %    Metamyelocytes% 1 0 - 1 %    Myelocytes % 4 (H) 0 - 1 %    Absolute Neutrophils 5 29 1 85 - 7 62 Thousand/uL    Lymphocytes Absolute 1 09 0 60 - 4 47 Thousand/uL    Monocytes Absolute 0 78 0 00 - 1 22 Thousand/uL    Eosinophils Absolute 0 23 0 00 - 0 40 Thousand/uL    Basophils Absolute 0 00 0 00 - 0 10 Thousand/uL    Total Counted      RBC Morphology Present     Anisocytosis Present     Polychromasia Present     Platelet Estimate Adequate Adequate   CBC and differential    Collection Time: 08/25/21 10:02 AM   Result Value Ref Range    WBC 9 53 4 31 - 10 16 Thousand/uL    RBC 4 02 3 88 - 5 62 Million/uL    Hemoglobin 12 6 12 0 - 17 0 g/dL    Hematocrit 37 8 36 5 - 49 3 %    MCV 94 82 - 98 fL    MCH 31 3 26 8 - 34 3 pg    MCHC 33 3 31 4 - 37 4 g/dL    RDW 17 9 (H) 11 6 - 15 1 %    MPV 8 9 8 9 - 12 7 fL    Platelets 961 432 - 673 Thousands/uL   Comprehensive metabolic panel    Collection Time: 08/25/21 10:02 AM   Result Value Ref Range    Sodium 139 136 - 145 mmol/L    Potassium 3 9 3 5 - 5 3 mmol/L    Chloride 110 (H) 100 - 108 mmol/L    CO2 26 21 - 32 mmol/L    ANION GAP 3 (L) 4 - 13 mmol/L    BUN 21 5 - 25 mg/dL    Creatinine 0 89 0 60 - 1 30 mg/dL    Glucose 84 65 - 140 mg/dL    Calcium 9 0 8 3 - 10 1 mg/dL    Corrected Calcium 9 5 8 3 - 10 1 mg/dL    AST 9 5 - 45 U/L    ALT 29 12 - 78 U/L    Alkaline Phosphatase 70 46 - 116 U/L    Total Protein 7 1 6 4 - 8 2 g/dL    Albumin 3 4 (L) 3 5 - 5 0 g/dL    Total Bilirubin 0 53 0 20 - 1 00 mg/dL    eGFR 87 ml/min/1 73sq m   Manual Differential(PHLEBS Do Not Order)    Collection Time: 08/25/21 10:02 AM   Result Value Ref Range    Segmented % 69 43 - 75 %    Bands % 4 0 - 8 %    Lymphocytes % 11 (L) 14 - 44 %    Monocytes % 12 4 - 12 %    Eosinophils, % 4 0 - 6 %    Basophils % 0 0 - 1 %    Absolute Neutrophils 6 96 1 85 - 7 62 Thousand/uL    Lymphocytes Absolute 1 05 0 60 - 4 47 Thousand/uL    Monocytes Absolute 1 14 0 00 - 1 22 Thousand/uL    Eosinophils Absolute 0 38 0 00 - 0 40 Thousand/uL    Basophils Absolute 0 00 0 00 - 0 10 Thousand/uL    Total Counted      RBC Morphology Present Polychromasia Present     Platelet Estimate Adequate Adequate    Artifact Present        Imaging Studies:MRI brain w wo contrast    Result Date: 8/26/2021  Narrative: MRI BRAIN WITH AND WITHOUT CONTRAST INDICATION: C71 9: Malignant neoplasm of brain, unspecified D69 6: Thrombocytopenia, unspecified  COMPARISON:  MRI brain with and without contrast May 29, 2021  MRI brain with and without contrast May 6, 2021  TECHNIQUE: Sagittal T1, axial T2, axial FLAIR, axial T1, axial Morrison, axial diffusion  Sagittal, axial T1 postcontrast   Axial bravo postcontrast with coronal reconstructions  IV Contrast:  12 mL of Gadobutrol injection (SINGLE-DOSE)  IMAGE QUALITY:   Diagnostic  FINDINGS: BRAIN PARENCHYMA: Postsurgical changes of left temporal craniotomy for resection of left temporal lobe mass  Left temporal resection cavity with peripheral hemosiderin deposition, resolved intrinsic T1 hyperintense blood products, similar surrounding gliosis, and resolved marginal ischemic changes and mild peripheral enhancement  No new nodular abnormal enhancement is within or surrounds the left temporal resection cavity  Unchanged mild dural thickening and enhancement deep to the craniotomy site, likely postsurgical change  No new brain mass  No mass effect or midline shift  There is no intracranial hemorrhage  Moderate diffuse cerebral atrophy  Diffusion imaging is unremarkable  Small scattered hyperintensities on T2/FLAIR imaging are noted in the periventricular and subcortical white matter demonstrating an appearance that is statistically most likely to represent mild microangiopathic change  VENTRICLES:  Ex vacuo dilatation of left lateral ventricle, unchanged  SELLA AND PITUITARY GLAND:  Normal  ORBITS:  Normal  PARANASAL SINUSES:  Small bilateral maxillary mucous retention cysts  VASCULATURE:  Evaluation of the major intracranial vasculature demonstrates appropriate flow voids   CALVARIUM AND SKULL BASE:  Left temporoparietal craniotomy  Small left mastoid effusion  EXTRACRANIAL SOFT TISSUES:  Normal      Impression: No evidence of residual or recurrent disease status post left temporal lobe mass resection  Workstation performed: [id number]           Assessment/Plan:  No orders of the defined types were placed in this encounter  Bacilio Allen   Has done well in follow-up  His 1st post treatment MRI appears quite good with no residual enhancing disease and no significant edema  He will be reinitiating Temodar and following closely with Medical Oncology  He will return to our department in 3 months for routine follow-up  Gertrudis Kang MD  9/1/2021,9:05 AM    Portions of the record may have been created with voice recognition software   Occasional wrong word or "sound a like" substitutions may have occurred due to the inherent limitations of voice recognition software   Read the chart carefully and recognize, using context, where substitutions have occurred

## 2021-09-07 ENCOUNTER — TELEPHONE (OUTPATIENT)
Dept: HEMATOLOGY ONCOLOGY | Facility: CLINIC | Age: 71
End: 2021-09-07

## 2021-09-24 ENCOUNTER — APPOINTMENT (OUTPATIENT)
Dept: LAB | Facility: HOSPITAL | Age: 71
End: 2021-09-24
Payer: COMMERCIAL

## 2021-09-24 ENCOUNTER — APPOINTMENT (OUTPATIENT)
Dept: LAB | Facility: HOSPITAL | Age: 71
End: 2021-09-24
Attending: INTERNAL MEDICINE
Payer: COMMERCIAL

## 2021-09-24 DIAGNOSIS — C71.9 GBM (GLIOBLASTOMA MULTIFORME) (HCC): ICD-10-CM

## 2021-09-24 PROCEDURE — 36415 COLL VENOUS BLD VENIPUNCTURE: CPT

## 2021-09-28 DIAGNOSIS — C71.9 GBM (GLIOBLASTOMA MULTIFORME) (HCC): Primary | ICD-10-CM

## 2021-09-28 RX ORDER — TEMOZOLOMIDE 100 MG/1
CAPSULE ORAL
Qty: 15 CAPSULE | Refills: 6 | Status: SHIPPED | OUTPATIENT
Start: 2021-09-28 | End: 2021-09-30 | Stop reason: SDUPTHER

## 2021-09-28 NOTE — TELEPHONE ENCOUNTER
Dr Deyanira Lopez ordered an increase in temodar dose to 125 mg/m2  New dose is 300 mg daily on days 1-5 every 28 days  Called and spoke with Mrs Allen to let her know of the new dosage  Pt to c/b after next cbc to discuss how pt is tolerating this new dose and evaluate his recent cbc

## 2021-09-30 DIAGNOSIS — C71.9 GBM (GLIOBLASTOMA MULTIFORME) (HCC): ICD-10-CM

## 2021-09-30 RX ORDER — TEMOZOLOMIDE 100 MG/1
CAPSULE ORAL
Qty: 15 CAPSULE | Refills: 6 | Status: SHIPPED | OUTPATIENT
Start: 2021-09-30

## 2021-10-04 ENCOUNTER — OFFICE VISIT (OUTPATIENT)
Dept: HEMATOLOGY ONCOLOGY | Facility: CLINIC | Age: 71
End: 2021-10-04
Payer: COMMERCIAL

## 2021-10-04 VITALS
BODY MASS INDEX: 38.8 KG/M2 | WEIGHT: 271 LBS | TEMPERATURE: 97.6 F | HEART RATE: 61 BPM | HEIGHT: 70 IN | SYSTOLIC BLOOD PRESSURE: 122 MMHG | DIASTOLIC BLOOD PRESSURE: 74 MMHG | OXYGEN SATURATION: 98 % | RESPIRATION RATE: 17 BRPM

## 2021-10-04 DIAGNOSIS — D69.6 THROMBOCYTOPENIA (HCC): ICD-10-CM

## 2021-10-04 DIAGNOSIS — C71.9 GBM (GLIOBLASTOMA MULTIFORME) (HCC): Primary | ICD-10-CM

## 2021-10-04 DIAGNOSIS — I26.99 OTHER PULMONARY EMBOLISM WITHOUT ACUTE COR PULMONALE, UNSPECIFIED CHRONICITY (HCC): ICD-10-CM

## 2021-10-04 PROCEDURE — 99215 OFFICE O/P EST HI 40 MIN: CPT | Performed by: INTERNAL MEDICINE

## 2021-10-13 ENCOUNTER — APPOINTMENT (OUTPATIENT)
Dept: RADIATION ONCOLOGY | Facility: HOSPITAL | Age: 71
End: 2021-10-13
Attending: RADIOLOGY
Payer: COMMERCIAL

## 2021-10-13 ENCOUNTER — APPOINTMENT (OUTPATIENT)
Dept: LAB | Facility: HOSPITAL | Age: 71
End: 2021-10-13
Attending: INTERNAL MEDICINE
Payer: COMMERCIAL

## 2021-10-13 DIAGNOSIS — C71.9 GBM (GLIOBLASTOMA MULTIFORME) (HCC): ICD-10-CM

## 2021-10-13 LAB
ALBUMIN SERPL BCP-MCNC: 3.5 G/DL (ref 3.5–5)
ALP SERPL-CCNC: 60 U/L (ref 46–116)
ALT SERPL W P-5'-P-CCNC: 22 U/L (ref 12–78)
ANION GAP SERPL CALCULATED.3IONS-SCNC: 6 MMOL/L (ref 4–13)
AST SERPL W P-5'-P-CCNC: 15 U/L (ref 5–45)
BASOPHILS # BLD AUTO: 0.01 THOUSANDS/ΜL (ref 0–0.1)
BASOPHILS NFR BLD AUTO: 0 % (ref 0–1)
BILIRUB SERPL-MCNC: 0.86 MG/DL (ref 0.2–1)
BUN SERPL-MCNC: 20 MG/DL (ref 5–25)
CALCIUM SERPL-MCNC: 9.3 MG/DL (ref 8.3–10.1)
CHLORIDE SERPL-SCNC: 111 MMOL/L (ref 100–108)
CO2 SERPL-SCNC: 22 MMOL/L (ref 21–32)
CREAT SERPL-MCNC: 1.06 MG/DL (ref 0.6–1.3)
EOSINOPHIL # BLD AUTO: 0.18 THOUSAND/ΜL (ref 0–0.61)
EOSINOPHIL NFR BLD AUTO: 3 % (ref 0–6)
ERYTHROCYTE [DISTWIDTH] IN BLOOD BY AUTOMATED COUNT: 15.3 % (ref 11.6–15.1)
GFR SERPL CREATININE-BSD FRML MDRD: 71 ML/MIN/1.73SQ M
GLUCOSE P FAST SERPL-MCNC: 97 MG/DL (ref 65–99)
HCT VFR BLD AUTO: 39 % (ref 36.5–49.3)
HGB BLD-MCNC: 13 G/DL (ref 12–17)
IMM GRANULOCYTES # BLD AUTO: 0.05 THOUSAND/UL (ref 0–0.2)
IMM GRANULOCYTES NFR BLD AUTO: 1 % (ref 0–2)
LYMPHOCYTES # BLD AUTO: 0.82 THOUSANDS/ΜL (ref 0.6–4.47)
LYMPHOCYTES NFR BLD AUTO: 14 % (ref 14–44)
MCH RBC QN AUTO: 31.9 PG (ref 26.8–34.3)
MCHC RBC AUTO-ENTMCNC: 33.3 G/DL (ref 31.4–37.4)
MCV RBC AUTO: 96 FL (ref 82–98)
MONOCYTES # BLD AUTO: 0.56 THOUSAND/ΜL (ref 0.17–1.22)
MONOCYTES NFR BLD AUTO: 9 % (ref 4–12)
NEUTROPHILS # BLD AUTO: 4.35 THOUSANDS/ΜL (ref 1.85–7.62)
NEUTS SEG NFR BLD AUTO: 73 % (ref 43–75)
NRBC BLD AUTO-RTO: 0 /100 WBCS
PLATELET # BLD AUTO: 141 THOUSANDS/UL (ref 149–390)
PMV BLD AUTO: 9.1 FL (ref 8.9–12.7)
POTASSIUM SERPL-SCNC: 3.8 MMOL/L (ref 3.5–5.3)
PROT SERPL-MCNC: 7 G/DL (ref 6.4–8.2)
RBC # BLD AUTO: 4.08 MILLION/UL (ref 3.88–5.62)
SODIUM SERPL-SCNC: 139 MMOL/L (ref 136–145)
WBC # BLD AUTO: 5.97 THOUSAND/UL (ref 4.31–10.16)

## 2021-10-13 PROCEDURE — 86361 T CELL ABSOLUTE COUNT: CPT

## 2021-10-13 PROCEDURE — 85025 COMPLETE CBC W/AUTO DIFF WBC: CPT

## 2021-10-13 PROCEDURE — 80053 COMPREHEN METABOLIC PANEL: CPT

## 2021-10-13 PROCEDURE — 36415 COLL VENOUS BLD VENIPUNCTURE: CPT

## 2021-10-14 ENCOUNTER — IMMUNIZATIONS (OUTPATIENT)
Dept: FAMILY MEDICINE CLINIC | Facility: HOSPITAL | Age: 71
End: 2021-10-14

## 2021-10-14 DIAGNOSIS — Z23 ENCOUNTER FOR IMMUNIZATION: Primary | ICD-10-CM

## 2021-10-14 LAB
BASOPHILS # BLD AUTO: 0 X10E3/UL (ref 0–0.2)
BASOPHILS NFR BLD AUTO: 0 %
CD3+CD4+ CELLS # BLD: 466 /UL (ref 359–1519)
CD3+CD4+ CELLS NFR BLD: 58.3 % (ref 30.8–58.5)
EOSINOPHIL # BLD AUTO: 0.2 X10E3/UL (ref 0–0.4)
EOSINOPHIL NFR BLD AUTO: 3 %
ERYTHROCYTE [DISTWIDTH] IN BLOOD BY AUTOMATED COUNT: 15.1 % (ref 11.6–15.4)
HCT VFR BLD AUTO: 39.4 % (ref 37.5–51)
HGB BLD-MCNC: 13 G/DL (ref 13–17.7)
IMM GRANULOCYTES # BLD: 0 X10E3/UL (ref 0–0.1)
IMM GRANULOCYTES NFR BLD: 1 %
LYMPHOCYTES # BLD AUTO: 0.8 X10E3/UL (ref 0.7–3.1)
LYMPHOCYTES NFR BLD AUTO: 13 %
MCH RBC QN AUTO: 31.3 PG (ref 26.6–33)
MCHC RBC AUTO-ENTMCNC: 33 G/DL (ref 31.5–35.7)
MCV RBC AUTO: 95 FL (ref 79–97)
MONOCYTES # BLD AUTO: 0.6 X10E3/UL (ref 0.1–0.9)
MONOCYTES NFR BLD AUTO: 10 %
NEUTROPHILS # BLD AUTO: 4.3 X10E3/UL (ref 1.4–7)
NEUTROPHILS NFR BLD AUTO: 73 %
PLATELET # BLD AUTO: 152 X10E3/UL (ref 150–450)
RBC # BLD AUTO: 4.15 X10E6/UL (ref 4.14–5.8)
WBC # BLD AUTO: 5.9 X10E3/UL (ref 3.4–10.8)

## 2021-10-14 PROCEDURE — 91300 SARS-COV-2 / COVID-19 MRNA VACCINE (PFIZER-BIONTECH) 30 MCG: CPT

## 2021-10-14 PROCEDURE — 0001A SARS-COV-2 / COVID-19 MRNA VACCINE (PFIZER-BIONTECH) 30 MCG: CPT

## 2021-10-21 ENCOUNTER — RA CDI HCC (OUTPATIENT)
Dept: OTHER | Facility: HOSPITAL | Age: 71
End: 2021-10-21

## 2021-10-23 ENCOUNTER — HOSPITAL ENCOUNTER (OUTPATIENT)
Dept: RADIOLOGY | Facility: HOSPITAL | Age: 71
Discharge: HOME | End: 2021-10-23
Attending: INTERNAL MEDICINE
Payer: COMMERCIAL

## 2021-10-23 DIAGNOSIS — C71.9 GBM (GLIOBLASTOMA MULTIFORME) (HCC): ICD-10-CM

## 2021-10-23 PROCEDURE — A9585 GADOBUTROL INJECTION: HCPCS | Performed by: INTERNAL MEDICINE

## 2021-10-23 PROCEDURE — 70553 MRI BRAIN STEM W/O & W/DYE: CPT

## 2021-10-23 PROCEDURE — G1004 CDSM NDSC: HCPCS

## 2021-10-23 RX ADMIN — GADOBUTROL 12 ML: 604.72 INJECTION INTRAVENOUS at 15:53

## 2021-10-26 ENCOUNTER — OFFICE VISIT (OUTPATIENT)
Dept: FAMILY MEDICINE CLINIC | Facility: CLINIC | Age: 71
End: 2021-10-26
Payer: COMMERCIAL

## 2021-10-26 VITALS
HEIGHT: 72 IN | DIASTOLIC BLOOD PRESSURE: 80 MMHG | WEIGHT: 273.6 LBS | SYSTOLIC BLOOD PRESSURE: 136 MMHG | BODY MASS INDEX: 37.06 KG/M2 | RESPIRATION RATE: 16 BRPM | HEART RATE: 84 BPM

## 2021-10-26 DIAGNOSIS — Z23 NEED FOR VACCINATION: ICD-10-CM

## 2021-10-26 DIAGNOSIS — Z00.00 MEDICARE ANNUAL WELLNESS VISIT, SUBSEQUENT: Primary | ICD-10-CM

## 2021-10-26 PROCEDURE — 3725F SCREEN DEPRESSION PERFORMED: CPT | Performed by: PHYSICIAN ASSISTANT

## 2021-10-26 PROCEDURE — 1170F FXNL STATUS ASSESSED: CPT | Performed by: PHYSICIAN ASSISTANT

## 2021-10-26 PROCEDURE — 1036F TOBACCO NON-USER: CPT | Performed by: PHYSICIAN ASSISTANT

## 2021-10-26 PROCEDURE — 1125F AMNT PAIN NOTED PAIN PRSNT: CPT | Performed by: PHYSICIAN ASSISTANT

## 2021-10-26 PROCEDURE — 3008F BODY MASS INDEX DOCD: CPT | Performed by: PHYSICIAN ASSISTANT

## 2021-10-26 PROCEDURE — 3288F FALL RISK ASSESSMENT DOCD: CPT | Performed by: PHYSICIAN ASSISTANT

## 2021-10-26 PROCEDURE — 90662 IIV NO PRSV INCREASED AG IM: CPT

## 2021-10-26 PROCEDURE — G0439 PPPS, SUBSEQ VISIT: HCPCS | Performed by: PHYSICIAN ASSISTANT

## 2021-10-26 PROCEDURE — 1160F RVW MEDS BY RX/DR IN RCRD: CPT | Performed by: PHYSICIAN ASSISTANT

## 2021-10-26 PROCEDURE — 1100F PTFALLS ASSESS-DOCD GE2>/YR: CPT | Performed by: PHYSICIAN ASSISTANT

## 2021-10-26 PROCEDURE — G0008 ADMIN INFLUENZA VIRUS VAC: HCPCS

## 2021-11-01 ENCOUNTER — TELEPHONE (OUTPATIENT)
Dept: HEMATOLOGY ONCOLOGY | Facility: HOSPITAL | Age: 71
End: 2021-11-01

## 2021-11-01 ENCOUNTER — OFFICE VISIT (OUTPATIENT)
Dept: HEMATOLOGY ONCOLOGY | Facility: CLINIC | Age: 71
End: 2021-11-01
Payer: COMMERCIAL

## 2021-11-01 VITALS
RESPIRATION RATE: 16 BRPM | SYSTOLIC BLOOD PRESSURE: 138 MMHG | TEMPERATURE: 98.8 F | OXYGEN SATURATION: 95 % | HEART RATE: 74 BPM | BODY MASS INDEX: 37.25 KG/M2 | DIASTOLIC BLOOD PRESSURE: 80 MMHG | WEIGHT: 275 LBS | HEIGHT: 72 IN

## 2021-11-01 DIAGNOSIS — K59.03 DRUG-INDUCED CONSTIPATION: ICD-10-CM

## 2021-11-01 DIAGNOSIS — R76.8 LOW CD4 CELL COUNT DETERMINED BY FLOW CYTOMETRY: ICD-10-CM

## 2021-11-01 DIAGNOSIS — D69.6 THROMBOCYTOPENIA (HCC): ICD-10-CM

## 2021-11-01 DIAGNOSIS — C71.9 GBM (GLIOBLASTOMA MULTIFORME) (HCC): Primary | ICD-10-CM

## 2021-11-01 DIAGNOSIS — C71.9 GBM (GLIOBLASTOMA MULTIFORME) (HCC): ICD-10-CM

## 2021-11-01 PROCEDURE — 3008F BODY MASS INDEX DOCD: CPT | Performed by: INTERNAL MEDICINE

## 2021-11-01 PROCEDURE — 99215 OFFICE O/P EST HI 40 MIN: CPT | Performed by: INTERNAL MEDICINE

## 2021-11-01 PROCEDURE — 1036F TOBACCO NON-USER: CPT | Performed by: INTERNAL MEDICINE

## 2021-11-01 PROCEDURE — 1160F RVW MEDS BY RX/DR IN RCRD: CPT | Performed by: INTERNAL MEDICINE

## 2021-11-01 RX ORDER — SULFAMETHOXAZOLE AND TRIMETHOPRIM 800; 160 MG/1; MG/1
TABLET ORAL
Qty: 60 TABLET | Refills: 3 | Status: SHIPPED | OUTPATIENT
Start: 2021-11-01 | End: 2021-11-01 | Stop reason: SDUPTHER

## 2021-11-01 RX ORDER — SULFAMETHOXAZOLE AND TRIMETHOPRIM 800; 160 MG/1; MG/1
TABLET ORAL
Qty: 90 TABLET | Refills: 3 | Status: SHIPPED | OUTPATIENT
Start: 2021-11-01 | End: 2022-11-01

## 2021-11-03 DIAGNOSIS — G93.6 CEREBRAL EDEMA (HCC): ICD-10-CM

## 2021-11-03 DIAGNOSIS — R11.2 CHEMOTHERAPY INDUCED NAUSEA AND VOMITING: ICD-10-CM

## 2021-11-03 DIAGNOSIS — T45.1X5A CHEMOTHERAPY INDUCED NAUSEA AND VOMITING: ICD-10-CM

## 2021-11-03 RX ORDER — DEXAMETHASONE 2 MG/1
2 TABLET ORAL
Qty: 30 TABLET | Refills: 2 | Status: SHIPPED | OUTPATIENT
Start: 2021-11-03 | End: 2022-05-04 | Stop reason: SDUPTHER

## 2021-11-10 ENCOUNTER — APPOINTMENT (OUTPATIENT)
Dept: LAB | Facility: HOSPITAL | Age: 71
End: 2021-11-10
Attending: INTERNAL MEDICINE
Payer: COMMERCIAL

## 2021-11-10 LAB
ALBUMIN SERPL BCP-MCNC: 3.5 G/DL (ref 3.5–5)
ALP SERPL-CCNC: 55 U/L (ref 46–116)
ALT SERPL W P-5'-P-CCNC: 23 U/L (ref 12–78)
ANION GAP SERPL CALCULATED.3IONS-SCNC: 6 MMOL/L (ref 4–13)
AST SERPL W P-5'-P-CCNC: 12 U/L (ref 5–45)
BASOPHILS # BLD AUTO: 0.01 THOUSANDS/ΜL (ref 0–0.1)
BASOPHILS NFR BLD AUTO: 0 % (ref 0–1)
BILIRUB SERPL-MCNC: 0.58 MG/DL (ref 0.2–1)
BUN SERPL-MCNC: 24 MG/DL (ref 5–25)
CALCIUM SERPL-MCNC: 9.3 MG/DL (ref 8.3–10.1)
CHLORIDE SERPL-SCNC: 112 MMOL/L (ref 100–108)
CO2 SERPL-SCNC: 23 MMOL/L (ref 21–32)
CREAT SERPL-MCNC: 1.16 MG/DL (ref 0.6–1.3)
EOSINOPHIL # BLD AUTO: 0.07 THOUSAND/ΜL (ref 0–0.61)
EOSINOPHIL NFR BLD AUTO: 2 % (ref 0–6)
ERYTHROCYTE [DISTWIDTH] IN BLOOD BY AUTOMATED COUNT: 14.6 % (ref 11.6–15.1)
GFR SERPL CREATININE-BSD FRML MDRD: 63 ML/MIN/1.73SQ M
GLUCOSE P FAST SERPL-MCNC: 99 MG/DL (ref 65–99)
HCT VFR BLD AUTO: 37.9 % (ref 36.5–49.3)
HGB BLD-MCNC: 12.7 G/DL (ref 12–17)
IMM GRANULOCYTES # BLD AUTO: 0.01 THOUSAND/UL (ref 0–0.2)
IMM GRANULOCYTES NFR BLD AUTO: 0 % (ref 0–2)
LYMPHOCYTES # BLD AUTO: 0.71 THOUSANDS/ΜL (ref 0.6–4.47)
LYMPHOCYTES NFR BLD AUTO: 22 % (ref 14–44)
MCH RBC QN AUTO: 32.2 PG (ref 26.8–34.3)
MCHC RBC AUTO-ENTMCNC: 33.5 G/DL (ref 31.4–37.4)
MCV RBC AUTO: 96 FL (ref 82–98)
MONOCYTES # BLD AUTO: 0.44 THOUSAND/ΜL (ref 0.17–1.22)
MONOCYTES NFR BLD AUTO: 13 % (ref 4–12)
NEUTROPHILS # BLD AUTO: 2.07 THOUSANDS/ΜL (ref 1.85–7.62)
NEUTS SEG NFR BLD AUTO: 63 % (ref 43–75)
NRBC BLD AUTO-RTO: 0 /100 WBCS
POTASSIUM SERPL-SCNC: 3.8 MMOL/L (ref 3.5–5.3)
PROT SERPL-MCNC: 6.8 G/DL (ref 6.4–8.2)
RBC # BLD AUTO: 3.95 MILLION/UL (ref 3.88–5.62)
SODIUM SERPL-SCNC: 141 MMOL/L (ref 136–145)
WBC # BLD AUTO: 3.31 THOUSAND/UL (ref 4.31–10.16)

## 2021-11-10 PROCEDURE — 80053 COMPREHEN METABOLIC PANEL: CPT | Performed by: INTERNAL MEDICINE

## 2021-11-10 PROCEDURE — 36415 COLL VENOUS BLD VENIPUNCTURE: CPT | Performed by: INTERNAL MEDICINE

## 2021-11-10 PROCEDURE — 85025 COMPLETE CBC W/AUTO DIFF WBC: CPT | Performed by: INTERNAL MEDICINE

## 2021-11-18 ENCOUNTER — TELEPHONE (OUTPATIENT)
Dept: HEMATOLOGY ONCOLOGY | Facility: CLINIC | Age: 71
End: 2021-11-18

## 2021-11-29 DIAGNOSIS — J44.9 CHRONIC OBSTRUCTIVE PULMONARY DISEASE, UNSPECIFIED COPD TYPE (HCC): ICD-10-CM

## 2021-11-30 ENCOUNTER — TELEPHONE (OUTPATIENT)
Dept: FAMILY MEDICINE CLINIC | Facility: CLINIC | Age: 71
End: 2021-11-30

## 2021-12-07 ENCOUNTER — TELEPHONE (OUTPATIENT)
Dept: FAMILY MEDICINE CLINIC | Facility: CLINIC | Age: 71
End: 2021-12-07

## 2021-12-07 DIAGNOSIS — J44.9 CHRONIC OBSTRUCTIVE PULMONARY DISEASE, UNSPECIFIED COPD TYPE (HCC): ICD-10-CM

## 2021-12-09 ENCOUNTER — OFFICE VISIT (OUTPATIENT)
Dept: HEMATOLOGY ONCOLOGY | Facility: CLINIC | Age: 71
End: 2021-12-09
Payer: COMMERCIAL

## 2021-12-09 VITALS
WEIGHT: 281 LBS | HEIGHT: 72 IN | DIASTOLIC BLOOD PRESSURE: 80 MMHG | HEART RATE: 63 BPM | OXYGEN SATURATION: 97 % | BODY MASS INDEX: 38.06 KG/M2 | TEMPERATURE: 96.9 F | SYSTOLIC BLOOD PRESSURE: 142 MMHG | RESPIRATION RATE: 16 BRPM

## 2021-12-09 DIAGNOSIS — C71.9 GBM (GLIOBLASTOMA MULTIFORME) (HCC): Primary | ICD-10-CM

## 2021-12-09 PROCEDURE — 99214 OFFICE O/P EST MOD 30 MIN: CPT | Performed by: INTERNAL MEDICINE

## 2021-12-15 ENCOUNTER — APPOINTMENT (OUTPATIENT)
Dept: LAB | Facility: HOSPITAL | Age: 71
End: 2021-12-15
Attending: INTERNAL MEDICINE
Payer: COMMERCIAL

## 2021-12-17 ENCOUNTER — CLINICAL SUPPORT (OUTPATIENT)
Dept: RADIATION ONCOLOGY | Facility: HOSPITAL | Age: 71
End: 2021-12-17
Attending: STUDENT IN AN ORGANIZED HEALTH CARE EDUCATION/TRAINING PROGRAM
Payer: COMMERCIAL

## 2021-12-17 VITALS
TEMPERATURE: 97.5 F | SYSTOLIC BLOOD PRESSURE: 130 MMHG | BODY MASS INDEX: 38.33 KG/M2 | HEART RATE: 71 BPM | RESPIRATION RATE: 18 BRPM | OXYGEN SATURATION: 96 % | DIASTOLIC BLOOD PRESSURE: 85 MMHG | WEIGHT: 283 LBS | HEIGHT: 72 IN

## 2021-12-17 DIAGNOSIS — C71.9 GBM (GLIOBLASTOMA MULTIFORME) (HCC): Primary | ICD-10-CM

## 2021-12-17 PROCEDURE — 99211 OFF/OP EST MAY X REQ PHY/QHP: CPT | Performed by: STUDENT IN AN ORGANIZED HEALTH CARE EDUCATION/TRAINING PROGRAM

## 2021-12-17 PROCEDURE — 99212 OFFICE O/P EST SF 10 MIN: CPT | Performed by: STUDENT IN AN ORGANIZED HEALTH CARE EDUCATION/TRAINING PROGRAM

## 2021-12-17 RX ORDER — MAG HYDROX/ALUMINUM HYD/SIMETH 400-400-40
1 SUSPENSION, ORAL (FINAL DOSE FORM) ORAL AS NEEDED
COMMUNITY

## 2022-01-01 ENCOUNTER — HOME CARE VISIT (OUTPATIENT)
Dept: HOME HEALTH SERVICES | Facility: HOME HEALTHCARE | Age: 72
End: 2022-01-01

## 2022-01-01 ENCOUNTER — HOME CARE VISIT (OUTPATIENT)
Dept: HOME HOSPICE | Facility: HOSPICE | Age: 72
End: 2022-01-01
Payer: MEDICARE

## 2022-01-06 ENCOUNTER — APPOINTMENT (OUTPATIENT)
Dept: LAB | Facility: HOSPITAL | Age: 72
End: 2022-01-06
Attending: INTERNAL MEDICINE
Payer: COMMERCIAL

## 2022-01-07 ENCOUNTER — TELEPHONE (OUTPATIENT)
Dept: HEMATOLOGY ONCOLOGY | Facility: CLINIC | Age: 72
End: 2022-01-07

## 2022-01-07 DIAGNOSIS — C71.9 GBM (GLIOBLASTOMA MULTIFORME) (HCC): Primary | ICD-10-CM

## 2022-01-07 NOTE — TELEPHONE ENCOUNTER
Left message asking Cristo Sosa to call me back to review when he is due to start his next temodar cycle  Left hopeline number for call back

## 2022-01-07 NOTE — TELEPHONE ENCOUNTER
Attempted to call pt again  Spoke with pts wife Bony Barragan advised me that Preeti Henriquez just finished his 5 days of Temodar  Reviewed his low platelets levels and precautions to take  Elza Jin that Dr Demetra Rivera would like Derrek to repeat labs next week  She verbalized understanding

## 2022-01-12 ENCOUNTER — APPOINTMENT (OUTPATIENT)
Dept: LAB | Facility: HOSPITAL | Age: 72
End: 2022-01-12
Attending: INTERNAL MEDICINE
Payer: COMMERCIAL

## 2022-01-12 DIAGNOSIS — C71.9 GBM (GLIOBLASTOMA MULTIFORME) (HCC): ICD-10-CM

## 2022-01-12 DIAGNOSIS — C71.9 GBM (GLIOBLASTOMA MULTIFORME) (HCC): Primary | ICD-10-CM

## 2022-01-12 LAB
BASOPHILS # BLD AUTO: 0.01 THOUSANDS/ΜL (ref 0–0.1)
BASOPHILS NFR BLD AUTO: 0 % (ref 0–1)
EOSINOPHIL # BLD AUTO: 0.07 THOUSAND/ΜL (ref 0–0.61)
EOSINOPHIL NFR BLD AUTO: 2 % (ref 0–6)
ERYTHROCYTE [DISTWIDTH] IN BLOOD BY AUTOMATED COUNT: 14.8 % (ref 11.6–15.1)
HCT VFR BLD AUTO: 40.2 % (ref 36.5–49.3)
HGB BLD-MCNC: 13.8 G/DL (ref 12–17)
IMM GRANULOCYTES # BLD AUTO: 0.04 THOUSAND/UL (ref 0–0.2)
IMM GRANULOCYTES NFR BLD AUTO: 1 % (ref 0–2)
LYMPHOCYTES # BLD AUTO: 0.9 THOUSANDS/ΜL (ref 0.6–4.47)
LYMPHOCYTES NFR BLD AUTO: 20 % (ref 14–44)
MCH RBC QN AUTO: 33.3 PG (ref 26.8–34.3)
MCHC RBC AUTO-ENTMCNC: 34.3 G/DL (ref 31.4–37.4)
MCV RBC AUTO: 97 FL (ref 82–98)
MONOCYTES # BLD AUTO: 0.64 THOUSAND/ΜL (ref 0.17–1.22)
MONOCYTES NFR BLD AUTO: 14 % (ref 4–12)
NEUTROPHILS # BLD AUTO: 2.82 THOUSANDS/ΜL (ref 1.85–7.62)
NEUTS SEG NFR BLD AUTO: 63 % (ref 43–75)
NRBC BLD AUTO-RTO: 0 /100 WBCS
PLATELET # BLD AUTO: 87 THOUSANDS/UL (ref 149–390)
PMV BLD AUTO: 9.6 FL (ref 8.9–12.7)
RBC # BLD AUTO: 4.15 MILLION/UL (ref 3.88–5.62)
WBC # BLD AUTO: 4.48 THOUSAND/UL (ref 4.31–10.16)

## 2022-01-12 PROCEDURE — 36415 COLL VENOUS BLD VENIPUNCTURE: CPT

## 2022-01-12 PROCEDURE — 85025 COMPLETE CBC W/AUTO DIFF WBC: CPT

## 2022-01-12 NOTE — PROGRESS NOTES
Cbc ordered  Called pt and spoke to Fela Peers  Advised her dr Portia Mendoza would like daniela to recheck his labs in 2 weeks prior to him starting his next cycle of temodar  Yulia voiced understanding

## 2022-01-16 ENCOUNTER — HOSPITAL ENCOUNTER (OUTPATIENT)
Dept: RADIOLOGY | Facility: HOSPITAL | Age: 72
Discharge: HOME | End: 2022-01-16
Attending: INTERNAL MEDICINE
Payer: COMMERCIAL

## 2022-01-16 DIAGNOSIS — C71.9 GBM (GLIOBLASTOMA MULTIFORME) (HCC): ICD-10-CM

## 2022-01-16 PROCEDURE — G1004 CDSM NDSC: HCPCS

## 2022-01-16 PROCEDURE — 70553 MRI BRAIN STEM W/O & W/DYE: CPT

## 2022-01-16 PROCEDURE — A9585 GADOBUTROL INJECTION: HCPCS | Performed by: INTERNAL MEDICINE

## 2022-01-16 RX ADMIN — GADOBUTROL 10 ML: 604.72 INJECTION INTRAVENOUS at 07:48

## 2022-01-20 ENCOUNTER — OFFICE VISIT (OUTPATIENT)
Dept: HEMATOLOGY ONCOLOGY | Facility: CLINIC | Age: 72
End: 2022-01-20
Payer: COMMERCIAL

## 2022-01-20 VITALS
DIASTOLIC BLOOD PRESSURE: 82 MMHG | HEIGHT: 71 IN | SYSTOLIC BLOOD PRESSURE: 142 MMHG | BODY MASS INDEX: 40.18 KG/M2 | OXYGEN SATURATION: 97 % | WEIGHT: 287 LBS | HEART RATE: 79 BPM | TEMPERATURE: 97.6 F | RESPIRATION RATE: 16 BRPM

## 2022-01-20 DIAGNOSIS — D69.6 THROMBOCYTOPENIA (HCC): ICD-10-CM

## 2022-01-20 DIAGNOSIS — C71.9 GBM (GLIOBLASTOMA MULTIFORME) (HCC): Primary | ICD-10-CM

## 2022-01-20 PROCEDURE — 99214 OFFICE O/P EST MOD 30 MIN: CPT | Performed by: INTERNAL MEDICINE

## 2022-01-20 PROCEDURE — 3008F BODY MASS INDEX DOCD: CPT | Performed by: INTERNAL MEDICINE

## 2022-01-20 PROCEDURE — 1036F TOBACCO NON-USER: CPT | Performed by: INTERNAL MEDICINE

## 2022-01-20 PROCEDURE — 1160F RVW MEDS BY RX/DR IN RCRD: CPT | Performed by: INTERNAL MEDICINE

## 2022-01-20 NOTE — PROGRESS NOTES
[facility] [place]  [address] [place] [address]  [phone]  Libertad Cruz, [id number]  01/20/22    Discussion:   In summary, this is a 28-year-old male history of GBM as outlined  He has been on consolidative Temodar, limited by thrombocytopenia  Platelet count a week ago was 87  He is due to start chemotherapy on January 23rd, 3 days from now  I would recheck tomorrow and, if greater than 100 proceed with this, his last cycle of chemotherapy  Recent MRI of the brain shows post treatment changes without evidence of new or progressive disease  No areas of enhancement  Some increase in white matter changes consistent with either microangiopathy or post treatment change  Observation is favored  The patient is quite sedentary  Deconditioned  He requests an electric scooter  I discussed the above with the patient  The patient and his wife voiced understanding and agreement   ______________________________________________________________________    Chief Complaint   Patient presents with    Follow-up       HPI:  Oncology History   GBM (glioblastoma multiforme) ([facility] [address] )   5/26/2021 Initial Diagnosis    Early May 2021 patient presented with speech difficulties  MRI of the brain showed 5 cm mass in left temporal region  May 11, 2021 patient underwent resection  Pathology indicated GBM, BRAF, EGFR V3 negative  6/9/2021 - 7/21/2021 Radiation    Course: C1    Plan ID Energy Fractions Dose per Fraction (cGy) Dose Correction (cGy) Total Dose Delivered (cGy) Elapsed Days   CD L Fr Temp 6X 7 / 7 200 0 1,400 8   L Fr Temporal 6X 23 / 23 200 0 4,600 33           6/9/2021 - 7/10/2021 Chemotherapy    Temodar 75 mg p o  Daily  Discontinued due to pancytopenia  8/31/2021 -  Chemotherapy    Temodar at 100 mg/m2 PO daily D1-5, Q 28 days          Interval History:  Clinically stable      ECOG-  3-symptomatic, greater than 50 percent sedentary  Review of Systems   Constitutional: Positive for fatigue  Negative for chills and fever  HENT: Negative for nosebleeds  Eyes: Negative for discharge  Respiratory: Negative for cough and shortness of breath  Cardiovascular: Negative for chest pain  Gastrointestinal: Negative for abdominal pain, constipation and diarrhea  Endocrine: Negative for polydipsia  Genitourinary: Negative for hematuria  Musculoskeletal: Negative for arthralgias  Skin: Negative for color change  Allergic/Immunologic: Negative for immunocompromised state  Neurological: Positive for weakness (Generalized)  Negative for dizziness and headaches  Hematological: Negative for adenopathy  Psychiatric/Behavioral: Negative for agitation         Past Medical History:   Diagnosis Date    Asthma     Brain cancer ([facility] )     Epilepsy ([facility] )     stopped meds 2006 has been seizure free    Pulmonary emboli ([facility] ) 05/25/2021     Patient Active Problem List   Diagnosis    Essential hypertension    Morbid obesity ([facility] )    Obstructive sleep apnea    Renal cyst, left    COPD (chronic obstructive pulmonary disease) ([facility] )    History of tobacco use    Eczema    Medicare annual wellness visit, subsequent    Right flank pain    Rib fracture    Shortness of breath    GBM (glioblastoma multiforme) (HCC)    Other pulmonary embolism without acute cor pulmonale (HCC)    Thrombocytopenia (HCC)    Drug-induced constipation    Low CD4 cell count determined by flow cytometry       Current Outpatient Medications:     apixaban (ELIQUIS) 5 mg, Take 1 tablet (5 mg total) by mouth 2 (two) times a day (Patient taking differently: Take 5 mg by mouth daily Pt is to take 5mg BID but only takes it once a day), Disp: 180 tablet, Rfl: 1    dexamethasone (DECADRON) 2 mg tablet, Take 1 tablet (2 mg total) by mouth daily with breakfast Half tablet daily (1mg), Disp: 30 tablet, Rfl: 2    fluticasone (FLONASE) 50 mcg/act nasal spray, 1 spray into each nostril daily, Disp: , Rfl:     fluticasone-salmeterol (Advair) 250-50 mcg/dose inhaler, Inhale 1 puff 2 (two) times a day Rinse mouth after use, Disp: 360 blister, Rfl: 0    glycerin adult 2 g suppository, Insert 1 suppository into the rectum as needed for constipation, Disp: , Rfl:     Melatonin 2 5 MG CAPS, Take 2 5 mg by mouth daily 2 capsules , Disp: , Rfl:     Multiple Vitamin (ONE-A-DAY MENS PO), Take by mouth, Disp: , Rfl:     psyllium (METAMUCIL) 58 6 % packet, Take 1 packet by mouth as needed, Disp: , Rfl:     sulfamethoxazole-trimethoprim (BACTRIM DS) 800-160 mg per tablet, Take 1 tab by mouth daily Monday-Friday (Patient not taking: Reported on 12/17/2021 ), Disp: 90 tablet, Rfl: 3    temozolomide (TEMODAR) 100 mg capsule, Take 1 capsule (100 mg total) by mouth daily On days 1 through 5  Repeat every 28 days  with 1 other temozolomide prescription for 240 mg (100 mg/m2/day x 2 38 m2) total (Patient not taking: Reported on 11/1/2021), Disp: 5 capsule, Rfl: 1    temozolomide (TEMODAR) 100 mg capsule, Take three 100 mg Caps on days 1-5 every 28 day cycle (Patient not taking: Reported on 12/17/2021 ), Disp: 15 capsule, Rfl: 6    temozolomide (TEMODAR) 140 mg capsule, Take 1 capsule (140 mg total) by mouth daily On days 1 through 5  Repeat every 28 days   with 1 other temozolomide prescription for 240 mg (100 mg/m2/day x 2 38 m2) total (Patient not taking: Reported on 10/26/2021), Disp: 5 capsule, Rfl: 1  Allergies   Allergen Reactions    Penicillins Other (See Comments)     As a child     Past Surgical History:   Procedure Laterality Date    APPENDECTOMY      CRANIOTOMY Left 5/11/2021    Procedure: Left temporal craniotomy with image guidance and 5-ALA for resection of mass;  Surgeon: Kaylah Miles MD;  [facility];  Service: Neurosurgery    MANDIBLE SURGERY      DE COLONOSCOPY FLX DX W/COLLJ [facility] PFRMD N/A 4/4/2016    Procedure: COLONOSCOPY;  Surgeon: Boubacar Power MD [place];  Location: [facility]; Service: Gastroenterology    SKULL FRACTURE ELEVATION      TONSILLECTOMY       Social History     Objective:  Vitals:    01/20/22 1507   BP: 142/82   BP Location: Left arm   Patient Position: Sitting   Cuff Size: Standard   Pulse: 79   Resp: 16   Temp: 97 6 °F (36 4 °C)   TempSrc: Temporal   SpO2: 97%   Weight: 130 kg (287 lb)   Height: 5' 11" (1 803 m)     Physical Exam  Constitutional:       Appearance: He is well-developed  HENT:      Head: Normocephalic and atraumatic  Eyes:      Pupils: Pupils are equal, round, and reactive to light  Cardiovascular:      Rate and Rhythm: Normal rate and regular rhythm  Heart sounds: No murmur heard  Pulmonary:      Breath sounds: Normal breath sounds  No wheezing or rales  Abdominal:      Palpations: Abdomen is soft  Tenderness: There is no abdominal tenderness  Musculoskeletal:         General: No tenderness  Normal range of motion  Cervical back: Neck supple  Lymphadenopathy:      Cervical: No cervical adenopathy  Skin:     Findings: No erythema or rash  Neurological:      Mental Status: He is alert and oriented to person, place, and time  Cranial Nerves: No cranial nerve deficit  Deep Tendon Reflexes: Reflexes are normal and symmetric  Psychiatric:         Behavior: Behavior normal            Labs: I personally reviewed the labs and imaging pertinent to this patient care

## 2022-01-21 ENCOUNTER — APPOINTMENT (OUTPATIENT)
Dept: LAB | Facility: HOSPITAL | Age: 72
End: 2022-01-21
Attending: INTERNAL MEDICINE
Payer: COMMERCIAL

## 2022-01-21 DIAGNOSIS — C71.9 GBM (GLIOBLASTOMA MULTIFORME) (HCC): ICD-10-CM

## 2022-01-21 LAB
BASOPHILS # BLD AUTO: 0.01 THOUSANDS/ΜL (ref 0–0.1)
BASOPHILS NFR BLD AUTO: 0 % (ref 0–1)
EOSINOPHIL # BLD AUTO: 0.07 THOUSAND/ΜL (ref 0–0.61)
EOSINOPHIL NFR BLD AUTO: 1 % (ref 0–6)
ERYTHROCYTE [DISTWIDTH] IN BLOOD BY AUTOMATED COUNT: 14.6 % (ref 11.6–15.1)
HCT VFR BLD AUTO: 38.9 % (ref 36.5–49.3)
HGB BLD-MCNC: 13.2 G/DL (ref 12–17)
IMM GRANULOCYTES # BLD AUTO: 0.04 THOUSAND/UL (ref 0–0.2)
IMM GRANULOCYTES NFR BLD AUTO: 1 % (ref 0–2)
LYMPHOCYTES # BLD AUTO: 0.93 THOUSANDS/ΜL (ref 0.6–4.47)
LYMPHOCYTES NFR BLD AUTO: 19 % (ref 14–44)
MCH RBC QN AUTO: 33.2 PG (ref 26.8–34.3)
MCHC RBC AUTO-ENTMCNC: 33.9 G/DL (ref 31.4–37.4)
MCV RBC AUTO: 98 FL (ref 82–98)
MONOCYTES # BLD AUTO: 0.59 THOUSAND/ΜL (ref 0.17–1.22)
MONOCYTES NFR BLD AUTO: 12 % (ref 4–12)
NEUTROPHILS # BLD AUTO: 3.3 THOUSANDS/ΜL (ref 1.85–7.62)
NEUTS SEG NFR BLD AUTO: 67 % (ref 43–75)
NRBC BLD AUTO-RTO: 0 /100 WBCS
PLATELET # BLD AUTO: 67 THOUSANDS/UL (ref 149–390)
PMV BLD AUTO: 9.7 FL (ref 8.9–12.7)
RBC # BLD AUTO: 3.98 MILLION/UL (ref 3.88–5.62)
WBC # BLD AUTO: 4.94 THOUSAND/UL (ref 4.31–10.16)

## 2022-01-21 PROCEDURE — 85025 COMPLETE CBC W/AUTO DIFF WBC: CPT

## 2022-01-21 PROCEDURE — 36415 COLL VENOUS BLD VENIPUNCTURE: CPT

## 2022-01-26 DIAGNOSIS — D69.6 THROMBOCYTOPENIA (HCC): Primary | ICD-10-CM

## 2022-02-03 ENCOUNTER — APPOINTMENT (OUTPATIENT)
Dept: LAB | Facility: HOSPITAL | Age: 72
End: 2022-02-03
Attending: INTERNAL MEDICINE
Payer: COMMERCIAL

## 2022-02-03 DIAGNOSIS — C71.9 GBM (GLIOBLASTOMA MULTIFORME) (HCC): ICD-10-CM

## 2022-02-03 LAB
BASOPHILS # BLD AUTO: 0.01 THOUSANDS/ΜL (ref 0–0.1)
BASOPHILS NFR BLD AUTO: 0 % (ref 0–1)
EOSINOPHIL # BLD AUTO: 0.03 THOUSAND/ΜL (ref 0–0.61)
EOSINOPHIL NFR BLD AUTO: 1 % (ref 0–6)
ERYTHROCYTE [DISTWIDTH] IN BLOOD BY AUTOMATED COUNT: 14.4 % (ref 11.6–15.1)
HCT VFR BLD AUTO: 38.4 % (ref 36.5–49.3)
HGB BLD-MCNC: 13 G/DL (ref 12–17)
IMM GRANULOCYTES # BLD AUTO: 0.03 THOUSAND/UL (ref 0–0.2)
IMM GRANULOCYTES NFR BLD AUTO: 1 % (ref 0–2)
LYMPHOCYTES # BLD AUTO: 0.4 THOUSANDS/ΜL (ref 0.6–4.47)
LYMPHOCYTES NFR BLD AUTO: 7 % (ref 14–44)
MCH RBC QN AUTO: 33.8 PG (ref 26.8–34.3)
MCHC RBC AUTO-ENTMCNC: 33.9 G/DL (ref 31.4–37.4)
MCV RBC AUTO: 100 FL (ref 82–98)
MONOCYTES # BLD AUTO: 0.93 THOUSAND/ΜL (ref 0.17–1.22)
MONOCYTES NFR BLD AUTO: 16 % (ref 4–12)
NEUTROPHILS # BLD AUTO: 4.38 THOUSANDS/ΜL (ref 1.85–7.62)
NEUTS SEG NFR BLD AUTO: 75 % (ref 43–75)
NRBC BLD AUTO-RTO: 0 /100 WBCS
PLATELET # BLD AUTO: 88 THOUSANDS/UL (ref 149–390)
PMV BLD AUTO: 9.8 FL (ref 8.9–12.7)
RBC # BLD AUTO: 3.85 MILLION/UL (ref 3.88–5.62)
WBC # BLD AUTO: 5.78 THOUSAND/UL (ref 4.31–10.16)

## 2022-02-03 PROCEDURE — 36415 COLL VENOUS BLD VENIPUNCTURE: CPT

## 2022-02-03 PROCEDURE — 85025 COMPLETE CBC W/AUTO DIFF WBC: CPT

## 2022-02-18 ENCOUNTER — APPOINTMENT (OUTPATIENT)
Dept: LAB | Facility: HOSPITAL | Age: 72
End: 2022-02-18
Attending: INTERNAL MEDICINE
Payer: COMMERCIAL

## 2022-02-18 DIAGNOSIS — D69.6 THROMBOCYTOPENIA (HCC): ICD-10-CM

## 2022-02-18 LAB
BASOPHILS # BLD AUTO: 0.01 THOUSANDS/ΜL (ref 0–0.1)
BASOPHILS NFR BLD AUTO: 0 % (ref 0–1)
EOSINOPHIL # BLD AUTO: 0.06 THOUSAND/ΜL (ref 0–0.61)
EOSINOPHIL NFR BLD AUTO: 1 % (ref 0–6)
ERYTHROCYTE [DISTWIDTH] IN BLOOD BY AUTOMATED COUNT: 13.9 % (ref 11.6–15.1)
HCT VFR BLD AUTO: 39.8 % (ref 36.5–49.3)
HGB BLD-MCNC: 13.4 G/DL (ref 12–17)
IMM GRANULOCYTES # BLD AUTO: 0.07 THOUSAND/UL (ref 0–0.2)
IMM GRANULOCYTES NFR BLD AUTO: 1 % (ref 0–2)
LYMPHOCYTES # BLD AUTO: 0.85 THOUSANDS/ΜL (ref 0.6–4.47)
LYMPHOCYTES NFR BLD AUTO: 16 % (ref 14–44)
MCH RBC QN AUTO: 33.5 PG (ref 26.8–34.3)
MCHC RBC AUTO-ENTMCNC: 33.7 G/DL (ref 31.4–37.4)
MCV RBC AUTO: 100 FL (ref 82–98)
MONOCYTES # BLD AUTO: 0.63 THOUSAND/ΜL (ref 0.17–1.22)
MONOCYTES NFR BLD AUTO: 12 % (ref 4–12)
NEUTROPHILS # BLD AUTO: 3.57 THOUSANDS/ΜL (ref 1.85–7.62)
NEUTS SEG NFR BLD AUTO: 70 % (ref 43–75)
NRBC BLD AUTO-RTO: 0 /100 WBCS
PLATELET # BLD AUTO: 114 THOUSANDS/UL (ref 149–390)
PMV BLD AUTO: 9.4 FL (ref 8.9–12.7)
RBC # BLD AUTO: 4 MILLION/UL (ref 3.88–5.62)
WBC # BLD AUTO: 5.19 THOUSAND/UL (ref 4.31–10.16)

## 2022-02-18 PROCEDURE — 36415 COLL VENOUS BLD VENIPUNCTURE: CPT

## 2022-02-18 PROCEDURE — 85025 COMPLETE CBC W/AUTO DIFF WBC: CPT

## 2022-03-30 DIAGNOSIS — C71.9 GBM (GLIOBLASTOMA MULTIFORME) (HCC): Primary | ICD-10-CM

## 2022-04-05 ENCOUNTER — HOSPITAL ENCOUNTER (OUTPATIENT)
Dept: RADIOLOGY | Facility: HOSPITAL | Age: 72
Discharge: HOME | End: 2022-04-05
Payer: COMMERCIAL

## 2022-04-05 ENCOUNTER — OFFICE VISIT (OUTPATIENT)
Dept: FAMILY MEDICINE CLINIC | Facility: CLINIC | Age: 72
End: 2022-04-05
Payer: COMMERCIAL

## 2022-04-05 ENCOUNTER — APPOINTMENT (OUTPATIENT)
Dept: LAB | Facility: HOSPITAL | Age: 72
End: 2022-04-05
Attending: INTERNAL MEDICINE
Payer: COMMERCIAL

## 2022-04-05 VITALS
BODY MASS INDEX: 42.25 KG/M2 | DIASTOLIC BLOOD PRESSURE: 80 MMHG | WEIGHT: 301.8 LBS | OXYGEN SATURATION: 93 % | SYSTOLIC BLOOD PRESSURE: 158 MMHG | HEIGHT: 71 IN | HEART RATE: 75 BPM | RESPIRATION RATE: 18 BRPM

## 2022-04-05 DIAGNOSIS — I26.99 OTHER PULMONARY EMBOLISM WITHOUT ACUTE COR PULMONALE, UNSPECIFIED CHRONICITY (HCC): ICD-10-CM

## 2022-04-05 DIAGNOSIS — R06.02 SHORTNESS OF BREATH: ICD-10-CM

## 2022-04-05 DIAGNOSIS — C71.9 GBM (GLIOBLASTOMA MULTIFORME) (HCC): ICD-10-CM

## 2022-04-05 DIAGNOSIS — J44.9 CHRONIC OBSTRUCTIVE PULMONARY DISEASE, UNSPECIFIED COPD TYPE (HCC): ICD-10-CM

## 2022-04-05 DIAGNOSIS — J18.9 PNEUMONIA OF RIGHT LOWER LOBE DUE TO INFECTIOUS ORGANISM: ICD-10-CM

## 2022-04-05 DIAGNOSIS — R06.02 SHORTNESS OF BREATH: Primary | ICD-10-CM

## 2022-04-05 PROBLEM — R10.9 RIGHT FLANK PAIN: Status: RESOLVED | Noted: 2020-06-17 | Resolved: 2022-04-05

## 2022-04-05 PROBLEM — R56.9 CONVULSIONS, UNSPECIFIED CONVULSION TYPE (HCC): Status: RESOLVED | Noted: 2022-04-05 | Resolved: 2022-04-05

## 2022-04-05 PROBLEM — R56.9 CONVULSIONS, UNSPECIFIED CONVULSION TYPE (HCC): Status: ACTIVE | Noted: 2022-04-05

## 2022-04-05 PROBLEM — S22.39XA RIB FRACTURE: Status: RESOLVED | Noted: 2021-05-09 | Resolved: 2022-04-05

## 2022-04-05 LAB
ALBUMIN SERPL BCP-MCNC: 3.9 G/DL (ref 3.5–5)
ALP SERPL-CCNC: 73 U/L (ref 46–116)
ALT SERPL W P-5'-P-CCNC: 27 U/L (ref 12–78)
ANION GAP SERPL CALCULATED.3IONS-SCNC: 3 MMOL/L (ref 4–13)
AST SERPL W P-5'-P-CCNC: 13 U/L (ref 5–45)
BASOPHILS # BLD AUTO: 0.01 THOUSANDS/ΜL (ref 0–0.1)
BASOPHILS NFR BLD AUTO: 0 % (ref 0–1)
BILIRUB SERPL-MCNC: 0.55 MG/DL (ref 0.2–1)
BUN SERPL-MCNC: 28 MG/DL (ref 5–25)
CALCIUM SERPL-MCNC: 9.1 MG/DL (ref 8.3–10.1)
CHLORIDE SERPL-SCNC: 110 MMOL/L (ref 100–108)
CO2 SERPL-SCNC: 27 MMOL/L (ref 21–32)
CREAT SERPL-MCNC: 1.27 MG/DL (ref 0.6–1.3)
EOSINOPHIL # BLD AUTO: 0.06 THOUSAND/ΜL (ref 0–0.61)
EOSINOPHIL NFR BLD AUTO: 1 % (ref 0–6)
ERYTHROCYTE [DISTWIDTH] IN BLOOD BY AUTOMATED COUNT: 13.1 % (ref 11.6–15.1)
GFR SERPL CREATININE-BSD FRML MDRD: 56 ML/MIN/1.73SQ M
GLUCOSE SERPL-MCNC: 114 MG/DL (ref 65–140)
HCT VFR BLD AUTO: 42.1 % (ref 36.5–49.3)
HGB BLD-MCNC: 14.1 G/DL (ref 12–17)
IMM GRANULOCYTES # BLD AUTO: 0.1 THOUSAND/UL (ref 0–0.2)
IMM GRANULOCYTES NFR BLD AUTO: 1 % (ref 0–2)
LYMPHOCYTES # BLD AUTO: 0.57 THOUSANDS/ΜL (ref 0.6–4.47)
LYMPHOCYTES NFR BLD AUTO: 8 % (ref 14–44)
MCH RBC QN AUTO: 33.3 PG (ref 26.8–34.3)
MCHC RBC AUTO-ENTMCNC: 33.5 G/DL (ref 31.4–37.4)
MCV RBC AUTO: 100 FL (ref 82–98)
MONOCYTES # BLD AUTO: 0.7 THOUSAND/ΜL (ref 0.17–1.22)
MONOCYTES NFR BLD AUTO: 9 % (ref 4–12)
NEUTROPHILS # BLD AUTO: 6.12 THOUSANDS/ΜL (ref 1.85–7.62)
NEUTS SEG NFR BLD AUTO: 81 % (ref 43–75)
NRBC BLD AUTO-RTO: 0 /100 WBCS
PLATELET # BLD AUTO: 139 THOUSANDS/UL (ref 149–390)
PMV BLD AUTO: 8.7 FL (ref 8.9–12.7)
POTASSIUM SERPL-SCNC: 4.4 MMOL/L (ref 3.5–5.3)
PROT SERPL-MCNC: 7.3 G/DL (ref 6.4–8.2)
RBC # BLD AUTO: 4.23 MILLION/UL (ref 3.88–5.62)
SODIUM SERPL-SCNC: 140 MMOL/L (ref 136–145)
WBC # BLD AUTO: 7.56 THOUSAND/UL (ref 4.31–10.16)

## 2022-04-05 PROCEDURE — 99214 OFFICE O/P EST MOD 30 MIN: CPT | Performed by: PHYSICIAN ASSISTANT

## 2022-04-05 PROCEDURE — 71275 CT ANGIOGRAPHY CHEST: CPT

## 2022-04-05 PROCEDURE — 93000 ELECTROCARDIOGRAM COMPLETE: CPT | Performed by: PHYSICIAN ASSISTANT

## 2022-04-05 PROCEDURE — 36415 COLL VENOUS BLD VENIPUNCTURE: CPT

## 2022-04-05 PROCEDURE — 85025 COMPLETE CBC W/AUTO DIFF WBC: CPT

## 2022-04-05 PROCEDURE — G1004 CDSM NDSC: HCPCS

## 2022-04-05 PROCEDURE — 80053 COMPREHEN METABOLIC PANEL: CPT

## 2022-04-05 RX ORDER — CEFUROXIME AXETIL 500 MG/1
500 TABLET ORAL EVERY 12 HOURS SCHEDULED
Qty: 20 TABLET | Refills: 0 | Status: SHIPPED | OUTPATIENT
Start: 2022-04-05 | End: 2022-04-06 | Stop reason: SDUPTHER

## 2022-04-05 RX ADMIN — IOHEXOL 100 ML: 350 INJECTION, SOLUTION INTRAVENOUS at 18:56

## 2022-04-05 NOTE — PROGRESS NOTES
Assessment/Plan:    1  Shortness of breath    - discussed at length with wife and , will go for CTA of the chest to r/o PE due to patients noncompliance with eliquis at night, also with history of PE during his initial diagnosis of cancer back in 6/2021, also at that time noted to have a small pericardial effusion that was not followed up with  Will get a CBC stat also  EKG today NSR  If s/s get worse will go to ER  - POCT ECG  - CTA chest pe study; Future  - CBC and differential; Future  - Comprehensive metabolic panel; Future    2  COPD    - taking advair 250/50 once in morning only     F/u pending results  F/u as needed    Addendum: Reading Room called with CTA showing a RLL pneumonia, no PE  Spoke with wife, patient is allergic to PCN  Will call in Ceftin 1 tab twice daily for 10 days  Will follow up 48 hrs    Subjective:   Chief Complaint   Patient presents with    COPD     getting worse per pt and wife       Patient ID: Guanakito Rolle is a 80 y o  male  Patient here with wife complaining of increased SOB over the past few weeks  Patient feels this is due to his increased weight, sedentary lifestyle  He has gained 20+ lbs in the past 3 months  Admits he lays around a lot and is not walking like he use to  He is SOB constantly, at rest or with activity  Although he does not due much activity  Denies leg swelling, calf pain  Was dx with malignant BGM back in 5/2021, had surgery and chemo starting immediately  In 6/2021 had b/l PE with pericardial effusion, was on eliquis for this  Admits he only takes his morning dose of eliquis  Does not do any of his night meds  Last dose of chemo was December  Patient with history of COPD, takes advair 250/50 but only once daily  Platelets were low due to chemo back 12/2021  Has not had another dose of temadro and has been having his platelets checked every two weeks  Platelets slowly coming up  Last platelet check was 2/05/5233  114        The following portions of the patient's history were reviewed and updated as appropriate: allergies, current medications, past family history, past medical history, past social history, past surgical history and problem list     Past Medical History:   Diagnosis Date    Asthma     [facility] [facility] )     [facility]     stopped meds 2006 has been seizure free    [facility] 2021     Past Surgical History:   Procedure Laterality Date    APPENDECTOMY      CRANIOTOMY Left 2021    Procedure: Left temporal craniotomy with image guidance and 5-ALA for resection of mass;  Surgeon: Raisa Song MD;  [facility];  Service: Neurosurgery    MANDIBLE SURGERY      OK COLONOSCOPY FLX DX W/COLLJ SPEC WHEN PFRMD N/A 2016    Procedure: COLONOSCOPY;  Surgeon: Chelo Carbajal MD;  [facility];   Service: Gastroenterology    SKULL FRACTURE ELEVATION      TONSILLECTOMY       Family History   Problem Relation Age of Onset    Alcohol abuse Father     Substance Abuse Neg Hx     Mental illness Neg Hx      Social History     Socioeconomic History    Marital status: /Civil Union     Spouse name: Not on file    Number of children: Not on file    Years of education: Not on file    Highest education level: Not on file   Occupational History    Not on file   Tobacco Use    Smoking status: Former Smoker     Types: Cigarettes     Quit date: 2006     Years since quittin 1    Smokeless tobacco: Never Used   Vaping Use    Vaping Use: Never used   Substance and Sexual Activity    Alcohol use: Never    Drug use: Not Currently     Types: Marijuana     Comment: tried for sleep did not help    Sexual activity: Not on file   Other Topics Concern    Not on file   Social History Narrative    Not on file     Social Determinants of Health     Financial Resource Strain: Not on file   Food Insecurity: Not on file   Transportation Needs: Not on file   Physical Activity: Not on file Stress: Not on file   Social Connections: Not on file   Intimate Partner Violence: Not on file   Housing Stability: Not on file       Current Outpatient Medications:     apixaban (ELIQUIS) 5 mg, Take 1 tablet (5 mg total) by mouth 2 (two) times a day (Patient taking differently: Take 5 mg by mouth daily Pt is to take 5mg BID but only takes it once a day), Disp: 180 tablet, Rfl: 1    dexamethasone (DECADRON) 2 mg tablet, Take 1 tablet (2 mg total) by mouth daily with breakfast Half tablet daily (1mg), Disp: 30 tablet, Rfl: 2    fluticasone (FLONASE) 50 mcg/act nasal spray, 1 spray into each nostril daily, Disp: , Rfl:     fluticasone-salmeterol (Advair) 250-50 mcg/dose inhaler, Inhale 1 puff 2 (two) times a day Rinse mouth after use, Disp: 360 blister, Rfl: 0    glycerin adult 2 g suppository, Insert 1 suppository into the rectum as needed for constipation, Disp: , Rfl:     Melatonin 2 5 MG CAPS, Take 2 5 mg by mouth daily 2 capsules , Disp: , Rfl:     Multiple Vitamin (ONE-A-DAY MENS PO), Take by mouth, Disp: , Rfl:     psyllium (METAMUCIL) 58 6 % packet, Take 1 packet by mouth as needed, Disp: , Rfl:     sulfamethoxazole-trimethoprim (BACTRIM DS) 800-160 mg per tablet, Take 1 tab by mouth daily Monday-Friday, Disp: 90 tablet, Rfl: 3    temozolomide (TEMODAR) 100 mg capsule, Take 1 capsule (100 mg total) by mouth daily On days 1 through 5  Repeat every 28 days  with 1 other temozolomide prescription for 240 mg (100 mg/m2/day x 2 38 m2) total (Patient not taking: Reported on 11/1/2021), Disp: 5 capsule, Rfl: 1    temozolomide (TEMODAR) 100 mg capsule, Take three 100 mg Caps on days 1-5 every 28 day cycle, Disp: 15 capsule, Rfl: 6    temozolomide (TEMODAR) 140 mg capsule, Take 1 capsule (140 mg total) by mouth daily On days 1 through 5  Repeat every 28 days   with 1 other temozolomide prescription for 240 mg (100 mg/m2/day x 2 38 m2) total (Patient not taking: Reported on 10/26/2021), Disp: 5 capsule, Rfl: 1    Review of Systems          Objective:    Vitals:    04/05/22 1625   BP: 158/80   Pulse: 75   Resp: 18   SpO2: 93%   Weight: (!) 137 kg (301 lb 12 8 oz)   Height: 5' 11" (1 803 m)        Physical Exam  Constitutional:       Appearance: Normal appearance  HENT:      Head: Normocephalic and atraumatic  Cardiovascular:      Rate and Rhythm: Normal rate and regular rhythm  Pulses: Normal pulses  Heart sounds: Normal heart sounds  Pulmonary:      Effort: Tachypnea present  Breath sounds: Examination of the right-lower field reveals decreased breath sounds  Examination of the left-lower field reveals decreased breath sounds  Decreased breath sounds present  No wheezing, rhonchi or rales  Musculoskeletal:      Cervical back: Normal range of motion and neck supple  Comments: Trace edema b/l   Neurological:      General: No focal deficit present  Mental Status: He is alert and oriented to person, place, and time  Psychiatric:         Mood and Affect: Mood normal          Behavior: Behavior normal          Thought Content:  Thought content normal          Judgment: Judgment normal

## 2022-04-06 ENCOUNTER — TELEPHONE (OUTPATIENT)
Dept: FAMILY MEDICINE CLINIC | Facility: CLINIC | Age: 72
End: 2022-04-06

## 2022-04-06 NOTE — TELEPHONE ENCOUNTER
Patients wife called, said the med for Janell Lanes is not at [facility], it went to mail order  I called it in   Ceftin  Please change it in your order

## 2022-04-13 ENCOUNTER — CONSULT (OUTPATIENT)
Dept: PULMONOLOGY | Facility: CLINIC | Age: 72
End: 2022-04-13
Payer: COMMERCIAL

## 2022-04-13 VITALS
RESPIRATION RATE: 18 BRPM | OXYGEN SATURATION: 95 % | SYSTOLIC BLOOD PRESSURE: 140 MMHG | HEART RATE: 60 BPM | BODY MASS INDEX: 42.14 KG/M2 | WEIGHT: 301 LBS | DIASTOLIC BLOOD PRESSURE: 84 MMHG | TEMPERATURE: 97 F | HEIGHT: 71 IN

## 2022-04-13 DIAGNOSIS — R91.8 ABNORMAL CT SCAN OF LUNG: ICD-10-CM

## 2022-04-13 DIAGNOSIS — R06.02 SHORTNESS OF BREATH: Primary | ICD-10-CM

## 2022-04-13 DIAGNOSIS — R60.0 EDEMA, LOWER EXTREMITY: ICD-10-CM

## 2022-04-13 DIAGNOSIS — G47.33 OBSTRUCTIVE SLEEP APNEA: ICD-10-CM

## 2022-04-13 PROBLEM — Z00.00 MEDICARE ANNUAL WELLNESS VISIT, SUBSEQUENT: Status: RESOLVED | Noted: 2019-10-17 | Resolved: 2022-04-13

## 2022-04-13 PROCEDURE — 99215 OFFICE O/P EST HI 40 MIN: CPT | Performed by: INTERNAL MEDICINE

## 2022-04-13 RX ORDER — ALBUTEROL SULFATE 90 UG/1
2 AEROSOL, METERED RESPIRATORY (INHALATION) EVERY 4 HOURS PRN
Status: SHIPPED | OUTPATIENT
Start: 2022-04-13

## 2022-04-13 NOTE — PROGRESS NOTES
Pulmonary Consultation   Lisa Allen 80 y o  male MRN: [id number]  4/13/2022      Assessment:    1  Shortness of breath  - The etiology of his shortness of breath is multifactorial  His main symptom is SOB with exertion  He has [id number] cough, no fevers, chills, recent sick contacts  Unlikely a pneumonia  He does have a history of mild emphysema and positive bronchoprovacation challenge along with IgE of 124 and multiple allergies indicating likely COPD and asthma  He also has a crescent shaped trachea, most notable on a prior CT which likely indicated Excessive dynamic airway collapse  He also has untreated sleep apnea  I suspect he may have pulmonary HTN as well  Plan:  - Will check a cardiac echo  - Continue with advair 2x daily  - Can finish off ceftin for possible pneumonia  - Instructed him to take his eliquis as prescribed as he has only been taking 1 tablet daily and was likely subtherpeutic  There is questions small RLL PE and he has left lower extremity edema, therefore I will check a LE doppler   - Will send albuterol rescue inhaler  - Recommended he take the bactrim given the high risk of pneumonia since he is on decadron    2  Obstructive sleep apnea  - Diagnosed many years ago  Does not use CPAP and not interested in using it  - I explained that his weight and shortness of breath can both be impacted by untreated sleep apnea but he defers and PAP therapy    3  Abnormal CT scan of lung  - bibasilar consolidaitons greater on the right  He does not have symptoms suggestive of pneumonia however it is not unreasonable to complete a course of antibiotics  Other possibilities would include a developing pulmonary infarct or atelectasis if the imaging was obtaining partly during expiration  - I will check procalcitonin  - Depending on results of testing ordered today, he will likely require repeat CT chest in 8-12 weeks    4   Edema, lower extremity  - History of PE on eliquis but not taking the proper dose  Counseled him on taking it twice daily and will check lower extremity dopplers            Plan:    Diagnoses and all orders for this visit:    Shortness of breath  -     Echo complete w/ contrast if indicated; Future  -     albuterol (PROVENTIL HFA,VENTOLIN HFA) inhaler 2 puff    Obstructive sleep apnea    Abnormal CT scan of lung  -     Procalcitonin; Future    Edema, lower extremity  -     VAS lower limb venous duplex study, complete bilateral; Future        History of Present Illness   HPI:  Teo Birch is a 80 y o  male who presents with shortness of breath  He is accompanied by his wife who also provides history  The patient has had SOB for many years but for the past few years he notices it is getting worse  The shortness of breath is primarily with exertion  He denies coughing, rhinorrhea, fevers, chills, night sweats or weight loss  He has had no sick contacts  He recently saw his PCP and was sent for a CTA which showed possible RLL pneumonia and therefore he was referred for further evaluation  He has a past medical history of glioblastoma  This was diagnosed may 2021  He had surgery during that month as well  He also whole brain radiation  He was also on chemotherapy with temozolomoide weekly until December  This was stopped because of he developed thrombocytopenia and although his counts recovered, the patient did not want to continue with therapy since there was only one week of treatment remaining  Patient has also been taking decadron since being diagnosed with cancer but he has declined taking the bactrim  Following his surgery last year, he had a CTA of the chest on 05/26/2021 that showed bilateral PE without evidence of heart strain and pulmonary arterial dilation  There was questionable pulmonary infarct and a small pericardial effusion  He has been on eliquis since then regularly  He also has a history of COPD   He did have a methacholine challenge in 2016 that was positive at 1mg/ml  He has been on advair and takes it twice daily  Patient also reports excessive daytime sleepiness  He has been diagnosed with sleep apnea but declines any PAP therapy  He reports smoking 2 PPD for 40 years and quit in 2006  He denies any alcohol or drug abuse  He used to work in [employer] as a   He then [employer] and retired last June (2021)  Review of Systems   Constitutional: Negative for chills and fever  HENT: Negative for congestion, postnasal drip and rhinorrhea  Eyes: Negative for itching  Respiratory: Positive for cough and shortness of breath  Negative for wheezing and stridor  Cardiovascular: Negative for chest pain, palpitations and leg swelling  Gastrointestinal: Negative for abdominal distention, abdominal pain, nausea and vomiting  Genitourinary: Negative for dysuria and flank pain  Musculoskeletal: Negative for arthralgias and myalgias  Skin: Negative for color change  Neurological: Negative for dizziness, light-headedness and headaches  Psychiatric/Behavioral: Negative  Historical Information   Past Medical History:   Diagnosis Date    Asthma     [facility]     Epilepsy [facility]     stopped meds 2006 has been seizure free    Pulmonary emboli ([facility] ) 05/25/2021     Past Surgical History:   Procedure Laterality Date    APPENDECTOMY      CRANIOTOMY Left 5/11/2021    Procedure: Left temporal craniotomy with image guidance and 5-ALA for resection of mass;  Surgeon: Mariah Cardoza MD;  [facility];  Service: Neurosurgery    MANDIBLE SURGERY      RI COLONOSCOPY FLX DX W/COLLJ SPEC WHEN PFRMD N/A 4/4/2016    Procedure: COLONOSCOPY;  Surgeon: Ramesh Montano MD;  [facility];   Service: Gastroenterology    SKULL FRACTURE ELEVATION      TONSILLECTOMY       Family History   Problem Relation Age of Onset    Alcohol abuse Father     Substance Abuse Neg Hx  Mental illness Neg Hx        Occupational History: was previously a   Declines significant exposure history    Social History: smoked 2 PPD for 40 years and quit in 2006    Meds/Allergies     Current Outpatient Medications:     cefuroxime (CEFTIN) 500 mg tablet, Take 1 tablet (500 mg total) by mouth every 12 (twelve) hours for 10 days, Disp: 20 tablet, Rfl: 0    dexamethasone (DECADRON) 2 mg tablet, Take 1 tablet (2 mg total) by mouth daily with breakfast Half tablet daily (1mg) (Patient taking differently: Take 4 mg by mouth daily with breakfast Half tablet daily (1mg) ), Disp: 30 tablet, Rfl: 2    fluticasone (FLONASE) 50 mcg/act nasal spray, 1 spray into each nostril daily, Disp: , Rfl:     fluticasone-salmeterol (Advair) 250-50 mcg/dose inhaler, Inhale 1 puff 2 (two) times a day Rinse mouth after use, Disp: 360 blister, Rfl: 0    glycerin adult 2 g suppository, Insert 1 suppository into the rectum as needed for constipation, Disp: , Rfl:     Melatonin 2 5 MG CAPS, Take 2 5 mg by mouth daily 2 capsules , Disp: , Rfl:     Multiple Vitamin (ONE-A-DAY MENS PO), Take by mouth, Disp: , Rfl:     psyllium (METAMUCIL) 58 6 % packet, Take 1 packet by mouth as needed, Disp: , Rfl:     apixaban (ELIQUIS) 5 mg, Take 1 tablet (5 mg total) by mouth 2 (two) times a day (Patient taking differently: Take 5 mg by mouth daily Pt is to take 5mg BID but only takes it once a day), Disp: 180 tablet, Rfl: 1    sulfamethoxazole-trimethoprim (BACTRIM DS) 800-160 mg per tablet, Take 1 tab by mouth daily Monday-Friday (Patient not taking: Reported on 4/13/2022 ), Disp: 90 tablet, Rfl: 3    temozolomide (TEMODAR) 100 mg capsule, Take 1 capsule (100 mg total) by mouth daily On days 1 through 5  Repeat every 28 days   with 1 other temozolomide prescription for 240 mg (100 mg/m2/day x 2 38 m2) total (Patient not taking: Reported on 11/1/2021), Disp: 5 capsule, Rfl: 1    temozolomide (TEMODAR) 100 mg capsule, Take three 100 mg Caps on days 1-5 every 28 day cycle (Patient not taking: Reported on 4/13/2022 ), Disp: 15 capsule, Rfl: 6    temozolomide (TEMODAR) 140 mg capsule, Take 1 capsule (140 mg total) by mouth daily On days 1 through 5  Repeat every 28 days  with 1 other temozolomide prescription for 240 mg (100 mg/m2/day x 2 38 m2) total (Patient not taking: Reported on 10/26/2021), Disp: 5 capsule, Rfl: 1    Current Facility-Administered Medications:     albuterol (PROVENTIL HFA,VENTOLIN HFA) inhaler 2 puff, 2 puff, Inhalation, Q4H PRN, Eben Bynum MD  Allergies   Allergen Reactions    Penicillins Other (See Comments)     As a child       Vitals: Blood pressure 140/84, pulse 60, temperature (!) 97 °F (36 1 °C), temperature source Tympanic, resp  rate 18, height 5' 11" (1 803 m), weight (!) 137 kg (301 lb), SpO2 95 %  , Body mass index is 41 98 kg/m²  Oxygen Therapy  SpO2: 95 %  Oxygen Therapy: None (Room air)    Physical Exam  Physical Exam  Constitutional:       General: He is not in acute distress  Appearance: He is obese  He is not diaphoretic  HENT:      Head: Normocephalic and atraumatic  Nose: Nose normal       Mouth/Throat:      Pharynx: No oropharyngeal exudate  Eyes:      General: No scleral icterus  Conjunctiva/sclera: Conjunctivae normal       Pupils: Pupils are equal, round, and reactive to light  Neck:      Thyroid: No thyromegaly  Vascular: No JVD  Trachea: No tracheal deviation  Cardiovascular:      Rate and Rhythm: Normal rate and regular rhythm  Heart sounds: Normal heart sounds  No murmur heard  No friction rub  No gallop  Pulmonary:      Effort: Pulmonary effort is normal  No respiratory distress  Breath sounds: No stridor  Wheezing present  No rales  Comments: Central wheezing with forced expiration  Abdominal:      General: Bowel sounds are normal  There is no distension  Palpations: Abdomen is soft  Tenderness:  There is no abdominal tenderness  There is no guarding or rebound  Musculoskeletal:         General: No deformity  Normal range of motion  Cervical back: Normal range of motion and neck supple  Left lower leg: Edema present  Comments: 1-2+ lower extremity edema of the left leg   Lymphadenopathy:      Cervical: No cervical adenopathy  Skin:     General: Skin is warm  Findings: No erythema or rash  Neurological:      Mental Status: He is alert and oriented to person, place, and time  Cranial Nerves: No cranial nerve deficit  Sensory: No sensory deficit  Labs: I have personally reviewed pertinent lab results  Lab Results   Component Value Date    WBC 7 56 04/05/2022    HGB 14 1 04/05/2022    HCT 42 1 04/05/2022     (H) 04/05/2022     (L) 04/05/2022     Lab Results   Component Value Date    GLUCOSE 146 (H) 05/11/2021    CALCIUM 9 1 04/05/2022     01/05/2016    K 4 4 04/05/2022    CO2 27 04/05/2022     (H) 04/05/2022    BUN 28 (H) 04/05/2022    CREATININE 1 27 04/05/2022     Lab Results   Component Value Date     (H) 01/27/2016     Lab Results   Component Value Date    ALT 27 04/05/2022    AST 13 04/05/2022    ALKPHOS 73 04/05/2022    BILITOT 0 90 01/05/2016         Imaging and other studies: I have personally reviewed pertinent reports  and I have personally reviewed pertinent films in PACS  CTA- bilateral basilar small consolidations  Questions RLL PE    Pulmonary function testing: Methacholine challenge in 2016, mild obstructive airflow defect with positive bronchoprovacation challenge with 1mg/ml    EKG, Pathology, and Other Studies: I have personally reviewed pertinent reports        Miguel Angel Martinez MD  Pulmonary and Critical Care Fellow- PGY 6  [facility]

## 2022-04-16 ENCOUNTER — TELEPHONE (OUTPATIENT)
Dept: OTHER | Facility: OTHER | Age: 72
End: 2022-04-16

## 2022-04-16 NOTE — TELEPHONE ENCOUNTER
Yulia Allen [facility]) [phone] (M)     Is calling to inform [facility] he has decided to stop taking all of his medications

## 2022-04-18 ENCOUNTER — TELEPHONE (OUTPATIENT)
Dept: HEMATOLOGY ONCOLOGY | Facility: CLINIC | Age: 72
End: 2022-04-18

## 2022-04-18 NOTE — TELEPHONE ENCOUNTER
Appointment Cancellation Or Reschedule     Person calling in Spouse    Provider Dr Jake Chong   Office Visit Date and Time 4/20/22 at 8:40   Office Visit Location [facility]   Did patient want to reschedule their office appointment? If so, when was it scheduled to? No   Is this patient calling to reschedule an infusion appointment? No   When is their next infusion appointment? N/a   Is this patient a Chemo patient? No   Reason for Cancellation or Reschedule Patient does not want to seek anymore treatment      If the patient is a treatment patient, please route this to the office nurse  If the patient is not on treatment, please route to the office MA  Patients spouse called in regarding patient  Appointment was canceled because patient does not want to seek anymore treatment   Spouse requesting a call back regarding matter from someone from Dr Marsha Delarosa team

## 2022-04-18 NOTE — TELEPHONE ENCOUNTER
Scheduling Appointment     Who Is Calling to Schedule Spouse    Doctor Dr Gonzalez Filter   Location [facility]   Date and Time 04/20 at 8:40am   Reason for scheduling appointment alice follow up    Patient verbalized understanding   yes

## 2022-04-20 ENCOUNTER — PATIENT OUTREACH (OUTPATIENT)
Dept: CASE MANAGEMENT | Facility: HOSPITAL | Age: 72
End: 2022-04-20

## 2022-04-20 ENCOUNTER — OFFICE VISIT (OUTPATIENT)
Dept: HEMATOLOGY ONCOLOGY | Facility: CLINIC | Age: 72
End: 2022-04-20
Payer: COMMERCIAL

## 2022-04-20 VITALS
OXYGEN SATURATION: 96 % | WEIGHT: 298.2 LBS | SYSTOLIC BLOOD PRESSURE: 124 MMHG | HEIGHT: 71 IN | DIASTOLIC BLOOD PRESSURE: 80 MMHG | BODY MASS INDEX: 41.75 KG/M2 | HEART RATE: 85 BPM | TEMPERATURE: 97.4 F | RESPIRATION RATE: 17 BRPM

## 2022-04-20 DIAGNOSIS — C71.9 GBM (GLIOBLASTOMA MULTIFORME) (HCC): Primary | ICD-10-CM

## 2022-04-20 DIAGNOSIS — Z78.9 NEED FOR FOLLOW-UP BY SOCIAL WORKER: Primary | ICD-10-CM

## 2022-04-20 PROCEDURE — 3008F BODY MASS INDEX DOCD: CPT | Performed by: INTERNAL MEDICINE

## 2022-04-20 PROCEDURE — 99215 OFFICE O/P EST HI 40 MIN: CPT | Performed by: INTERNAL MEDICINE

## 2022-04-20 PROCEDURE — 1036F TOBACCO NON-USER: CPT | Performed by: INTERNAL MEDICINE

## 2022-04-20 PROCEDURE — 1160F RVW MEDS BY RX/DR IN RCRD: CPT | Performed by: INTERNAL MEDICINE

## 2022-04-20 NOTE — LETTER
April 20, 2022     Chandler Heck PA-C  [address]    Patient: Kinsey Ugarte   YOB: 1950   Date of Visit: 4/20/2022       Dear Dr Santiago Doing: Thank you for referring Kinsey Ugarte to me for evaluation  Below are my notes for this consultation  If you have questions, please do not hesitate to call me  I look forward to following your patient along with you  Sincerely,        Jethro Bermudez DO        CC: MD Jethro Maguire DO  4/20/2022  1:59 PM  Sign when Signing Visit  [place] [address]  [phone]    [address], [id number]  04/20/22    Discussion:   In summary, this is a 71-year-old male history of GBM  He has decided not to take any more medication including cancer medications  He understands that this will likely allow more rapid progression of his tumor and his demise  His priority is to be comfortable and not extend his survival     While he occasionally makes comments that are somewhat unusual, I believe that he is coherent, awake, alert, not depressed  In other words, he appears competent  We reviewed that medications could be offered for symptom palliation  He seemed to be open to this  We discussed the potential role of hospice care in his case and he was agreeable to hospice consultation  I made arrangements accordingly  Separately, I reviewed the above discussion with the patient's wife  She states that he had been in this frame of mind for several months and is in agreement with the plan as outlined above  She states that for many years he had made unusual coments and that this is not at all unusual for him  This supports competence  I discussed the above with the patient    The patient  voiced understanding and agreement   ______________________________________________________________________    Chief Complaint   Patient presents with    Follow-up       HPI:  Oncology History   GBM (glioblastoma multiforme) ([facility] )   5/26/2021 Initial Diagnosis    Early May 2021 patient presented with speech difficulties  MRI of the brain showed 5 cm mass in left temporal region  May 11, 2021 patient underwent resection  Pathology indicated GBM, BRAF, EGFR V3 negative  6/9/2021 - 7/21/2021 Radiation    Course: C1    Plan ID Energy Fractions Dose per Fraction (cGy) Dose Correction (cGy) Total Dose Delivered (cGy) Elapsed Days   CD L Fr Temp 6X 7 / 7 200 0 1,400 8   L Fr Temporal 6X 23 / 23 200 0 4,600 33           6/9/2021 - 7/10/2021 Chemotherapy    Temodar 75 mg p o  Daily  Discontinued due to pancytopenia  8/31/2021 -  Chemotherapy    Temodar at 100 mg/m2 PO daily D1-5, Q 28 days          Interval History:  Clinically stable  ECOG-  1 - Symptomatic but completely ambulatory    Review of Systems   Constitutional: Positive for fatigue  Negative for chills and fever  HENT: Negative for nosebleeds  Eyes: Negative for discharge  Respiratory: Negative for cough and shortness of breath  Cardiovascular: Negative for chest pain  Gastrointestinal: Negative for abdominal pain, constipation and diarrhea  Endocrine: Negative for polydipsia  Genitourinary: Negative for hematuria  Musculoskeletal: Negative for arthralgias  Skin: Negative for color change  Allergic/Immunologic: Negative for immunocompromised state  Neurological: Negative for dizziness and headaches  Hematological: Negative for adenopathy  Psychiatric/Behavioral: Negative for agitation         Past Medical History:   Diagnosis Date    Asthma     [facility]     [facility]     stopped meds 2006 has been seizure free    [facility]oli [facility] 05/25/2021     Patient Active Problem List   Diagnosis    Essential hypertension    [facility]    Obstructive sleep apnea    Renal cyst, left    COPD (chronic obstructive pulmonary disease) (HCC)    History of tobacco use    Eczema    Shortness of breath    GBM (glioblastoma multiforme) (HCC)    Other pulmonary embolism without acute cor pulmonale (HCC)    Thrombocytopenia (HCC)    Drug-induced constipation    Low CD4 cell count determined by flow cytometry       Current Outpatient Medications:     apixaban (ELIQUIS) 5 mg, Take 1 tablet (5 mg total) by mouth 2 (two) times a day (Patient taking differently: Take 5 mg by mouth daily Pt is to take 5mg BID but only takes it once a day), Disp: 180 tablet, Rfl: 1    dexamethasone (DECADRON) 2 mg tablet, Take 1 tablet (2 mg total) by mouth daily with breakfast Half tablet daily (1mg) (Patient taking differently: Take 4 mg by mouth daily with breakfast Half tablet daily (1mg) ), Disp: 30 tablet, Rfl: 2    fluticasone (FLONASE) 50 mcg/act nasal spray, 1 spray into each nostril daily, Disp: , Rfl:     fluticasone-salmeterol (Advair) 250-50 mcg/dose inhaler, Inhale 1 puff 2 (two) times a day Rinse mouth after use, Disp: 360 blister, Rfl: 0    glycerin adult 2 g suppository, Insert 1 suppository into the rectum as needed for constipation, Disp: , Rfl:     Melatonin 2 5 MG CAPS, Take 2 5 mg by mouth daily 2 capsules , Disp: , Rfl:     Multiple Vitamin (ONE-A-DAY MENS PO), Take by mouth, Disp: , Rfl:     psyllium (METAMUCIL) 58 6 % packet, Take 1 packet by mouth as needed, Disp: , Rfl:     sulfamethoxazole-trimethoprim (BACTRIM DS) 800-160 mg per tablet, Take 1 tab by mouth daily Monday-Friday (Patient not taking: Reported on 4/13/2022 ), Disp: 90 tablet, Rfl: 3    temozolomide (TEMODAR) 100 mg capsule, Take 1 capsule (100 mg total) by mouth daily On days 1 through 5  Repeat every 28 days   with 1 other temozolomide prescription for 240 mg (100 mg/m2/day x 2 38 m2) total (Patient not taking: Reported on 11/1/2021), Disp: 5 capsule, Rfl: 1    temozolomide (TEMODAR) 100 mg capsule, Take three 100 mg Caps on days 1-5 every 28 day cycle (Patient not taking: Reported on 4/13/2022 ), Disp: 15 capsule, Rfl: 6    temozolomide (TEMODAR) 140 mg capsule, Take 1 capsule (140 mg total) by mouth daily On days 1 through 5  Repeat every 28 days  with 1 other temozolomide prescription for 240 mg (100 mg/m2/day x 2 38 m2) total (Patient not taking: Reported on 10/26/2021), Disp: 5 capsule, Rfl: 1    Current Facility-Administered Medications:     albuterol (PROVENTIL HFA,VENTOLIN HFA) inhaler 2 puff, 2 puff, Inhalation, Q4H PRN, Jan Kelly MD  Allergies   Allergen Reactions    Penicillins Other (See Comments)     As a child     Past Surgical History:   Procedure Laterality Date    APPENDECTOMY      CRANIOTOMY Left 5/11/2021    Procedure: Left temporal craniotomy with image guidance and 5-ALA for resection of mass;  Surgeon: Hermila Rodney MD;  [facility];  Service: Neurosurgery    MANDIBLE SURGERY      ME COLONOSCOPY FLX DX W/COLLJ SPEC WHEN PFRMD N/A 4/4/2016    Procedure: COLONOSCOPY;  Surgeon: Nora Lara MD;  [facility]; Service: Gastroenterology    SKULL FRACTURE ELEVATION      TONSILLECTOMY       Social History     Objective:  Vitals:    04/20/22 0828   BP: 124/80   BP Location: Right arm   Patient Position: Sitting   Cuff Size: Adult   Pulse: 85   Resp: 17   Temp: (!) 97 4 °F (36 3 °C)   SpO2: 96%   Weight: 135 kg (298 lb 3 2 oz)   Height: 5' 11" (1 803 m)     Physical Exam  Constitutional:       Appearance: He is well-developed  HENT:      Head: Normocephalic and atraumatic  Eyes:      Pupils: Pupils are equal, round, and reactive to light  Cardiovascular:      Rate and Rhythm: Normal rate and regular rhythm  Heart sounds: No murmur heard  Pulmonary:      Breath sounds: Normal breath sounds  No wheezing or rales  Abdominal:      Palpations: Abdomen is soft  Tenderness: There is no abdominal tenderness  Musculoskeletal:         General: No tenderness  Normal range of motion  Cervical back: Neck supple  Lymphadenopathy:      Cervical: No cervical adenopathy  Skin:     Findings: No erythema or rash  Neurological:      Mental Status: He is alert and oriented to person, place, and time  Cranial Nerves: No cranial nerve deficit  Deep Tendon Reflexes: Reflexes are normal and symmetric  Psychiatric:         Behavior: Behavior normal            Labs: I personally reviewed the labs and imaging pertinent to this patient care

## 2022-04-20 NOTE — PROGRESS NOTES
[facility]  [address] [place] [address]  [phone]  Libertad Cruz, [id number]  04/20/22    Discussion:   In summary, this is a 45-year-old male history of GBM  He has decided not to take any more medication including cancer medications  He understands that this will likely allow more rapid progression of his tumor and his demise  His priority is to be comfortable and not extend his survival     While he occasionally makes comments that are somewhat unusual, I believe that he is coherent, awake, alert, not depressed  In other words, he appears competent  We reviewed that medications could be offered for symptom palliation  He seemed to be open to this  We discussed the potential role of hospice care in his case and he was agreeable to hospice consultation  I made arrangements accordingly  Separately, I reviewed the above discussion with the patient's wife  She states that he had been in this frame of mind for several months and is in agreement with the plan as outlined above  She states that for many years he had made unusual coments and that this is not at all unusual for him  This supports competence  I discussed the above with the patient  The patient  voiced understanding and agreement   ______________________________________________________________________    Chief Complaint   Patient presents with    Follow-up       HPI:  Oncology History   GBM (glioblastoma multiforme) ([facility] [address] )   5/26/2021 Initial Diagnosis    Early May 2021 patient presented with speech difficulties  MRI of the brain showed 5 cm mass in left temporal region  May 11, 2021 patient underwent resection  Pathology indicated GBM, BRAF, EGFR V3 negative       6/9/2021 - 7/21/2021 Radiation    Course: C1    Plan ID Energy Fractions Dose per Fraction (cGy) Dose Correction (cGy) Total Dose Delivered (cGy) Elapsed Days   CD L Fr Temp 6X 7 / 7 200 0 1,400 8   L Fr Temporal 6X 23 / 23 200 0 4,600 33           6/9/2021 - 7/10/2021 Chemotherapy    Temodar 75 mg p o  Daily  Discontinued due to pancytopenia  8/31/2021 -  Chemotherapy    Temodar at 100 mg/m2 PO daily D1-5, Q 28 days          Interval History:  Clinically stable  ECOG-  1 - Symptomatic but completely ambulatory    Review of Systems   Constitutional: Positive for fatigue  Negative for chills and fever  HENT: Negative for nosebleeds  Eyes: Negative for discharge  Respiratory: Negative for cough and shortness of breath  Cardiovascular: Negative for chest pain  Gastrointestinal: Negative for abdominal pain, constipation and diarrhea  Endocrine: Negative for polydipsia  Genitourinary: Negative for hematuria  Musculoskeletal: Negative for arthralgias  Skin: Negative for color change  Allergic/Immunologic: Negative for immunocompromised state  Neurological: Negative for dizziness and headaches  Hematological: Negative for adenopathy  Psychiatric/Behavioral: Negative for agitation         Past Medical History:   Diagnosis Date    Asthma     Brain cancer ([facility] [address] )     Epilepsy ([facility] [address] )     stopped meds 2006 has been seizure free    Pulmonary emboli ([facility] [address] ) 05/25/2021     Patient Active Problem List   Diagnosis    Essential hypertension    Morbid obesity ([facility] [address] )    Obstructive sleep apnea    Renal cyst, left    COPD (chronic obstructive pulmonary disease) ([facility] [address] )    History of tobacco use    Eczema    Shortness of breath    GBM (glioblastoma multiforme) (HCC)    Other pulmonary embolism without acute cor pulmonale (HCC)    Thrombocytopenia (HCC)    Drug-induced constipation    Low CD4 cell count determined by flow cytometry       Current Outpatient Medications:     apixaban (ELIQUIS) 5 mg, Take 1 tablet (5 mg total) by mouth 2 (two) times a day (Patient taking differently: Take 5 mg by mouth daily Pt is to take 5mg BID but only takes it once a day), Disp: 180 tablet, Rfl: 1    dexamethasone (DECADRON) 2 mg tablet, Take 1 tablet (2 mg total) by mouth daily with breakfast Half tablet daily (1mg) (Patient taking differently: Take 4 mg by mouth daily with breakfast Half tablet daily (1mg) ), Disp: 30 tablet, Rfl: 2    fluticasone (FLONASE) 50 mcg/act nasal spray, 1 spray into each nostril daily, Disp: , Rfl:     fluticasone-salmeterol (Advair) 250-50 mcg/dose inhaler, Inhale 1 puff 2 (two) times a day Rinse mouth after use, Disp: 360 blister, Rfl: 0    glycerin adult 2 g suppository, Insert 1 suppository into the rectum as needed for constipation, Disp: , Rfl:     Melatonin 2 5 MG CAPS, Take 2 5 mg by mouth daily 2 capsules , Disp: , Rfl:     Multiple Vitamin (ONE-A-DAY MENS PO), Take by mouth, Disp: , Rfl:     psyllium (METAMUCIL) 58 6 % packet, Take 1 packet by mouth as needed, Disp: , Rfl:     sulfamethoxazole-trimethoprim (BACTRIM DS) 800-160 mg per tablet, Take 1 tab by mouth daily Monday-Friday (Patient not taking: Reported on 4/13/2022 ), Disp: 90 tablet, Rfl: 3    temozolomide (TEMODAR) 100 mg capsule, Take 1 capsule (100 mg total) by mouth daily On days 1 through 5  Repeat every 28 days  with 1 other temozolomide prescription for 240 mg (100 mg/m2/day x 2 38 m2) total (Patient not taking: Reported on 11/1/2021), Disp: 5 capsule, Rfl: 1    temozolomide (TEMODAR) 100 mg capsule, Take three 100 mg Caps on days 1-5 every 28 day cycle (Patient not taking: Reported on 4/13/2022 ), Disp: 15 capsule, Rfl: 6    temozolomide (TEMODAR) 140 mg capsule, Take 1 capsule (140 mg total) by mouth daily On days 1 through 5  Repeat every 28 days   with 1 other temozolomide prescription for 240 mg (100 mg/m2/day x 2 38 m2) total (Patient not taking: Reported on 10/26/2021), Disp: 5 capsule, Rfl: 1    Current Facility-Administered Medications:     albuterol (PROVENTIL HFA,VENTOLIN HFA) inhaler 2 puff, 2 puff, Inhalation, Q4H PRN, Mitchell Franco MD  Allergies   Allergen Reactions  Penicillins Other (See Comments)     As a child     Past Surgical History:   Procedure Laterality Date    APPENDECTOMY      CRANIOTOMY Left 5/11/2021    Procedure: Left temporal craniotomy with image guidance and 5-ALA for resection of mass;  Surgeon: Luisa Issa MD;  [facility];  Service: Neurosurgery    MANDIBLE SURGERY      IA COLONOSCOPY FLX DX W/COLLJ SPEC WHEN PFRMD N/A 4/4/2016    Procedure: COLONOSCOPY;  Surgeon: Sophy Wall MD;  Location: [facility]; Service: Gastroenterology    SKULL FRACTURE ELEVATION      TONSILLECTOMY       Social History     Objective:  Vitals:    04/20/22 0828   BP: 124/80   BP Location: Right arm   Patient Position: Sitting   Cuff Size: Adult   Pulse: 85   Resp: 17   Temp: (!) 97 4 °F (36 3 °C)   SpO2: 96%   Weight: 135 kg (298 lb 3 2 oz)   Height: 5' 11" (1 803 m)     Physical Exam  Constitutional:       Appearance: He is well-developed  HENT:      Head: Normocephalic and atraumatic  Eyes:      Pupils: Pupils are equal, round, and reactive to light  Cardiovascular:      Rate and Rhythm: Normal rate and regular rhythm  Heart sounds: No murmur heard  Pulmonary:      Breath sounds: Normal breath sounds  No wheezing or rales  Abdominal:      Palpations: Abdomen is soft  Tenderness: There is no abdominal tenderness  Musculoskeletal:         General: No tenderness  Normal range of motion  Cervical back: Neck supple  Lymphadenopathy:      Cervical: No cervical adenopathy  Skin:     Findings: No erythema or rash  Neurological:      Mental Status: He is alert and oriented to person, place, and time  Cranial Nerves: No cranial nerve deficit  Deep Tendon Reflexes: Reflexes are normal and symmetric  Psychiatric:         Behavior: Behavior normal            Labs: I personally reviewed the labs and imaging pertinent to this patient care

## 2022-04-20 NOTE — PROGRESS NOTES
Oncology LSW received request from Shae Rendon RN, to follow up on the hospice referral placed for PT on this day  LSW noted, upon review, that PT's hospice referral had been closed and accepted by [facility]

## 2022-04-22 ENCOUNTER — TELEPHONE (OUTPATIENT)
Dept: HEMATOLOGY ONCOLOGY | Facility: CLINIC | Age: 72
End: 2022-04-22

## 2022-04-22 NOTE — TELEPHONE ENCOUNTER
Renetta Reyes from [address] called in to inform Dr Moore Place they received the referral for hospice care and that patient will start hospice services on 4/25/22  Renetta Reyes can be reached back at [phone]

## 2022-04-28 ENCOUNTER — HOSPICE ADMISSION (OUTPATIENT)
Dept: HOME HOSPICE | Facility: HOSPICE | Age: 72
End: 2022-04-28
Payer: MEDICARE

## 2022-05-01 PROCEDURE — T2042 HOSPICE ROUTINE HOME CARE: HCPCS

## 2022-05-02 PROCEDURE — T2042 HOSPICE ROUTINE HOME CARE: HCPCS

## 2022-05-03 ENCOUNTER — HOME CARE VISIT (OUTPATIENT)
Dept: HOME HEALTH SERVICES | Facility: HOME HEALTHCARE | Age: 72
End: 2022-05-03
Payer: MEDICARE

## 2022-05-03 ENCOUNTER — TELEPHONE (OUTPATIENT)
Dept: HEMATOLOGY ONCOLOGY | Facility: CLINIC | Age: 72
End: 2022-05-03

## 2022-05-03 VITALS
TEMPERATURE: 97.8 F | HEART RATE: 70 BPM | SYSTOLIC BLOOD PRESSURE: 168 MMHG | DIASTOLIC BLOOD PRESSURE: 78 MMHG | RESPIRATION RATE: 18 BRPM

## 2022-05-03 PROCEDURE — T2042 HOSPICE ROUTINE HOME CARE: HCPCS

## 2022-05-03 PROCEDURE — G0299 HHS/HOSPICE OF RN EA 15 MIN: HCPCS

## 2022-05-03 NOTE — TELEPHONE ENCOUNTER
CALL RETURN FORM   Reason for patient call? Spouse, Wilfrido Kramer calling to check status on [facility] paperwork  She states it was dropped off at Dr Manning Gaucher office in [facility] [place], [place]    Patient's primary oncologist? Dr Jung Live   Name of person the patient was calling for? Dr Jung Live   Any additional information to add, if applicable? [phone]   Informed patient that the message will be forwarded to the team and someone will get back to them as soon as possible    Did you relay this information to the patient? As per Sophia, she will check to see if paperwork is there tomorrow  She is not at the [facility] [place], [place] office today

## 2022-05-04 DIAGNOSIS — Z51.5 HOSPICE CARE PATIENT: Primary | ICD-10-CM

## 2022-05-04 PROCEDURE — T2042 HOSPICE ROUTINE HOME CARE: HCPCS

## 2022-05-04 RX ORDER — DEXAMETHASONE 2 MG/1
2 TABLET ORAL
Qty: 30 TABLET | Refills: 2 | Status: SHIPPED | OUTPATIENT
Start: 2022-05-04 | End: 2022-07-18

## 2022-05-05 PROCEDURE — T2042 HOSPICE ROUTINE HOME CARE: HCPCS

## 2022-05-06 ENCOUNTER — TELEPHONE (OUTPATIENT)
Dept: HEMATOLOGY ONCOLOGY | Facility: CLINIC | Age: 72
End: 2022-05-06

## 2022-05-06 ENCOUNTER — TELEPHONE (OUTPATIENT)
Dept: RADIATION ONCOLOGY | Facility: HOSPITAL | Age: 72
End: 2022-05-06

## 2022-05-06 PROCEDURE — T2042 HOSPICE ROUTINE HOME CARE: HCPCS

## 2022-05-06 NOTE — TELEPHONE ENCOUNTER
Called pt's wife Arianna Corbin in regards to [facility] paperwork that was recv'd earlier this week  Asked that she please call us back

## 2022-05-06 NOTE — TELEPHONE ENCOUNTER
Patients spouse, Ginger Alvarado, calling to inform Dr Evonne Lozada radiation oncology never received documentation  Requesting office re-fax  Fax# [phone]

## 2022-05-07 PROCEDURE — T2042 HOSPICE ROUTINE HOME CARE: HCPCS

## 2022-05-08 PROCEDURE — T2042 HOSPICE ROUTINE HOME CARE: HCPCS

## 2022-05-09 PROCEDURE — T2042 HOSPICE ROUTINE HOME CARE: HCPCS

## 2022-05-10 PROCEDURE — T2042 HOSPICE ROUTINE HOME CARE: HCPCS

## 2022-05-11 ENCOUNTER — HOME CARE VISIT (OUTPATIENT)
Dept: HOME HOSPICE | Facility: HOSPICE | Age: 72
End: 2022-05-11
Payer: MEDICARE

## 2022-05-11 PROCEDURE — T2042 HOSPICE ROUTINE HOME CARE: HCPCS

## 2022-05-12 ENCOUNTER — HOME CARE VISIT (OUTPATIENT)
Dept: HOME HOSPICE | Facility: HOSPICE | Age: 72
End: 2022-05-12
Payer: MEDICARE

## 2022-05-12 PROCEDURE — T2042 HOSPICE ROUTINE HOME CARE: HCPCS

## 2022-05-13 PROCEDURE — T2042 HOSPICE ROUTINE HOME CARE: HCPCS

## 2022-05-14 PROCEDURE — T2042 HOSPICE ROUTINE HOME CARE: HCPCS

## 2022-05-15 PROCEDURE — T2042 HOSPICE ROUTINE HOME CARE: HCPCS

## 2022-05-16 PROCEDURE — T2042 HOSPICE ROUTINE HOME CARE: HCPCS

## 2022-05-17 ENCOUNTER — HOME CARE VISIT (OUTPATIENT)
Dept: HOME HOSPICE | Facility: HOSPICE | Age: 72
End: 2022-05-17
Payer: MEDICARE

## 2022-05-17 PROCEDURE — T2042 HOSPICE ROUTINE HOME CARE: HCPCS

## 2022-05-18 ENCOUNTER — HOME CARE VISIT (OUTPATIENT)
Dept: HOME HEALTH SERVICES | Facility: HOME HEALTHCARE | Age: 72
End: 2022-05-18
Payer: MEDICARE

## 2022-05-18 VITALS
SYSTOLIC BLOOD PRESSURE: 172 MMHG | RESPIRATION RATE: 16 BRPM | DIASTOLIC BLOOD PRESSURE: 68 MMHG | TEMPERATURE: 98.6 F | HEART RATE: 64 BPM

## 2022-05-18 PROCEDURE — T2042 HOSPICE ROUTINE HOME CARE: HCPCS

## 2022-05-18 PROCEDURE — G0299 HHS/HOSPICE OF RN EA 15 MIN: HCPCS

## 2022-05-19 PROCEDURE — T2042 HOSPICE ROUTINE HOME CARE: HCPCS

## 2022-05-20 PROCEDURE — T2042 HOSPICE ROUTINE HOME CARE: HCPCS

## 2022-05-21 PROCEDURE — T2042 HOSPICE ROUTINE HOME CARE: HCPCS

## 2022-05-22 PROCEDURE — T2042 HOSPICE ROUTINE HOME CARE: HCPCS

## 2022-05-23 PROCEDURE — T2042 HOSPICE ROUTINE HOME CARE: HCPCS

## 2022-05-24 ENCOUNTER — HOME CARE VISIT (OUTPATIENT)
Dept: HOME HEALTH SERVICES | Facility: HOME HEALTHCARE | Age: 72
End: 2022-05-24
Payer: MEDICARE

## 2022-05-24 PROCEDURE — G0299 HHS/HOSPICE OF RN EA 15 MIN: HCPCS

## 2022-05-24 PROCEDURE — T2042 HOSPICE ROUTINE HOME CARE: HCPCS

## 2022-05-25 PROCEDURE — T2042 HOSPICE ROUTINE HOME CARE: HCPCS

## 2022-05-26 PROCEDURE — T2042 HOSPICE ROUTINE HOME CARE: HCPCS

## 2022-05-27 PROCEDURE — T2042 HOSPICE ROUTINE HOME CARE: HCPCS

## 2022-05-28 PROCEDURE — T2042 HOSPICE ROUTINE HOME CARE: HCPCS

## 2022-05-29 PROCEDURE — T2042 HOSPICE ROUTINE HOME CARE: HCPCS

## 2022-05-30 PROCEDURE — T2042 HOSPICE ROUTINE HOME CARE: HCPCS

## 2022-05-31 ENCOUNTER — HOME CARE VISIT (OUTPATIENT)
Dept: HOME HOSPICE | Facility: HOSPICE | Age: 72
End: 2022-05-31
Payer: MEDICARE

## 2022-05-31 ENCOUNTER — HOME CARE VISIT (OUTPATIENT)
Dept: HOME HEALTH SERVICES | Facility: HOME HEALTHCARE | Age: 72
End: 2022-05-31
Payer: MEDICARE

## 2022-05-31 PROCEDURE — G0299 HHS/HOSPICE OF RN EA 15 MIN: HCPCS

## 2022-05-31 PROCEDURE — T2042 HOSPICE ROUTINE HOME CARE: HCPCS

## 2022-05-31 PROCEDURE — 10330057 MEDICATION, GENERAL

## 2022-05-31 PROCEDURE — G0155 HHCP-SVS OF CSW,EA 15 MIN: HCPCS

## 2022-06-01 VITALS — RESPIRATION RATE: 22 BRPM | HEART RATE: 66 BPM | DIASTOLIC BLOOD PRESSURE: 78 MMHG | SYSTOLIC BLOOD PRESSURE: 158 MMHG

## 2022-06-01 PROCEDURE — T2042 HOSPICE ROUTINE HOME CARE: HCPCS

## 2022-06-02 PROCEDURE — T2042 HOSPICE ROUTINE HOME CARE: HCPCS

## 2022-06-03 PROCEDURE — T2042 HOSPICE ROUTINE HOME CARE: HCPCS

## 2022-06-04 PROCEDURE — T2042 HOSPICE ROUTINE HOME CARE: HCPCS

## 2022-06-05 PROCEDURE — T2042 HOSPICE ROUTINE HOME CARE: HCPCS

## 2022-06-06 PROCEDURE — T2042 HOSPICE ROUTINE HOME CARE: HCPCS

## 2022-06-07 ENCOUNTER — HOME CARE VISIT (OUTPATIENT)
Dept: HOME HEALTH SERVICES | Facility: HOME HEALTHCARE | Age: 72
End: 2022-06-07
Payer: MEDICARE

## 2022-06-07 VITALS
RESPIRATION RATE: 20 BRPM | HEART RATE: 62 BPM | DIASTOLIC BLOOD PRESSURE: 84 MMHG | TEMPERATURE: 98.4 F | SYSTOLIC BLOOD PRESSURE: 178 MMHG

## 2022-06-07 PROCEDURE — T2042 HOSPICE ROUTINE HOME CARE: HCPCS

## 2022-06-07 PROCEDURE — G0299 HHS/HOSPICE OF RN EA 15 MIN: HCPCS

## 2022-06-08 PROCEDURE — T2042 HOSPICE ROUTINE HOME CARE: HCPCS

## 2022-06-09 PROCEDURE — T2042 HOSPICE ROUTINE HOME CARE: HCPCS

## 2022-06-10 PROCEDURE — T2042 HOSPICE ROUTINE HOME CARE: HCPCS

## 2022-06-11 PROCEDURE — T2042 HOSPICE ROUTINE HOME CARE: HCPCS

## 2022-06-12 PROCEDURE — T2042 HOSPICE ROUTINE HOME CARE: HCPCS

## 2022-06-13 PROCEDURE — T2042 HOSPICE ROUTINE HOME CARE: HCPCS

## 2022-06-14 ENCOUNTER — HOME CARE VISIT (OUTPATIENT)
Dept: HOME HEALTH SERVICES | Facility: HOME HEALTHCARE | Age: 72
End: 2022-06-14
Payer: MEDICARE

## 2022-06-14 VITALS
TEMPERATURE: 98.9 F | SYSTOLIC BLOOD PRESSURE: 132 MMHG | BODY MASS INDEX: 41.98 KG/M2 | DIASTOLIC BLOOD PRESSURE: 62 MMHG | WEIGHT: 301 LBS | HEART RATE: 62 BPM | RESPIRATION RATE: 18 BRPM

## 2022-06-14 PROCEDURE — T2042 HOSPICE ROUTINE HOME CARE: HCPCS

## 2022-06-14 PROCEDURE — G0299 HHS/HOSPICE OF RN EA 15 MIN: HCPCS

## 2022-06-15 ENCOUNTER — HOME CARE VISIT (OUTPATIENT)
Dept: HOME HOSPICE | Facility: HOSPICE | Age: 72
End: 2022-06-15
Payer: MEDICARE

## 2022-06-15 PROCEDURE — T2042 HOSPICE ROUTINE HOME CARE: HCPCS

## 2022-06-16 PROCEDURE — T2042 HOSPICE ROUTINE HOME CARE: HCPCS

## 2022-06-17 PROCEDURE — T2042 HOSPICE ROUTINE HOME CARE: HCPCS

## 2022-06-18 PROCEDURE — T2042 HOSPICE ROUTINE HOME CARE: HCPCS

## 2022-06-19 PROCEDURE — T2042 HOSPICE ROUTINE HOME CARE: HCPCS

## 2022-06-20 PROCEDURE — T2042 HOSPICE ROUTINE HOME CARE: HCPCS

## 2022-06-21 ENCOUNTER — HOME CARE VISIT (OUTPATIENT)
Dept: HOME HEALTH SERVICES | Facility: HOME HEALTHCARE | Age: 72
End: 2022-06-21
Payer: MEDICARE

## 2022-06-21 PROCEDURE — G0299 HHS/HOSPICE OF RN EA 15 MIN: HCPCS

## 2022-06-21 PROCEDURE — T2042 HOSPICE ROUTINE HOME CARE: HCPCS

## 2022-06-22 VITALS
BODY MASS INDEX: 42.26 KG/M2 | WEIGHT: 303 LBS | DIASTOLIC BLOOD PRESSURE: 74 MMHG | SYSTOLIC BLOOD PRESSURE: 162 MMHG | TEMPERATURE: 98.1 F | RESPIRATION RATE: 18 BRPM | HEART RATE: 62 BPM

## 2022-06-22 PROCEDURE — T2042 HOSPICE ROUTINE HOME CARE: HCPCS

## 2022-06-23 PROCEDURE — T2042 HOSPICE ROUTINE HOME CARE: HCPCS

## 2022-06-24 PROCEDURE — T2042 HOSPICE ROUTINE HOME CARE: HCPCS

## 2022-06-25 PROCEDURE — T2042 HOSPICE ROUTINE HOME CARE: HCPCS

## 2022-06-26 PROCEDURE — T2042 HOSPICE ROUTINE HOME CARE: HCPCS

## 2022-06-27 PROCEDURE — T2042 HOSPICE ROUTINE HOME CARE: HCPCS

## 2022-06-28 ENCOUNTER — HOME CARE VISIT (OUTPATIENT)
Dept: HOME HOSPICE | Facility: HOSPICE | Age: 72
End: 2022-06-28
Payer: MEDICARE

## 2022-06-28 PROCEDURE — T2042 HOSPICE ROUTINE HOME CARE: HCPCS

## 2022-06-29 PROCEDURE — T2042 HOSPICE ROUTINE HOME CARE: HCPCS

## 2022-06-30 ENCOUNTER — HOME CARE VISIT (OUTPATIENT)
Dept: HOME HEALTH SERVICES | Facility: HOME HEALTHCARE | Age: 72
End: 2022-06-30
Payer: MEDICARE

## 2022-06-30 PROCEDURE — T2042 HOSPICE ROUTINE HOME CARE: HCPCS

## 2022-07-01 PROCEDURE — T2042 HOSPICE ROUTINE HOME CARE: HCPCS

## 2022-07-02 VITALS — RESPIRATION RATE: 22 BRPM | HEART RATE: 76 BPM | SYSTOLIC BLOOD PRESSURE: 150 MMHG | DIASTOLIC BLOOD PRESSURE: 80 MMHG

## 2022-07-02 PROCEDURE — 10330057 MEDICATION, GENERAL

## 2022-07-02 PROCEDURE — T2042 HOSPICE ROUTINE HOME CARE: HCPCS

## 2022-07-03 PROCEDURE — T2042 HOSPICE ROUTINE HOME CARE: HCPCS

## 2022-07-04 PROCEDURE — T2042 HOSPICE ROUTINE HOME CARE: HCPCS

## 2022-07-05 ENCOUNTER — HOME CARE VISIT (OUTPATIENT)
Dept: HOME HEALTH SERVICES | Facility: HOME HEALTHCARE | Age: 72
End: 2022-07-05
Payer: MEDICARE

## 2022-07-05 VITALS
SYSTOLIC BLOOD PRESSURE: 164 MMHG | TEMPERATURE: 97.7 F | DIASTOLIC BLOOD PRESSURE: 98 MMHG | HEART RATE: 64 BPM | WEIGHT: 305 LBS | BODY MASS INDEX: 42.54 KG/M2 | RESPIRATION RATE: 20 BRPM

## 2022-07-05 PROCEDURE — T2042 HOSPICE ROUTINE HOME CARE: HCPCS

## 2022-07-06 PROCEDURE — T2042 HOSPICE ROUTINE HOME CARE: HCPCS

## 2022-07-07 ENCOUNTER — HOME CARE VISIT (OUTPATIENT)
Dept: HOME HOSPICE | Facility: HOSPICE | Age: 72
End: 2022-07-07
Payer: MEDICARE

## 2022-07-07 PROCEDURE — T2042 HOSPICE ROUTINE HOME CARE: HCPCS

## 2022-07-08 PROCEDURE — T2042 HOSPICE ROUTINE HOME CARE: HCPCS

## 2022-07-09 PROCEDURE — T2042 HOSPICE ROUTINE HOME CARE: HCPCS

## 2022-07-10 PROCEDURE — T2042 HOSPICE ROUTINE HOME CARE: HCPCS

## 2022-07-11 PROCEDURE — T2042 HOSPICE ROUTINE HOME CARE: HCPCS

## 2022-07-12 ENCOUNTER — HOME CARE VISIT (OUTPATIENT)
Dept: HOME HOSPICE | Facility: HOSPICE | Age: 72
End: 2022-07-12
Payer: MEDICARE

## 2022-07-12 PROCEDURE — T2042 HOSPICE ROUTINE HOME CARE: HCPCS

## 2022-07-13 PROCEDURE — T2042 HOSPICE ROUTINE HOME CARE: HCPCS

## 2022-07-14 PROCEDURE — T2042 HOSPICE ROUTINE HOME CARE: HCPCS

## 2022-07-15 PROCEDURE — T2042 HOSPICE ROUTINE HOME CARE: HCPCS

## 2022-07-16 PROCEDURE — T2042 HOSPICE ROUTINE HOME CARE: HCPCS

## 2022-07-17 PROCEDURE — T2042 HOSPICE ROUTINE HOME CARE: HCPCS

## 2022-07-18 ENCOUNTER — HOME CARE VISIT (OUTPATIENT)
Dept: HOME HEALTH SERVICES | Facility: HOME HEALTHCARE | Age: 72
End: 2022-07-18
Payer: MEDICARE

## 2022-07-18 VITALS
TEMPERATURE: 98.1 F | DIASTOLIC BLOOD PRESSURE: 102 MMHG | HEART RATE: 68 BPM | WEIGHT: 304 LBS | SYSTOLIC BLOOD PRESSURE: 158 MMHG | RESPIRATION RATE: 16 BRPM | BODY MASS INDEX: 42.4 KG/M2

## 2022-07-18 PROCEDURE — T2042 HOSPICE ROUTINE HOME CARE: HCPCS

## 2022-07-18 PROCEDURE — G0299 HHS/HOSPICE OF RN EA 15 MIN: HCPCS

## 2022-07-19 PROCEDURE — T2042 HOSPICE ROUTINE HOME CARE: HCPCS

## 2022-07-20 PROCEDURE — T2042 HOSPICE ROUTINE HOME CARE: HCPCS

## 2022-07-21 PROCEDURE — T2042 HOSPICE ROUTINE HOME CARE: HCPCS

## 2022-07-22 ENCOUNTER — HOME CARE VISIT (OUTPATIENT)
Dept: HOME HOSPICE | Facility: HOSPICE | Age: 72
End: 2022-07-22
Payer: MEDICARE

## 2022-07-22 PROCEDURE — T2042 HOSPICE ROUTINE HOME CARE: HCPCS

## 2022-07-23 PROCEDURE — T2042 HOSPICE ROUTINE HOME CARE: HCPCS

## 2022-07-24 PROCEDURE — T2042 HOSPICE ROUTINE HOME CARE: HCPCS

## 2022-07-25 PROCEDURE — T2042 HOSPICE ROUTINE HOME CARE: HCPCS

## 2022-07-26 ENCOUNTER — HOME CARE VISIT (OUTPATIENT)
Dept: HOME HEALTH SERVICES | Facility: HOME HEALTHCARE | Age: 72
End: 2022-07-26
Payer: MEDICARE

## 2022-07-26 ENCOUNTER — HOME CARE VISIT (OUTPATIENT)
Dept: HOME HOSPICE | Facility: HOSPICE | Age: 72
End: 2022-07-26
Payer: MEDICARE

## 2022-07-26 VITALS
HEART RATE: 62 BPM | SYSTOLIC BLOOD PRESSURE: 158 MMHG | WEIGHT: 308 LBS | RESPIRATION RATE: 16 BRPM | BODY MASS INDEX: 42.96 KG/M2 | DIASTOLIC BLOOD PRESSURE: 70 MMHG | TEMPERATURE: 98.1 F

## 2022-07-26 PROCEDURE — 10330057 MEDICATION, GENERAL

## 2022-07-26 PROCEDURE — T2042 HOSPICE ROUTINE HOME CARE: HCPCS

## 2022-07-26 PROCEDURE — G0299 HHS/HOSPICE OF RN EA 15 MIN: HCPCS

## 2022-07-27 PROCEDURE — T2042 HOSPICE ROUTINE HOME CARE: HCPCS

## 2022-07-28 PROCEDURE — T2042 HOSPICE ROUTINE HOME CARE: HCPCS

## 2022-07-29 PROCEDURE — T2042 HOSPICE ROUTINE HOME CARE: HCPCS

## 2022-07-30 PROCEDURE — T2042 HOSPICE ROUTINE HOME CARE: HCPCS

## 2022-07-31 PROCEDURE — T2042 HOSPICE ROUTINE HOME CARE: HCPCS

## 2022-08-01 PROCEDURE — T2042 HOSPICE ROUTINE HOME CARE: HCPCS

## 2022-08-02 PROCEDURE — T2042 HOSPICE ROUTINE HOME CARE: HCPCS

## 2022-08-03 PROCEDURE — T2042 HOSPICE ROUTINE HOME CARE: HCPCS

## 2022-08-04 ENCOUNTER — HOME CARE VISIT (OUTPATIENT)
Dept: HOME HEALTH SERVICES | Facility: HOME HEALTHCARE | Age: 72
End: 2022-08-04
Payer: MEDICARE

## 2022-08-04 VITALS — HEART RATE: 62 BPM | TEMPERATURE: 98.2 F | DIASTOLIC BLOOD PRESSURE: 68 MMHG | SYSTOLIC BLOOD PRESSURE: 148 MMHG

## 2022-08-04 PROCEDURE — G0299 HHS/HOSPICE OF RN EA 15 MIN: HCPCS

## 2022-08-04 PROCEDURE — T2042 HOSPICE ROUTINE HOME CARE: HCPCS

## 2022-08-05 PROCEDURE — T2042 HOSPICE ROUTINE HOME CARE: HCPCS

## 2022-08-06 PROCEDURE — T2042 HOSPICE ROUTINE HOME CARE: HCPCS

## 2022-08-07 PROCEDURE — T2042 HOSPICE ROUTINE HOME CARE: HCPCS

## 2022-08-08 PROCEDURE — T2042 HOSPICE ROUTINE HOME CARE: HCPCS

## 2022-08-09 ENCOUNTER — HOME CARE VISIT (OUTPATIENT)
Dept: HOME HOSPICE | Facility: HOSPICE | Age: 72
End: 2022-08-09
Payer: MEDICARE

## 2022-08-09 ENCOUNTER — HOME CARE VISIT (OUTPATIENT)
Dept: HOME HEALTH SERVICES | Facility: HOME HEALTHCARE | Age: 72
End: 2022-08-09
Payer: MEDICARE

## 2022-08-09 VITALS
SYSTOLIC BLOOD PRESSURE: 158 MMHG | HEART RATE: 62 BPM | TEMPERATURE: 96.8 F | DIASTOLIC BLOOD PRESSURE: 80 MMHG | RESPIRATION RATE: 18 BRPM

## 2022-08-09 PROCEDURE — T2042 HOSPICE ROUTINE HOME CARE: HCPCS

## 2022-08-09 PROCEDURE — G0155 HHCP-SVS OF CSW,EA 15 MIN: HCPCS

## 2022-08-09 PROCEDURE — G0299 HHS/HOSPICE OF RN EA 15 MIN: HCPCS

## 2022-08-16 ENCOUNTER — HOME CARE VISIT (OUTPATIENT)
Dept: HOME HEALTH SERVICES | Facility: HOME HEALTHCARE | Age: 72
End: 2022-08-16
Payer: MEDICARE

## 2022-08-16 VITALS
HEART RATE: 60 BPM | DIASTOLIC BLOOD PRESSURE: 80 MMHG | SYSTOLIC BLOOD PRESSURE: 160 MMHG | RESPIRATION RATE: 18 BRPM | TEMPERATURE: 98.4 F

## 2022-08-16 PROCEDURE — G0299 HHS/HOSPICE OF RN EA 15 MIN: HCPCS

## 2022-08-23 ENCOUNTER — HOME CARE VISIT (OUTPATIENT)
Dept: HOME HEALTH SERVICES | Facility: HOME HEALTHCARE | Age: 72
End: 2022-08-23
Payer: MEDICARE

## 2022-08-23 VITALS
RESPIRATION RATE: 18 BRPM | WEIGHT: 315 LBS | TEMPERATURE: 98.8 F | BODY MASS INDEX: 43.93 KG/M2 | HEART RATE: 58 BPM | SYSTOLIC BLOOD PRESSURE: 198 MMHG | DIASTOLIC BLOOD PRESSURE: 92 MMHG

## 2022-08-23 PROCEDURE — G0299 HHS/HOSPICE OF RN EA 15 MIN: HCPCS

## 2022-08-30 ENCOUNTER — HOME CARE VISIT (OUTPATIENT)
Dept: HOME HEALTH SERVICES | Facility: HOME HEALTHCARE | Age: 72
End: 2022-08-30
Payer: MEDICARE

## 2022-08-30 VITALS
HEART RATE: 66 BPM | TEMPERATURE: 98.2 F | SYSTOLIC BLOOD PRESSURE: 152 MMHG | DIASTOLIC BLOOD PRESSURE: 84 MMHG | RESPIRATION RATE: 16 BRPM

## 2022-08-30 PROCEDURE — G0299 HHS/HOSPICE OF RN EA 15 MIN: HCPCS

## 2022-09-06 ENCOUNTER — HOME CARE VISIT (OUTPATIENT)
Dept: HOME HEALTH SERVICES | Facility: HOME HEALTHCARE | Age: 72
End: 2022-09-06
Payer: MEDICARE

## 2022-09-06 PROCEDURE — G0299 HHS/HOSPICE OF RN EA 15 MIN: HCPCS

## 2022-09-06 NOTE — PROGRESS NOTES
[address] [facility]  Progress Note Ian Allen 1950, 80 y o  male MRN: [id number]  Unit/Bed#: [facility] 624-01 Encounter: [id number]  Primary Care Provider: Danyel Melendez PA-C   Date and time admitted to hospital: 5/6/2021  7:32 PM    * Brain mass  Assessment & Plan  · Presented with word-finding difficulties and recent syncopal event  · CTA head/neck (05/06/2021): 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 and left hemispheric mass effect causing approximately 3 mm left to right midline shift "   · MRI brain (05/06/2021): Rim enhancing left temporal parietal mass measuring 5 cm with significant surrounding vasogenic edema, 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  Left to right midline shift of 2-3 mm  · S/p left temporal craniotomy for tumor resection with 5-ALA per neurosurgery (05/11/2021)  · Pathology:  Prelim: favor high grade glioma  Final Pending  · CT chest, abdomen, pelvis: negative for any other primary sources  · Continue with oral Decadron taper per Neurosurgery recommendations  · Continue Keppra 500 mg BID for seizure prophylaxis x7 days  · PT/OT recommends acute rehab  · Patient seen in consultation by PMR who agrees that patient is appropriate for inpatient acute rehab  Placement pending  Unfortunately, there are no beds at [facility] or  until next week  CM working on alternative facilities  Rib fracture  Assessment & Plan  · S/p fall  · Xray ribs (04/27/21): No evidence of fracture  · CT chest abdomen and pelvis (05/07/2021): Left lateral 7th rib nondisplaced fracture which could be subacute or acute  · Conservative management with pain control  · Encourage incentive spirometry  Leukocytosis  Assessment & Plan  · Likely secondary to the steroids  · Down trending  Patient is afebrile  COPD (chronic obstructive pulmonary disease) (HCC)  Assessment & Plan  · Not in acute exacerbation    · Continue respiratory protocol  · Patient's home inhalers are non formulary  Family can bring in from home if he wishes she use them  Otherwise continue non formulary alternatives  Obstructive sleep apnea  Assessment & Plan  · Non compliant with BiPAP  Morbid obesity ([address] )  Assessment & Plan  · Dietary and lifestyle modification advised  · Patient with history of obstructive sleep apnea but do not use BiPAP  Essential hypertension  Assessment & Plan  · Blood pressures are acceptable off all medications at this time  · Continue to monitor  VTE Pharmacologic Prophylaxis: VTE Score: 6 High Risk (Score >/= 5) - Pharmacological DVT Prophylaxis Ordered: enoxaparin (Lovenox)  Sequential Compression Devices Ordered  Patient Centered Rounds: I performed bedside rounds with nursing staff today  Discussions with Specialists or Other Care Team Provider:  Neurosurgery    Education and Discussions with Family / Patient: Updated  (wife) via phone  Time Spent for Care: 30 minutes  More than 50% of total time spent on counseling and coordination of care as described above  Current Length of Stay: 8 day(s)  Current Patient Status: Inpatient   Certification Statement: The patient will continue to require additional inpatient hospital stay due to Rehab placement  Discharge Plan: Anticipate discharge in 24-48 hrs to Acute rehab, exact location to be determined  Code Status: Level 1 - Full Code    Subjective:   Patient is currently sitting in his bedside chair playing on his computer  He denies any headache, dizziness, lightheadedness, visual changes, sensory or motor changes in his extremities  He perseverates over needing a new TV that has USB hook-up so that he can play a fire stick      Objective:     Vitals:   Temp (24hrs), Av 1 °F (36 7 °C), Min:97 4 °F (36 3 °C), Max:99 1 °F (37 3 °C)    Temp:  [97 4 °F (36 3 °C)-99 1 °F (37 3 °C)] 97 8 °F (36 6 °C)  HR:  [64-88] 64  Resp:  [16-20] 19  BP: (146-162)/(72-98) 150/98  SpO2:  [94 %-95 %] 94 %  Body mass index is 41 91 kg/m²  Input and Output Summary (last 24 hours): Intake/Output Summary (Last 24 hours) at 5/14/2021 1128  Last data filed at 5/14/2021 0900  Gross per 24 hour   Intake 1200 ml   Output --   Net 1200 ml       Physical Exam:   Physical Exam  Vitals signs and nursing note reviewed  Constitutional:       Appearance: He is well-developed  HENT:      Head: Normocephalic and atraumatic  Eyes:      Conjunctiva/sclera: Conjunctivae normal    Neck:      Musculoskeletal: Neck supple  Cardiovascular:      Rate and Rhythm: Normal rate and regular rhythm  Heart sounds: No murmur  Pulmonary:      Effort: Pulmonary effort is normal  No respiratory distress  Breath sounds: Normal breath sounds  Abdominal:      Palpations: Abdomen is soft  Tenderness: There is no abdominal tenderness  Skin:     General: Skin is warm and dry  Comments: Left temporoparietal surgical incision site is healing well with no evidence of wound dehiscence or infection  Neurological:      Mental Status: He is alert  Additional Data:     Labs:  Results from last 7 days   Lab Units 05/14/21  0606   WBC Thousand/uL 16 47*   HEMOGLOBIN g/dL 14 6   HEMATOCRIT % 44 0   PLATELETS Thousands/uL 261   NEUTROS PCT % 87*   LYMPHS PCT % 4*   MONOS PCT % 7   EOS PCT % 0     Results from last 7 days   Lab Units 05/14/21  0606   SODIUM mmol/L 137   POTASSIUM mmol/L 4 7   CHLORIDE mmol/L 107   CO2 mmol/L 25   BUN mg/dL 27*   CREATININE mg/dL 0 99   ANION GAP mmol/L 5   CALCIUM mg/dL 9 0   GLUCOSE RANDOM mg/dL 124     Results from last 7 days   Lab Units 05/12/21  0512   INR  1 06     Lines/Drains:  Invasive Devices     Peripheral Intravenous Line            Peripheral IV 05/11/21 Left Wrist 3 days    Peripheral IV 05/11/21 Right Hand 3 days              Imaging: No pertinent imaging reviewed      Recent Cultures (last 7 days):     Last 24 Hours Medication List:   Current Facility-Administered Medications   Medication Dose Route Frequency Provider Last Rate    acetaminophen  975 mg Oral Q8H [address] Freedom Anthony, DO      dexamethasone  4 mg Oral Q8H [address] Freedom Anthony, DO      Followed by   Antoni Cruz ON 5/16/2021] dexamethasone  2 mg Oral Q6H [address] Freedom Anthony, DO      Followed by   Antoni Cruz ON 5/19/2021] dexamethasone  2 mg Oral Q8H [address] [place] Anthony, DO      Followed by   Antoni Cruz ON 5/22/2021] dexamethasone  2 mg Oral Q12H [address] Freedom Anthony, DO      Followed by   Antoni Cruz ON 5/25/2021] dexamethasone  2 mg Oral Daily [place] Anthony, DO      docusate sodium  100 mg Oral BID Scarlet Krill, DO      enoxaparin  40 mg Subcutaneous Q24H [address] Freedom Anthony, DO      famotidine  20 mg Oral BID Scarlet Krill, DO      fluticasone-vilanterol  1 puff Inhalation Daily Scarlet Krill, DO      glycerin (adult)  1 suppository Rectal Daily PRN Дмитрий Jurado PA-C      hydrALAZINE  10 mg Intravenous Q4H PRN Scarlet Krill, DO      HYDROmorphone  0 2 mg Intravenous Q1H PRN Scarlet Krill, DO      Labetalol HCl  10 mg Intravenous Q4H Scarlet Krill, DO      levETIRAcetam  500 mg Oral Q12H [address] Scarlet Krill, DO      lidocaine  1 patch Topical Daily Scarlet Krill, DO      methocarbamol  500 mg Oral Q6H [address] Scarlet Krill, DO      multivitamin-minerals  1 tablet Oral Daily Scarlet Krill, DO      niCARdipine  1-15 mg/hr Intravenous Titrated Scarlet Krill, DO Stopped (05/12/21 1710)    ondansetron  4 mg Intravenous Q6H PRN Scarlet Krill, DO      oxyCODONE  2 5 mg Oral Q4H PRN Scarlet Krill, DO      oxyCODONE  5 mg Oral Q4H PRN Scarlet Krill, DO      polyethylene glycol  17 g Oral Daily Scarlet Krill, DO      senna  1 tablet Oral HS Scarlet Krill, DO        Today, Patient Was Seen By: Дмитрий Jurado PA-C    **Please Note: This note may have been constructed using a voice recognition system  ** Perineural Invasion (For Histology - Be Specific If Possible): absent

## 2022-09-08 ENCOUNTER — HOME CARE VISIT (OUTPATIENT)
Dept: HOME HEALTH SERVICES | Facility: HOME HEALTHCARE | Age: 72
End: 2022-09-08
Payer: MEDICARE

## 2022-09-08 VITALS
TEMPERATURE: 98.4 F | HEART RATE: 66 BPM | DIASTOLIC BLOOD PRESSURE: 82 MMHG | SYSTOLIC BLOOD PRESSURE: 168 MMHG | RESPIRATION RATE: 16 BRPM

## 2022-09-08 PROCEDURE — G0299 HHS/HOSPICE OF RN EA 15 MIN: HCPCS

## 2022-09-13 ENCOUNTER — HOME CARE VISIT (OUTPATIENT)
Dept: HOME HEALTH SERVICES | Facility: HOME HEALTHCARE | Age: 72
End: 2022-09-13
Payer: MEDICARE

## 2022-09-13 VITALS
RESPIRATION RATE: 18 BRPM | HEART RATE: 62 BPM | DIASTOLIC BLOOD PRESSURE: 62 MMHG | SYSTOLIC BLOOD PRESSURE: 148 MMHG | TEMPERATURE: 98.4 F

## 2022-09-13 PROCEDURE — G0299 HHS/HOSPICE OF RN EA 15 MIN: HCPCS

## 2022-09-20 ENCOUNTER — HOME CARE VISIT (OUTPATIENT)
Dept: HOME HEALTH SERVICES | Facility: HOME HEALTHCARE | Age: 72
End: 2022-09-20
Payer: MEDICARE

## 2022-09-20 VITALS
DIASTOLIC BLOOD PRESSURE: 78 MMHG | RESPIRATION RATE: 20 BRPM | HEART RATE: 66 BPM | TEMPERATURE: 98.2 F | SYSTOLIC BLOOD PRESSURE: 144 MMHG

## 2022-09-20 PROCEDURE — G0299 HHS/HOSPICE OF RN EA 15 MIN: HCPCS

## 2022-09-28 ENCOUNTER — HOME CARE VISIT (OUTPATIENT)
Dept: HOME HOSPICE | Facility: HOSPICE | Age: 72
End: 2022-09-28
Payer: MEDICARE

## 2022-09-28 ENCOUNTER — HOME CARE VISIT (OUTPATIENT)
Dept: HOME HEALTH SERVICES | Facility: HOME HEALTHCARE | Age: 72
End: 2022-09-28
Payer: MEDICARE

## 2022-09-28 VITALS
DIASTOLIC BLOOD PRESSURE: 78 MMHG | BODY MASS INDEX: 45.44 KG/M2 | HEART RATE: 62 BPM | RESPIRATION RATE: 18 BRPM | TEMPERATURE: 98.8 F | SYSTOLIC BLOOD PRESSURE: 144 MMHG | WEIGHT: 315 LBS

## 2022-09-28 PROCEDURE — G0299 HHS/HOSPICE OF RN EA 15 MIN: HCPCS

## 2022-09-28 PROCEDURE — G0155 HHCP-SVS OF CSW,EA 15 MIN: HCPCS

## 2022-10-04 ENCOUNTER — HOME CARE VISIT (OUTPATIENT)
Dept: HOME HEALTH SERVICES | Facility: HOME HEALTHCARE | Age: 72
End: 2022-10-04
Payer: MEDICARE

## 2022-10-04 VITALS
DIASTOLIC BLOOD PRESSURE: 82 MMHG | TEMPERATURE: 99.5 F | RESPIRATION RATE: 14 BRPM | HEART RATE: 62 BPM | SYSTOLIC BLOOD PRESSURE: 160 MMHG

## 2022-10-04 PROCEDURE — G0299 HHS/HOSPICE OF RN EA 15 MIN: HCPCS

## 2022-10-07 ENCOUNTER — HOME CARE VISIT (OUTPATIENT)
Dept: HOME HOSPICE | Facility: HOSPICE | Age: 72
End: 2022-10-07
Payer: MEDICARE

## 2022-10-11 ENCOUNTER — HOME CARE VISIT (OUTPATIENT)
Dept: HOME HEALTH SERVICES | Facility: HOME HEALTHCARE | Age: 72
End: 2022-10-11
Payer: MEDICARE

## 2022-10-11 VITALS
TEMPERATURE: 98.2 F | HEART RATE: 64 BPM | WEIGHT: 315 LBS | BODY MASS INDEX: 45.61 KG/M2 | SYSTOLIC BLOOD PRESSURE: 152 MMHG | DIASTOLIC BLOOD PRESSURE: 68 MMHG | RESPIRATION RATE: 16 BRPM

## 2022-10-11 PROCEDURE — G0299 HHS/HOSPICE OF RN EA 15 MIN: HCPCS

## 2022-10-17 ENCOUNTER — HOME CARE VISIT (OUTPATIENT)
Dept: HOME HOSPICE | Facility: HOSPICE | Age: 72
End: 2022-10-17
Payer: MEDICARE

## 2022-10-18 ENCOUNTER — HOME CARE VISIT (OUTPATIENT)
Dept: HOME HEALTH SERVICES | Facility: HOME HEALTHCARE | Age: 72
End: 2022-10-18
Payer: MEDICARE

## 2022-10-18 VITALS
SYSTOLIC BLOOD PRESSURE: 150 MMHG | HEART RATE: 60 BPM | TEMPERATURE: 97.8 F | WEIGHT: 315 LBS | RESPIRATION RATE: 22 BRPM | DIASTOLIC BLOOD PRESSURE: 92 MMHG | BODY MASS INDEX: 45.05 KG/M2

## 2022-10-18 PROCEDURE — G0299 HHS/HOSPICE OF RN EA 15 MIN: HCPCS

## 2022-10-25 ENCOUNTER — HOME CARE VISIT (OUTPATIENT)
Dept: HOME HEALTH SERVICES | Facility: HOME HEALTHCARE | Age: 72
End: 2022-10-25
Payer: MEDICARE

## 2022-10-25 VITALS
RESPIRATION RATE: 14 BRPM | TEMPERATURE: 98.1 F | DIASTOLIC BLOOD PRESSURE: 80 MMHG | SYSTOLIC BLOOD PRESSURE: 152 MMHG | HEART RATE: 72 BPM

## 2022-10-25 PROCEDURE — G0299 HHS/HOSPICE OF RN EA 15 MIN: HCPCS

## 2022-11-01 ENCOUNTER — HOME CARE VISIT (OUTPATIENT)
Dept: HOME HEALTH SERVICES | Facility: HOME HEALTHCARE | Age: 72
End: 2022-11-01

## 2022-11-01 VITALS
RESPIRATION RATE: 16 BRPM | TEMPERATURE: 98.1 F | HEART RATE: 56 BPM | DIASTOLIC BLOOD PRESSURE: 82 MMHG | SYSTOLIC BLOOD PRESSURE: 144 MMHG

## 2022-11-02 ENCOUNTER — HOME CARE VISIT (OUTPATIENT)
Dept: HOME HOSPICE | Facility: HOSPICE | Age: 72
End: 2022-11-02

## 2022-11-08 ENCOUNTER — HOME CARE VISIT (OUTPATIENT)
Dept: HOME HEALTH SERVICES | Facility: HOME HEALTHCARE | Age: 72
End: 2022-11-08

## 2022-11-08 VITALS
RESPIRATION RATE: 16 BRPM | SYSTOLIC BLOOD PRESSURE: 138 MMHG | DIASTOLIC BLOOD PRESSURE: 90 MMHG | TEMPERATURE: 98.9 F | HEART RATE: 60 BPM

## 2022-11-15 ENCOUNTER — HOME CARE VISIT (OUTPATIENT)
Dept: HOME HEALTH SERVICES | Facility: HOME HEALTHCARE | Age: 72
End: 2022-11-15

## 2022-11-15 VITALS
DIASTOLIC BLOOD PRESSURE: 72 MMHG | TEMPERATURE: 98.3 F | RESPIRATION RATE: 16 BRPM | SYSTOLIC BLOOD PRESSURE: 128 MMHG | HEART RATE: 66 BPM

## 2022-11-22 ENCOUNTER — HOME CARE VISIT (OUTPATIENT)
Dept: HOME HOSPICE | Facility: HOSPICE | Age: 72
End: 2022-11-22

## 2022-11-22 ENCOUNTER — HOME CARE VISIT (OUTPATIENT)
Dept: HOME HEALTH SERVICES | Facility: HOME HEALTHCARE | Age: 72
End: 2022-11-22

## 2022-11-22 VITALS
RESPIRATION RATE: 18 BRPM | DIASTOLIC BLOOD PRESSURE: 70 MMHG | TEMPERATURE: 98.1 F | HEART RATE: 68 BPM | SYSTOLIC BLOOD PRESSURE: 150 MMHG

## 2022-11-30 ENCOUNTER — HOME CARE VISIT (OUTPATIENT)
Dept: HOME HEALTH SERVICES | Facility: HOME HEALTHCARE | Age: 72
End: 2022-11-30

## 2022-11-30 VITALS
DIASTOLIC BLOOD PRESSURE: 68 MMHG | TEMPERATURE: 98.4 F | HEART RATE: 60 BPM | SYSTOLIC BLOOD PRESSURE: 152 MMHG | RESPIRATION RATE: 16 BRPM

## 2022-12-07 ENCOUNTER — HOME CARE VISIT (OUTPATIENT)
Dept: HOME HEALTH SERVICES | Facility: HOME HEALTHCARE | Age: 72
End: 2022-12-07

## 2022-12-07 VITALS
SYSTOLIC BLOOD PRESSURE: 132 MMHG | DIASTOLIC BLOOD PRESSURE: 62 MMHG | HEART RATE: 66 BPM | RESPIRATION RATE: 24 BRPM | TEMPERATURE: 98.2 F

## 2022-12-08 ENCOUNTER — HOME CARE VISIT (OUTPATIENT)
Dept: HOME HEALTH SERVICES | Facility: HOME HEALTHCARE | Age: 72
End: 2022-12-08

## 2022-12-08 VITALS — RESPIRATION RATE: 20 BRPM | TEMPERATURE: 97.3 F | HEART RATE: 80 BPM

## 2022-12-14 ENCOUNTER — HOME CARE VISIT (OUTPATIENT)
Dept: HOME HEALTH SERVICES | Facility: HOME HEALTHCARE | Age: 72
End: 2022-12-14

## 2022-12-14 VITALS
SYSTOLIC BLOOD PRESSURE: 162 MMHG | DIASTOLIC BLOOD PRESSURE: 92 MMHG | TEMPERATURE: 97.6 F | HEART RATE: 102 BPM | RESPIRATION RATE: 22 BRPM

## 2022-12-16 ENCOUNTER — HOME CARE VISIT (OUTPATIENT)
Dept: HOME HEALTH SERVICES | Facility: HOME HEALTHCARE | Age: 72
End: 2022-12-16

## 2022-12-17 ENCOUNTER — HOME CARE VISIT (OUTPATIENT)
Dept: HOME HEALTH SERVICES | Facility: HOME HEALTHCARE | Age: 72
End: 2022-12-17

## 2022-12-17 ENCOUNTER — HOME CARE VISIT (OUTPATIENT)
Dept: HOME HOSPICE | Facility: HOSPICE | Age: 72
End: 2022-12-17

## 2022-12-20 ENCOUNTER — HOME CARE VISIT (OUTPATIENT)
Dept: HOME HEALTH SERVICES | Facility: HOME HEALTHCARE | Age: 72
End: 2022-12-20

## 2022-12-20 VITALS — DIASTOLIC BLOOD PRESSURE: 82 MMHG | HEART RATE: 88 BPM | RESPIRATION RATE: 16 BRPM | SYSTOLIC BLOOD PRESSURE: 132 MMHG

## 2022-12-21 ENCOUNTER — HOME CARE VISIT (OUTPATIENT)
Dept: HOME HEALTH SERVICES | Facility: HOME HEALTHCARE | Age: 72
End: 2022-12-21

## 2022-12-23 ENCOUNTER — HOME CARE VISIT (OUTPATIENT)
Dept: HOME HEALTH SERVICES | Facility: HOME HEALTHCARE | Age: 72
End: 2022-12-23

## 2022-12-23 VITALS — RESPIRATION RATE: 30 BRPM

## 2022-12-27 ENCOUNTER — HOME CARE VISIT (OUTPATIENT)
Dept: HOME HEALTH SERVICES | Facility: HOME HEALTHCARE | Age: 72
End: 2022-12-27

## 2022-12-27 VITALS
TEMPERATURE: 98.1 F | RESPIRATION RATE: 12 BRPM | SYSTOLIC BLOOD PRESSURE: 138 MMHG | HEART RATE: 62 BPM | DIASTOLIC BLOOD PRESSURE: 72 MMHG

## 2022-12-28 ENCOUNTER — HOME CARE VISIT (OUTPATIENT)
Dept: HOME HEALTH SERVICES | Facility: HOME HEALTHCARE | Age: 72
End: 2022-12-28

## 2022-12-30 ENCOUNTER — HOME CARE VISIT (OUTPATIENT)
Dept: HOME HEALTH SERVICES | Facility: HOME HEALTHCARE | Age: 72
End: 2022-12-30

## 2023-01-01 ENCOUNTER — HOME CARE VISIT (OUTPATIENT)
Dept: HOME HOSPICE | Facility: HOSPICE | Age: 73
End: 2023-01-01

## 2023-01-01 ENCOUNTER — HOME CARE VISIT (OUTPATIENT)
Dept: HOME HEALTH SERVICES | Facility: HOME HEALTHCARE | Age: 73
End: 2023-01-01

## 2023-01-01 VITALS — RESPIRATION RATE: 24 BRPM

## 2023-01-13 ENCOUNTER — HOME CARE VISIT (OUTPATIENT)
Dept: HOME HOSPICE | Facility: HOSPICE | Age: 73
End: 2023-01-13

## 2023-04-22 NOTE — ASSESSMENT & PLAN NOTE
· Not in acute exacerbation  · Continue respiratory protocol  · Patient's home inhalers are non formulary  Family can bring in from home if he wishes she use them  Otherwise continue non formulary alternatives  Discharged

## 2025-03-20 NOTE — TELEPHONE ENCOUNTER
Patient follows with Dr Mayela Norton and  [facility]  I saw him initially that is why  reports are coming to me  All let Dr Mayela Norton and danyelle know to order new sets of blood tests so that reports should go to them     Platelet count has dropped to 24,000  I spoke with patient's wife and she said she had spoken  with Dr Modesto Dobbs yesterday and he advised to hold Temodar  and Eliquis    Patient and his wife will get in touch with Dr Mayela Norton tomorrow (Monday) VTE Present on Admission, Assessment Completed on: 20-Mar-2025 18:28

## (undated) DEVICE — CHLORAPREP HI-LITE 26ML ORANGE

## (undated) DEVICE — TOOL F2/8TA23 LEGEND 8CM 2.3MM TAPER: Brand: MIDAS REX™

## (undated) DEVICE — MAYFIELD® DISPOSABLE ADULT SKULL PIN (PLASTIC BASE): Brand: MAYFIELD®

## (undated) DEVICE — ELECTRODE BLADE MOD E-Z CLEAN 2.5IN 6.4CM -0012M

## (undated) DEVICE — SURGICEL 4 X 8

## (undated) DEVICE — CURITY STRETCH BANDAGE: Brand: CURITY

## (undated) DEVICE — MONITORING SPINAL IMPULSE CASE FEE

## (undated) DEVICE — SUT PROLENE 2-0 CT-2 30 IN 8411H

## (undated) DEVICE — MARKER REFLECTIVE RADIOPAQUE SPHERE

## (undated) DEVICE — OCCLUSIVE GAUZE STRIP,3% BISMUTH TRIBROMOPHENATE IN PETROLATUM BLEND: Brand: XEROFORM

## (undated) DEVICE — DRAIN JACKSON PRATT 10FR 1/8END: Brand: CARDINAL HEALTH

## (undated) DEVICE — PACK CRANIOTOMY PBDS RF

## (undated) DEVICE — DISPOSABLE SLIM BIPOLAR FORCEPS, NON-STICK,: Brand: SPETZLER-MALIS

## (undated) DEVICE — DRAPE SHEET THREE QUARTER

## (undated) DEVICE — DISPOSABLE EQUIPMENT COVER: Brand: SMALL TOWEL DRAPE

## (undated) DEVICE — SUPPLY FEE STD

## (undated) DEVICE — TUBING SUCTION 5MM X 12 FT

## (undated) DEVICE — DRAPE INTESTINAL ISOLATION BAG

## (undated) DEVICE — 3M™ IOBAN™ 2 ANTIMICROBIAL INCISE DRAPE 6650EZ: Brand: IOBAN™ 2

## (undated) DEVICE — PROXIMATE SKIN STAPLERS (35 WIDE) CONTAINS 35 STAINLESS STEEL STAPLES (FIXED HEAD): Brand: PROXIMATE

## (undated) DEVICE — SPONGE SCRUB 4 PCT CHLORHEXIDINE

## (undated) DEVICE — TRAY FOLEY 16FR URIMETER SURESTEP

## (undated) DEVICE — SUT NUROLON 4-0 TF CR/8 18 IN C584D

## (undated) DEVICE — UTILITY MARKER,BLACK WITH LABELS: Brand: DEVON

## (undated) DEVICE — RANEY SCALP CLIP STERILE: Brand: AESCULAP

## (undated) DEVICE — DRESSING ALLEVYN LIFE SACRAL 6.75 X 6.5 IN

## (undated) DEVICE — KERLIX BANDAGE ROLL: Brand: KERLIX

## (undated) DEVICE — SUT SILK 2-0 SH CR/8 18 IN C012D

## (undated) DEVICE — Device: Brand: IQ SYSTEM

## (undated) DEVICE — DRAPE MICROSCOPE OPMI PENTERO

## (undated) DEVICE — GLOVE INDICATOR PI UNDERGLOVE SZ 8 BLUE

## (undated) DEVICE — INTENDED FOR TISSUE SEPARATION, AND OTHER PROCEDURES THAT REQUIRE A SHARP SURGICAL BLADE TO PUNCTURE OR CUT.: Brand: BARD-PARKER SAFETY BLADES SIZE 10, STERILE

## (undated) DEVICE — TELFA NON-ADHERENT ABSORBENT DRESSING: Brand: TELFA

## (undated) DEVICE — SURGIFOAM 8.5 X 12.5

## (undated) DEVICE — BETADINE OINTMENT FOIL PACK

## (undated) DEVICE — GLOVE SRG BIOGEL 7.5

## (undated) DEVICE — INTENDED FOR TISSUE SEPARATION, AND OTHER PROCEDURES THAT REQUIRE A SHARP SURGICAL BLADE TO PUNCTURE OR CUT.: Brand: BARD-PARKER ® CARBON RIB-BACK BLADES

## (undated) DEVICE — JACKSON-PRATT 100CC BULB RESERVOIR: Brand: CARDINAL HEALTH

## (undated) DEVICE — SEALANT FIBRIN VISTASEAL 10ML

## (undated) DEVICE — ANTIBACTERIAL VIOLET BRAIDED (POLYGLACTIN 910), SYNTHETIC ABSORBABLE SUTURE: Brand: COATED VICRYL

## (undated) DEVICE — TOOL 9MH30 LEGEND 9CM 3MM MH: Brand: MIDAS REX

## (undated) DEVICE — STOCKINETTE REGULAR

## (undated) DEVICE — SPECIMEN CONTAINER STERILE PEEL PACK

## (undated) DEVICE — PETRI DISH STERILE